# Patient Record
Sex: MALE | URBAN - METROPOLITAN AREA
[De-identification: names, ages, dates, MRNs, and addresses within clinical notes are randomized per-mention and may not be internally consistent; named-entity substitution may affect disease eponyms.]

---

## 2022-09-12 ENCOUNTER — TELEPHONE (OUTPATIENT)
Dept: HEMATOLOGY/ONCOLOGY | Facility: CLINIC | Age: 57
End: 2022-09-12

## 2022-09-12 NOTE — TELEPHONE ENCOUNTER
----- Message from Viry Chew sent at 9/12/2022  8:46 AM CDT -----  Terri (Wife) called to consult with nurse or staff regarding an appointment. She states the patient is currently being seen at [facility] and wanted to find a provider closer because they live in [place]. Terri states that the provider the patient is currently seeing does not do referrals and wanted to see what they can do to get the patient see. She left a number for Dr. Peralta, the oncologist at [phone] just in case the office needs to reach out. Terri would like a call back and can be reached at [phone]. Thanks/MR

## 2022-09-12 NOTE — TELEPHONE ENCOUNTER
Spoke with the patients spouse she states the patient has an appointment with Dr Tomlinson this Wednesday. My direct number was provided and I let her know that we would be more than happy to schedule the patient. They will see Dr Tomlinson Wednesday. They will call us if they would like to make an appointment. TTRN

## 2024-07-31 ENCOUNTER — HOSPITAL ENCOUNTER (EMERGENCY)
Facility: HOSPITAL | Age: 59
Discharge: HOME | End: 2024-07-31
Attending: INTERNAL MEDICINE
Payer: COMMERCIAL

## 2024-07-31 VITALS
TEMPERATURE: 99 F | OXYGEN SATURATION: 100 % | DIASTOLIC BLOOD PRESSURE: 67 MMHG | SYSTOLIC BLOOD PRESSURE: 118 MMHG | RESPIRATION RATE: 18 BRPM | HEIGHT: 67 IN | BODY MASS INDEX: 21.66 KG/M2 | WEIGHT: 138 LBS | HEART RATE: 54 BPM

## 2024-07-31 DIAGNOSIS — R10.84 GENERALIZED ABDOMINAL PAIN: Primary | ICD-10-CM

## 2024-07-31 DIAGNOSIS — Z85.07 PERSONAL HISTORY OF PANCREATIC CANCER: ICD-10-CM

## 2024-07-31 DIAGNOSIS — K59.00 CONSTIPATION, UNSPECIFIED CONSTIPATION TYPE: ICD-10-CM

## 2024-07-31 LAB
ALBUMIN SERPL-MCNC: 3.3 G/DL (ref 3.5–5)
ALBUMIN/GLOB SERPL: 0.9 RATIO (ref 1.1–2)
ALP SERPL-CCNC: 839 UNIT/L (ref 40–150)
ALT SERPL-CCNC: 91 UNIT/L (ref 0–55)
AMMONIA PLAS-MSCNC: 18.2 UMOL/L (ref 18–72)
AMYLASE SERPL-CCNC: 28 UNIT/L (ref 25–125)
ANION GAP SERPL CALC-SCNC: 7 MEQ/L
AST SERPL-CCNC: 102 UNIT/L (ref 5–34)
BASOPHILS # BLD AUTO: 0.03 X10(3)/MCL
BASOPHILS NFR BLD AUTO: 0.5 %
BILIRUB SERPL-MCNC: 0.4 MG/DL
BILIRUB UR QL STRIP.AUTO: NEGATIVE
BUN SERPL-MCNC: 13 MG/DL (ref 8.4–25.7)
CALCIUM SERPL-MCNC: 9.8 MG/DL (ref 8.4–10.2)
CHLORIDE SERPL-SCNC: 108 MMOL/L (ref 98–107)
CLARITY UR: CLEAR
CO2 SERPL-SCNC: 27 MMOL/L (ref 22–29)
COLOR UR AUTO: NORMAL
CREAT SERPL-MCNC: 0.94 MG/DL (ref 0.73–1.18)
CREAT/UREA NIT SERPL: 14
EOSINOPHIL # BLD AUTO: 0.14 X10(3)/MCL (ref 0–0.9)
EOSINOPHIL NFR BLD AUTO: 2.3 %
ERYTHROCYTE [DISTWIDTH] IN BLOOD BY AUTOMATED COUNT: 15.2 % (ref 11.5–17)
GFR SERPLBLD CREATININE-BSD FMLA CKD-EPI: >60 ML/MIN/1.73/M2
GLOBULIN SER-MCNC: 3.8 GM/DL (ref 2.4–3.5)
GLUCOSE SERPL-MCNC: 123 MG/DL (ref 74–100)
GLUCOSE UR QL STRIP: NEGATIVE
HCT VFR BLD AUTO: 34.5 % (ref 42–52)
HGB BLD-MCNC: 10.7 G/DL (ref 14–18)
HGB UR QL STRIP: NEGATIVE
IMM GRANULOCYTES # BLD AUTO: 0.01 X10(3)/MCL (ref 0–0.04)
IMM GRANULOCYTES NFR BLD AUTO: 0.2 %
KETONES UR QL STRIP: NEGATIVE
LACTATE SERPL-SCNC: 0.8 MMOL/L (ref 0.5–2.2)
LEUKOCYTE ESTERASE UR QL STRIP: NEGATIVE
LIPASE SERPL-CCNC: <4 U/L
LYMPHOCYTES # BLD AUTO: 0.97 X10(3)/MCL (ref 0.6–4.6)
LYMPHOCYTES NFR BLD AUTO: 15.8 %
MCH RBC QN AUTO: 27 PG (ref 27–31)
MCHC RBC AUTO-ENTMCNC: 31 G/DL (ref 33–36)
MCV RBC AUTO: 86.9 FL (ref 80–94)
MONOCYTES # BLD AUTO: 0.63 X10(3)/MCL (ref 0.1–1.3)
MONOCYTES NFR BLD AUTO: 10.3 %
NEUTROPHILS # BLD AUTO: 4.34 X10(3)/MCL (ref 2.1–9.2)
NEUTROPHILS NFR BLD AUTO: 70.9 %
NITRITE UR QL STRIP: NEGATIVE
PH UR STRIP: 5.5 [PH]
PLATELET # BLD AUTO: 183 X10(3)/MCL (ref 130–400)
PMV BLD AUTO: 9.9 FL (ref 7.4–10.4)
POTASSIUM SERPL-SCNC: 4.5 MMOL/L (ref 3.5–5.1)
PROT SERPL-MCNC: 7.1 GM/DL (ref 6.4–8.3)
PROT UR QL STRIP: NEGATIVE
RBC # BLD AUTO: 3.97 X10(6)/MCL (ref 4.7–6.1)
SODIUM SERPL-SCNC: 142 MMOL/L (ref 136–145)
SP GR UR STRIP.AUTO: >=1.03 (ref 1–1.03)
UROBILINOGEN UR STRIP-ACNC: 0.2
WBC # BLD AUTO: 6.12 X10(3)/MCL (ref 4.5–11.5)

## 2024-07-31 PROCEDURE — 25000003 PHARM REV CODE 250: Performed by: INTERNAL MEDICINE

## 2024-07-31 PROCEDURE — 82140 ASSAY OF AMMONIA: CPT | Performed by: INTERNAL MEDICINE

## 2024-07-31 PROCEDURE — 80053 COMPREHEN METABOLIC PANEL: CPT | Performed by: INTERNAL MEDICINE

## 2024-07-31 PROCEDURE — 83605 ASSAY OF LACTIC ACID: CPT | Performed by: INTERNAL MEDICINE

## 2024-07-31 PROCEDURE — 82150 ASSAY OF AMYLASE: CPT | Performed by: INTERNAL MEDICINE

## 2024-07-31 PROCEDURE — 83690 ASSAY OF LIPASE: CPT | Performed by: INTERNAL MEDICINE

## 2024-07-31 PROCEDURE — 99284 EMERGENCY DEPT VISIT MOD MDM: CPT | Mod: 25

## 2024-07-31 PROCEDURE — 85025 COMPLETE CBC W/AUTO DIFF WBC: CPT | Performed by: INTERNAL MEDICINE

## 2024-07-31 PROCEDURE — 81003 URINALYSIS AUTO W/O SCOPE: CPT | Performed by: INTERNAL MEDICINE

## 2024-07-31 RX ORDER — LACTULOSE 10 G/15ML
20 SOLUTION ORAL
Status: COMPLETED | OUTPATIENT
Start: 2024-07-31 | End: 2024-07-31

## 2024-07-31 RX ADMIN — LACTULOSE 20 G: 20 SOLUTION ORAL at 10:07

## 2024-08-22 DIAGNOSIS — C25.9 ADENOCARCINOMA OF PANCREAS: Primary | ICD-10-CM

## 2024-08-30 NOTE — PROGRESS NOTES
HEMATOLOGY/ONCOLOGY OFFICE CLINIC VISIT    Visit Information:    Initial Evaluation: 9/3/2024  Referring Provider: Alba Chisholm MD PhD ([facility]) -Cell [phone]  Other providers:  Code status: Not addressed    Diagnosis:  Advanced pancreatic ductal adenocarcinoma (Dx 8/2022)     Present treatment:  Gemzar monotherapy 8/28/2024  -Gemcitabine and CISplatin Every 2 Weeks (8/28/2024-present)--planned  Chemotherapy summary: CISplatin (PLATINOL) 53 mg in sodium chloride 0.9% (NS) 250 mL IVPB, intravenous, 0 of 12 cycles  gemcitabine (GEMZAR) 836 mg in sodium chloride 0.9% (NS) 250 mL IVPB, intravenous, 0 of 12 cycles     Treatment/Oncology history:  -8/2/2022 ERCP with metal biliary stent placement   -Folfirinox (10/2022- 2/2023)  -capecitabine RT (4/3/23 - 5/16/23)   -Phase I study 2021-1176 SD-101 at [facility]--SD-101 2 mg in sodium chloride 0.9% (NS) 30 mL IVPB, intravenous x 2 cycles (8/22/2023-11/22/2023)  -Gemcitabine and paclitaxel protein bound (12/13/2023-- 7/2024)-->clinical progression and GOO  -s/p biliary stent placement 4/4/2024  -chemotherapy with gemcitabine/paclitaxel (last dose 6/26/2024)  -duodenal stent (placed by GI)- 8/7/2024  -Gemcitabine and CISplatin Every 2 Weeks (8/28/2024-present)      Goal of care: life prolongation    Imaging:  CT CAP 8/21/2022: Since 7/25/2022, the primary pancreatic head mass encasing the common hepatic artery remains stable. The intrahepatic biliary obstruction improved with interval placement of a CBD stent. No definite distant metastasis in the abdomen or pelvis. Small indeterminate left lower lobe pulmonary nodule can be followed.  CT CAP 12/12/2022:  Primary pancreatic head tumor encasing the common hepatic artery is overall unchanged in size since 8/21/2022. Stable upstream pancreatic duct dilation and pancreatic atrophy. Interval placement of a cholecystostomy tube, with decompression of the gallbladder. No definite evidence of distant metastatic disease in the chest,  abdomen, and pelvis.   CT CAP 3/7/2023: Locally advanced pancreatic head tumor is slightly less conspicuous. Stable portacaval lymphadenopathy. Geographic hypodense regions have significantly increased throughout the liver compatible with fatty liver, to be correlated clinically regarding any potential concomitant hepatotoxicity; this appearance could obscure small low contrast liver lesions, to be better evaluated with MRI, if indicated. Unchanged mild jennifer appearance of the omentum is favored to be postinflammatory. No definite distant metastatic disease within the chest abdomen pelvis.   Ct CAP 6/29/2023:1.  Locally advanced pancreatic head mass not significantly changed. 2.  Increased periportal haziness and central liver heterogeneity which may relate to radiation.   3.  Indeterminate inferior right hepatic hypodensity as described above. Punctate left hepatic hypodensity also not previously evident, too small to characterize. Consider further evaluation with MRI as indicated.    MRI AP 7/15/2023:Primary neoplasm is noted within the pancreatic head and causes pancreatic ductal dilation and biliary ductal obstruction, which is decompressed via a stent. Nonspecific focal dilation of segment IV bile duct is noted.    CT CAP 10/2/2023: 1.  Compared to 07/28/2023, interval embolization of SMV tributaries in the epigastric region with new embolization coils in the right hepatic lobe following transhepatic catheterization. 2.  Previously noted small indeterminate low-attenuation lesion in the inferior right hepatic lobe segment 6 is no longer visualized. No new hepatic lesions.    3.  Stable 2.2 cm ill-defined residual pancreatic head tumor with no change in the vascular contact with the main portal vein, common hepatic and proper hepatic artery. 4.  No new metastatic disease in the chest or abdomen.   CT CAP 1/9/2024: 1.  Mild interval increase in size of the primary tumor in the head of the pancreas. 2.  No  evidence of metastatic disease in the chest, abdomen or pelvis.   CT AP 4/3/2024: 1.  There is worsening intrahepatic biliary dilation despite the presence of a biliary stent, suggesting stent occlusion. 2.  No significant change in the locally advanced tumor of the head of the pancreas since 03/12/2024.   CT CAP 6/21/2024: 1. No convincing change in the large locally advanced infiltrating ill-defined mass in the head and neck of the pancreas compared with 5/14/2024. 2. No specific evidence of distant metastatic disease in the chest, abdomen or pelvis.   XR ABDOMEN 1 VW PORTABLE 8/6/2024:  There is a gastric drainage tube with the tip below the diaphragm projecting over the gastric body with the sidehole below the GE junction in appropriate position. Gastric drainage tube with tip and sidehole projecting below the diaphragm over the gastric body.  X-ray Abdomen AP  8/5/2024: Air is seen scattered throughout normal caliber colon in a nonobstructive pattern. No dilated loops of bowel. Moderate colonic stool burden. No intraperitoneal free air within the limitations of this study. A biliary stent and embolization coils overlie the right upper quadrant. No suspicious osseous lesions.   Nonobstructive bowel gas pattern. I personally reviewed these image(s) along with the resident's/fellow's interpretations, certify that if a procedure was performed I was physically present, and agree with the final report.  CT Abdomen Pelvis with Contrast 8/5/2024: No suspicious pulmonary nodules in the lung bases. Hepatobiliary: Evaluation of the liver is somewhat limited secondary to streak artifact from the embolization coils. Within these limits there is no suspicious hepatic lesion. The gallbladder is decompressed and poorly evaluated. Common bile duct stent with expected pneumobilia. Mild intrahepatic biliary ductal dilatation is not significantly changed. Spleen: No splenomegaly. Pancreas: Ill-defined infiltrating mass of the  pancreatic head and neck, in keeping with pancreatic adenocarcinoma. There is atrophy and ductal dilatation of the pancreatic body and tail. Overall this tumor appears larger Adrenal Glands: No mass. Kidneys, Ureters, Bladder: No hydronephrosis. No suspicious renal lesion. No bladder mass. Gastrointestinal Tract: The stomach is distended with fluid and debris, suggesting there may be a degree of gastric outlet obstruction secondary to the pancreatic tumor. This is not significantly changed. There is no downstream obstruction. Pelvic Organs: No pelvic mass. Prostatomegaly. Peritoneum/Retroperitoneum: No ascites. Lymph Nodes: No lymphadenopathy. Musculoskeletal: No suspicious skeletal lesion.     No acute abdominopelvic abnormality. Attending addendum: The pancreatic tumor appears larger compared to 06/21/2024. There is gastric distention with debris suggesting chronic outlet obstruction ACTIONABLE ITEMS/RECOMMENDATIONS*: None. *An Actionable Finding is a finding that may be unrelated to the original reason for imaging but potentially actionable, meaning further investigation may be necessary. The Actionable Findings Vigilance Unit (AFVU) assists medical providers with responding to additional radiologic findings that are unexpected and potentially actionable. I personally reviewed these image(s) along with the resident's/fellow's interpretations, certify that if a procedure was performed I was physically present, and agree with the final report.     Pathology:  8/2/2022:  FNA head pancreas: SCANT MALIGNANT CELLS, FAVOR ADENOCARCINOMA  NGS:  No detectable genomic aberrations      CLINICAL HISTORY:       Patient: Warren Lejeune is a 59 y.o. male.with a past medical history of past medical history of pancreatic ductal adenocarcinoma (Dx 8/2022)   Patient initially presented with  unintentional weight loss starting in 1/2022. He also had worsening back/shoulder and abdominal pain during this time. In July he finally  presented to his PCP with darkening of the urine and lightening of the stool - with Jaundice that his wife noticed. Labs demonstrating cholestasis (bilirubin of 19).    Patient noted to have an abnormal CT scan after developing obstructive jaundice. 2 cm hypoenhancing mass noted in the head of the pancreas with intra and extrahepatic biliary ductal dilatation. Mass measured 2.5 x 2.1 cm. There is also atrophy of the distal gland and upstream dilatation of the pancreas duct. There is no obvious vascular invasion. There were no focal liver lesions.    8/2/22- EGD/EUS/FNA. Final pathology showed ductal adenocarcinoma, moderately differentiated. ERCP on the same date performed, with placement of a fully covered metal biliary stent, 60 mm x 10 mm.    7/27/22- CA 19-9 was 681    8/21/22- CT CAP: Since 7/25/2022, the primary pancreatic head mass encasing the common hepatic artery remains stable. The intrahepatic biliary obstruction improved with interval placement of a CBD stent. No definite distant metastasis in the abdomen or pelvis. Small indeterminate left lower lobe pulmonary nodule can be followed.    09/03/2022 Germline genetic testing: Negative for germline pathogenic variants in any of the analyzed genes, Genes Analyzed: APC, DALE, BMPR1A, BRCA1, BRCA2, CDKN2A, EPCAM, MEN1, MLH1, MSH2, MSH6, NF1, PALB2, PMS2, SMAD4, STK11, TP53, TSC1, TSC2, and VHL. Genetic testing performed by [employer]; full report available in scanned documents.     s/p biliary stent placement, currently undergoing chemotherapy with gemcitabine/paclitaxel (last dose 6/26/2024), T2DM on insulin presenting for abdominal pain and constipation. CT A/P with distension in stomach suggesting gastric outlet obstruction secondary to pancreatic tumor. GI and Surg Onc consulted, pending evaluation. Poor PO tolerance, deferring NG tube now as vomiting resolves and having bowel movements.     Patient was recently admitted to the hospital at  "[facility]--Hospital Course:  "Findings on imaging were concerning for malignant gastric outlet obstruction from pancreatic cancer. After a discussion with surgical oncology, GI and GIMO, decision was made to proceed with duodenal stent (placed by GI). No indications for gastrojejunostomy bypass. Patient tolerated procedure well. We were able to advance to low fiber diet. Nutrition was consulted for low fiber diet education. Managed neoplasm related pain with PO morphine."     Patient is here today with his wife to continue chemotherapy close to home.  He is doing relatively well and voices no concerns.  MediPort was removed from his left chest wall to exposure of the MediPort.  Patient reports abdominal mid back pain, occasional muscle spasm and dizziness.  No fever, chills, sweats.  No chest pain or short of breath.    Chief Complaint: Pancreatic Cancer (Pt c/o abd and middle back pain that radiates downward. He is also having muscle spasms and dizziness. )      Interval History:        Past Medical History:   Diagnosis Date    Bradycardia     Enlarged prostate     Pancreatic cancer     Type 2 diabetes mellitus with unspecified diabetic retinopathy without macular edema       Past Surgical History:   Procedure Laterality Date    EGD, WITH STENT INSERTION      MEDIPORT REMOVAL       Family History   Problem Relation Name Age of Onset    Hypertension Mother      Stroke Father      Stroke Sister      Prostate cancer Brother            Review of patient's allergies indicates:  No Known Allergies   Current Outpatient Medications on File Prior to Visit   Medication Sig Dispense Refill    cholecalciferol, vitamin D3, (VITAMIN D3) 50 mcg (2,000 unit) Tab Take 2,000 Units by mouth.      cyclobenzaprine (FLEXERIL) 5 MG tablet Take 5 mg by mouth 3 (three) times daily as needed for Muscle spasms.      diphenoxylate-atropine 2.5-0.025 mg (LOMOTIL) 2.5-0.025 mg per tablet Take 1 tablet by mouth.      GEMTESA 75 mg Tab Take 1 tablet by " mouth.      insulin aspart U-100 (NOVOLOG) 100 unit/mL injection Inject into the skin 3 (three) times daily before meals.      magnesium 250 mg Tab Take 1 tablet by mouth once daily.      multivitamin with folic acid 400 mcg Tab Take 1 tablet by mouth once daily.      ondansetron (ZOFRAN) 8 MG tablet Take 8 mg by mouth.      oxyCODONE-acetaminophen (PERCOCET)  mg per tablet Take 1 tablet by mouth.      pantoprazole (PROTONIX) 40 MG tablet Take 40 mg by mouth.      polyethylene glycol (GLYCOLAX) 17 gram PwPk Take 17 g by mouth.      pregabalin (LYRICA) 100 MG capsule Take 100 mg by mouth.      prochlorperazine (COMPAZINE) 10 MG tablet Take 10 mg by mouth.      tamsulosin (FLOMAX) 0.4 mg Cap Take 1 capsule by mouth.      TRESIBA FLEXTOUCH U-200 200 unit/mL (3 mL) insulin pen Inject 14 Units into the skin.       No current facility-administered medications on file prior to visit.      Review of Systems   Constitutional:  Positive for fatigue. Negative for activity change, appetite change, chills, diaphoresis, fever and unexpected weight change.   HENT:  Negative for nasal congestion, mouth sores, nosebleeds, sinus pressure/congestion, sore throat and trouble swallowing.    Eyes: Negative.    Respiratory:  Negative for cough and shortness of breath.    Cardiovascular:  Negative for chest pain and palpitations.   Gastrointestinal:  Negative for abdominal distention, abdominal pain, blood in stool, change in bowel habit, constipation, diarrhea, nausea and vomiting.   Endocrine: Negative.    Genitourinary:  Negative for bladder incontinence, decreased urine volume, difficulty urinating, dysuria, frequency, hematuria and urgency.   Musculoskeletal:  Negative for arthralgias, back pain, gait problem, joint swelling, leg pain and myalgias.   Integumentary:  Negative for rash.   Allergic/Immunologic: Negative.    Neurological:  Negative for dizziness, tremors, syncope, weakness, light-headedness, numbness, headaches and  "memory loss.   Hematological:  Negative for adenopathy. Does not bruise/bleed easily.   Psychiatric/Behavioral:  Negative for agitation, confusion, hallucinations, sleep disturbance and suicidal ideas. The patient is not nervous/anxious.               Vitals:    09/03/24 1310   BP: 109/68   Pulse: (!) 51   Resp: 20   Temp: 98 °F (36.7 °C)   SpO2: 100%   Weight: 62 kg (136 lb 11.2 oz)   Height: 5' 6.54" (1.69 m)      Wt Readings from Last 6 Encounters:   09/03/24 62 kg (136 lb 11.2 oz)   07/31/24 62.6 kg (138 lb)     Body mass index is 21.71 kg/m².  Body surface area is 1.71 meters squared.  Physical Exam  Vitals and nursing note reviewed.   Constitutional:       General: He is not in acute distress.     Comments: thin   HENT:      Head: Normocephalic and atraumatic.      Mouth/Throat:      Mouth: Mucous membranes are moist.   Eyes:      General: No scleral icterus.     Extraocular Movements: Extraocular movements intact.      Conjunctiva/sclera: Conjunctivae normal.   Neck:      Vascular: No JVD.   Cardiovascular:      Rate and Rhythm: Normal rate and regular rhythm.      Heart sounds: No murmur heard.  Pulmonary:      Effort: Pulmonary effort is normal.      Breath sounds: Normal breath sounds. No wheezing or rhonchi.   Chest:      Comments: Left chest wall scar from previous Mediport  Abdominal:      General: Bowel sounds are normal. There is no distension.      Palpations: Abdomen is soft.      Tenderness: There is no abdominal tenderness.   Musculoskeletal:         General: No swelling or deformity.      Cervical back: Neck supple.   Lymphadenopathy:      Head:      Right side of head: No submandibular adenopathy.      Left side of head: No submandibular adenopathy.      Cervical: No cervical adenopathy.      Upper Body:      Right upper body: No supraclavicular or axillary adenopathy.      Left upper body: No supraclavicular or axillary adenopathy.      Lower Body: No right inguinal adenopathy. No left inguinal " adenopathy.   Skin:     General: Skin is warm.      Coloration: Skin is not jaundiced.      Findings: No rash.   Neurological:      General: No focal deficit present.      Mental Status: He is alert and oriented to person, place, and time.      Cranial Nerves: Cranial nerves 2-12 are intact.   Psychiatric:         Attention and Perception: Attention normal.         Behavior: Behavior is cooperative.       ECOG SCORE    0 - Fully active-able to carry on all pre-disease performance without restriction         Laboratory:  CBC with Differential:  Lab Results   Component Value Date    WBC 6.12 07/31/2024    RBC 3.97 (L) 07/31/2024    HGB 10.7 (L) 07/31/2024    HCT 34.5 (L) 07/31/2024    MCV 86.9 07/31/2024    MCH 27.0 07/31/2024    MCHC 31.0 (L) 07/31/2024    RDW 15.2 07/31/2024     07/31/2024    MPV 9.9 07/31/2024        CMP:  Sodium   Date Value Ref Range Status   07/31/2024 142 136 - 145 mmol/L Final     Potassium   Date Value Ref Range Status   07/31/2024 4.5 3.5 - 5.1 mmol/L Final     Chloride   Date Value Ref Range Status   07/31/2024 108 (H) 98 - 107 mmol/L Final     CO2   Date Value Ref Range Status   07/31/2024 27 22 - 29 mmol/L Final     Blood Urea Nitrogen   Date Value Ref Range Status   07/31/2024 13.0 8.4 - 25.7 mg/dL Final   10/20/2023 12 6 - 23 mg/dL Final     Creatinine   Date Value Ref Range Status   07/31/2024 0.94 0.73 - 1.18 mg/dL Final   10/20/2023 0.95 0.67 - 1.17 mg/dL Final     Calcium   Date Value Ref Range Status   07/31/2024 9.8 8.4 - 10.2 mg/dL Final   10/20/2023 8.7 8.4 - 10.2 mg/dL Final     Albumin   Date Value Ref Range Status   07/31/2024 3.3 (L) 3.5 - 5.0 g/dL Final     Bilirubin Total   Date Value Ref Range Status   07/31/2024 0.4 <=1.5 mg/dL Final     ALP   Date Value Ref Range Status   07/31/2024 839 (H) 40 - 150 unit/L Final     AST   Date Value Ref Range Status   07/31/2024 102 (H) 5 - 34 unit/L Final     ALT   Date Value Ref Range Status   07/31/2024 91 (H) 0 - 55 unit/L  Final      Component 08/28/24 07/17/24 06/26/24 06/21/24 06/12/24 05/29/24   CA 19-9 2,385.0 High  2,150.0 High  1,567.0 High  1,183.0 High  1,000.0 High  959.9 High             Assessment:       1. Adenocarcinoma of pancreas      1) Locally advanced pancreatic cancer   ---8/2/2022 ERCP with metal biliary stent placement   ---Folfirinox (10/2022- 2/2023)  ---capecitabine RT (4/3/23 - 5/16/23)   ---Phase I study 2021-1176 SD-101 at [facility]--SD-101 2 mg in sodium chloride 0.9% (NS) 30 mL IVPB, intravenous x 2 cycles (8/22/2023-11/22/2023)  ---Gemcitabine and paclitaxel protein bound (12/13/2023-- 7/2024)-->clinical progression and GOO  ---s/p biliary stent placement 4/4/2024  ---chemotherapy with gemcitabine/paclitaxel (last dose 6/26/2024)  ---duodenal stent (placed by GI)- 8/7/2024  ---Gemcitabine and CISplatin Every 2 Weeks (8/28/2024-present)    2) H/o Gastric outlet obstruction  ---s/p duodenal stent, severe protein calori malnutrition     3) Pain, neoplasm related pain  ---continue Percocet and Lyrica          Plan:       Patient with unresectable advanced pancreatic cancer to start 4th line treatment with cisplatin and Gemzar.   We will schedule initiation of treatment on/11/2024 with a doctor visit day prior    RTC 2 weeks with NP/MD for TD next day  Labs today and q week  CBC, CMP CA 19-9 today   CBC, CMP q week and add CA 19-9 q Day 1 of chemotherapy  Refer to Dr Moe Champagne for Mediport placemen right chest wall if possible    The patient was seen, interviewed and examined. Pertinent lab and radiology studies were reviewed.   The patient was given ample opportunity to ask questions, and to the best of my abilities, all questions answered to satisfaction; patient demonstrated understanding of what we discussed and agreeable to the plan. Pt instructed to call should develop concerning signs/symptoms or have further questions.     I'd like to thank for referring and allowing me the opportunity to participate in  the care of this patient and if any questions, please do not hesitate to call the office at [phone].         Jena Messina MD  Hematology/Oncology

## 2024-09-03 ENCOUNTER — LAB VISIT (OUTPATIENT)
Dept: LAB | Facility: HOSPITAL | Age: 59
End: 2024-09-03
Attending: INTERNAL MEDICINE
Payer: COMMERCIAL

## 2024-09-03 ENCOUNTER — OFFICE VISIT (OUTPATIENT)
Dept: HEMATOLOGY/ONCOLOGY | Facility: CLINIC | Age: 59
End: 2024-09-03
Payer: COMMERCIAL

## 2024-09-03 VITALS
SYSTOLIC BLOOD PRESSURE: 109 MMHG | DIASTOLIC BLOOD PRESSURE: 68 MMHG | OXYGEN SATURATION: 100 % | RESPIRATION RATE: 20 BRPM | BODY MASS INDEX: 21.45 KG/M2 | HEIGHT: 67 IN | HEART RATE: 51 BPM | TEMPERATURE: 98 F | WEIGHT: 136.69 LBS

## 2024-09-03 DIAGNOSIS — C25.9 ADENOCARCINOMA OF PANCREAS: ICD-10-CM

## 2024-09-03 DIAGNOSIS — C25.9 ADENOCARCINOMA OF PANCREAS: Primary | ICD-10-CM

## 2024-09-03 LAB
ALBUMIN SERPL-MCNC: 3.1 G/DL (ref 3.5–5)
ALBUMIN/GLOB SERPL: 0.9 RATIO (ref 1.1–2)
ALP SERPL-CCNC: 395 UNIT/L (ref 40–150)
ALT SERPL-CCNC: 39 UNIT/L (ref 0–55)
ANION GAP SERPL CALC-SCNC: 5 MEQ/L
AST SERPL-CCNC: 30 UNIT/L (ref 5–34)
BASOPHILS # BLD AUTO: 0.01 X10(3)/MCL
BASOPHILS NFR BLD AUTO: 0.3 %
BILIRUB SERPL-MCNC: 0.3 MG/DL
BUN SERPL-MCNC: 13 MG/DL (ref 8.4–25.7)
CALCIUM SERPL-MCNC: 8.9 MG/DL (ref 8.4–10.2)
CHLORIDE SERPL-SCNC: 102 MMOL/L (ref 98–107)
CO2 SERPL-SCNC: 29 MMOL/L (ref 22–29)
CREAT SERPL-MCNC: 0.76 MG/DL (ref 0.73–1.18)
CREAT/UREA NIT SERPL: 17
EOSINOPHIL # BLD AUTO: 0.02 X10(3)/MCL (ref 0–0.9)
EOSINOPHIL NFR BLD AUTO: 0.7 %
ERYTHROCYTE [DISTWIDTH] IN BLOOD BY AUTOMATED COUNT: 15.2 % (ref 11.5–17)
GFR SERPLBLD CREATININE-BSD FMLA CKD-EPI: >60 ML/MIN/1.73/M2
GLOBULIN SER-MCNC: 3.4 GM/DL (ref 2.4–3.5)
GLUCOSE SERPL-MCNC: 185 MG/DL (ref 74–100)
HCT VFR BLD AUTO: 33.1 % (ref 42–52)
HGB BLD-MCNC: 10.4 G/DL (ref 14–18)
IMM GRANULOCYTES # BLD AUTO: 0 X10(3)/MCL (ref 0–0.04)
IMM GRANULOCYTES NFR BLD AUTO: 0 %
LYMPHOCYTES # BLD AUTO: 0.83 X10(3)/MCL (ref 0.6–4.6)
LYMPHOCYTES NFR BLD AUTO: 28.2 %
MCH RBC QN AUTO: 27.2 PG (ref 27–31)
MCHC RBC AUTO-ENTMCNC: 31.4 G/DL (ref 33–36)
MCV RBC AUTO: 86.4 FL (ref 80–94)
MONOCYTES # BLD AUTO: 0.28 X10(3)/MCL (ref 0.1–1.3)
MONOCYTES NFR BLD AUTO: 9.5 %
NEUTROPHILS # BLD AUTO: 1.8 X10(3)/MCL (ref 2.1–9.2)
NEUTROPHILS NFR BLD AUTO: 61.3 %
PLATELET # BLD AUTO: 128 X10(3)/MCL (ref 130–400)
PMV BLD AUTO: 10.8 FL (ref 7.4–10.4)
POTASSIUM SERPL-SCNC: 4.1 MMOL/L (ref 3.5–5.1)
PROT SERPL-MCNC: 6.5 GM/DL (ref 6.4–8.3)
RBC # BLD AUTO: 3.83 X10(6)/MCL (ref 4.7–6.1)
SODIUM SERPL-SCNC: 136 MMOL/L (ref 136–145)
WBC # BLD AUTO: 2.94 X10(3)/MCL (ref 4.5–11.5)

## 2024-09-03 PROCEDURE — 3074F SYST BP LT 130 MM HG: CPT | Mod: CPTII,S$GLB,, | Performed by: INTERNAL MEDICINE

## 2024-09-03 PROCEDURE — 3008F BODY MASS INDEX DOCD: CPT | Mod: CPTII,S$GLB,, | Performed by: INTERNAL MEDICINE

## 2024-09-03 PROCEDURE — 80053 COMPREHEN METABOLIC PANEL: CPT

## 2024-09-03 PROCEDURE — 99999 PR PBB SHADOW E&M-EST. PATIENT-LVL V: CPT | Mod: PBBFAC,,, | Performed by: INTERNAL MEDICINE

## 2024-09-03 PROCEDURE — 86301 IMMUNOASSAY TUMOR CA 19-9: CPT

## 2024-09-03 PROCEDURE — 1159F MED LIST DOCD IN RCRD: CPT | Mod: CPTII,S$GLB,, | Performed by: INTERNAL MEDICINE

## 2024-09-03 PROCEDURE — 85025 COMPLETE CBC W/AUTO DIFF WBC: CPT

## 2024-09-03 PROCEDURE — 3078F DIAST BP <80 MM HG: CPT | Mod: CPTII,S$GLB,, | Performed by: INTERNAL MEDICINE

## 2024-09-03 PROCEDURE — 36415 COLL VENOUS BLD VENIPUNCTURE: CPT

## 2024-09-03 PROCEDURE — 1160F RVW MEDS BY RX/DR IN RCRD: CPT | Mod: CPTII,S$GLB,, | Performed by: INTERNAL MEDICINE

## 2024-09-03 PROCEDURE — 99205 OFFICE O/P NEW HI 60 MIN: CPT | Mod: S$GLB,,, | Performed by: INTERNAL MEDICINE

## 2024-09-03 RX ORDER — PREGABALIN 100 MG/1
100 CAPSULE ORAL
COMMUNITY
Start: 2024-06-12

## 2024-09-03 RX ORDER — ONDANSETRON HYDROCHLORIDE 8 MG/1
8 TABLET, FILM COATED ORAL
COMMUNITY
Start: 2024-06-12

## 2024-09-03 RX ORDER — INSULIN DEGLUDEC 200 U/ML
14 INJECTION, SOLUTION SUBCUTANEOUS
COMMUNITY
Start: 2024-03-12

## 2024-09-03 RX ORDER — DIPHENOXYLATE HYDROCHLORIDE AND ATROPINE SULFATE 2.5; .025 MG/1; MG/1
1 TABLET ORAL
COMMUNITY
Start: 2024-06-12

## 2024-09-03 RX ORDER — POLYETHYLENE GLYCOL 3350 17 G/17G
17 POWDER, FOR SOLUTION ORAL
COMMUNITY
Start: 2024-08-09

## 2024-09-03 RX ORDER — PANTOPRAZOLE SODIUM 40 MG/1
40 TABLET, DELAYED RELEASE ORAL
COMMUNITY
Start: 2024-07-31

## 2024-09-03 RX ORDER — PROCHLORPERAZINE MALEATE 10 MG
10 TABLET ORAL
COMMUNITY
Start: 2024-06-12

## 2024-09-03 RX ORDER — OXYCODONE AND ACETAMINOPHEN 10; 325 MG/1; MG/1
1 TABLET ORAL
COMMUNITY
Start: 2024-08-09

## 2024-09-03 RX ORDER — MAGNESIUM 250 MG
1 TABLET ORAL DAILY
COMMUNITY

## 2024-09-03 RX ORDER — CYCLOBENZAPRINE HCL 5 MG
5 TABLET ORAL 3 TIMES DAILY PRN
COMMUNITY

## 2024-09-03 RX ORDER — MULTIVITAMIN
1 TABLET ORAL DAILY
COMMUNITY

## 2024-09-03 RX ORDER — TAMSULOSIN HYDROCHLORIDE 0.4 MG/1
1 CAPSULE ORAL
COMMUNITY
Start: 2024-07-26

## 2024-09-03 RX ORDER — CHOLECALCIFEROL (VITAMIN D3) 50 MCG
2000 TABLET ORAL
COMMUNITY

## 2024-09-03 RX ORDER — INSULIN ASPART 100 [IU]/ML
INJECTION, SOLUTION INTRAVENOUS; SUBCUTANEOUS
COMMUNITY

## 2024-09-03 RX ORDER — VIBEGRON 75 MG/1
1 TABLET, FILM COATED ORAL
COMMUNITY
Start: 2024-07-24

## 2024-09-04 ENCOUNTER — DOCUMENTATION ONLY (OUTPATIENT)
Dept: HEMATOLOGY/ONCOLOGY | Facility: CLINIC | Age: 59
End: 2024-09-04
Payer: COMMERCIAL

## 2024-09-04 ENCOUNTER — TELEPHONE (OUTPATIENT)
Dept: HEMATOLOGY/ONCOLOGY | Facility: CLINIC | Age: 59
End: 2024-09-04
Payer: COMMERCIAL

## 2024-09-04 DIAGNOSIS — C25.9 ADENOCARCINOMA OF PANCREAS: Primary | ICD-10-CM

## 2024-09-04 LAB — CANCER AG19-9 SERPL-ACNC: 8876.42 UNIT/ML (ref 0–37)

## 2024-09-04 NOTE — TELEPHONE ENCOUNTER
I spoke with his doctor from [facility] and after long discussion he will have cis and gem q 2 weeks. I can not put the order and need to wait until is built by pharmacist.    Patient will not have education so please let him know that I spoke with his doctor from [facility] and he will have the chemo q 2 weeks.     Thanks

## 2024-09-05 ENCOUNTER — DOCUMENTATION ONLY (OUTPATIENT)
Dept: HEMATOLOGY/ONCOLOGY | Facility: CLINIC | Age: 59
End: 2024-09-05
Payer: COMMERCIAL

## 2024-09-05 NOTE — PROGRESS NOTES
HEMATOLOGY/ONCOLOGY OFFICE CLINIC VISIT    Visit Information:    Initial Evaluation: 9/3/2024  Referring Provider: Alba Chisholm MD PhD ([facility]) -Cell [phone]  Other providers:  Code status: Not addressed    Diagnosis:  Advanced pancreatic ductal adenocarcinoma (Dx 8/2022)     Present treatment:  Gemzar monotherapy 8/28/2024  -Gemcitabine and CISplatin Every 2 Weeks (8/28/2024-present)--planned  Chemotherapy summary: CISplatin (PLATINOL) 53 mg in sodium chloride 0.9% (NS) 250 mL IVPB, intravenous, 0 of 12 cycles  gemcitabine (GEMZAR) 836 mg in sodium chloride 0.9% (NS) 250 mL IVPB, intravenous, 0 of 12 cycles     Treatment/Oncology history:  -8/2/2022 ERCP with metal biliary stent placement   -Folfirinox (10/2022- 2/2023)  -capecitabine RT (4/3/23 - 5/16/23)   -Phase I study 2021-1176 SD-101 at [facility]--SD-101 2 mg in sodium chloride 0.9% (NS) 30 mL IVPB, intravenous x 2 cycles (8/22/2023-11/22/2023)  -Gemcitabine and paclitaxel protein bound (12/13/2023-- 7/2024)-->clinical progression and GOO  -s/p biliary stent placement 4/4/2024  -chemotherapy with gemcitabine/paclitaxel (last dose 6/26/2024)  -duodenal stent (placed by GI)- 8/7/2024  -Gemcitabine and CISplatin Every 2 Weeks (8/28/2024-present)      Goal of care: life prolongation    Imaging:  CT CAP 8/21/2022: Since 7/25/2022, the primary pancreatic head mass encasing the common hepatic artery remains stable. The intrahepatic biliary obstruction improved with interval placement of a CBD stent. No definite distant metastasis in the abdomen or pelvis. Small indeterminate left lower lobe pulmonary nodule can be followed.  CT CAP 12/12/2022:  Primary pancreatic head tumor encasing the common hepatic artery is overall unchanged in size since 8/21/2022. Stable upstream pancreatic duct dilation and pancreatic atrophy. Interval placement of a cholecystostomy tube, with decompression of the gallbladder. No definite evidence of distant metastatic disease in the chest,  abdomen, and pelvis.   CT CAP 3/7/2023: Locally advanced pancreatic head tumor is slightly less conspicuous. Stable portacaval lymphadenopathy. Geographic hypodense regions have significantly increased throughout the liver compatible with fatty liver, to be correlated clinically regarding any potential concomitant hepatotoxicity; this appearance could obscure small low contrast liver lesions, to be better evaluated with MRI, if indicated. Unchanged mild jennifer appearance of the omentum is favored to be postinflammatory. No definite distant metastatic disease within the chest abdomen pelvis.   Ct CAP 6/29/2023:1.  Locally advanced pancreatic head mass not significantly changed. 2.  Increased periportal haziness and central liver heterogeneity which may relate to radiation.   3.  Indeterminate inferior right hepatic hypodensity as described above. Punctate left hepatic hypodensity also not previously evident, too small to characterize. Consider further evaluation with MRI as indicated.    MRI AP 7/15/2023:Primary neoplasm is noted within the pancreatic head and causes pancreatic ductal dilation and biliary ductal obstruction, which is decompressed via a stent. Nonspecific focal dilation of segment IV bile duct is noted.    CT CAP 10/2/2023: 1.  Compared to 07/28/2023, interval embolization of SMV tributaries in the epigastric region with new embolization coils in the right hepatic lobe following transhepatic catheterization. 2.  Previously noted small indeterminate low-attenuation lesion in the inferior right hepatic lobe segment 6 is no longer visualized. No new hepatic lesions.    3.  Stable 2.2 cm ill-defined residual pancreatic head tumor with no change in the vascular contact with the main portal vein, common hepatic and proper hepatic artery. 4.  No new metastatic disease in the chest or abdomen.   CT CAP 1/9/2024: 1.  Mild interval increase in size of the primary tumor in the head of the pancreas. 2.  No  evidence of metastatic disease in the chest, abdomen or pelvis.   CT AP 4/3/2024: 1.  There is worsening intrahepatic biliary dilation despite the presence of a biliary stent, suggesting stent occlusion. 2.  No significant change in the locally advanced tumor of the head of the pancreas since 03/12/2024.   CT CAP 6/21/2024: 1. No convincing change in the large locally advanced infiltrating ill-defined mass in the head and neck of the pancreas compared with 5/14/2024. 2. No specific evidence of distant metastatic disease in the chest, abdomen or pelvis.   XR ABDOMEN 1 VW PORTABLE 8/6/2024:  There is a gastric drainage tube with the tip below the diaphragm projecting over the gastric body with the sidehole below the GE junction in appropriate position. Gastric drainage tube with tip and sidehole projecting below the diaphragm over the gastric body.  X-ray Abdomen AP  8/5/2024: Air is seen scattered throughout normal caliber colon in a nonobstructive pattern. No dilated loops of bowel. Moderate colonic stool burden. No intraperitoneal free air within the limitations of this study. A biliary stent and embolization coils overlie the right upper quadrant. No suspicious osseous lesions.   Nonobstructive bowel gas pattern. I personally reviewed these image(s) along with the resident's/fellow's interpretations, certify that if a procedure was performed I was physically present, and agree with the final report.  CT Abdomen Pelvis with Contrast 8/5/2024: No suspicious pulmonary nodules in the lung bases. Hepatobiliary: Evaluation of the liver is somewhat limited secondary to streak artifact from the embolization coils. Within these limits there is no suspicious hepatic lesion. The gallbladder is decompressed and poorly evaluated. Common bile duct stent with expected pneumobilia. Mild intrahepatic biliary ductal dilatation is not significantly changed. Spleen: No splenomegaly. Pancreas: Ill-defined infiltrating mass of the  pancreatic head and neck, in keeping with pancreatic adenocarcinoma. There is atrophy and ductal dilatation of the pancreatic body and tail. Overall this tumor appears larger Adrenal Glands: No mass. Kidneys, Ureters, Bladder: No hydronephrosis. No suspicious renal lesion. No bladder mass. Gastrointestinal Tract: The stomach is distended with fluid and debris, suggesting there may be a degree of gastric outlet obstruction secondary to the pancreatic tumor. This is not significantly changed. There is no downstream obstruction. Pelvic Organs: No pelvic mass. Prostatomegaly. Peritoneum/Retroperitoneum: No ascites. Lymph Nodes: No lymphadenopathy. Musculoskeletal: No suspicious skeletal lesion.     No acute abdominopelvic abnormality. Attending addendum: The pancreatic tumor appears larger compared to 06/21/2024. There is gastric distention with debris suggesting chronic outlet obstruction ACTIONABLE ITEMS/RECOMMENDATIONS*: None. *An Actionable Finding is a finding that may be unrelated to the original reason for imaging but potentially actionable, meaning further investigation may be necessary. The Actionable Findings Vigilance Unit (AFVU) assists medical providers with responding to additional radiologic findings that are unexpected and potentially actionable. I personally reviewed these image(s) along with the resident's/fellow's interpretations, certify that if a procedure was performed I was physically present, and agree with the final report.     Pathology:  8/2/2022:  FNA head pancreas: SCANT MALIGNANT CELLS, FAVOR ADENOCARCINOMA  NGS:  No detectable genomic aberrations      CLINICAL HISTORY:       Patient: Warren Lejeune is a 59 y.o. male.with a past medical history of past medical history of pancreatic ductal adenocarcinoma (Dx 8/2022)   Patient initially presented with  unintentional weight loss starting in 1/2022. He also had worsening back/shoulder and abdominal pain during this time. In July he finally  presented to his PCP with darkening of the urine and lightening of the stool - with Jaundice that his wife noticed. Labs demonstrating cholestasis (bilirubin of 19).    Patient noted to have an abnormal CT scan after developing obstructive jaundice. 2 cm hypoenhancing mass noted in the head of the pancreas with intra and extrahepatic biliary ductal dilatation. Mass measured 2.5 x 2.1 cm. There is also atrophy of the distal gland and upstream dilatation of the pancreas duct. There is no obvious vascular invasion. There were no focal liver lesions.    8/2/22- EGD/EUS/FNA. Final pathology showed ductal adenocarcinoma, moderately differentiated. ERCP on the same date performed, with placement of a fully covered metal biliary stent, 60 mm x 10 mm.    7/27/22- CA 19-9 was 681    8/21/22- CT CAP: Since 7/25/2022, the primary pancreatic head mass encasing the common hepatic artery remains stable. The intrahepatic biliary obstruction improved with interval placement of a CBD stent. No definite distant metastasis in the abdomen or pelvis. Small indeterminate left lower lobe pulmonary nodule can be followed.    09/03/2022 Germline genetic testing: Negative for germline pathogenic variants in any of the analyzed genes, Genes Analyzed: APC, DALE, BMPR1A, BRCA1, BRCA2, CDKN2A, EPCAM, MEN1, MLH1, MSH2, MSH6, NF1, PALB2, PMS2, SMAD4, STK11, TP53, TSC1, TSC2, and VHL. Genetic testing performed by [employer]; full report available in scanned documents.     s/p biliary stent placement, currently undergoing chemotherapy with gemcitabine/paclitaxel (last dose 6/26/2024), T2DM on insulin presenting for abdominal pain and constipation. CT A/P with distension in stomach suggesting gastric outlet obstruction secondary to pancreatic tumor. GI and Surg Onc consulted, pending evaluation. Poor PO tolerance, deferring NG tube now as vomiting resolves and having bowel movements.     Patient was recently admitted to the hospital at  "[facility]--Hospital Course:  "Findings on imaging were concerning for malignant gastric outlet obstruction from pancreatic cancer. After a discussion with surgical oncology, GI and GIMO, decision was made to proceed with duodenal stent (placed by GI). No indications for gastrojejunostomy bypass. Patient tolerated procedure well. We were able to advance to low fiber diet. Nutrition was consulted for low fiber diet education. Managed neoplasm related pain with PO morphine."     Patient is here today with his wife to continue chemotherapy close to home.  He is doing relatively well and voices no concerns.  MediPort was removed from his left chest wall to exposure of the MediPort.  Patient reports abdominal mid back pain, occasional muscle spasm and dizziness.  No fever, chills, sweats.  No chest pain or short of breath.    Chief Complaint: Abdominal pain with radiating to the back.      Interval History:    9/10/2024:  Mr. Lejeune is here today for labs and follow-up regarding pancreatic cancer.  He will start cycle 2, 4th line treatment of Gemcitabine and Cisplatin every 2 weeks on 9/11/2024.  He received his 1st cycle at [facility] on 8/28/2024.  His wife Terri is present by telephone today during the clinic visit.  Today, he reports, he had nausea/vomiting/diarrhea/abdominal pain after receiving cycle 1 of Gemcitabine, no Cisplatin at [facility].  He states, "just abdominal pain that radiates to the back today.  Pain rating is "6-7" on 0-10 pain scale.  He is taking Percocet 10 mg PO once/twice a day only, with relief.  He denies any fever, chills, cough, SOB, chest pain, abnormal bleeding, nausea, vomiting, jaundice, change in bladder habits, joint/bone/muscle cramping or pain, and no lower extremity pain or swelling.  He is taking Miralax and laxative for opioid induced constipation, and Pregabalin 100 mg QD for bilateral hands and feet neuropathy.  Patient reports difficulty swallowing thick and solid foods, which started " "after he had a duodenal stent placed 1 month ago.  Labs reviewed with patient.  Abnormal labs:  Hgb 11.0, Hct 35.3, , and AST 61.  He has gained 4 pounds since his last visit.  Eating and drinking, "some days better than other."  He denies any recent infections, hospitalizations, illnesses.    He will see Dr. Moe Champagne on 9/10/2024 for Port consult.  Per Dr. Messina, Mr. Lejeune will start his 2nd cycle without Port and have via peripheral IV.    Past Medical History:   Diagnosis Date    Bradycardia     Enlarged prostate     Pancreatic cancer     Type 2 diabetes mellitus with unspecified diabetic retinopathy without macular edema       Past Surgical History:   Procedure Laterality Date    EGD, WITH STENT INSERTION      MEDIPORT REMOVAL       Family History   Problem Relation Name Age of Onset    Hypertension Mother      Stroke Father      Stroke Sister      Prostate cancer Brother            Review of patient's allergies indicates:  No Known Allergies   Current Outpatient Medications on File Prior to Visit   Medication Sig Dispense Refill    cholecalciferol, vitamin D3, (VITAMIN D3) 50 mcg (2,000 unit) Tab Take 2,000 Units by mouth.      cyclobenzaprine (FLEXERIL) 5 MG tablet Take 5 mg by mouth 3 (three) times daily as needed for Muscle spasms.      diphenoxylate-atropine 2.5-0.025 mg (LOMOTIL) 2.5-0.025 mg per tablet Take 1 tablet by mouth.      GEMTESA 75 mg Tab Take 1 tablet by mouth.      insulin aspart U-100 (NOVOLOG) 100 unit/mL injection Inject into the skin 3 (three) times daily before meals.      magnesium 250 mg Tab Take 1 tablet by mouth once daily.      multivitamin with folic acid 400 mcg Tab Take 1 tablet by mouth once daily.      ondansetron (ZOFRAN) 8 MG tablet Take 8 mg by mouth.      oxyCODONE-acetaminophen (PERCOCET)  mg per tablet Take 1 tablet by mouth.      pantoprazole (PROTONIX) 40 MG tablet Take 40 mg by mouth.      polyethylene glycol (GLYCOLAX) 17 gram PwPk Take 17 g by " "mouth.      pregabalin (LYRICA) 100 MG capsule Take 100 mg by mouth.      prochlorperazine (COMPAZINE) 10 MG tablet Take 10 mg by mouth.      tamsulosin (FLOMAX) 0.4 mg Cap Take 1 capsule by mouth.      TRESIBA FLEXTOUCH U-200 200 unit/mL (3 mL) insulin pen Inject 14 Units into the skin.       No current facility-administered medications on file prior to visit.      Review of Systems   Constitutional:  Positive for fatigue. Negative for activity change, appetite change, chills, diaphoresis, fever and unexpected weight change.   HENT:  Negative for nasal congestion, mouth sores, nosebleeds, sinus pressure/congestion, sore throat and trouble swallowing.    Eyes: Negative.    Respiratory:  Negative for cough and shortness of breath.    Cardiovascular:  Negative for chest pain and palpitations.   Gastrointestinal:  Negative for abdominal distention, abdominal pain, blood in stool, change in bowel habit, constipation, diarrhea, nausea and vomiting.   Endocrine: Negative.    Genitourinary:  Negative for bladder incontinence, decreased urine volume, difficulty urinating, dysuria, frequency, hematuria and urgency.   Musculoskeletal:  Negative for arthralgias, back pain, gait problem, joint swelling, leg pain and myalgias.   Integumentary:  Negative for rash.   Allergic/Immunologic: Negative.    Neurological:  Negative for dizziness, tremors, syncope, weakness, light-headedness, numbness, headaches and memory loss.   Hematological:  Negative for adenopathy. Does not bruise/bleed easily.   Psychiatric/Behavioral:  Negative for agitation, confusion, hallucinations, sleep disturbance and suicidal ideas. The patient is not nervous/anxious.               Vitals:    09/09/24 0912   BP: 114/72   Pulse: (!) 54   Resp: 20   Temp: 98.1 °F (36.7 °C)   SpO2: 100%   Weight: 63.5 kg (140 lb)   Height: 5' 6.54" (1.69 m)        Wt Readings from Last 6 Encounters:   09/09/24 63.5 kg (140 lb)   09/03/24 62 kg (136 lb 11.2 oz)   07/31/24 62.6 " kg (138 lb)     Body mass index is 22.23 kg/m².  Body surface area is 1.73 meters squared.  Physical Exam  Vitals and nursing note reviewed.   Constitutional:       General: He is not in acute distress.     Comments: thin   HENT:      Head: Normocephalic and atraumatic.      Mouth/Throat:      Mouth: Mucous membranes are moist.   Eyes:      General: No scleral icterus.     Extraocular Movements: Extraocular movements intact.      Conjunctiva/sclera: Conjunctivae normal.   Neck:      Vascular: No JVD.   Cardiovascular:      Rate and Rhythm: Normal rate and regular rhythm.      Heart sounds: No murmur heard.  Pulmonary:      Effort: Pulmonary effort is normal.      Breath sounds: Normal breath sounds. No wheezing or rhonchi.   Chest:      Comments: Left chest wall scar from previous Mediport  Abdominal:      General: Bowel sounds are normal. There is no distension.      Palpations: Abdomen is soft.      Tenderness: There is no abdominal tenderness.   Musculoskeletal:         General: No swelling or deformity.      Cervical back: Neck supple.   Lymphadenopathy:      Head:      Right side of head: No submandibular adenopathy.      Left side of head: No submandibular adenopathy.      Cervical: No cervical adenopathy.      Upper Body:      Right upper body: No supraclavicular or axillary adenopathy.      Left upper body: No supraclavicular or axillary adenopathy.      Lower Body: No right inguinal adenopathy. No left inguinal adenopathy.   Skin:     General: Skin is warm.      Coloration: Skin is not jaundiced.      Findings: No rash.   Neurological:      General: No focal deficit present.      Mental Status: He is alert and oriented to person, place, and time.      Cranial Nerves: Cranial nerves 2-12 are intact.   Psychiatric:         Attention and Perception: Attention normal.         Behavior: Behavior is cooperative.       ECOG SCORE    1 - Restricted in strenuous activity-ambulatory and able to carry out work of a  light nature         Laboratory:  CBC with Differential:  Lab Results   Component Value Date    WBC 6.00 09/09/2024    RBC 4.07 (L) 09/09/2024    HGB 11.0 (L) 09/09/2024    HCT 35.3 (L) 09/09/2024    MCV 86.7 09/09/2024    MCH 27.0 09/09/2024    MCHC 31.2 (L) 09/09/2024    RDW 15.1 09/09/2024     09/09/2024    MPV 10.7 (H) 09/09/2024        CMP:  Sodium   Date Value Ref Range Status   09/09/2024 142 136 - 145 mmol/L Final     Potassium   Date Value Ref Range Status   09/09/2024 3.9 3.5 - 5.1 mmol/L Final     Chloride   Date Value Ref Range Status   09/09/2024 107 98 - 107 mmol/L Final     CO2   Date Value Ref Range Status   09/09/2024 28 22 - 29 mmol/L Final     Blood Urea Nitrogen   Date Value Ref Range Status   09/09/2024 13.0 8.4 - 25.7 mg/dL Final   10/20/2023 12 6 - 23 mg/dL Final     Creatinine   Date Value Ref Range Status   09/09/2024 0.85 0.73 - 1.18 mg/dL Final   10/20/2023 0.95 0.67 - 1.17 mg/dL Final     Calcium   Date Value Ref Range Status   09/09/2024 8.6 8.4 - 10.2 mg/dL Final   10/20/2023 8.7 8.4 - 10.2 mg/dL Final     Albumin   Date Value Ref Range Status   09/09/2024 2.9 (L) 3.5 - 5.0 g/dL Final     Bilirubin Total   Date Value Ref Range Status   09/09/2024 0.4 <=1.5 mg/dL Final     ALP   Date Value Ref Range Status   09/09/2024 438 (H) 40 - 150 unit/L Final     AST   Date Value Ref Range Status   09/09/2024 61 (H) 5 - 34 unit/L Final     ALT   Date Value Ref Range Status   09/09/2024 44 0 - 55 unit/L Final      Component 08/28/24 07/17/24 06/26/24 06/21/24 06/12/24 05/29/24   CA 19-9 2,385.0 High  2,150.0 High  1,567.0 High  1,183.0 High  1,000.0 High  959.9 High             Assessment:       1. Adenocarcinoma of pancreas    2. Cancer associated pain    3. Chemotherapy-induced peripheral neuropathy    4. Constipation due to opioid therapy        1) Locally advanced pancreatic cancer   ---8/2/2022 ERCP with metal biliary stent placement   ---Folfirinox (10/2022- 2/2023)  ---capecitabine RT  (4/3/23 - 5/16/23)   ---Phase I study 2021-1176 SD-101 at [facility]--SD-101 2 mg in sodium chloride 0.9% (NS) 30 mL IVPB, intravenous x 2 cycles (8/22/2023-11/22/2023)  ---Gemcitabine and paclitaxel protein bound (12/13/2023-- 7/2024)-->clinical progression and GOO  ---s/p biliary stent placement 4/4/2024  ---chemotherapy with gemcitabine/paclitaxel (last dose 6/26/2024)  ---duodenal stent (placed by GI)- 8/7/2024  ---Gemcitabine and CISplatin Every 2 Weeks (8/28/2024-present)    2) H/o Gastric outlet obstruction  ---s/p duodenal stent, severe protein calori malnutrition     3) Pain, neoplasm related pain  ---continue Percocet and Lyrica          Plan:       Patient with unresectable advanced pancreatic cancer to start 4th line treatment with cisplatin and Gemzar.   We will schedule initiation of treatment on 9/11/2024 with a doctor visit day prior    Olanzapine and Dexamethasone sent to pharmacy today for take home premedications.  Refer to GI regarding dysphagia, if OK with Dr. Messina.  Duodenal stent placed at [facility] 1 month ago with difficulty swallowing after stent placement.  RTC 2 weeks with NP/MD for TD next day  Labs today and q week  CBC, CMP q week and add CA 19-9 q Day 1 of chemotherapy  Refer to Dr Moe Champagne for Mediport placemen right chest wall if possible, 9/10/2024.    The patient is in agreement with today's plan of care.  All questions answered.  Patient is aware to contact our office for any problems or concerns prior to next visit.      Danielle Bryant, MSN-ED, APRN, AGACNP-BC, OCN

## 2024-09-09 ENCOUNTER — OFFICE VISIT (OUTPATIENT)
Dept: HEMATOLOGY/ONCOLOGY | Facility: CLINIC | Age: 59
End: 2024-09-09
Payer: COMMERCIAL

## 2024-09-09 ENCOUNTER — LAB VISIT (OUTPATIENT)
Dept: LAB | Facility: HOSPITAL | Age: 59
End: 2024-09-09
Attending: INTERNAL MEDICINE
Payer: COMMERCIAL

## 2024-09-09 VITALS
BODY MASS INDEX: 21.97 KG/M2 | TEMPERATURE: 98 F | OXYGEN SATURATION: 100 % | SYSTOLIC BLOOD PRESSURE: 114 MMHG | HEIGHT: 67 IN | RESPIRATION RATE: 20 BRPM | WEIGHT: 140 LBS | DIASTOLIC BLOOD PRESSURE: 72 MMHG | HEART RATE: 54 BPM

## 2024-09-09 DIAGNOSIS — C25.9 ADENOCARCINOMA OF PANCREAS: Primary | ICD-10-CM

## 2024-09-09 DIAGNOSIS — C25.9 ADENOCARCINOMA OF PANCREAS: ICD-10-CM

## 2024-09-09 DIAGNOSIS — T45.1X5A CHEMOTHERAPY-INDUCED PERIPHERAL NEUROPATHY: ICD-10-CM

## 2024-09-09 DIAGNOSIS — T40.2X5A CONSTIPATION DUE TO OPIOID THERAPY: ICD-10-CM

## 2024-09-09 DIAGNOSIS — G62.0 CHEMOTHERAPY-INDUCED PERIPHERAL NEUROPATHY: ICD-10-CM

## 2024-09-09 DIAGNOSIS — G89.3 CANCER ASSOCIATED PAIN: ICD-10-CM

## 2024-09-09 DIAGNOSIS — K59.03 CONSTIPATION DUE TO OPIOID THERAPY: ICD-10-CM

## 2024-09-09 LAB
ALBUMIN SERPL-MCNC: 2.9 G/DL (ref 3.5–5)
ALBUMIN/GLOB SERPL: 0.8 RATIO (ref 1.1–2)
ALP SERPL-CCNC: 438 UNIT/L (ref 40–150)
ALT SERPL-CCNC: 44 UNIT/L (ref 0–55)
ANION GAP SERPL CALC-SCNC: 7 MEQ/L
AST SERPL-CCNC: 61 UNIT/L (ref 5–34)
BASOPHILS # BLD AUTO: 0.02 X10(3)/MCL
BASOPHILS NFR BLD AUTO: 0.3 %
BILIRUB SERPL-MCNC: 0.4 MG/DL
BUN SERPL-MCNC: 13 MG/DL (ref 8.4–25.7)
CALCIUM SERPL-MCNC: 8.6 MG/DL (ref 8.4–10.2)
CANCER AG19-9 SERPL-ACNC: 6727.08 UNIT/ML (ref 0–37)
CHLORIDE SERPL-SCNC: 107 MMOL/L (ref 98–107)
CO2 SERPL-SCNC: 28 MMOL/L (ref 22–29)
CREAT SERPL-MCNC: 0.85 MG/DL (ref 0.73–1.18)
CREAT/UREA NIT SERPL: 15
EOSINOPHIL # BLD AUTO: 0.07 X10(3)/MCL (ref 0–0.9)
EOSINOPHIL NFR BLD AUTO: 1.2 %
ERYTHROCYTE [DISTWIDTH] IN BLOOD BY AUTOMATED COUNT: 15.1 % (ref 11.5–17)
GFR SERPLBLD CREATININE-BSD FMLA CKD-EPI: >60 ML/MIN/1.73/M2
GLOBULIN SER-MCNC: 3.5 GM/DL (ref 2.4–3.5)
GLUCOSE SERPL-MCNC: 96 MG/DL (ref 74–100)
HCT VFR BLD AUTO: 35.3 % (ref 42–52)
HGB BLD-MCNC: 11 G/DL (ref 14–18)
IMM GRANULOCYTES # BLD AUTO: 0.01 X10(3)/MCL (ref 0–0.04)
IMM GRANULOCYTES NFR BLD AUTO: 0.2 %
LYMPHOCYTES # BLD AUTO: 0.91 X10(3)/MCL (ref 0.6–4.6)
LYMPHOCYTES NFR BLD AUTO: 15.2 %
MCH RBC QN AUTO: 27 PG (ref 27–31)
MCHC RBC AUTO-ENTMCNC: 31.2 G/DL (ref 33–36)
MCV RBC AUTO: 86.7 FL (ref 80–94)
MONOCYTES # BLD AUTO: 0.61 X10(3)/MCL (ref 0.1–1.3)
MONOCYTES NFR BLD AUTO: 10.2 %
NEUTROPHILS # BLD AUTO: 4.38 X10(3)/MCL (ref 2.1–9.2)
NEUTROPHILS NFR BLD AUTO: 72.9 %
PLATELET # BLD AUTO: 141 X10(3)/MCL (ref 130–400)
PMV BLD AUTO: 10.7 FL (ref 7.4–10.4)
POTASSIUM SERPL-SCNC: 3.9 MMOL/L (ref 3.5–5.1)
PROT SERPL-MCNC: 6.4 GM/DL (ref 6.4–8.3)
RBC # BLD AUTO: 4.07 X10(6)/MCL (ref 4.7–6.1)
SODIUM SERPL-SCNC: 142 MMOL/L (ref 136–145)
WBC # BLD AUTO: 6 X10(3)/MCL (ref 4.5–11.5)

## 2024-09-09 PROCEDURE — 99214 OFFICE O/P EST MOD 30 MIN: CPT | Mod: S$GLB,,, | Performed by: NURSE PRACTITIONER

## 2024-09-09 PROCEDURE — 3008F BODY MASS INDEX DOCD: CPT | Mod: CPTII,S$GLB,, | Performed by: NURSE PRACTITIONER

## 2024-09-09 PROCEDURE — 36415 COLL VENOUS BLD VENIPUNCTURE: CPT

## 2024-09-09 PROCEDURE — 99999 PR PBB SHADOW E&M-EST. PATIENT-LVL IV: CPT | Mod: PBBFAC,,, | Performed by: NURSE PRACTITIONER

## 2024-09-09 PROCEDURE — 80053 COMPREHEN METABOLIC PANEL: CPT

## 2024-09-09 PROCEDURE — 3078F DIAST BP <80 MM HG: CPT | Mod: CPTII,S$GLB,, | Performed by: NURSE PRACTITIONER

## 2024-09-09 PROCEDURE — 1159F MED LIST DOCD IN RCRD: CPT | Mod: CPTII,S$GLB,, | Performed by: NURSE PRACTITIONER

## 2024-09-09 PROCEDURE — 3074F SYST BP LT 130 MM HG: CPT | Mod: CPTII,S$GLB,, | Performed by: NURSE PRACTITIONER

## 2024-09-09 PROCEDURE — 1160F RVW MEDS BY RX/DR IN RCRD: CPT | Mod: CPTII,S$GLB,, | Performed by: NURSE PRACTITIONER

## 2024-09-09 PROCEDURE — 85025 COMPLETE CBC W/AUTO DIFF WBC: CPT

## 2024-09-09 PROCEDURE — 86301 IMMUNOASSAY TUMOR CA 19-9: CPT

## 2024-09-09 RX ORDER — DEXAMETHASONE 4 MG/1
8 TABLET ORAL DAILY
Qty: 24 TABLET | Refills: 2 | Status: SHIPPED | OUTPATIENT
Start: 2024-09-09 | End: 2024-10-15

## 2024-09-09 RX ORDER — OLANZAPINE 5 MG/1
5 TABLET ORAL NIGHTLY
Qty: 4 TABLET | Refills: 6 | Status: SHIPPED | OUTPATIENT
Start: 2024-09-09 | End: 2024-10-07

## 2024-09-09 NOTE — PROGRESS NOTES
History & Physical    CHIEF COMPLAINT:  PANCREATIC CANCER - NEEDS A NEW MEDIPORT    History of Present Illness:  59 year-old-male referred by Dr. Messina.  Patient was diagnosed in August 2022 with pancreatic cancer.  He has been treated at [facility] and also followed by surgical oncology there.  He was recently admitted there with a gastric outlet obstruction related to locally advanced and now progressive pancreatic head adenocarcinoma.  Patient has been on multiple lines of chemotherapy  with increasing tumor size, rising , new gastric outlet obstruction and weight loss.  Patient has established care with Dr. Messina for treatment closer to home.  He had a mediport removed due to it being exposed.  Dr. Messina is requesting a new mediport placement.      Review of patient's allergies indicates:  No Known Allergies    Current Outpatient Medications   Medication Sig Dispense Refill    cholecalciferol, vitamin D3, (VITAMIN D3) 50 mcg (2,000 unit) Tab Take 2,000 Units by mouth.      cyclobenzaprine (FLEXERIL) 5 MG tablet Take 5 mg by mouth 3 (three) times daily as needed for Muscle spasms.      diphenoxylate-atropine 2.5-0.025 mg (LOMOTIL) 2.5-0.025 mg per tablet Take 1 tablet by mouth.      GEMTESA 75 mg Tab Take 1 tablet by mouth.      insulin aspart U-100 (NOVOLOG) 100 unit/mL injection Inject into the skin 3 (three) times daily before meals.      magnesium 250 mg Tab Take 1 tablet by mouth once daily.      multivitamin with folic acid 400 mcg Tab Take 1 tablet by mouth once daily.      ondansetron (ZOFRAN) 8 MG tablet Take 8 mg by mouth.      oxyCODONE-acetaminophen (PERCOCET)  mg per tablet Take 1 tablet by mouth.      pantoprazole (PROTONIX) 40 MG tablet Take 40 mg by mouth.      polyethylene glycol (GLYCOLAX) 17 gram PwPk Take 17 g by mouth.      pregabalin (LYRICA) 100 MG capsule Take 100 mg by mouth.      prochlorperazine (COMPAZINE) 10 MG tablet Take 10 mg by mouth.      tamsulosin (FLOMAX)  0.4 mg Cap Take 1 capsule by mouth.      TRESIBA FLEXTOUCH U-200 200 unit/mL (3 mL) insulin pen Inject 14 Units into the skin.       No current facility-administered medications for this visit.       Past Medical History:   Diagnosis Date    Bradycardia     Enlarged prostate     Pancreatic cancer     Type 2 diabetes mellitus with unspecified diabetic retinopathy without macular edema      Past Surgical History:   Procedure Laterality Date    EGD, WITH STENT INSERTION      MEDIPORT REMOVAL       Family History   Problem Relation Name Age of Onset    Hypertension Mother      Stroke Father      Stroke Sister      Prostate cancer Brother       Social History     Tobacco Use    Smoking status: Never    Smokeless tobacco: Former     Types: Chew   Substance Use Topics    Alcohol use: Not Currently    Drug use: Never        Review of Systems:  Review of Systems   Constitutional:  Negative for activity change, appetite change, chills, diaphoresis, fatigue and fever.   HENT:  Negative for congestion, ear pain, tinnitus and trouble swallowing.    Eyes:  Negative for photophobia and pain.   Respiratory:  Negative for apnea, cough, choking, chest tightness, shortness of breath and stridor.    Cardiovascular:  Negative for chest pain, palpitations and leg swelling.   Endocrine: Negative for cold intolerance and heat intolerance.   Genitourinary:  Negative for difficulty urinating, dysuria, enuresis, flank pain, frequency and hematuria.   Musculoskeletal:  Negative for arthralgias, back pain and gait problem.   Neurological:  Negative for dizziness, seizures, syncope, facial asymmetry, speech difficulty, weakness, light-headedness, numbness and headaches.   Psychiatric/Behavioral:  Negative for agitation, behavioral problems, confusion and decreased concentration.    All other systems reviewed and are negative.         Objective     Vital Signs (Most Recent)              Physical Exam:  Physical Exam  Constitutional:        General: He is not in acute distress.     Appearance: Normal appearance. He is well-developed and normal weight. He is not ill-appearing, toxic-appearing or diaphoretic.   HENT:      Head: Normocephalic and atraumatic.      Right Ear: Hearing and external ear normal.      Left Ear: Hearing and external ear normal.      Nose: No congestion or rhinorrhea.      Mouth/Throat:      Mouth: Mucous membranes are moist.      Pharynx: Oropharynx is clear. No oropharyngeal exudate.   Eyes:      General: Lids are normal. Gaze aligned appropriately. No scleral icterus.     Extraocular Movements: Extraocular movements intact.      Right eye: Normal extraocular motion and no nystagmus.      Left eye: Normal extraocular motion and no nystagmus.      Conjunctiva/sclera: Conjunctivae normal.      Right eye: Right conjunctiva is not injected.      Left eye: Left conjunctiva is not injected.      Pupils: Pupils are equal, round, and reactive to light.   Neck:      Vascular: No carotid bruit or JVD.      Trachea: Trachea and phonation normal.   Cardiovascular:      Rate and Rhythm: Normal rate and regular rhythm.      Pulses: Normal pulses.      Heart sounds: Normal heart sounds. No murmur heard.     No friction rub. No gallop.   Pulmonary:      Effort: Pulmonary effort is normal. No tachypnea, accessory muscle usage, respiratory distress or retractions.      Breath sounds: Normal breath sounds and air entry. No stridor or decreased air movement. No wheezing or rhonchi.   Chest:      Chest wall: No mass, deformity, swelling, tenderness or crepitus.   Abdominal:      General: Abdomen is flat. Bowel sounds are normal. There is no distension. There are no signs of injury.      Palpations: Abdomen is soft. There is no shifting dullness, fluid wave, hepatomegaly, splenomegaly or mass.      Tenderness: There is no abdominal tenderness. There is no guarding or rebound.      Hernia: No hernia is present.   Musculoskeletal:         General: No  swelling or tenderness.      Cervical back: Normal range of motion and neck supple. No rigidity, tenderness or crepitus.   Lymphadenopathy:      Head:      Right side of head: No submental, submandibular or occipital adenopathy.      Left side of head: No submental, submandibular or occipital adenopathy.      Cervical: No cervical adenopathy.      Right cervical: No superficial or posterior cervical adenopathy.     Left cervical: No superficial or posterior cervical adenopathy.      Upper Body:      Right upper body: No supraclavicular or axillary adenopathy.      Left upper body: No supraclavicular or axillary adenopathy.      Lower Body: No right inguinal adenopathy. No left inguinal adenopathy.   Skin:     General: Skin is warm.      Capillary Refill: Capillary refill takes less than 2 seconds.      Coloration: Skin is not cyanotic, jaundiced, mottled or pale.      Findings: No bruising, ecchymosis, erythema, lesion, petechiae or rash.      Nails: There is no clubbing.   Neurological:      General: No focal deficit present.      Mental Status: He is alert and oriented to person, place, and time.      Cranial Nerves: No cranial nerve deficit or facial asymmetry.      Sensory: No sensory deficit.      Motor: No weakness, tremor, atrophy or abnormal muscle tone.      Coordination: Coordination normal.      Gait: Gait normal.      Deep Tendon Reflexes: Reflexes normal.   Psychiatric:         Attention and Perception: Attention and perception normal.         Mood and Affect: Mood normal. Mood is not anxious or depressed. Affect is not blunt or flat.         Speech: Speech normal. Speech is not slurred.         Behavior: Behavior normal. Behavior is cooperative.              Assessment and Plan   Pancreatic cancer, MediPort request    Scheduled placement of MediPort    The risks and benefits of the procedure were explained in detail using layman terms, including the pros and cons of any implant that may be used, all  questions were addressed, the patient gives consent to proceed

## 2024-09-09 NOTE — H&P (VIEW-ONLY)
History & Physical    CHIEF COMPLAINT:  PANCREATIC CANCER - NEEDS A NEW MEDIPORT    History of Present Illness:  59 year-old-male referred by Dr. Messina.  Patient was diagnosed in August 2022 with pancreatic cancer.  He has been treated at [facility] and also followed by surgical oncology there.  He was recently admitted there with a gastric outlet obstruction related to locally advanced and now progressive pancreatic head adenocarcinoma.  Patient has been on multiple lines of chemotherapy  with increasing tumor size, rising , new gastric outlet obstruction and weight loss.  Patient has established care with Dr. Messina for treatment closer to home.  He had a mediport removed due to it being exposed.  Dr. Messina is requesting a new mediport placement.      Review of patient's allergies indicates:  No Known Allergies    Current Outpatient Medications   Medication Sig Dispense Refill    cholecalciferol, vitamin D3, (VITAMIN D3) 50 mcg (2,000 unit) Tab Take 2,000 Units by mouth.      cyclobenzaprine (FLEXERIL) 5 MG tablet Take 5 mg by mouth 3 (three) times daily as needed for Muscle spasms.      diphenoxylate-atropine 2.5-0.025 mg (LOMOTIL) 2.5-0.025 mg per tablet Take 1 tablet by mouth.      GEMTESA 75 mg Tab Take 1 tablet by mouth.      insulin aspart U-100 (NOVOLOG) 100 unit/mL injection Inject into the skin 3 (three) times daily before meals.      magnesium 250 mg Tab Take 1 tablet by mouth once daily.      multivitamin with folic acid 400 mcg Tab Take 1 tablet by mouth once daily.      ondansetron (ZOFRAN) 8 MG tablet Take 8 mg by mouth.      oxyCODONE-acetaminophen (PERCOCET)  mg per tablet Take 1 tablet by mouth.      pantoprazole (PROTONIX) 40 MG tablet Take 40 mg by mouth.      polyethylene glycol (GLYCOLAX) 17 gram PwPk Take 17 g by mouth.      pregabalin (LYRICA) 100 MG capsule Take 100 mg by mouth.      prochlorperazine (COMPAZINE) 10 MG tablet Take 10 mg by mouth.      tamsulosin (FLOMAX)  0.4 mg Cap Take 1 capsule by mouth.      TRESIBA FLEXTOUCH U-200 200 unit/mL (3 mL) insulin pen Inject 14 Units into the skin.       No current facility-administered medications for this visit.       Past Medical History:   Diagnosis Date    Bradycardia     Enlarged prostate     Pancreatic cancer     Type 2 diabetes mellitus with unspecified diabetic retinopathy without macular edema      Past Surgical History:   Procedure Laterality Date    EGD, WITH STENT INSERTION      MEDIPORT REMOVAL       Family History   Problem Relation Name Age of Onset    Hypertension Mother      Stroke Father      Stroke Sister      Prostate cancer Brother       Social History     Tobacco Use    Smoking status: Never    Smokeless tobacco: Former     Types: Chew   Substance Use Topics    Alcohol use: Not Currently    Drug use: Never        Review of Systems:  Review of Systems   Constitutional:  Negative for activity change, appetite change, chills, diaphoresis, fatigue and fever.   HENT:  Negative for congestion, ear pain, tinnitus and trouble swallowing.    Eyes:  Negative for photophobia and pain.   Respiratory:  Negative for apnea, cough, choking, chest tightness, shortness of breath and stridor.    Cardiovascular:  Negative for chest pain, palpitations and leg swelling.   Endocrine: Negative for cold intolerance and heat intolerance.   Genitourinary:  Negative for difficulty urinating, dysuria, enuresis, flank pain, frequency and hematuria.   Musculoskeletal:  Negative for arthralgias, back pain and gait problem.   Neurological:  Negative for dizziness, seizures, syncope, facial asymmetry, speech difficulty, weakness, light-headedness, numbness and headaches.   Psychiatric/Behavioral:  Negative for agitation, behavioral problems, confusion and decreased concentration.    All other systems reviewed and are negative.         Objective     Vital Signs (Most Recent)              Physical Exam:  Physical Exam  Constitutional:        General: He is not in acute distress.     Appearance: Normal appearance. He is well-developed and normal weight. He is not ill-appearing, toxic-appearing or diaphoretic.   HENT:      Head: Normocephalic and atraumatic.      Right Ear: Hearing and external ear normal.      Left Ear: Hearing and external ear normal.      Nose: No congestion or rhinorrhea.      Mouth/Throat:      Mouth: Mucous membranes are moist.      Pharynx: Oropharynx is clear. No oropharyngeal exudate.   Eyes:      General: Lids are normal. Gaze aligned appropriately. No scleral icterus.     Extraocular Movements: Extraocular movements intact.      Right eye: Normal extraocular motion and no nystagmus.      Left eye: Normal extraocular motion and no nystagmus.      Conjunctiva/sclera: Conjunctivae normal.      Right eye: Right conjunctiva is not injected.      Left eye: Left conjunctiva is not injected.      Pupils: Pupils are equal, round, and reactive to light.   Neck:      Vascular: No carotid bruit or JVD.      Trachea: Trachea and phonation normal.   Cardiovascular:      Rate and Rhythm: Normal rate and regular rhythm.      Pulses: Normal pulses.      Heart sounds: Normal heart sounds. No murmur heard.     No friction rub. No gallop.   Pulmonary:      Effort: Pulmonary effort is normal. No tachypnea, accessory muscle usage, respiratory distress or retractions.      Breath sounds: Normal breath sounds and air entry. No stridor or decreased air movement. No wheezing or rhonchi.   Chest:      Chest wall: No mass, deformity, swelling, tenderness or crepitus.   Abdominal:      General: Abdomen is flat. Bowel sounds are normal. There is no distension. There are no signs of injury.      Palpations: Abdomen is soft. There is no shifting dullness, fluid wave, hepatomegaly, splenomegaly or mass.      Tenderness: There is no abdominal tenderness. There is no guarding or rebound.      Hernia: No hernia is present.   Musculoskeletal:         General: No  swelling or tenderness.      Cervical back: Normal range of motion and neck supple. No rigidity, tenderness or crepitus.   Lymphadenopathy:      Head:      Right side of head: No submental, submandibular or occipital adenopathy.      Left side of head: No submental, submandibular or occipital adenopathy.      Cervical: No cervical adenopathy.      Right cervical: No superficial or posterior cervical adenopathy.     Left cervical: No superficial or posterior cervical adenopathy.      Upper Body:      Right upper body: No supraclavicular or axillary adenopathy.      Left upper body: No supraclavicular or axillary adenopathy.      Lower Body: No right inguinal adenopathy. No left inguinal adenopathy.   Skin:     General: Skin is warm.      Capillary Refill: Capillary refill takes less than 2 seconds.      Coloration: Skin is not cyanotic, jaundiced, mottled or pale.      Findings: No bruising, ecchymosis, erythema, lesion, petechiae or rash.      Nails: There is no clubbing.   Neurological:      General: No focal deficit present.      Mental Status: He is alert and oriented to person, place, and time.      Cranial Nerves: No cranial nerve deficit or facial asymmetry.      Sensory: No sensory deficit.      Motor: No weakness, tremor, atrophy or abnormal muscle tone.      Coordination: Coordination normal.      Gait: Gait normal.      Deep Tendon Reflexes: Reflexes normal.   Psychiatric:         Attention and Perception: Attention and perception normal.         Mood and Affect: Mood normal. Mood is not anxious or depressed. Affect is not blunt or flat.         Speech: Speech normal. Speech is not slurred.         Behavior: Behavior normal. Behavior is cooperative.              Assessment and Plan   Pancreatic cancer, MediPort request    Scheduled placement of MediPort    The risks and benefits of the procedure were explained in detail using layman terms, including the pros and cons of any implant that may be used, all  questions were addressed, the patient gives consent to proceed

## 2024-09-10 ENCOUNTER — OFFICE VISIT (OUTPATIENT)
Dept: SURGICAL ONCOLOGY | Facility: CLINIC | Age: 59
End: 2024-09-10
Payer: COMMERCIAL

## 2024-09-10 ENCOUNTER — HOSPITAL ENCOUNTER (OUTPATIENT)
Dept: RADIOLOGY | Facility: HOSPITAL | Age: 59
Discharge: HOME | End: 2024-09-10
Attending: SURGERY
Payer: COMMERCIAL

## 2024-09-10 VITALS
DIASTOLIC BLOOD PRESSURE: 62 MMHG | SYSTOLIC BLOOD PRESSURE: 114 MMHG | HEART RATE: 60 BPM | WEIGHT: 138.81 LBS | BODY MASS INDEX: 20.56 KG/M2 | HEIGHT: 69 IN

## 2024-09-10 DIAGNOSIS — Z01.818 PREOP TESTING: ICD-10-CM

## 2024-09-10 DIAGNOSIS — C25.9 ADENOCARCINOMA OF PANCREAS: ICD-10-CM

## 2024-09-10 DIAGNOSIS — G62.0 CHEMOTHERAPY-INDUCED PERIPHERAL NEUROPATHY: Primary | ICD-10-CM

## 2024-09-10 DIAGNOSIS — C25.9 ADENOCARCINOMA OF PANCREAS: Primary | ICD-10-CM

## 2024-09-10 DIAGNOSIS — T45.1X5A CHEMOTHERAPY-INDUCED PERIPHERAL NEUROPATHY: Primary | ICD-10-CM

## 2024-09-10 DIAGNOSIS — C25.9 PANCREATIC CANCER: ICD-10-CM

## 2024-09-10 PROCEDURE — 3078F DIAST BP <80 MM HG: CPT | Mod: CPTII,S$GLB,, | Performed by: SURGERY

## 2024-09-10 PROCEDURE — 99999 PR PBB SHADOW E&M-EST. PATIENT-LVL IV: CPT | Mod: PBBFAC,,, | Performed by: SURGERY

## 2024-09-10 PROCEDURE — 3008F BODY MASS INDEX DOCD: CPT | Mod: CPTII,S$GLB,, | Performed by: SURGERY

## 2024-09-10 PROCEDURE — 1159F MED LIST DOCD IN RCRD: CPT | Mod: CPTII,S$GLB,, | Performed by: SURGERY

## 2024-09-10 PROCEDURE — 71045 X-RAY EXAM CHEST 1 VIEW: CPT | Mod: TC

## 2024-09-10 PROCEDURE — 99202 OFFICE O/P NEW SF 15 MIN: CPT | Mod: S$GLB,,, | Performed by: SURGERY

## 2024-09-10 PROCEDURE — 3074F SYST BP LT 130 MM HG: CPT | Mod: CPTII,S$GLB,, | Performed by: SURGERY

## 2024-09-10 RX ORDER — ENOXAPARIN SODIUM 300 MG/3ML
30 INJECTION INTRAVENOUS; SUBCUTANEOUS EVERY 24 HOURS
OUTPATIENT
Start: 2024-09-10

## 2024-09-12 ENCOUNTER — LAB VISIT (OUTPATIENT)
Dept: LAB | Facility: HOSPITAL | Age: 59
End: 2024-09-12
Payer: COMMERCIAL

## 2024-09-12 ENCOUNTER — INFUSION (OUTPATIENT)
Dept: INFUSION THERAPY | Facility: HOSPITAL | Age: 59
End: 2024-09-12
Attending: INTERNAL MEDICINE
Payer: COMMERCIAL

## 2024-09-12 VITALS
OXYGEN SATURATION: 100 % | SYSTOLIC BLOOD PRESSURE: 110 MMHG | HEART RATE: 54 BPM | WEIGHT: 138 LBS | HEIGHT: 69 IN | DIASTOLIC BLOOD PRESSURE: 67 MMHG | BODY MASS INDEX: 20.44 KG/M2 | TEMPERATURE: 98 F

## 2024-09-12 DIAGNOSIS — C25.9 ADENOCARCINOMA OF PANCREAS: Primary | ICD-10-CM

## 2024-09-12 DIAGNOSIS — C25.9 ADENOCARCINOMA OF PANCREAS: ICD-10-CM

## 2024-09-12 LAB
ALBUMIN SERPL-MCNC: 3 G/DL (ref 3.5–5)
ALBUMIN/GLOB SERPL: 0.8 RATIO (ref 1.1–2)
ALP SERPL-CCNC: 444 UNIT/L (ref 40–150)
ALT SERPL-CCNC: 51 UNIT/L (ref 0–55)
ANION GAP SERPL CALC-SCNC: 7 MEQ/L
AST SERPL-CCNC: 50 UNIT/L (ref 5–34)
BASOPHILS # BLD AUTO: 0.01 X10(3)/MCL
BASOPHILS NFR BLD AUTO: 0.2 %
BILIRUB SERPL-MCNC: 0.3 MG/DL
BUN SERPL-MCNC: 10 MG/DL (ref 8.4–25.7)
CALCIUM SERPL-MCNC: 9.3 MG/DL (ref 8.4–10.2)
CANCER AG19-9 SERPL-ACNC: 7049.29 UNIT/ML (ref 0–37)
CHLORIDE SERPL-SCNC: 104 MMOL/L (ref 98–107)
CO2 SERPL-SCNC: 30 MMOL/L (ref 22–29)
CREAT SERPL-MCNC: 0.85 MG/DL (ref 0.73–1.18)
CREAT/UREA NIT SERPL: 12
EOSINOPHIL # BLD AUTO: 0.01 X10(3)/MCL (ref 0–0.9)
EOSINOPHIL NFR BLD AUTO: 0.2 %
ERYTHROCYTE [DISTWIDTH] IN BLOOD BY AUTOMATED COUNT: 15.3 % (ref 11.5–17)
GFR SERPLBLD CREATININE-BSD FMLA CKD-EPI: >60 ML/MIN/1.73/M2
GLOBULIN SER-MCNC: 3.7 GM/DL (ref 2.4–3.5)
GLUCOSE SERPL-MCNC: 92 MG/DL (ref 74–100)
HCT VFR BLD AUTO: 35.2 % (ref 42–52)
HGB BLD-MCNC: 11.3 G/DL (ref 14–18)
IMM GRANULOCYTES # BLD AUTO: 0.01 X10(3)/MCL (ref 0–0.04)
IMM GRANULOCYTES NFR BLD AUTO: 0.2 %
LYMPHOCYTES # BLD AUTO: 0.56 X10(3)/MCL (ref 0.6–4.6)
LYMPHOCYTES NFR BLD AUTO: 10.1 %
MCH RBC QN AUTO: 27.6 PG (ref 27–31)
MCHC RBC AUTO-ENTMCNC: 32.1 G/DL (ref 33–36)
MCV RBC AUTO: 86.1 FL (ref 80–94)
MONOCYTES # BLD AUTO: 0.59 X10(3)/MCL (ref 0.1–1.3)
MONOCYTES NFR BLD AUTO: 10.7 %
NEUTROPHILS # BLD AUTO: 4.34 X10(3)/MCL (ref 2.1–9.2)
NEUTROPHILS NFR BLD AUTO: 78.6 %
PLATELET # BLD AUTO: 206 X10(3)/MCL (ref 130–400)
PMV BLD AUTO: 10.2 FL (ref 7.4–10.4)
POTASSIUM SERPL-SCNC: 4.7 MMOL/L (ref 3.5–5.1)
PROT SERPL-MCNC: 6.7 GM/DL (ref 6.4–8.3)
RBC # BLD AUTO: 4.09 X10(6)/MCL (ref 4.7–6.1)
SODIUM SERPL-SCNC: 141 MMOL/L (ref 136–145)
WBC # BLD AUTO: 5.52 X10(3)/MCL (ref 4.5–11.5)

## 2024-09-12 PROCEDURE — 63600175 PHARM REV CODE 636 W HCPCS: Performed by: INTERNAL MEDICINE

## 2024-09-12 PROCEDURE — 96417 CHEMO IV INFUS EACH ADDL SEQ: CPT

## 2024-09-12 PROCEDURE — 96367 TX/PROPH/DG ADDL SEQ IV INF: CPT

## 2024-09-12 PROCEDURE — 25000003 PHARM REV CODE 250: Performed by: INTERNAL MEDICINE

## 2024-09-12 PROCEDURE — 96375 TX/PRO/DX INJ NEW DRUG ADDON: CPT

## 2024-09-12 PROCEDURE — 36415 COLL VENOUS BLD VENIPUNCTURE: CPT

## 2024-09-12 PROCEDURE — 96413 CHEMO IV INFUSION 1 HR: CPT

## 2024-09-12 PROCEDURE — 86301 IMMUNOASSAY TUMOR CA 19-9: CPT

## 2024-09-12 PROCEDURE — 85025 COMPLETE CBC W/AUTO DIFF WBC: CPT

## 2024-09-12 PROCEDURE — 80053 COMPREHEN METABOLIC PANEL: CPT

## 2024-09-12 PROCEDURE — 96368 THER/DIAG CONCURRENT INF: CPT

## 2024-09-12 RX ADMIN — APREPITANT 130 MG: 130 INJECTION, EMULSION INTRAVENOUS at 09:09

## 2024-09-12 RX ADMIN — CISPLATIN 60 MG: 1 INJECTION, SOLUTION INTRAVENOUS at 10:09

## 2024-09-12 RX ADMIN — POTASSIUM CHLORIDE 500 ML/HR: 2 INJECTION, SOLUTION, CONCENTRATE INTRAVENOUS at 09:09

## 2024-09-12 RX ADMIN — DEXAMETHASONE SODIUM PHOSPHATE 0.25 MG: 10 INJECTION, SOLUTION INTRAMUSCULAR; INTRAVENOUS at 09:09

## 2024-09-12 RX ADMIN — SODIUM CHLORIDE 500 ML: 9 INJECTION, SOLUTION INTRAVENOUS at 12:09

## 2024-09-12 RX ADMIN — GEMCITABINE 1800 MG: 38 INJECTION, SOLUTION INTRAVENOUS at 11:09

## 2024-09-12 NOTE — PLAN OF CARE
Patient will receive infusion in a timely manor. Patient completed infusion with out difficulty  
4 = No assist / stand by assistance

## 2024-09-13 ENCOUNTER — DOCUMENTATION ONLY (OUTPATIENT)
Dept: HEMATOLOGY/ONCOLOGY | Facility: CLINIC | Age: 59
End: 2024-09-13
Payer: COMMERCIAL

## 2024-09-13 RX ORDER — VIBEGRON 75 MG/1
75 TABLET, FILM COATED ORAL
COMMUNITY

## 2024-09-13 NOTE — PROGRESS NOTES
Oncology Navigation (Initial Visit)   Intake  Date of Diagnosis: 22  Cancer Type: Pancreatic  Type of Referral: External  Initial Nurse Navigator Contact: 24  Appointment Date: 24  Start of Treatment:  (Gemcitabine/Paclitaxel last dose 2024 &Gemcitabine and  Cisplatin every 2 weeks s (2024-present))     Treatment  Current Status: Active       Medical Oncologist: Dr Jena Messina  Chemotherapy: Initiated  Chemotherapy Regimen: Gemcitabine and Cisplatin Every 2 weeks.       Procedures: Port / PICC    [facility] Ref[facility] [employer]; [facility] ([facility] ([facility] with house note and utilities.)          Support Systems: Spouse/significant other  Barriers of Care: Financial concerns  Financial Concerns: Financial hardship  Concerns: Patient was very concern on how he going to be able to pay his house note and utilities bills.     Acuity  Stage: 2  Systemic Treatment - predicted or initiated: Chemotherapy Regimen with Multiple drugs (+1)  ECO  Comorbidities in Medical History: 1  Hospitalization Within the Past Month: 0   Needed: 0  Support: 0  Verbalizes Financial Concerns: 1 (patient very concern on how he going to be able to maintain paying his house note.)  Transportation: 0  Mental Health: PHQ Score: 0  History of noncompliance/frequent no shows and cancellations: 0  Verbalizes the need for more education: 0  Other Factors (+1 for Each): 2  Navigation Acuity: 9     Follow Up  No follow-ups on file.   Patient came to his initial visit with his wife Terri. Patient has great family support but was very concerned about being able to pay his bills and utilities bills. Patient is currently have his insurance under [employer]. Patient has reliable transportation back and forward to his appointment. Patient caitlyn with stress by playing with his chicken and geese. Patient request 75 dollars gas card from [facility]. Patient and patient's wife  was informed about all the things that [facility] and [facility] offered. On the distress scale patient's score is 7/10, he is very concern about his financial responsibilities and chemotherapy. Patient has a poor appetite which he was order supplement through [employer] [facility]. Will continue to monitor patient.

## 2024-09-18 ENCOUNTER — LAB VISIT (OUTPATIENT)
Dept: LAB | Facility: HOSPITAL | Age: 59
End: 2024-09-18
Attending: NURSE PRACTITIONER
Payer: COMMERCIAL

## 2024-09-18 ENCOUNTER — OFFICE VISIT (OUTPATIENT)
Dept: HEMATOLOGY/ONCOLOGY | Facility: CLINIC | Age: 59
End: 2024-09-18
Payer: COMMERCIAL

## 2024-09-18 VITALS
HEART RATE: 57 BPM | SYSTOLIC BLOOD PRESSURE: 108 MMHG | DIASTOLIC BLOOD PRESSURE: 69 MMHG | RESPIRATION RATE: 16 BRPM | HEIGHT: 68 IN | TEMPERATURE: 99 F | BODY MASS INDEX: 20.64 KG/M2 | WEIGHT: 136.19 LBS | OXYGEN SATURATION: 100 %

## 2024-09-18 DIAGNOSIS — G89.3 CANCER ASSOCIATED PAIN: ICD-10-CM

## 2024-09-18 DIAGNOSIS — K20.90 ESOPHAGITIS: ICD-10-CM

## 2024-09-18 DIAGNOSIS — K59.03 CONSTIPATION DUE TO OPIOID THERAPY: ICD-10-CM

## 2024-09-18 DIAGNOSIS — K13.79 MOUTH SORES: ICD-10-CM

## 2024-09-18 DIAGNOSIS — G62.0 CHEMOTHERAPY-INDUCED PERIPHERAL NEUROPATHY: ICD-10-CM

## 2024-09-18 DIAGNOSIS — C25.9 ADENOCARCINOMA OF PANCREAS: Primary | ICD-10-CM

## 2024-09-18 DIAGNOSIS — R13.10 DYSPHAGIA, UNSPECIFIED TYPE: ICD-10-CM

## 2024-09-18 DIAGNOSIS — T45.1X5A CHEMOTHERAPY-INDUCED PERIPHERAL NEUROPATHY: ICD-10-CM

## 2024-09-18 DIAGNOSIS — K21.9 GASTROESOPHAGEAL REFLUX DISEASE, UNSPECIFIED WHETHER ESOPHAGITIS PRESENT: ICD-10-CM

## 2024-09-18 DIAGNOSIS — C25.9 ADENOCARCINOMA OF PANCREAS: ICD-10-CM

## 2024-09-18 DIAGNOSIS — Z79.899 ON ANTINEOPLASTIC CHEMOTHERAPY: ICD-10-CM

## 2024-09-18 DIAGNOSIS — T40.2X5A CONSTIPATION DUE TO OPIOID THERAPY: ICD-10-CM

## 2024-09-18 LAB
ALBUMIN SERPL-MCNC: 3 G/DL (ref 3.5–5)
ALBUMIN/GLOB SERPL: 0.9 RATIO (ref 1.1–2)
ALP SERPL-CCNC: 659 UNIT/L (ref 40–150)
ALT SERPL-CCNC: 139 UNIT/L (ref 0–55)
ANION GAP SERPL CALC-SCNC: 2 MEQ/L
AST SERPL-CCNC: 153 UNIT/L (ref 5–34)
BASOPHILS # BLD AUTO: 0.02 X10(3)/MCL
BASOPHILS NFR BLD AUTO: 0.6 %
BILIRUB SERPL-MCNC: 0.4 MG/DL
BUN SERPL-MCNC: 12 MG/DL (ref 8.4–25.7)
CALCIUM SERPL-MCNC: 9.2 MG/DL (ref 8.4–10.2)
CHLORIDE SERPL-SCNC: 99 MMOL/L (ref 98–107)
CO2 SERPL-SCNC: 34 MMOL/L (ref 22–29)
CREAT SERPL-MCNC: 0.81 MG/DL (ref 0.73–1.18)
CREAT/UREA NIT SERPL: 15
EOSINOPHIL # BLD AUTO: 0.01 X10(3)/MCL (ref 0–0.9)
EOSINOPHIL NFR BLD AUTO: 0.3 %
ERYTHROCYTE [DISTWIDTH] IN BLOOD BY AUTOMATED COUNT: 15.5 % (ref 11.5–17)
GFR SERPLBLD CREATININE-BSD FMLA CKD-EPI: >60 ML/MIN/1.73/M2
GLOBULIN SER-MCNC: 3.4 GM/DL (ref 2.4–3.5)
GLUCOSE SERPL-MCNC: 265 MG/DL (ref 74–100)
HCT VFR BLD AUTO: 32.2 % (ref 42–52)
HGB BLD-MCNC: 10.3 G/DL (ref 14–18)
IMM GRANULOCYTES # BLD AUTO: 0.01 X10(3)/MCL (ref 0–0.04)
IMM GRANULOCYTES NFR BLD AUTO: 0.3 %
LYMPHOCYTES # BLD AUTO: 0.6 X10(3)/MCL (ref 0.6–4.6)
LYMPHOCYTES NFR BLD AUTO: 18.9 %
MCH RBC QN AUTO: 27.4 PG (ref 27–31)
MCHC RBC AUTO-ENTMCNC: 32 G/DL (ref 33–36)
MCV RBC AUTO: 85.6 FL (ref 80–94)
MONOCYTES # BLD AUTO: 0.1 X10(3)/MCL (ref 0.1–1.3)
MONOCYTES NFR BLD AUTO: 3.1 %
NEUTROPHILS # BLD AUTO: 2.44 X10(3)/MCL (ref 2.1–9.2)
NEUTROPHILS NFR BLD AUTO: 76.8 %
PLATELET # BLD AUTO: 197 X10(3)/MCL (ref 130–400)
PMV BLD AUTO: 9.4 FL (ref 7.4–10.4)
POTASSIUM SERPL-SCNC: 4.9 MMOL/L (ref 3.5–5.1)
PROT SERPL-MCNC: 6.4 GM/DL (ref 6.4–8.3)
RBC # BLD AUTO: 3.76 X10(6)/MCL (ref 4.7–6.1)
SODIUM SERPL-SCNC: 135 MMOL/L (ref 136–145)
WBC # BLD AUTO: 3.18 X10(3)/MCL (ref 4.5–11.5)

## 2024-09-18 PROCEDURE — 1160F RVW MEDS BY RX/DR IN RCRD: CPT | Mod: CPTII,S$GLB,,

## 2024-09-18 PROCEDURE — 85025 COMPLETE CBC W/AUTO DIFF WBC: CPT

## 2024-09-18 PROCEDURE — 80053 COMPREHEN METABOLIC PANEL: CPT

## 2024-09-18 PROCEDURE — 1159F MED LIST DOCD IN RCRD: CPT | Mod: CPTII,S$GLB,,

## 2024-09-18 PROCEDURE — 3078F DIAST BP <80 MM HG: CPT | Mod: CPTII,S$GLB,,

## 2024-09-18 PROCEDURE — 99215 OFFICE O/P EST HI 40 MIN: CPT | Mod: S$GLB,,,

## 2024-09-18 PROCEDURE — 99999 PR PBB SHADOW E&M-EST. PATIENT-LVL V: CPT | Mod: PBBFAC,,,

## 2024-09-18 PROCEDURE — 3074F SYST BP LT 130 MM HG: CPT | Mod: CPTII,S$GLB,,

## 2024-09-18 PROCEDURE — 36415 COLL VENOUS BLD VENIPUNCTURE: CPT

## 2024-09-18 PROCEDURE — 3008F BODY MASS INDEX DOCD: CPT | Mod: CPTII,S$GLB,,

## 2024-09-18 RX ORDER — LIDOCAINE HYDROCHLORIDE 20 MG/ML
SOLUTION OROPHARYNGEAL 4 TIMES DAILY PRN
Qty: 100 ML | Refills: 3 | Status: SHIPPED | OUTPATIENT
Start: 2024-09-18

## 2024-09-18 RX ORDER — DIPHENHYDRAMINE HCL 12.5MG/5ML
12.5 ELIXIR ORAL 4 TIMES DAILY PRN
Qty: 236 ML | Refills: 3 | Status: SHIPPED | OUTPATIENT
Start: 2024-09-18

## 2024-09-18 RX ORDER — PANTOPRAZOLE SODIUM 40 MG/1
40 TABLET, DELAYED RELEASE ORAL DAILY
Qty: 30 TABLET | Refills: 2 | Status: SHIPPED | OUTPATIENT
Start: 2024-09-18 | End: 2024-12-17

## 2024-09-18 RX ORDER — MORPHINE SULFATE 15 MG/1
15 TABLET, FILM COATED, EXTENDED RELEASE ORAL 2 TIMES DAILY
Qty: 30 TABLET | Refills: 0 | Status: SHIPPED | OUTPATIENT
Start: 2024-09-18

## 2024-09-18 RX ORDER — ALUMINUM HYDROXIDE, MAGNESIUM HYDROXIDE, AND SIMETHICONE 2400; 240; 2400 MG/30ML; MG/30ML; MG/30ML
5 SUSPENSION ORAL 4 TIMES DAILY PRN
Qty: 335 ML | Refills: 3 | Status: SHIPPED | OUTPATIENT
Start: 2024-09-18 | End: 2025-09-18

## 2024-09-18 RX ORDER — CYCLOBENZAPRINE HCL 5 MG
5 TABLET ORAL 3 TIMES DAILY PRN
Qty: 90 TABLET | Refills: 0 | Status: SHIPPED | OUTPATIENT
Start: 2024-09-18 | End: 2024-10-18

## 2024-09-18 NOTE — PROGRESS NOTES
HEMATOLOGY/ONCOLOGY OFFICE CLINIC VISIT    Visit Information:    Initial Evaluation: 9/3/2024  Referring Provider: Alba Cihsholm MD PhD ([facility]) -Cell [phone]  Other providers:  Code status: Not addressed    Diagnosis:  Advanced pancreatic ductal adenocarcinoma (Dx 8/2022)     Present treatment:  Gemzar monotherapy 8/28/2024  -Gemcitabine and CISplatin Every 2 Weeks (8/28/2024-present)--planned  Chemotherapy summary: CISplatin (PLATINOL) 53 mg in sodium chloride 0.9% (NS) 250 mL IVPB, intravenous, 0 of 12 cycles  gemcitabine (GEMZAR) 836 mg in sodium chloride 0.9% (NS) 250 mL IVPB, intravenous, 0 of 12 cycles     Treatment/Oncology history:  -8/2/2022 ERCP with metal biliary stent placement   -Folfirinox (10/2022- 2/2023)  -capecitabine RT (4/3/23 - 5/16/23)   -Phase I study 2021-1176 SD-101 at [facility]--SD-101 2 mg in sodium chloride 0.9% (NS) 30 mL IVPB, intravenous x 2 cycles (8/22/2023-11/22/2023)  -Gemcitabine and paclitaxel protein bound (12/13/2023-- 7/2024)-->clinical progression and GOO  -s/p biliary stent placement 4/4/2024  -chemotherapy with gemcitabine/paclitaxel (last dose 6/26/2024)  -duodenal stent (placed by GI)- 8/7/2024  -Gemcitabine and CISplatin Every 2 Weeks (8/28/2024-present)      Goal of care: life prolongation    Imaging:  CT CAP 8/21/2022: Since 7/25/2022, the primary pancreatic head mass encasing the common hepatic artery remains stable. The intrahepatic biliary obstruction improved with interval placement of a CBD stent. No definite distant metastasis in the abdomen or pelvis. Small indeterminate left lower lobe pulmonary nodule can be followed.  CT CAP 12/12/2022:  Primary pancreatic head tumor encasing the common hepatic artery is overall unchanged in size since 8/21/2022. Stable upstream pancreatic duct dilation and pancreatic atrophy. Interval placement of a cholecystostomy tube, with decompression of the gallbladder. No definite evidence of distant metastatic disease in the chest,  abdomen, and pelvis.   CT CAP 3/7/2023: Locally advanced pancreatic head tumor is slightly less conspicuous. Stable portacaval lymphadenopathy. Geographic hypodense regions have significantly increased throughout the liver compatible with fatty liver, to be correlated clinically regarding any potential concomitant hepatotoxicity; this appearance could obscure small low contrast liver lesions, to be better evaluated with MRI, if indicated. Unchanged mild jennifer appearance of the omentum is favored to be postinflammatory. No definite distant metastatic disease within the chest abdomen pelvis.   Ct CAP 6/29/2023:1.  Locally advanced pancreatic head mass not significantly changed. 2.  Increased periportal haziness and central liver heterogeneity which may relate to radiation.   3.  Indeterminate inferior right hepatic hypodensity as described above. Punctate left hepatic hypodensity also not previously evident, too small to characterize. Consider further evaluation with MRI as indicated.    MRI AP 7/15/2023:Primary neoplasm is noted within the pancreatic head and causes pancreatic ductal dilation and biliary ductal obstruction, which is decompressed via a stent. Nonspecific focal dilation of segment IV bile duct is noted.    CT CAP 10/2/2023: 1.  Compared to 07/28/2023, interval embolization of SMV tributaries in the epigastric region with new embolization coils in the right hepatic lobe following transhepatic catheterization. 2.  Previously noted small indeterminate low-attenuation lesion in the inferior right hepatic lobe segment 6 is no longer visualized. No new hepatic lesions.    3.  Stable 2.2 cm ill-defined residual pancreatic head tumor with no change in the vascular contact with the main portal vein, common hepatic and proper hepatic artery. 4.  No new metastatic disease in the chest or abdomen.   CT CAP 1/9/2024: 1.  Mild interval increase in size of the primary tumor in the head of the pancreas. 2.  No  evidence of metastatic disease in the chest, abdomen or pelvis.   CT AP 4/3/2024: 1.  There is worsening intrahepatic biliary dilation despite the presence of a biliary stent, suggesting stent occlusion. 2.  No significant change in the locally advanced tumor of the head of the pancreas since 03/12/2024.   CT CAP 6/21/2024: 1. No convincing change in the large locally advanced infiltrating ill-defined mass in the head and neck of the pancreas compared with 5/14/2024. 2. No specific evidence of distant metastatic disease in the chest, abdomen or pelvis.   XR ABDOMEN 1 VW PORTABLE 8/6/2024:  There is a gastric drainage tube with the tip below the diaphragm projecting over the gastric body with the sidehole below the GE junction in appropriate position. Gastric drainage tube with tip and sidehole projecting below the diaphragm over the gastric body.  X-ray Abdomen AP  8/5/2024: Air is seen scattered throughout normal caliber colon in a nonobstructive pattern. No dilated loops of bowel. Moderate colonic stool burden. No intraperitoneal free air within the limitations of this study. A biliary stent and embolization coils overlie the right upper quadrant. No suspicious osseous lesions.   Nonobstructive bowel gas pattern. I personally reviewed these image(s) along with the resident's/fellow's interpretations, certify that if a procedure was performed I was physically present, and agree with the final report.  CT Abdomen Pelvis with Contrast 8/5/2024: No suspicious pulmonary nodules in the lung bases. Hepatobiliary: Evaluation of the liver is somewhat limited secondary to streak artifact from the embolization coils. Within these limits there is no suspicious hepatic lesion. The gallbladder is decompressed and poorly evaluated. Common bile duct stent with expected pneumobilia. Mild intrahepatic biliary ductal dilatation is not significantly changed. Spleen: No splenomegaly. Pancreas: Ill-defined infiltrating mass of the  pancreatic head and neck, in keeping with pancreatic adenocarcinoma. There is atrophy and ductal dilatation of the pancreatic body and tail. Overall this tumor appears larger Adrenal Glands: No mass. Kidneys, Ureters, Bladder: No hydronephrosis. No suspicious renal lesion. No bladder mass. Gastrointestinal Tract: The stomach is distended with fluid and debris, suggesting there may be a degree of gastric outlet obstruction secondary to the pancreatic tumor. This is not significantly changed. There is no downstream obstruction. Pelvic Organs: No pelvic mass. Prostatomegaly. Peritoneum/Retroperitoneum: No ascites. Lymph Nodes: No lymphadenopathy. Musculoskeletal: No suspicious skeletal lesion.     No acute abdominopelvic abnormality. Attending addendum: The pancreatic tumor appears larger compared to 06/21/2024. There is gastric distention with debris suggesting chronic outlet obstruction ACTIONABLE ITEMS/RECOMMENDATIONS*: None. *An Actionable Finding is a finding that may be unrelated to the original reason for imaging but potentially actionable, meaning further investigation may be necessary. The Actionable Findings Vigilance Unit (AFVU) assists medical providers with responding to additional radiologic findings that are unexpected and potentially actionable. I personally reviewed these image(s) along with the resident's/fellow's interpretations, certify that if a procedure was performed I was physically present, and agree with the final report.     Pathology:  8/2/2022:  FNA head pancreas: SCANT MALIGNANT CELLS, FAVOR ADENOCARCINOMA  NGS:  No detectable genomic aberrations      CLINICAL HISTORY:       Patient: Warren P Lejeune is a 59 y.o. male.with a past medical history of past medical history of pancreatic ductal adenocarcinoma (Dx 8/2022)   Patient initially presented with  unintentional weight loss starting in 1/2022. He also had worsening back/shoulder and abdominal pain during this time. In July he finally  presented to his PCP with darkening of the urine and lightening of the stool - with Jaundice that his wife noticed. Labs demonstrating cholestasis (bilirubin of 19).    Patient noted to have an abnormal CT scan after developing obstructive jaundice. 2 cm hypoenhancing mass noted in the head of the pancreas with intra and extrahepatic biliary ductal dilatation. Mass measured 2.5 x 2.1 cm. There is also atrophy of the distal gland and upstream dilatation of the pancreas duct. There is no obvious vascular invasion. There were no focal liver lesions.    8/2/22- EGD/EUS/FNA. Final pathology showed ductal adenocarcinoma, moderately differentiated. ERCP on the same date performed, with placement of a fully covered metal biliary stent, 60 mm x 10 mm.    7/27/22- CA 19-9 was 681    8/21/22- CT CAP: Since 7/25/2022, the primary pancreatic head mass encasing the common hepatic artery remains stable. The intrahepatic biliary obstruction improved with interval placement of a CBD stent. No definite distant metastasis in the abdomen or pelvis. Small indeterminate left lower lobe pulmonary nodule can be followed.    09/03/2022 Germline genetic testing: Negative for germline pathogenic variants in any of the analyzed genes, Genes Analyzed: APC, DALE, BMPR1A, BRCA1, BRCA2, CDKN2A, EPCAM, MEN1, MLH1, MSH2, MSH6, NF1, PALB2, PMS2, SMAD4, STK11, TP53, TSC1, TSC2, and VHL. Genetic testing performed by [facility]; full report available in scanned documents.     s/p biliary stent placement, currently undergoing chemotherapy with gemcitabine/paclitaxel (last dose 6/26/2024), T2DM on insulin presenting for abdominal pain and constipation. CT A/P with distension in stomach suggesting gastric outlet obstruction secondary to pancreatic tumor. GI and Surg Onc consulted, pending evaluation. Poor PO tolerance, deferring NG tube now as vomiting resolves and having bowel movements.     Patient was recently admitted to the hospital at  "[facility]--Hospital Course:  "Findings on imaging were concerning for malignant gastric outlet obstruction from pancreatic cancer. After a discussion with surgical oncology, GI and GIMO, decision was made to proceed with duodenal stent (placed by GI). No indications for gastrojejunostomy bypass. Patient tolerated procedure well. We were able to advance to low fiber diet. Nutrition was consulted for low fiber diet education. Managed neoplasm related pain with PO morphine."     Patient is here today with his wife to continue chemotherapy close to home.  He is doing relatively well and voices no concerns.  MediPort was removed from his left chest wall to exposure of the MediPort.  Patient reports abdominal mid back pain, occasional muscle spasm and dizziness.  No fever, chills, sweats.  No chest pain or short of breath.    Chief Complaint: Abdominal pain with radiating to the back.      Interval History:    9/18/2024:  Mr. Lejeune is here today for labs and  toxicity check following Gemzar and Cisplatin. He started cycle 2, 4th line treatment of Gemcitabine and Cisplatin every 2 weeks on 9/12/2024.  He received his 1st cycle at [facility] on 8/28/2024, gemzar only.  His wife Terri is present by telephone today during the clinic visit.  Today, he reports, he had nausea/vomiting/diarrhea/abdominal pain after receiving cycle 2 of Gemcitabine and Cisplatin.  He states, "just abdominal pain that radiates to the back today.  Pain rating is "6-7" on 0-10 pain scale.  He is taking Percocet 10 mg PO once/twice a day only, with relief.  He denies any fever, chills, cough, SOB, chest pain, abnormal bleeding, nausea, vomiting, jaundice, change in bladder habits, joint/bone/muscle cramping or pain.  He is taking Miralax and laxative for opioid induced constipation, and Pregabalin 100 mg QD for bilateral hands and feet neuropathy. He is also reporting shortness of breath and swelling to bilateral knee and ankles that has since resolved.  " Patient reports difficulty swallowing thick and solid foods, which started after he had a duodenal stent placed 1 month ago.  Labs reviewed with patient.  Abnormal labs:  Hgb 10.3, Hct 32.2, ,  GAG939, .  He has lost 4 pounds since his last visit.He does report decreased appetite.He denies any recent infections, hospitalizations, illnesses. He is scheduled for mediport insertion on 9/20/2024 with Dr. Moe Champagne.      Past Medical History:   Diagnosis Date    Abdominal pain     Admission for chemotherapy     last chemo 9/12/24    Anxiety disorder, unspecified     Back pain     Bradycardia     Enlarged prostate     GERD (gastroesophageal reflux disease)     Hypertension     no cardiologist; no longer on meds since weight loss due to pancreatic cancer    Memory loss     Pancreatic cancer     Peripheral neuropathy     Type 2 diabetes mellitus with unspecified diabetic retinopathy without macular edema       Past Surgical History:   Procedure Laterality Date    bile duct stents times 2      EGD, WITH STENT INSERTION      MEDIPORT REMOVAL      times 2     Family History   Problem Relation Name Age of Onset    Hypertension Mother      Stroke Father      Stroke Sister      Prostate cancer Brother            Review of patient's allergies indicates:  No Known Allergies   Current Outpatient Medications on File Prior to Visit   Medication Sig Dispense Refill    cholecalciferol, vitamin D3, (VITAMIN D3) 50 mcg (2,000 unit) Tab Take 2,000 Units by mouth once daily.      dexAMETHasone (DECADRON) 4 MG Tab Take 2 tablets (8 mg total) by mouth once daily. 24 tablet 2    diphenoxylate-atropine 2.5-0.025 mg (LOMOTIL) 2.5-0.025 mg per tablet Take 1 tablet by mouth.      insulin aspart U-100 (NOVOLOG) 100 unit/mL injection Inject into the skin 3 (three) times daily before meals. prn      magnesium 250 mg Tab Take 1 tablet by mouth once daily.      multivitamin with folic acid 400 mcg Tab Take 1 tablet by mouth once daily.       OLANZapine (ZYPREXA) 5 MG tablet Take 1 tablet (5 mg total) by mouth nightly. 4 tablet 6    ondansetron (ZOFRAN) 8 MG tablet Take 8 mg by mouth.      oxyCODONE-acetaminophen (PERCOCET)  mg per tablet Take 1 tablet by mouth.      polyethylene glycol (GLYCOLAX) 17 gram PwPk Take 17 g by mouth.      pregabalin (LYRICA) 100 MG capsule Take 100 mg by mouth.      prochlorperazine (COMPAZINE) 10 MG tablet Take 10 mg by mouth.      tamsulosin (FLOMAX) 0.4 mg Cap Take 1 capsule by mouth.      TRESIBA FLEXTOUCH U-200 200 unit/mL (3 mL) insulin pen Inject 14 Units into the skin.      vibegron (GEMTESA) 75 mg Tab Take 75 mg by mouth.      [DISCONTINUED] cyclobenzaprine (FLEXERIL) 5 MG tablet Take 5 mg by mouth 3 (three) times daily as needed for Muscle spasms.      [DISCONTINUED] pantoprazole (PROTONIX) 40 MG tablet Take 40 mg by mouth.       No current facility-administered medications on file prior to visit.      Review of Systems   Constitutional:  Positive for appetite change and fatigue. Negative for activity change, chills, diaphoresis, fever and unexpected weight change.   HENT:  Positive for trouble swallowing. Negative for nasal congestion, mouth sores, nosebleeds, sinus pressure/congestion and sore throat.    Eyes: Negative.    Respiratory:  Positive for shortness of breath. Negative for cough.    Cardiovascular:  Negative for chest pain and palpitations.   Gastrointestinal:  Positive for constipation. Negative for abdominal distention, abdominal pain, blood in stool, change in bowel habit, diarrhea, nausea and vomiting.   Endocrine: Negative.    Genitourinary:  Negative for bladder incontinence, decreased urine volume, difficulty urinating, dysuria, frequency, hematuria and urgency.   Musculoskeletal:  Positive for back pain. Negative for arthralgias, gait problem, joint swelling, leg pain and myalgias.   Integumentary:  Negative for rash.   Allergic/Immunologic: Negative.  Negative for frequent  "infections.   Neurological:  Positive for weakness. Negative for dizziness, tremors, syncope, light-headedness, numbness, headaches and memory loss.   Hematological:  Negative for adenopathy. Does not bruise/bleed easily.   Psychiatric/Behavioral:  Negative for agitation, confusion, hallucinations, sleep disturbance and suicidal ideas. The patient is not nervous/anxious.               Vitals:    09/18/24 1244   BP: 108/69   Pulse: (!) 57   Resp: 16   Temp: 99.1 °F (37.3 °C)   SpO2: 100%   Weight: 61.8 kg (136 lb 3.2 oz)   Height: 5' 7.99" (1.727 m)          Wt Readings from Last 6 Encounters:   09/18/24 61.8 kg (136 lb 3.2 oz)   09/12/24 62.6 kg (138 lb)   09/10/24 63 kg (138 lb 12.8 oz)   09/09/24 63.5 kg (140 lb)   09/03/24 62 kg (136 lb 11.2 oz)   07/31/24 62.6 kg (138 lb)     Body mass index is 20.71 kg/m².  Body surface area is 1.72 meters squared.  Physical Exam  Vitals and nursing note reviewed.   Constitutional:       General: He is not in acute distress.     Comments: thin   HENT:      Head: Normocephalic and atraumatic.      Mouth/Throat:      Mouth: Mucous membranes are moist.   Eyes:      General: No scleral icterus.     Extraocular Movements: Extraocular movements intact.      Conjunctiva/sclera: Conjunctivae normal.   Neck:      Vascular: No JVD.   Cardiovascular:      Rate and Rhythm: Normal rate and regular rhythm.      Heart sounds: No murmur heard.  Pulmonary:      Effort: Pulmonary effort is normal.      Breath sounds: Normal breath sounds. No wheezing or rhonchi.   Chest:      Comments: Left chest wall scar from previous Mediport  Abdominal:      General: Bowel sounds are normal. There is no distension.      Palpations: Abdomen is soft.      Tenderness: There is no abdominal tenderness.   Musculoskeletal:         General: No swelling or deformity.      Cervical back: Neck supple.   Lymphadenopathy:      Head:      Right side of head: No submandibular adenopathy.      Left side of head: No " submandibular adenopathy.      Cervical: No cervical adenopathy.      Upper Body:      Right upper body: No supraclavicular or axillary adenopathy.      Left upper body: No supraclavicular or axillary adenopathy.      Lower Body: No right inguinal adenopathy. No left inguinal adenopathy.   Skin:     General: Skin is warm.      Coloration: Skin is not jaundiced.      Findings: No rash.   Neurological:      General: No focal deficit present.      Mental Status: He is alert and oriented to person, place, and time.      Cranial Nerves: Cranial nerves 2-12 are intact.   Psychiatric:         Attention and Perception: Attention normal.         Behavior: Behavior is cooperative.       ECOG SCORE             Laboratory:  CBC with Differential:  Lab Results   Component Value Date    WBC 3.18 (L) 09/18/2024    RBC 3.76 (L) 09/18/2024    HGB 10.3 (L) 09/18/2024    HCT 32.2 (L) 09/18/2024    MCV 85.6 09/18/2024    MCH 27.4 09/18/2024    MCHC 32.0 (L) 09/18/2024    RDW 15.5 09/18/2024     09/18/2024    MPV 9.4 09/18/2024        CMP:  Sodium   Date Value Ref Range Status   09/18/2024 135 (L) 136 - 145 mmol/L Final     Potassium   Date Value Ref Range Status   09/18/2024 4.9 3.5 - 5.1 mmol/L Final     Chloride   Date Value Ref Range Status   09/18/2024 99 98 - 107 mmol/L Final     CO2   Date Value Ref Range Status   09/18/2024 34 (H) 22 - 29 mmol/L Final     Blood Urea Nitrogen   Date Value Ref Range Status   09/18/2024 12.0 8.4 - 25.7 mg/dL Final   10/20/2023 12 6 - 23 mg/dL Final     Creatinine   Date Value Ref Range Status   09/18/2024 0.81 0.73 - 1.18 mg/dL Final   10/20/2023 0.95 0.67 - 1.17 mg/dL Final     Calcium   Date Value Ref Range Status   09/18/2024 9.2 8.4 - 10.2 mg/dL Final   10/20/2023 8.7 8.4 - 10.2 mg/dL Final     Albumin   Date Value Ref Range Status   09/18/2024 3.0 (L) 3.5 - 5.0 g/dL Final     Bilirubin Total   Date Value Ref Range Status   09/18/2024 0.4 <=1.5 mg/dL Final     ALP   Date Value Ref Range  Status   09/18/2024 659 (H) 40 - 150 unit/L Final     AST   Date Value Ref Range Status   09/18/2024 153 (H) 5 - 34 unit/L Final     ALT   Date Value Ref Range Status   09/18/2024 139 (H) 0 - 55 unit/L Final      Component 08/28/24 07/17/24 06/26/24 06/21/24 06/12/24 05/29/24   CA 19-9 2,385.0 High  2,150.0 High  1,567.0 High  1,183.0 High  1,000.0 High  959.9 High             Assessment:       1. Adenocarcinoma of pancreas    2. Cancer associated pain    3. Chemotherapy-induced peripheral neuropathy    4. Constipation due to opioid therapy    5. On antineoplastic chemotherapy    6. Gastroesophageal reflux disease, unspecified whether esophagitis present    7. Esophagitis          1) Locally advanced pancreatic cancer   ---8/2/2022 ERCP with metal biliary stent placement   ---Folfirinox (10/2022- 2/2023)  ---capecitabine RT (4/3/23 - 5/16/23)   ---Phase I study 2021-1176 SD-101 at [facility]--SD-101 2 mg in sodium chloride 0.9% (NS) 30 mL IVPB, intravenous x 2 cycles (8/22/2023-11/22/2023)  ---Gemcitabine and paclitaxel protein bound (12/13/2023-- 7/2024)-->clinical progression and GOO  ---s/p biliary stent placement 4/4/2024  ---chemotherapy with gemcitabine/paclitaxel (last dose 6/26/2024)  ---duodenal stent (placed by GI)- 8/7/2024  ---Gemcitabine and CISplatin Every 2 Weeks (8/28/2024-present)    2) H/o Gastric outlet obstruction  ---s/p duodenal stent, severe protein calori malnutrition     3) Pain, neoplasm related pain  ---continue Percocet and Lyrica          Plan:       Patient with unresectable advanced pancreatic cancer to start 4th line treatment with cisplatin and Gemzar.       Refilled protonix, flexeril, and morphine 15 mg BID  Start Magic Mouthwash for esophagitis   Refer to GI regarding dysphagia.  Duodenal stent placed at [facility] 1 month ago with difficulty swallowing after stent placement.  RTC 1 week with NP/treatment   CBC CMP CA 19 9     The patient is in agreement with today's plan of care.  All  questions answered.  Patient is aware to contact our office for any problems or concerns prior to next visit.    THEODORE Poon  Oncology/Hematology  [facility]

## 2024-09-19 ENCOUNTER — ANESTHESIA EVENT (OUTPATIENT)
Dept: SURGERY | Facility: HOSPITAL | Age: 59
End: 2024-09-19
Payer: COMMERCIAL

## 2024-09-19 NOTE — ANESTHESIA PREPROCEDURE EVALUATION
09/19/2024  Warren P Lejeune is a 59 y.o., male , who presents for the following:    Procedure: [id number]-CATH (Chest)   Anesthesia type: General/MAC   Diagnosis: Adenocarcinoma of pancreas [C25.9]   Pre-op diagnosis: Adenocarcinoma of pancreas [C25.9]   Location: [facility] OR 02 / [facility] OR   Surgeons: Moe Champagne MD     LAB:        CXR:  NAF    EKG:  Sinus Bradycardia  (42 bpm)      Pre-op Assessment    I have reviewed the Patient Summary Reports.     I have reviewed the Nursing Notes. I have reviewed the NPO Status.   I have reviewed the Medications.     Review of Systems  Anesthesia Hx:  No problems with previous Anesthesia             Denies Family Hx of Anesthesia complications.    Denies Personal Hx of Anesthesia complications.                    Hematology/Oncology:       -- Anemia:                              Oncology Comments: Pancreatic CA     Cardiovascular:     Hypertension               H/O Bradycardia                         Pulmonary:  Pulmonary Normal                       Hepatic/GI:     GERD   H/O  Abdominal Pain, but denies abdominal pain or nausea this am          Neurological:        Peripheral Neuropathy                          Endocrine:  Diabetes, type 2           Psych:   anxiety                 Physical Exam  General: Alert and Oriented    Airway:  Mallampati: III   Mouth Opening: Normal  TM Distance: Normal  Tongue: Normal  Neck ROM: Normal ROM    Dental:  Intact    Chest/Lungs:  Normal Respiratory Rate    Heart:  Rate: Normal  Rhythm: Regular Rhythm        Anesthesia Plan  Type of Anesthesia, risks & benefits discussed:    Anesthesia Type: Gen Supraglottic Airway  Intra-op Monitoring Plan: Standard ASA Monitors  Post Op Pain Control Plan: IV/PO Opioids PRN and multimodal analgesia  Induction:  IV  Airway Plan: Direct  Informed Consent: Informed consent signed with the  Patient and all parties understand the risks and agree with anesthesia plan.  All questions answered. Patient consented to blood products? No  ASA Score: 3  Day of Surgery Review of History & Physical: H&P Update referred to the surgeon/provider.  Anesthesia Plan Notes: Premedication with Midazolam  Technique: LMA   PONV Prophylaxis   PACU Postop       Ready For Surgery From Anesthesia Perspective.     .

## 2024-09-20 ENCOUNTER — ANESTHESIA (OUTPATIENT)
Dept: SURGERY | Facility: HOSPITAL | Age: 59
End: 2024-09-20
Payer: COMMERCIAL

## 2024-09-20 ENCOUNTER — HOSPITAL ENCOUNTER (OUTPATIENT)
Facility: HOSPITAL | Age: 59
Discharge: HOME | End: 2024-09-20
Attending: SURGERY | Admitting: SURGERY
Payer: COMMERCIAL

## 2024-09-20 DIAGNOSIS — C25.9 PANCREATIC CANCER: ICD-10-CM

## 2024-09-20 DIAGNOSIS — C25.9 ADENOCARCINOMA OF PANCREAS: ICD-10-CM

## 2024-09-20 LAB
POCT GLUCOSE: 172 MG/DL (ref 70–110)
POCT GLUCOSE: 75 MG/DL (ref 70–110)

## 2024-09-20 PROCEDURE — 71000015 HC POSTOP RECOV 1ST HR: Performed by: SURGERY

## 2024-09-20 PROCEDURE — 82962 GLUCOSE BLOOD TEST: CPT | Performed by: SURGERY

## 2024-09-20 PROCEDURE — 25000003 PHARM REV CODE 250: Performed by: NURSE ANESTHETIST, CERTIFIED REGISTERED

## 2024-09-20 PROCEDURE — 63600175 PHARM REV CODE 636 W HCPCS: Performed by: ANESTHESIOLOGY

## 2024-09-20 PROCEDURE — 37000008 HC ANESTHESIA 1ST 15 MINUTES: Performed by: SURGERY

## 2024-09-20 PROCEDURE — 71000033 HC RECOVERY, INTIAL HOUR: Performed by: SURGERY

## 2024-09-20 PROCEDURE — 36000706: Performed by: SURGERY

## 2024-09-20 PROCEDURE — C1788 PORT, INDWELLING, IMP: HCPCS | Performed by: SURGERY

## 2024-09-20 PROCEDURE — 37000009 HC ANESTHESIA EA ADD 15 MINS: Performed by: SURGERY

## 2024-09-20 PROCEDURE — 36000707: Performed by: SURGERY

## 2024-09-20 PROCEDURE — 63600175 PHARM REV CODE 636 W HCPCS: Performed by: SURGERY

## 2024-09-20 PROCEDURE — 63600175 PHARM REV CODE 636 W HCPCS: Performed by: NURSE ANESTHETIST, CERTIFIED REGISTERED

## 2024-09-20 DEVICE — POWERPORT CLEARVUE IMPLANTABLE PORT WITH ATTACHABLE 8F POLYURETHANE OPEN-ENDED SINGLE-LUMEN VENOUS CATHETER INTERMEDIATE KIT
Type: IMPLANTABLE DEVICE | Site: CHEST | Status: FUNCTIONAL
Brand: POWERPORT CLEARVUE

## 2024-09-20 RX ORDER — ONDANSETRON HYDROCHLORIDE 2 MG/ML
4 INJECTION, SOLUTION INTRAVENOUS ONCE AS NEEDED
Status: DISCONTINUED | OUTPATIENT
Start: 2024-09-20 | End: 2024-09-20 | Stop reason: HOSPADM

## 2024-09-20 RX ORDER — SODIUM CHLORIDE, SODIUM LACTATE, POTASSIUM CHLORIDE, CALCIUM CHLORIDE 600; 310; 30; 20 MG/100ML; MG/100ML; MG/100ML; MG/100ML
INJECTION, SOLUTION INTRAVENOUS CONTINUOUS
Status: DISCONTINUED | OUTPATIENT
Start: 2024-09-20 | End: 2024-09-20

## 2024-09-20 RX ORDER — MIDAZOLAM HYDROCHLORIDE 2 MG/2ML
2 INJECTION, SOLUTION INTRAMUSCULAR; INTRAVENOUS ONCE AS NEEDED
Status: COMPLETED | OUTPATIENT
Start: 2024-09-20 | End: 2024-09-20

## 2024-09-20 RX ORDER — MORPHINE SULFATE 15 MG/1
15 TABLET, FILM COATED, EXTENDED RELEASE ORAL ONCE
Status: DISCONTINUED | OUTPATIENT
Start: 2024-09-20 | End: 2024-09-20 | Stop reason: HOSPADM

## 2024-09-20 RX ORDER — CEFAZOLIN SODIUM 1 G/3ML
INJECTION, POWDER, FOR SOLUTION INTRAMUSCULAR; INTRAVENOUS
Status: DISCONTINUED | OUTPATIENT
Start: 2024-09-20 | End: 2024-09-20 | Stop reason: HOSPADM

## 2024-09-20 RX ORDER — EPHEDRINE SULFATE 50 MG/ML
INJECTION, SOLUTION INTRAVENOUS
Status: DISCONTINUED | OUTPATIENT
Start: 2024-09-20 | End: 2024-09-20

## 2024-09-20 RX ORDER — ENOXAPARIN SODIUM 100 MG/ML
30 INJECTION SUBCUTANEOUS
Status: DISCONTINUED | OUTPATIENT
Start: 2024-09-20 | End: 2024-09-20 | Stop reason: HOSPADM

## 2024-09-20 RX ORDER — LIDOCAINE HYDROCHLORIDE 10 MG/ML
1 INJECTION, SOLUTION EPIDURAL; INFILTRATION; INTRACAUDAL; PERINEURAL ONCE
Status: DISCONTINUED | OUTPATIENT
Start: 2024-09-20 | End: 2024-09-20 | Stop reason: HOSPADM

## 2024-09-20 RX ORDER — BUPIVACAINE HYDROCHLORIDE 5 MG/ML
INJECTION, SOLUTION EPIDURAL; INTRACAUDAL
Status: DISCONTINUED
Start: 2024-09-20 | End: 2024-09-20 | Stop reason: HOSPADM

## 2024-09-20 RX ORDER — LIDOCAINE HYDROCHLORIDE 10 MG/ML
INJECTION, SOLUTION EPIDURAL; INFILTRATION; INTRACAUDAL; PERINEURAL
Status: DISCONTINUED | OUTPATIENT
Start: 2024-09-20 | End: 2024-09-20

## 2024-09-20 RX ORDER — CEFAZOLIN SODIUM 1 G/3ML
INJECTION, POWDER, FOR SOLUTION INTRAMUSCULAR; INTRAVENOUS
Status: DISCONTINUED
Start: 2024-09-20 | End: 2024-09-20 | Stop reason: HOSPADM

## 2024-09-20 RX ORDER — HEPARIN SODIUM 5000 [USP'U]/ML
INJECTION, SOLUTION INTRAVENOUS; SUBCUTANEOUS
Status: DISCONTINUED | OUTPATIENT
Start: 2024-09-20 | End: 2024-09-20 | Stop reason: HOSPADM

## 2024-09-20 RX ORDER — HYDROMORPHONE HYDROCHLORIDE 2 MG/ML
0.4 INJECTION, SOLUTION INTRAMUSCULAR; INTRAVENOUS; SUBCUTANEOUS EVERY 5 MIN PRN
Status: DISCONTINUED | OUTPATIENT
Start: 2024-09-20 | End: 2024-09-20 | Stop reason: HOSPADM

## 2024-09-20 RX ORDER — BUPIVACAINE HYDROCHLORIDE 5 MG/ML
INJECTION, SOLUTION EPIDURAL; INTRACAUDAL
Status: DISCONTINUED | OUTPATIENT
Start: 2024-09-20 | End: 2024-09-20 | Stop reason: HOSPADM

## 2024-09-20 RX ORDER — GLUCAGON 1 MG
1 KIT INJECTION
Status: DISCONTINUED | OUTPATIENT
Start: 2024-09-20 | End: 2024-09-20 | Stop reason: HOSPADM

## 2024-09-20 RX ORDER — ONDANSETRON HYDROCHLORIDE 2 MG/ML
INJECTION, SOLUTION INTRAMUSCULAR; INTRAVENOUS
Status: DISCONTINUED | OUTPATIENT
Start: 2024-09-20 | End: 2024-09-20

## 2024-09-20 RX ORDER — ENOXAPARIN SODIUM 100 MG/ML
30 INJECTION SUBCUTANEOUS EVERY 24 HOURS
Status: DISCONTINUED | OUTPATIENT
Start: 2024-09-20 | End: 2024-09-20

## 2024-09-20 RX ORDER — PROPOFOL 10 MG/ML
VIAL (ML) INTRAVENOUS
Status: DISCONTINUED | OUTPATIENT
Start: 2024-09-20 | End: 2024-09-20

## 2024-09-20 RX ORDER — DEXTROSE, SODIUM CHLORIDE, SODIUM LACTATE, POTASSIUM CHLORIDE, AND CALCIUM CHLORIDE 5; .6; .31; .03; .02 G/100ML; G/100ML; G/100ML; G/100ML; G/100ML
INJECTION, SOLUTION INTRAVENOUS CONTINUOUS
Status: DISCONTINUED | OUTPATIENT
Start: 2024-09-20 | End: 2024-09-20 | Stop reason: HOSPADM

## 2024-09-20 RX ORDER — HEPARIN SODIUM 5000 [USP'U]/ML
INJECTION, SOLUTION INTRAVENOUS; SUBCUTANEOUS
Status: DISCONTINUED
Start: 2024-09-20 | End: 2024-09-20 | Stop reason: HOSPADM

## 2024-09-20 RX ORDER — CEFAZOLIN SODIUM 1 G/3ML
2 INJECTION, POWDER, FOR SOLUTION INTRAMUSCULAR; INTRAVENOUS ONCE
Status: COMPLETED | OUTPATIENT
Start: 2024-09-20 | End: 2024-09-20

## 2024-09-20 RX ADMIN — LIDOCAINE HYDROCHLORIDE 20 MG: 10 INJECTION, SOLUTION EPIDURAL; INFILTRATION; INTRACAUDAL; PERINEURAL at 07:09

## 2024-09-20 RX ADMIN — ONDANSETRON 4 MG: 2 INJECTION INTRAMUSCULAR; INTRAVENOUS at 07:09

## 2024-09-20 RX ADMIN — EPHEDRINE SULFATE 20 MG: 50 INJECTION INTRAVENOUS at 07:09

## 2024-09-20 RX ADMIN — MIDAZOLAM HYDROCHLORIDE 2 MG: 1 INJECTION, SOLUTION INTRAMUSCULAR; INTRAVENOUS at 07:09

## 2024-09-20 RX ADMIN — SODIUM CHLORIDE, SODIUM LACTATE, POTASSIUM CHLORIDE, CALCIUM CHLORIDE AND DEXTROSE MONOHYDRATE: 5; 600; 310; 30; 20 INJECTION, SOLUTION INTRAVENOUS at 06:09

## 2024-09-20 RX ADMIN — CEFAZOLIN 2 G: 330 INJECTION, POWDER, FOR SOLUTION INTRAMUSCULAR; INTRAVENOUS at 07:09

## 2024-09-20 RX ADMIN — ENOXAPARIN SODIUM 30 MG: 30 INJECTION SUBCUTANEOUS at 06:09

## 2024-09-20 RX ADMIN — SODIUM CHLORIDE, POTASSIUM CHLORIDE, SODIUM LACTATE AND CALCIUM CHLORIDE: 600; 310; 30; 20 INJECTION, SOLUTION INTRAVENOUS at 06:09

## 2024-09-20 RX ADMIN — PROPOFOL 120 MG: 10 INJECTION, EMULSION INTRAVENOUS at 07:09

## 2024-09-20 NOTE — OP NOTE
Date of Surgery:  09/20/2024    Surgeon:  Moe Champagne MD                                      Pre-op Diagnosis:  Pancreatic cancer    Post-op Diagnosis:  Same    Procedure:  Right internal jugular Mediport insertion with ultrasound and fluoroscopic guidance    Anesthesia:  Local/MAC    EBL:  Less than 15 cc    Specimens:  None    Findings:  8 Japanese PowerPort placed without difficulty, final fluoroscopy revealed the tip of the catheter within the superior vena cava, no evidence of pneumothorax or other complication.  The port flushed and aspirated freely.    Procedure in detail: After informed consent was obtained the patient was brought to the operating room and placed in the supine position.  Sedation was administered by anesthesia.  The upper chest and neck were prepped and draped in sterile fashion.  After infiltration with local anesthesia, under ultrasound guidance the right internal jugular vein was cannulated with a large-bore needle and a guidewire was placed under fluoroscopic guidance into the superior vena cava.  Incision was made and a pocket was created for the port over the pectoralis fascia.  A PowerPort was assembled and flushed it was soaked in antimicrobial solution, placed in the pocket and the catheter was tunneled to the wire insertion site without difficulty.  The catheter was cut to size using fluoroscopic guidance.  An introducer dilator was placed over the guidewire and the catheter was threaded through the peel-away introducer without difficulty.  Final fluoroscopy revealed the tip of the catheter within the superior vena cava, the port flushed and aspirated freely, final heparin solution was instilled.  There was no evidence of pneumothorax or other complication on final fluoroscopy.  The port pocket was irrigated with antimicrobial solution, meticulous hemostasis was achieved, it was closed in multiple layers with absorbable suture and sterile dressings were applied.  The patient  tolerated the procedure well.    Brief Discharge Note:    Outcome: Satisfactory  Disposition: Discharged home postoperatively in good condition  Followup:  With oncology  Final Diagnosis same as postoperative diagnosis    Moe Champagne MD  Surgical Oncology  Complex General, Gastrointestinal and Hepatobiliary Surgery

## 2024-09-20 NOTE — ANESTHESIA POSTPROCEDURE EVALUATION
Anesthesia Post Evaluation    Patient: Warren P Lejeune    Procedure(s) Performed: Procedure(s) (LRB):  [id number] (Right)    Final Anesthesia Type: general      Patient location during evaluation: OPS  Patient participation: Yes- Able to Participate  Level of consciousness: awake and oriented  Post-procedure vital signs: reviewed and stable  Pain management: adequate  Airway patency: patent    PONV status at discharge: No PONV  Anesthetic complications: no      Cardiovascular status: stable and hemodynamically stable  Respiratory status: unassisted and spontaneous ventilation  Hydration status: euvolemic  Follow-up not needed.              Vitals Value Taken Time   /58 09/20/24 0920   Temp 36.6 °C (97.8 °F) 09/20/24 0849   Pulse 45 09/20/24 0920   Resp 16 09/20/24 0848   SpO2 100 % 09/20/24 0920         Event Time   Out of Recovery 08:44:00         Pain/Pina Score: Pina Score: 10 (9/20/2024  8:50 AM)  Modified Pina Score: 20 (9/20/2024  9:46 AM)

## 2024-09-20 NOTE — PLAN OF CARE
Patient resting comfortably, VSS, no signs or symptoms of distress.  Pina at acceptable level for transfer to phase II.  Criteria met for anesthesia, released per Dr. Thibodeaux.

## 2024-09-20 NOTE — TRANSFER OF CARE
"Anesthesia Transfer of Care Note    Patient: Warren P Lejeune    Procedure(s) Performed: Procedure(s) (LRB):  [id number] (Right)    Patient location: PACU    Anesthesia Type: general    Transport from OR: Transported from OR on room air with adequate spontaneous ventilation    Post pain: adequate analgesia    Post assessment: no apparent anesthetic complications    Post vital signs: stable    Level of consciousness: responds to stimulation    Nausea/Vomiting: no nausea/vomiting    Complications: none    Transfer of care protocol was followed      Last vitals: Visit Vitals  BP (!) 94/51   Pulse (!) 51   Temp 37.2 °C (98.9 °F) (Oral)   Resp 18   Ht 5' 8.5" (1.74 m)   Wt 60 kg (132 lb 4.4 oz)   SpO2 99%   BMI 19.82 kg/m²     "

## 2024-09-20 NOTE — ANESTHESIA PROCEDURE NOTES
Intubation    Date/Time: 9/20/2024 7:27 AM    Performed by: Michael Abreu CRNA  Authorized by: Jimenez Villafuerte MD    Intubation:     Induction:  Intravenous    Intubated:  Postinduction    Mask Ventilation:  Easy mask    Attempts:  1    Attempted By:  CRNA    Difficult Airway Encountered?: No      Complications:  None    Airway Device:  Supraglottic airway/LMA    Airway Device Size:  4.0    Style/Cuff Inflation:  Cuffed (inflated to minimal occlusive pressure)    Secured at:  The lips    Placement Verified By:  Capnometry    Findings Post-Intubation:  Atraumatic/condition of teeth unchanged

## 2024-09-21 VITALS
BODY MASS INDEX: 19.59 KG/M2 | DIASTOLIC BLOOD PRESSURE: 58 MMHG | TEMPERATURE: 98 F | SYSTOLIC BLOOD PRESSURE: 107 MMHG | OXYGEN SATURATION: 100 % | HEART RATE: 45 BPM | RESPIRATION RATE: 16 BRPM | WEIGHT: 132.25 LBS | HEIGHT: 69 IN

## 2024-09-24 ENCOUNTER — OFFICE VISIT (OUTPATIENT)
Dept: HEMATOLOGY/ONCOLOGY | Facility: CLINIC | Age: 59
End: 2024-09-24
Payer: COMMERCIAL

## 2024-09-24 ENCOUNTER — LAB VISIT (OUTPATIENT)
Dept: LAB | Facility: HOSPITAL | Age: 59
End: 2024-09-24
Payer: COMMERCIAL

## 2024-09-24 VITALS
WEIGHT: 139.69 LBS | HEART RATE: 51 BPM | OXYGEN SATURATION: 100 % | DIASTOLIC BLOOD PRESSURE: 68 MMHG | HEIGHT: 68 IN | TEMPERATURE: 98 F | RESPIRATION RATE: 16 BRPM | SYSTOLIC BLOOD PRESSURE: 114 MMHG | BODY MASS INDEX: 21.17 KG/M2

## 2024-09-24 DIAGNOSIS — C25.9 ADENOCARCINOMA OF PANCREAS: Primary | ICD-10-CM

## 2024-09-24 DIAGNOSIS — Z79.899 ON ANTINEOPLASTIC CHEMOTHERAPY: ICD-10-CM

## 2024-09-24 DIAGNOSIS — G89.3 CANCER ASSOCIATED PAIN: ICD-10-CM

## 2024-09-24 DIAGNOSIS — K59.03 CONSTIPATION DUE TO OPIOID THERAPY: ICD-10-CM

## 2024-09-24 DIAGNOSIS — T40.2X5A CONSTIPATION DUE TO OPIOID THERAPY: ICD-10-CM

## 2024-09-24 DIAGNOSIS — C25.9 ADENOCARCINOMA OF PANCREAS: ICD-10-CM

## 2024-09-24 LAB
ALBUMIN SERPL-MCNC: 2.9 G/DL (ref 3.5–5)
ALBUMIN/GLOB SERPL: 0.9 RATIO (ref 1.1–2)
ALP SERPL-CCNC: 493 UNIT/L (ref 40–150)
ALT SERPL-CCNC: 62 UNIT/L (ref 0–55)
ANION GAP SERPL CALC-SCNC: 4 MEQ/L
AST SERPL-CCNC: 52 UNIT/L (ref 5–34)
BASOPHILS # BLD AUTO: 0.03 X10(3)/MCL
BASOPHILS NFR BLD AUTO: 0.7 %
BILIRUB SERPL-MCNC: 0.3 MG/DL
BUN SERPL-MCNC: 7 MG/DL (ref 8.4–25.7)
CALCIUM SERPL-MCNC: 8.8 MG/DL (ref 8.4–10.2)
CANCER AG19-9 SERPL-ACNC: 6202.93 UNIT/ML (ref 0–37)
CHLORIDE SERPL-SCNC: 108 MMOL/L (ref 98–107)
CO2 SERPL-SCNC: 30 MMOL/L (ref 22–29)
CREAT SERPL-MCNC: 0.75 MG/DL (ref 0.73–1.18)
CREAT/UREA NIT SERPL: 9
EOSINOPHIL # BLD AUTO: 0.02 X10(3)/MCL (ref 0–0.9)
EOSINOPHIL NFR BLD AUTO: 0.5 %
ERYTHROCYTE [DISTWIDTH] IN BLOOD BY AUTOMATED COUNT: 15.8 % (ref 11.5–17)
GFR SERPLBLD CREATININE-BSD FMLA CKD-EPI: >60 ML/MIN/1.73/M2
GLOBULIN SER-MCNC: 3.4 GM/DL (ref 2.4–3.5)
GLUCOSE SERPL-MCNC: 82 MG/DL (ref 74–100)
HCT VFR BLD AUTO: 33.6 % (ref 42–52)
HGB BLD-MCNC: 10.6 G/DL (ref 14–18)
IMM GRANULOCYTES # BLD AUTO: 0.01 X10(3)/MCL (ref 0–0.04)
IMM GRANULOCYTES NFR BLD AUTO: 0.2 %
LYMPHOCYTES # BLD AUTO: 0.8 X10(3)/MCL (ref 0.6–4.6)
LYMPHOCYTES NFR BLD AUTO: 18.6 %
MCH RBC QN AUTO: 27.3 PG (ref 27–31)
MCHC RBC AUTO-ENTMCNC: 31.5 G/DL (ref 33–36)
MCV RBC AUTO: 86.6 FL (ref 80–94)
MONOCYTES # BLD AUTO: 0.53 X10(3)/MCL (ref 0.1–1.3)
MONOCYTES NFR BLD AUTO: 12.4 %
NEUTROPHILS # BLD AUTO: 2.9 X10(3)/MCL (ref 2.1–9.2)
NEUTROPHILS NFR BLD AUTO: 67.6 %
PLATELET # BLD AUTO: 131 X10(3)/MCL (ref 130–400)
PMV BLD AUTO: 10.3 FL (ref 7.4–10.4)
POTASSIUM SERPL-SCNC: 4.6 MMOL/L (ref 3.5–5.1)
PROT SERPL-MCNC: 6.3 GM/DL (ref 6.4–8.3)
RBC # BLD AUTO: 3.88 X10(6)/MCL (ref 4.7–6.1)
SODIUM SERPL-SCNC: 142 MMOL/L (ref 136–145)
WBC # BLD AUTO: 4.29 X10(3)/MCL (ref 4.5–11.5)

## 2024-09-24 PROCEDURE — 1159F MED LIST DOCD IN RCRD: CPT | Mod: CPTII,S$GLB,,

## 2024-09-24 PROCEDURE — 3078F DIAST BP <80 MM HG: CPT | Mod: CPTII,S$GLB,,

## 2024-09-24 PROCEDURE — 36415 COLL VENOUS BLD VENIPUNCTURE: CPT

## 2024-09-24 PROCEDURE — 85025 COMPLETE CBC W/AUTO DIFF WBC: CPT

## 2024-09-24 PROCEDURE — 99999 PR PBB SHADOW E&M-EST. PATIENT-LVL V: CPT | Mod: PBBFAC,,,

## 2024-09-24 PROCEDURE — 3008F BODY MASS INDEX DOCD: CPT | Mod: CPTII,S$GLB,,

## 2024-09-24 PROCEDURE — 99215 OFFICE O/P EST HI 40 MIN: CPT | Mod: S$GLB,,,

## 2024-09-24 PROCEDURE — 80053 COMPREHEN METABOLIC PANEL: CPT

## 2024-09-24 PROCEDURE — 86301 IMMUNOASSAY TUMOR CA 19-9: CPT

## 2024-09-24 PROCEDURE — 3074F SYST BP LT 130 MM HG: CPT | Mod: CPTII,S$GLB,,

## 2024-09-24 PROCEDURE — 1160F RVW MEDS BY RX/DR IN RCRD: CPT | Mod: CPTII,S$GLB,,

## 2024-09-24 RX ORDER — PROCHLORPERAZINE EDISYLATE 5 MG/ML
10 INJECTION INTRAMUSCULAR; INTRAVENOUS ONCE AS NEEDED
Status: CANCELLED
Start: 2024-09-25

## 2024-09-24 RX ORDER — SODIUM CHLORIDE 0.9 % (FLUSH) 0.9 %
10 SYRINGE (ML) INJECTION
Status: CANCELLED | OUTPATIENT
Start: 2024-09-25

## 2024-09-24 RX ORDER — DIPHENHYDRAMINE HYDROCHLORIDE 50 MG/ML
50 INJECTION INTRAMUSCULAR; INTRAVENOUS ONCE AS NEEDED
Status: CANCELLED | OUTPATIENT
Start: 2024-09-25

## 2024-09-24 RX ORDER — HEPARIN 100 UNIT/ML
500 SYRINGE INTRAVENOUS
Status: CANCELLED | OUTPATIENT
Start: 2024-09-25

## 2024-09-24 RX ORDER — EPINEPHRINE 0.3 MG/.3ML
0.3 INJECTION SUBCUTANEOUS ONCE AS NEEDED
Status: CANCELLED | OUTPATIENT
Start: 2024-09-25

## 2024-09-24 NOTE — PROGRESS NOTES
HEMATOLOGY/ONCOLOGY OFFICE CLINIC VISIT    Visit Information:    Initial Evaluation: 9/3/2024  Referring Provider: Alba Chisholm MD PhD ([facility]) -Cell [phone]  Other providers:  Code status: Not addressed    Diagnosis:  Advanced pancreatic ductal adenocarcinoma (Dx 8/2022)     Present treatment:  Gemzar monotherapy 8/28/2024  -Gemcitabine and CISplatin Every 2 Weeks (8/28/2024-present)--planned  Chemotherapy summary: CISplatin (PLATINOL) 53 mg in sodium chloride 0.9% (NS) 250 mL IVPB, intravenous, 0 of 12 cycles  gemcitabine (GEMZAR) 836 mg in sodium chloride 0.9% (NS) 250 mL IVPB, intravenous, 0 of 12 cycles     Treatment/Oncology history:  -8/2/2022 ERCP with metal biliary stent placement   -Folfirinox (10/2022- 2/2023)  -capecitabine RT (4/3/23 - 5/16/23)   -Phase I study 2021-1176 SD-101 at [facility]--SD-101 2 mg in sodium chloride 0.9% (NS) 30 mL IVPB, intravenous x 2 cycles (8/22/2023-11/22/2023)  -Gemcitabine and paclitaxel protein bound (12/13/2023-- 7/2024)-->clinical progression and GOO  -s/p biliary stent placement 4/4/2024  -chemotherapy with gemcitabine/paclitaxel (last dose 6/26/2024)  -duodenal stent (placed by GI)- 8/7/2024  -Gemcitabine and CISplatin Every 2 Weeks (8/28/2024-present)      Goal of care: life prolongation    Imaging:  CT CAP 8/21/2022: Since 7/25/2022, the primary pancreatic head mass encasing the common hepatic artery remains stable. The intrahepatic biliary obstruction improved with interval placement of a CBD stent. No definite distant metastasis in the abdomen or pelvis. Small indeterminate left lower lobe pulmonary nodule can be followed.  CT CAP 12/12/2022:  Primary pancreatic head tumor encasing the common hepatic artery is overall unchanged in size since 8/21/2022. Stable upstream pancreatic duct dilation and pancreatic atrophy. Interval placement of a cholecystostomy tube, with decompression of the gallbladder. No definite evidence of distant metastatic disease in the chest,  abdomen, and pelvis.   CT CAP 3/7/2023: Locally advanced pancreatic head tumor is slightly less conspicuous. Stable portacaval lymphadenopathy. Geographic hypodense regions have significantly increased throughout the liver compatible with fatty liver, to be correlated clinically regarding any potential concomitant hepatotoxicity; this appearance could obscure small low contrast liver lesions, to be better evaluated with MRI, if indicated. Unchanged mild jennifer appearance of the omentum is favored to be postinflammatory. No definite distant metastatic disease within the chest abdomen pelvis.   Ct CAP 6/29/2023:1.  Locally advanced pancreatic head mass not significantly changed. 2.  Increased periportal haziness and central liver heterogeneity which may relate to radiation.   3.  Indeterminate inferior right hepatic hypodensity as described above. Punctate left hepatic hypodensity also not previously evident, too small to characterize. Consider further evaluation with MRI as indicated.    MRI AP 7/15/2023:Primary neoplasm is noted within the pancreatic head and causes pancreatic ductal dilation and biliary ductal obstruction, which is decompressed via a stent. Nonspecific focal dilation of segment IV bile duct is noted.    CT CAP 10/2/2023: 1.  Compared to 07/28/2023, interval embolization of SMV tributaries in the epigastric region with new embolization coils in the right hepatic lobe following transhepatic catheterization. 2.  Previously noted small indeterminate low-attenuation lesion in the inferior right hepatic lobe segment 6 is no longer visualized. No new hepatic lesions.    3.  Stable 2.2 cm ill-defined residual pancreatic head tumor with no change in the vascular contact with the main portal vein, common hepatic and proper hepatic artery. 4.  No new metastatic disease in the chest or abdomen.   CT CAP 1/9/2024: 1.  Mild interval increase in size of the primary tumor in the head of the pancreas. 2.  No  evidence of metastatic disease in the chest, abdomen or pelvis.   CT AP 4/3/2024: 1.  There is worsening intrahepatic biliary dilation despite the presence of a biliary stent, suggesting stent occlusion. 2.  No significant change in the locally advanced tumor of the head of the pancreas since 03/12/2024.   CT CAP 6/21/2024: 1. No convincing change in the large locally advanced infiltrating ill-defined mass in the head and neck of the pancreas compared with 5/14/2024. 2. No specific evidence of distant metastatic disease in the chest, abdomen or pelvis.   XR ABDOMEN 1 VW PORTABLE 8/6/2024:  There is a gastric drainage tube with the tip below the diaphragm projecting over the gastric body with the sidehole below the GE junction in appropriate position. Gastric drainage tube with tip and sidehole projecting below the diaphragm over the gastric body.  X-ray Abdomen AP  8/5/2024: Air is seen scattered throughout normal caliber colon in a nonobstructive pattern. No dilated loops of bowel. Moderate colonic stool burden. No intraperitoneal free air within the limitations of this study. A biliary stent and embolization coils overlie the right upper quadrant. No suspicious osseous lesions.   Nonobstructive bowel gas pattern. I personally reviewed these image(s) along with the resident's/fellow's interpretations, certify that if a procedure was performed I was physically present, and agree with the final report.  CT Abdomen Pelvis with Contrast 8/5/2024: No suspicious pulmonary nodules in the lung bases. Hepatobiliary: Evaluation of the liver is somewhat limited secondary to streak artifact from the embolization coils. Within these limits there is no suspicious hepatic lesion. The gallbladder is decompressed and poorly evaluated. Common bile duct stent with expected pneumobilia. Mild intrahepatic biliary ductal dilatation is not significantly changed. Spleen: No splenomegaly. Pancreas: Ill-defined infiltrating mass of the  pancreatic head and neck, in keeping with pancreatic adenocarcinoma. There is atrophy and ductal dilatation of the pancreatic body and tail. Overall this tumor appears larger Adrenal Glands: No mass. Kidneys, Ureters, Bladder: No hydronephrosis. No suspicious renal lesion. No bladder mass. Gastrointestinal Tract: The stomach is distended with fluid and debris, suggesting there may be a degree of gastric outlet obstruction secondary to the pancreatic tumor. This is not significantly changed. There is no downstream obstruction. Pelvic Organs: No pelvic mass. Prostatomegaly. Peritoneum/Retroperitoneum: No ascites. Lymph Nodes: No lymphadenopathy. Musculoskeletal: No suspicious skeletal lesion.     No acute abdominopelvic abnormality. Attending addendum: The pancreatic tumor appears larger compared to 06/21/2024. There is gastric distention with debris suggesting chronic outlet obstruction ACTIONABLE ITEMS/RECOMMENDATIONS*: None. *An Actionable Finding is a finding that may be unrelated to the original reason for imaging but potentially actionable, meaning further investigation may be necessary. The Actionable Findings Vigilance Unit (AFVU) assists medical providers with responding to additional radiologic findings that are unexpected and potentially actionable. I personally reviewed these image(s) along with the resident's/fellow's interpretations, certify that if a procedure was performed I was physically present, and agree with the final report.     Pathology:  8/2/2022:  FNA head pancreas: SCANT MALIGNANT CELLS, FAVOR ADENOCARCINOMA  NGS:  No detectable genomic aberrations      CLINICAL HISTORY:       Patient: Warren P Lejeune is a 59 y.o. male.with a past medical history of past medical history of pancreatic ductal adenocarcinoma (Dx 8/2022)   Patient initially presented with  unintentional weight loss starting in 1/2022. He also had worsening back/shoulder and abdominal pain during this time. In July he finally  presented to his PCP with darkening of the urine and lightening of the stool - with Jaundice that his wife noticed. Labs demonstrating cholestasis (bilirubin of 19).    Patient noted to have an abnormal CT scan after developing obstructive jaundice. 2 cm hypoenhancing mass noted in the head of the pancreas with intra and extrahepatic biliary ductal dilatation. Mass measured 2.5 x 2.1 cm. There is also atrophy of the distal gland and upstream dilatation of the pancreas duct. There is no obvious vascular invasion. There were no focal liver lesions.    8/2/22- EGD/EUS/FNA. Final pathology showed ductal adenocarcinoma, moderately differentiated. ERCP on the same date performed, with placement of a fully covered metal biliary stent, 60 mm x 10 mm.    7/27/22- CA 19-9 was 681    8/21/22- CT CAP: Since 7/25/2022, the primary pancreatic head mass encasing the common hepatic artery remains stable. The intrahepatic biliary obstruction improved with interval placement of a CBD stent. No definite distant metastasis in the abdomen or pelvis. Small indeterminate left lower lobe pulmonary nodule can be followed.    09/03/2022 Germline genetic testing: Negative for germline pathogenic variants in any of the analyzed genes, Genes Analyzed: APC, DALE, BMPR1A, BRCA1, BRCA2, CDKN2A, EPCAM, MEN1, MLH1, MSH2, MSH6, NF1, PALB2, PMS2, SMAD4, STK11, TP53, TSC1, TSC2, and VHL. Genetic testing performed by [facility] [facility]; full report available in scanned documents.     s/p biliary stent placement, currently undergoing chemotherapy with gemcitabine/paclitaxel (last dose 6/26/2024), T2DM on insulin presenting for abdominal pain and constipation. CT A/P with distension in stomach suggesting gastric outlet obstruction secondary to pancreatic tumor. GI and Surg Onc consulted, pending evaluation. Poor PO tolerance, deferring NG tube now as vomiting resolves and having bowel movements.     Patient was recently admitted to the hospital at  "[facility]--Hospital Course:  "Findings on imaging were concerning for malignant gastric outlet obstruction from pancreatic cancer. After a discussion with surgical oncology, GI and GIMO, decision was made to proceed with duodenal stent (placed by GI). No indications for gastrojejunostomy bypass. Patient tolerated procedure well. We were able to advance to low fiber diet. Nutrition was consulted for low fiber diet education. Managed neoplasm related pain with PO morphine."     Patient is here today with his wife to continue chemotherapy close to home.  He is doing relatively well and voices no concerns.  MediPort was removed from his left chest wall to exposure of the MediPort.  Patient reports abdominal mid back pain, occasional muscle spasm and dizziness.  No fever, chills, sweats.  No chest pain or short of breath.    Chief Complaint: Abdominal pain with radiating to the back.      Interval History:    9/24/2024:  Mr. Lejeune is here today follow-up and treatment clearance for Cycle 2 Gemcitabine and Cisplatin. He received his 1st cycle at [facility] on 8/28/2024, gemzar only.  His wife Terri is present by telephone today during the clinic visit.  Today, he reports abdominal pain that radiates that has improved.  He is taking Percocet 10 mg PO once a day and morphine 15 mg once a day.  He denies any fever, chills, cough, SOB, chest pain, abnormal bleeding, nausea, vomiting, jaundice, change in bladder habits, joint/bone/muscle cramping or pain. He is taking Miralax and laxative for opioid induced constipation, and Pregabalin 100 mg QD for bilateral hand and feet neuropathy. He is also reporting shortness of breath and swelling to bilateral knee and ankles that has since resolved. He does report decreased appetite, go stable . He is also reporting fatigue and weakness, continues to work. He denies any recent infections, hospitalizations, illnesses.  Mediport insertion on 9/20/2024 with Dr. Moe Champagne, well healed " without signs of infection. He has an appointment with GI @ [facility] on 1/8/2025 to replace duodenal stent. Labs reviewed with patient and wife in detail. Ca 19-9  6,202.93 on 9/24/24       Past Medical History:   Diagnosis Date    Abdominal pain     Admission for chemotherapy     last chemo 9/12/24    Anxiety disorder, unspecified     Back pain     Bradycardia     Enlarged prostate     GERD (gastroesophageal reflux disease)     Hypertension     no cardiologist; no longer on meds since weight loss due to pancreatic cancer    Memory loss     Pancreatic cancer     Peripheral neuropathy     Type 2 diabetes mellitus with unspecified diabetic retinopathy without macular edema       Past Surgical History:   Procedure Laterality Date    bile duct stents times 2      EGD, WITH STENT INSERTION      INSERTION OF TUNNELED CENTRAL VENOUS CATHETER (CVC) WITH SUBCUTANEOUS PORT Right 9/20/2024    Procedure: [id number];  Surgeon: Moe Champagne MD;  Location: [facility];  Service: General;  Laterality: Right;    MEDIPORT REMOVAL      times 2     Family History   Problem Relation Name Age of Onset    Hypertension Mother      Stroke Father      Stroke Sister      Prostate cancer Brother            Review of patient's allergies indicates:  No Known Allergies   Current Outpatient Medications on File Prior to Visit   Medication Sig Dispense Refill    aluminum & magnesium hydroxide-simethicone (MYLANTA MAX STRENGTH) 400-400-40 mg/5 mL suspension Take 5 mLs by mouth 4 (four) times daily as needed (mouth sores). 335 mL 3    cholecalciferol, vitamin D3, (VITAMIN D3) 50 mcg (2,000 unit) Tab Take 2,000 Units by mouth once daily.      cyclobenzaprine (FLEXERIL) 5 MG tablet Take 1 tablet (5 mg total) by mouth 3 (three) times daily as needed for Muscle spasms. 90 tablet 0    dexAMETHasone (DECADRON) 4 MG Tab Take 2 tablets (8 mg total) by mouth once daily. 24 tablet 2    diphenhydrAMINE (BENADRYL) 12.5 mg/5 mL elixir Take 5 mLs (12.5 mg  total) by mouth 4 (four) times daily as needed (mouth sores). 236 mL 3    diphenoxylate-atropine 2.5-0.025 mg (LOMOTIL) 2.5-0.025 mg per tablet Take 1 tablet by mouth.      insulin aspart U-100 (NOVOLOG) 100 unit/mL injection Inject into the skin 3 (three) times daily before meals. prn      LIDOcaine viscous HCl 2% (LIDOCAINE VISCOUS) 2 % Soln by Mucous Membrane route 4 (four) times daily as needed (mouth sores). Swish and spit 15 ml (5 ml benadryl, 5 ml mylanta, 5 ml lidocaine) by mouth 4 times daily as needed for mouth sores 100 mL 3    magnesium 250 mg Tab Take 1 tablet by mouth once daily.      morphine (MS CONTIN) 15 MG 12 hr tablet Take 1 tablet (15 mg total) by mouth 2 (two) times daily. 30 tablet 0    multivitamin with folic acid 400 mcg Tab Take 1 tablet by mouth once daily.      OLANZapine (ZYPREXA) 5 MG tablet Take 1 tablet (5 mg total) by mouth nightly. 4 tablet 6    ondansetron (ZOFRAN) 8 MG tablet Take 8 mg by mouth.      oxyCODONE-acetaminophen (PERCOCET)  mg per tablet Take 1 tablet by mouth.      pantoprazole (PROTONIX) 40 MG tablet Take 1 tablet (40 mg total) by mouth once daily. 30 tablet 2    polyethylene glycol (GLYCOLAX) 17 gram PwPk Take 17 g by mouth.      pregabalin (LYRICA) 100 MG capsule Take 100 mg by mouth.      prochlorperazine (COMPAZINE) 10 MG tablet Take 10 mg by mouth.      tamsulosin (FLOMAX) 0.4 mg Cap Take 1 capsule by mouth.      TRESIBA FLEXTOUCH U-200 200 unit/mL (3 mL) insulin pen Inject 14 Units into the skin.      vibegron (GEMTESA) 75 mg Tab Take 75 mg by mouth.       No current facility-administered medications on file prior to visit.      Review of Systems   Constitutional:  Positive for appetite change and fatigue. Negative for activity change, chills, diaphoresis, fever and unexpected weight change.   HENT:  Positive for trouble swallowing. Negative for nasal congestion, mouth sores, nosebleeds, sinus pressure/congestion and sore throat.    Eyes: Negative.   "  Respiratory:  Positive for shortness of breath. Negative for cough.    Cardiovascular:  Negative for chest pain and palpitations.   Gastrointestinal:  Positive for constipation. Negative for abdominal distention, abdominal pain, blood in stool, change in bowel habit, diarrhea, nausea and vomiting.   Endocrine: Negative.    Genitourinary:  Negative for bladder incontinence, decreased urine volume, difficulty urinating, dysuria, frequency, hematuria and urgency.   Musculoskeletal:  Positive for back pain. Negative for arthralgias, gait problem, joint swelling, leg pain and myalgias.   Integumentary:  Negative for rash.   Allergic/Immunologic: Negative.  Negative for frequent infections.   Neurological:  Positive for weakness. Negative for dizziness, tremors, syncope, light-headedness, numbness, headaches and memory loss.   Hematological:  Negative for adenopathy. Does not bruise/bleed easily.   Psychiatric/Behavioral:  Negative for agitation, confusion, hallucinations, sleep disturbance and suicidal ideas. The patient is not nervous/anxious.               Vitals:    09/24/24 1000   BP: 114/68   BP Location: Left arm   Pulse: (!) 51   Resp: 16   Temp: 98.1 °F (36.7 °C)   SpO2: 100%   Weight: 63.4 kg (139 lb 11.2 oz)   Height: 5' 7.99" (1.727 m)          Wt Readings from Last 6 Encounters:   09/24/24 63.4 kg (139 lb 11.2 oz)   09/20/24 60 kg (132 lb 4.4 oz)   09/18/24 61.8 kg (136 lb 3.2 oz)   09/12/24 62.6 kg (138 lb)   09/10/24 63 kg (138 lb 12.8 oz)   09/09/24 63.5 kg (140 lb)     Body mass index is 21.25 kg/m².  Body surface area is 1.74 meters squared.  Physical Exam  Vitals and nursing note reviewed.   Constitutional:       General: He is not in acute distress.     Comments: thin   HENT:      Head: Normocephalic and atraumatic.      Mouth/Throat:      Mouth: Mucous membranes are moist.   Eyes:      General: No scleral icterus.     Extraocular Movements: Extraocular movements intact.      Conjunctiva/sclera: " Conjunctivae normal.   Neck:      Vascular: No JVD.   Cardiovascular:      Rate and Rhythm: Normal rate and regular rhythm.      Heart sounds: No murmur heard.  Pulmonary:      Effort: Pulmonary effort is normal.      Breath sounds: Normal breath sounds. No wheezing or rhonchi.   Chest:      Comments: Left chest wall scar from previous Mediport  Abdominal:      General: Bowel sounds are normal. There is no distension.      Palpations: Abdomen is soft.      Tenderness: There is no abdominal tenderness.   Musculoskeletal:         General: No swelling or deformity.      Cervical back: Neck supple.   Lymphadenopathy:      Head:      Right side of head: No submandibular adenopathy.      Left side of head: No submandibular adenopathy.      Cervical: No cervical adenopathy.      Upper Body:      Right upper body: No supraclavicular or axillary adenopathy.      Left upper body: No supraclavicular or axillary adenopathy.      Lower Body: No right inguinal adenopathy. No left inguinal adenopathy.   Skin:     General: Skin is warm.      Coloration: Skin is not jaundiced.      Findings: No rash.   Neurological:      General: No focal deficit present.      Mental Status: He is alert and oriented to person, place, and time.      Cranial Nerves: Cranial nerves 2-12 are intact.   Psychiatric:         Attention and Perception: Attention normal.         Behavior: Behavior is cooperative.       ECOG SCORE             Laboratory:  CBC with Differential:  Lab Results   Component Value Date    WBC 4.29 (L) 09/24/2024    RBC 3.88 (L) 09/24/2024    HGB 10.6 (L) 09/24/2024    HCT 33.6 (L) 09/24/2024    MCV 86.6 09/24/2024    MCH 27.3 09/24/2024    MCHC 31.5 (L) 09/24/2024    RDW 15.8 09/24/2024     09/24/2024    MPV 10.3 09/24/2024        CMP:  Sodium   Date Value Ref Range Status   09/24/2024 142 136 - 145 mmol/L Final     Potassium   Date Value Ref Range Status   09/24/2024 4.6 3.5 - 5.1 mmol/L Final     Chloride   Date Value Ref  Range Status   09/24/2024 108 (H) 98 - 107 mmol/L Final     CO2   Date Value Ref Range Status   09/24/2024 30 (H) 22 - 29 mmol/L Final     Blood Urea Nitrogen   Date Value Ref Range Status   09/24/2024 7.0 (L) 8.4 - 25.7 mg/dL Final   10/20/2023 12 6 - 23 mg/dL Final     Creatinine   Date Value Ref Range Status   09/24/2024 0.75 0.73 - 1.18 mg/dL Final   10/20/2023 0.95 0.67 - 1.17 mg/dL Final     Calcium   Date Value Ref Range Status   09/24/2024 8.8 8.4 - 10.2 mg/dL Final   10/20/2023 8.7 8.4 - 10.2 mg/dL Final     Albumin   Date Value Ref Range Status   09/24/2024 2.9 (L) 3.5 - 5.0 g/dL Final     Bilirubin Total   Date Value Ref Range Status   09/24/2024 0.3 <=1.5 mg/dL Final     ALP   Date Value Ref Range Status   09/24/2024 493 (H) 40 - 150 unit/L Final     AST   Date Value Ref Range Status   09/24/2024 52 (H) 5 - 34 unit/L Final     ALT   Date Value Ref Range Status   09/24/2024 62 (H) 0 - 55 unit/L Final      Component 08/28/24 07/17/24 06/26/24 06/21/24 06/12/24 05/29/24   CA 19-9 2,385.0 High  2,150.0 High  1,567.0 High  1,183.0 High  1,000.0 High  959.9 High             Assessment:       1. Adenocarcinoma of pancreas    2. Cancer associated pain    3. On antineoplastic chemotherapy    4. Constipation due to opioid therapy            1) Locally advanced pancreatic cancer   ---8/2/2022 ERCP with metal biliary stent placement   ---Folfirinox (10/2022- 2/2023)  ---capecitabine RT (4/3/23 - 5/16/23)   ---Phase I study 2021-1176 SD-101 at [facility]--SD-101 2 mg in sodium chloride 0.9% (NS) 30 mL IVPB, intravenous x 2 cycles (8/22/2023-11/22/2023)  ---Gemcitabine and paclitaxel protein bound (12/13/2023-- 7/2024)-->clinical progression and GOO  ---s/p biliary stent placement 4/4/2024  ---chemotherapy with gemcitabine/paclitaxel (last dose 6/26/2024)  ---duodenal stent (placed by GI)- 8/7/2024  ---Gemcitabine and CISplatin Every 2 Weeks (8/28/2024-present)    2) H/o Gastric outlet obstruction  ---s/p duodenal stent,  severe protein calori malnutrition     3) Pain, neoplasm related pain  ---continue Percocet, Lyrica, Morphine         Plan:       Patient with unresectable advanced pancreatic cancer to start 4th line treatment with cisplatin and Gemzar.     Labs and exam stable  Proceed with C2 Gemzar/Cisplatin   Stimufend on Day 2   Continue Magic Mouthwash for esophagitis   Continue morphine and percocet for pain  CT C/A/P every 3 months, next due 11/2024   RTC 2  week with NP/treatment   CBC CMP CA 19 9     The patient is in agreement with today's plan of care.  All questions answered.  Patient is aware to contact our office for any problems or concerns prior to next visit.    Terri Marcelino, ERNA-C  Oncology/Hematology  [facility]

## 2024-09-25 ENCOUNTER — INFUSION (OUTPATIENT)
Dept: INFUSION THERAPY | Facility: HOSPITAL | Age: 59
End: 2024-09-25
Attending: INTERNAL MEDICINE
Payer: COMMERCIAL

## 2024-09-25 VITALS
BODY MASS INDEX: 21.37 KG/M2 | RESPIRATION RATE: 16 BRPM | HEIGHT: 68 IN | SYSTOLIC BLOOD PRESSURE: 109 MMHG | WEIGHT: 141 LBS | TEMPERATURE: 99 F | HEART RATE: 58 BPM | DIASTOLIC BLOOD PRESSURE: 58 MMHG

## 2024-09-25 DIAGNOSIS — C25.9 ADENOCARCINOMA OF PANCREAS: Primary | ICD-10-CM

## 2024-09-25 PROCEDURE — 63600175 PHARM REV CODE 636 W HCPCS: Mod: JZ,JG

## 2024-09-25 PROCEDURE — 96417 CHEMO IV INFUS EACH ADDL SEQ: CPT

## 2024-09-25 PROCEDURE — 96375 TX/PRO/DX INJ NEW DRUG ADDON: CPT

## 2024-09-25 PROCEDURE — 96413 CHEMO IV INFUSION 1 HR: CPT

## 2024-09-25 PROCEDURE — A4216 STERILE WATER/SALINE, 10 ML: HCPCS

## 2024-09-25 PROCEDURE — 25000003 PHARM REV CODE 250

## 2024-09-25 PROCEDURE — 96367 TX/PROPH/DG ADDL SEQ IV INF: CPT

## 2024-09-25 RX ORDER — SODIUM CHLORIDE 0.9 % (FLUSH) 0.9 %
10 SYRINGE (ML) INJECTION
Status: DISCONTINUED | OUTPATIENT
Start: 2024-09-25 | End: 2024-09-25 | Stop reason: HOSPADM

## 2024-09-25 RX ORDER — PROCHLORPERAZINE EDISYLATE 5 MG/ML
10 INJECTION INTRAMUSCULAR; INTRAVENOUS ONCE AS NEEDED
Status: DISCONTINUED | OUTPATIENT
Start: 2024-09-12 | End: 2024-09-25 | Stop reason: HOSPADM

## 2024-09-25 RX ORDER — DIPHENHYDRAMINE HYDROCHLORIDE 50 MG/ML
50 INJECTION INTRAMUSCULAR; INTRAVENOUS ONCE AS NEEDED
Status: DISCONTINUED | OUTPATIENT
Start: 2024-09-25 | End: 2024-09-25 | Stop reason: HOSPADM

## 2024-09-25 RX ORDER — EPINEPHRINE 0.3 MG/.3ML
0.3 INJECTION SUBCUTANEOUS ONCE AS NEEDED
Status: DISCONTINUED | OUTPATIENT
Start: 2024-09-25 | End: 2024-09-25 | Stop reason: HOSPADM

## 2024-09-25 RX ORDER — HEPARIN 100 UNIT/ML
500 SYRINGE INTRAVENOUS
Status: DISCONTINUED | OUTPATIENT
Start: 2024-09-25 | End: 2024-09-25 | Stop reason: HOSPADM

## 2024-09-25 RX ADMIN — CISPLATIN 60 MG: 1 INJECTION, SOLUTION INTRAVENOUS at 09:09

## 2024-09-25 RX ADMIN — SODIUM CHLORIDE 500 ML: 9 INJECTION, SOLUTION INTRAVENOUS at 10:09

## 2024-09-25 RX ADMIN — GEMCITABINE 1800 MG: 38 INJECTION, SOLUTION INTRAVENOUS at 10:09

## 2024-09-25 RX ADMIN — DEXAMETHASONE SODIUM PHOSPHATE 0.25 MG: 10 INJECTION, SOLUTION INTRAMUSCULAR; INTRAVENOUS at 08:09

## 2024-09-25 RX ADMIN — Medication 10 ML: at 11:09

## 2024-09-25 RX ADMIN — APREPITANT 130 MG: 130 INJECTION, EMULSION INTRAVENOUS at 09:09

## 2024-09-25 RX ADMIN — HEPARIN SODIUM (PORCINE) LOCK FLUSH IV SOLN 100 UNIT/ML 500 UNITS: 100 SOLUTION at 11:09

## 2024-09-25 RX ADMIN — POTASSIUM CHLORIDE 500 ML/HR: 2 INJECTION, SOLUTION, CONCENTRATE INTRAVENOUS at 09:09

## 2024-09-26 ENCOUNTER — INFUSION (OUTPATIENT)
Dept: INFUSION THERAPY | Facility: HOSPITAL | Age: 59
End: 2024-09-26
Attending: INTERNAL MEDICINE
Payer: COMMERCIAL

## 2024-09-26 VITALS
SYSTOLIC BLOOD PRESSURE: 93 MMHG | WEIGHT: 141 LBS | DIASTOLIC BLOOD PRESSURE: 56 MMHG | RESPIRATION RATE: 16 BRPM | HEART RATE: 47 BPM | TEMPERATURE: 98 F | BODY MASS INDEX: 21.37 KG/M2 | HEIGHT: 68 IN

## 2024-09-26 DIAGNOSIS — C25.9 ADENOCARCINOMA OF PANCREAS: Primary | ICD-10-CM

## 2024-09-26 PROCEDURE — 63600175 PHARM REV CODE 636 W HCPCS: Mod: JZ,JG

## 2024-09-26 PROCEDURE — 96372 THER/PROPH/DIAG INJ SC/IM: CPT

## 2024-09-26 RX ADMIN — PEGFLILGRASTIM-FPGK 6 MG: 6 INJECTION, SOLUTION SUBCUTANEOUS at 02:09

## 2024-10-09 ENCOUNTER — INFUSION (OUTPATIENT)
Dept: INFUSION THERAPY | Facility: HOSPITAL | Age: 59
End: 2024-10-09
Attending: INTERNAL MEDICINE
Payer: COMMERCIAL

## 2024-10-09 ENCOUNTER — LAB VISIT (OUTPATIENT)
Dept: LAB | Facility: HOSPITAL | Age: 59
End: 2024-10-09
Attending: NURSE PRACTITIONER
Payer: COMMERCIAL

## 2024-10-09 ENCOUNTER — OFFICE VISIT (OUTPATIENT)
Dept: HEMATOLOGY/ONCOLOGY | Facility: CLINIC | Age: 59
End: 2024-10-09
Payer: COMMERCIAL

## 2024-10-09 VITALS
DIASTOLIC BLOOD PRESSURE: 73 MMHG | WEIGHT: 141.13 LBS | SYSTOLIC BLOOD PRESSURE: 112 MMHG | RESPIRATION RATE: 16 BRPM | TEMPERATURE: 98 F | BODY MASS INDEX: 21.39 KG/M2 | RESPIRATION RATE: 16 BRPM | OXYGEN SATURATION: 100 % | BODY MASS INDEX: 21.39 KG/M2 | WEIGHT: 141.13 LBS | OXYGEN SATURATION: 100 % | DIASTOLIC BLOOD PRESSURE: 73 MMHG | HEART RATE: 58 BPM | HEART RATE: 58 BPM | HEIGHT: 68 IN | TEMPERATURE: 98 F | HEIGHT: 68 IN | SYSTOLIC BLOOD PRESSURE: 112 MMHG

## 2024-10-09 DIAGNOSIS — C25.9 ADENOCARCINOMA OF PANCREAS: ICD-10-CM

## 2024-10-09 DIAGNOSIS — Z79.899 ON ANTINEOPLASTIC CHEMOTHERAPY: ICD-10-CM

## 2024-10-09 DIAGNOSIS — Z79.899 IMMUNODEFICIENCY DUE TO CHEMOTHERAPY: ICD-10-CM

## 2024-10-09 DIAGNOSIS — D84.821 IMMUNODEFICIENCY DUE TO CHEMOTHERAPY: ICD-10-CM

## 2024-10-09 DIAGNOSIS — C25.9 ADENOCARCINOMA OF PANCREAS: Primary | ICD-10-CM

## 2024-10-09 DIAGNOSIS — T45.1X5A IMMUNODEFICIENCY DUE TO CHEMOTHERAPY: ICD-10-CM

## 2024-10-09 DIAGNOSIS — T45.1X5A CISPLATIN INDUCED OTOTOXICITY, UNSPECIFIED LATERALITY: ICD-10-CM

## 2024-10-09 DIAGNOSIS — G89.3 CANCER ASSOCIATED PAIN: ICD-10-CM

## 2024-10-09 DIAGNOSIS — H93.8X9 CISPLATIN INDUCED OTOTOXICITY, UNSPECIFIED LATERALITY: ICD-10-CM

## 2024-10-09 LAB
ALBUMIN SERPL-MCNC: 2.9 G/DL (ref 3.5–5)
ALBUMIN/GLOB SERPL: 0.9 RATIO (ref 1.1–2)
ALP SERPL-CCNC: 637 UNIT/L (ref 40–150)
ALT SERPL-CCNC: 60 UNIT/L (ref 0–55)
ANION GAP SERPL CALC-SCNC: 5 MEQ/L
AST SERPL-CCNC: 49 UNIT/L (ref 5–34)
BASOPHILS # BLD AUTO: 0.06 X10(3)/MCL
BASOPHILS NFR BLD AUTO: 0.6 %
BILIRUB SERPL-MCNC: 0.3 MG/DL
BUN SERPL-MCNC: 5 MG/DL (ref 8.4–25.7)
CALCIUM SERPL-MCNC: 8.8 MG/DL (ref 8.4–10.2)
CHLORIDE SERPL-SCNC: 105 MMOL/L (ref 98–107)
CO2 SERPL-SCNC: 31 MMOL/L (ref 22–29)
CREAT SERPL-MCNC: 0.78 MG/DL (ref 0.73–1.18)
CREAT/UREA NIT SERPL: 6
EOSINOPHIL # BLD AUTO: 0.02 X10(3)/MCL (ref 0–0.9)
EOSINOPHIL NFR BLD AUTO: 0.2 %
ERYTHROCYTE [DISTWIDTH] IN BLOOD BY AUTOMATED COUNT: 17.6 % (ref 11.5–17)
GFR SERPLBLD CREATININE-BSD FMLA CKD-EPI: >60 ML/MIN/1.73/M2
GLOBULIN SER-MCNC: 3.3 GM/DL (ref 2.4–3.5)
GLUCOSE SERPL-MCNC: 161 MG/DL (ref 74–100)
HCT VFR BLD AUTO: 34.8 % (ref 42–52)
HGB BLD-MCNC: 11 G/DL (ref 14–18)
IMM GRANULOCYTES # BLD AUTO: 0.07 X10(3)/MCL (ref 0–0.04)
IMM GRANULOCYTES NFR BLD AUTO: 0.7 %
LYMPHOCYTES # BLD AUTO: 1.18 X10(3)/MCL (ref 0.6–4.6)
LYMPHOCYTES NFR BLD AUTO: 12.3 %
MCH RBC QN AUTO: 27.6 PG (ref 27–31)
MCHC RBC AUTO-ENTMCNC: 31.6 G/DL (ref 33–36)
MCV RBC AUTO: 87.4 FL (ref 80–94)
MONOCYTES # BLD AUTO: 0.81 X10(3)/MCL (ref 0.1–1.3)
MONOCYTES NFR BLD AUTO: 8.4 %
NEUTROPHILS # BLD AUTO: 7.46 X10(3)/MCL (ref 2.1–9.2)
NEUTROPHILS NFR BLD AUTO: 77.8 %
PLATELET # BLD AUTO: 200 X10(3)/MCL (ref 130–400)
PMV BLD AUTO: 10.3 FL (ref 7.4–10.4)
POTASSIUM SERPL-SCNC: 4.2 MMOL/L (ref 3.5–5.1)
PROT SERPL-MCNC: 6.2 GM/DL (ref 6.4–8.3)
RBC # BLD AUTO: 3.98 X10(6)/MCL (ref 4.7–6.1)
SODIUM SERPL-SCNC: 141 MMOL/L (ref 136–145)
WBC # BLD AUTO: 9.6 X10(3)/MCL (ref 4.5–11.5)

## 2024-10-09 PROCEDURE — 3008F BODY MASS INDEX DOCD: CPT | Mod: CPTII,S$GLB,,

## 2024-10-09 PROCEDURE — 99999 PR PBB SHADOW E&M-EST. PATIENT-LVL V: CPT | Mod: PBBFAC,,,

## 2024-10-09 PROCEDURE — 96368 THER/DIAG CONCURRENT INF: CPT

## 2024-10-09 PROCEDURE — 3074F SYST BP LT 130 MM HG: CPT | Mod: CPTII,S$GLB,,

## 2024-10-09 PROCEDURE — 96417 CHEMO IV INFUS EACH ADDL SEQ: CPT

## 2024-10-09 PROCEDURE — 85025 COMPLETE CBC W/AUTO DIFF WBC: CPT

## 2024-10-09 PROCEDURE — 1160F RVW MEDS BY RX/DR IN RCRD: CPT | Mod: CPTII,S$GLB,,

## 2024-10-09 PROCEDURE — 63600175 PHARM REV CODE 636 W HCPCS

## 2024-10-09 PROCEDURE — 36415 COLL VENOUS BLD VENIPUNCTURE: CPT

## 2024-10-09 PROCEDURE — 96413 CHEMO IV INFUSION 1 HR: CPT

## 2024-10-09 PROCEDURE — 80053 COMPREHEN METABOLIC PANEL: CPT

## 2024-10-09 PROCEDURE — 96375 TX/PRO/DX INJ NEW DRUG ADDON: CPT

## 2024-10-09 PROCEDURE — 25000003 PHARM REV CODE 250: Performed by: INTERNAL MEDICINE

## 2024-10-09 PROCEDURE — 96367 TX/PROPH/DG ADDL SEQ IV INF: CPT

## 2024-10-09 PROCEDURE — 1159F MED LIST DOCD IN RCRD: CPT | Mod: CPTII,S$GLB,,

## 2024-10-09 PROCEDURE — A4216 STERILE WATER/SALINE, 10 ML: HCPCS

## 2024-10-09 PROCEDURE — 99215 OFFICE O/P EST HI 40 MIN: CPT | Mod: S$GLB,,,

## 2024-10-09 PROCEDURE — 25000003 PHARM REV CODE 250

## 2024-10-09 PROCEDURE — 3078F DIAST BP <80 MM HG: CPT | Mod: CPTII,S$GLB,,

## 2024-10-09 RX ORDER — HEPARIN 100 UNIT/ML
500 SYRINGE INTRAVENOUS
Status: CANCELLED | OUTPATIENT
Start: 2024-10-09

## 2024-10-09 RX ORDER — DIPHENHYDRAMINE HYDROCHLORIDE 50 MG/ML
50 INJECTION INTRAMUSCULAR; INTRAVENOUS ONCE AS NEEDED
Status: CANCELLED | OUTPATIENT
Start: 2024-10-09

## 2024-10-09 RX ORDER — EPINEPHRINE 0.3 MG/.3ML
0.3 INJECTION SUBCUTANEOUS ONCE AS NEEDED
Status: CANCELLED | OUTPATIENT
Start: 2024-10-09

## 2024-10-09 RX ORDER — HEPARIN 100 UNIT/ML
500 SYRINGE INTRAVENOUS
Status: DISCONTINUED | OUTPATIENT
Start: 2024-10-09 | End: 2024-10-09 | Stop reason: HOSPADM

## 2024-10-09 RX ORDER — PROCHLORPERAZINE EDISYLATE 5 MG/ML
10 INJECTION INTRAMUSCULAR; INTRAVENOUS ONCE AS NEEDED
Status: CANCELLED
Start: 2024-10-09

## 2024-10-09 RX ORDER — SODIUM CHLORIDE 0.9 % (FLUSH) 0.9 %
10 SYRINGE (ML) INJECTION
Status: CANCELLED | OUTPATIENT
Start: 2024-10-09

## 2024-10-09 RX ORDER — SODIUM CHLORIDE 0.9 % (FLUSH) 0.9 %
10 SYRINGE (ML) INJECTION
Status: DISCONTINUED | OUTPATIENT
Start: 2024-10-09 | End: 2024-10-09 | Stop reason: HOSPADM

## 2024-10-09 RX ADMIN — DEXAMETHASONE SODIUM PHOSPHATE 0.25 MG: 10 INJECTION, SOLUTION INTRAMUSCULAR; INTRAVENOUS at 09:10

## 2024-10-09 RX ADMIN — HEPARIN SODIUM (PORCINE) LOCK FLUSH IV SOLN 100 UNIT/ML 500 UNITS: 100 SOLUTION at 11:10

## 2024-10-09 RX ADMIN — SODIUM CHLORIDE: 9 INJECTION, SOLUTION INTRAVENOUS at 09:10

## 2024-10-09 RX ADMIN — POTASSIUM CHLORIDE 500 ML/HR: 2 INJECTION, SOLUTION, CONCENTRATE INTRAVENOUS at 09:10

## 2024-10-09 RX ADMIN — Medication 10 ML: at 11:10

## 2024-10-09 RX ADMIN — APREPITANT 130 MG: 130 INJECTION, EMULSION INTRAVENOUS at 09:10

## 2024-10-09 RX ADMIN — GEMCITABINE 1800 MG: 38 INJECTION, SOLUTION INTRAVENOUS at 09:10

## 2024-10-09 RX ADMIN — CISPLATIN 60 MG: 1 INJECTION, SOLUTION INTRAVENOUS at 10:10

## 2024-10-09 RX ADMIN — SODIUM CHLORIDE 500 ML: 9 INJECTION, SOLUTION INTRAVENOUS at 10:10

## 2024-10-09 NOTE — PROGRESS NOTES
HEMATOLOGY/ONCOLOGY OFFICE CLINIC VISIT    Visit Information:    Initial Evaluation: 9/3/2024  Referring Provider: Alba Chisholm MD PhD ([facility]) -Cell [phone]  Other providers:  Code status: Not addressed    Diagnosis:  Advanced pancreatic ductal adenocarcinoma (Dx 8/2022)     Present treatment:  Gemzar monotherapy 8/28/2024  -Gemcitabine and CISplatin Every 2 Weeks (8/28/2024-present)--planned  Chemotherapy summary: CISplatin (PLATINOL) 53 mg in sodium chloride 0.9% (NS) 250 mL IVPB, intravenous, 0 of 12 cycles  gemcitabine (GEMZAR) 836 mg in sodium chloride 0.9% (NS) 250 mL IVPB, intravenous, 0 of 12 cycles     Treatment/Oncology history:  -8/2/2022 ERCP with metal biliary stent placement   -Folfirinox (10/2022- 2/2023)  -capecitabine RT (4/3/23 - 5/16/23)   -Phase I study 2021-1176 SD-101 at [facility]--SD-101 2 mg in sodium chloride 0.9% (NS) 30 mL IVPB, intravenous x 2 cycles (8/22/2023-11/22/2023)  -Gemcitabine and paclitaxel protein bound (12/13/2023-- 7/2024)-->clinical progression and GOO  -s/p biliary stent placement 4/4/2024  -chemotherapy with gemcitabine/paclitaxel (last dose 6/26/2024)  -duodenal stent (placed by GI)- 8/7/2024  -Gemcitabine and CISplatin Every 2 Weeks (8/28/2024-present)      Goal of care: life prolongation    Imaging:  CT CAP 8/21/2022: Since 7/25/2022, the primary pancreatic head mass encasing the common hepatic artery remains stable. The intrahepatic biliary obstruction improved with interval placement of a CBD stent. No definite distant metastasis in the abdomen or pelvis. Small indeterminate left lower lobe pulmonary nodule can be followed.  CT CAP 12/12/2022:  Primary pancreatic head tumor encasing the common hepatic artery is overall unchanged in size since 8/21/2022. Stable upstream pancreatic duct dilation and pancreatic atrophy. Interval placement of a cholecystostomy tube, with decompression of the gallbladder. No definite evidence of distant metastatic disease in the chest,  abdomen, and pelvis.   CT CAP 3/7/2023: Locally advanced pancreatic head tumor is slightly less conspicuous. Stable portacaval lymphadenopathy. Geographic hypodense regions have significantly increased throughout the liver compatible with fatty liver, to be correlated clinically regarding any potential concomitant hepatotoxicity; this appearance could obscure small low contrast liver lesions, to be better evaluated with MRI, if indicated. Unchanged mild jennifer appearance of the omentum is favored to be postinflammatory. No definite distant metastatic disease within the chest abdomen pelvis.   Ct CAP 6/29/2023:1.  Locally advanced pancreatic head mass not significantly changed. 2.  Increased periportal haziness and central liver heterogeneity which may relate to radiation.   3.  Indeterminate inferior right hepatic hypodensity as described above. Punctate left hepatic hypodensity also not previously evident, too small to characterize. Consider further evaluation with MRI as indicated.    MRI AP 7/15/2023:Primary neoplasm is noted within the pancreatic head and causes pancreatic ductal dilation and biliary ductal obstruction, which is decompressed via a stent. Nonspecific focal dilation of segment IV bile duct is noted.    CT CAP 10/2/2023: 1.  Compared to 07/28/2023, interval embolization of SMV tributaries in the epigastric region with new embolization coils in the right hepatic lobe following transhepatic catheterization. 2.  Previously noted small indeterminate low-attenuation lesion in the inferior right hepatic lobe segment 6 is no longer visualized. No new hepatic lesions.    3.  Stable 2.2 cm ill-defined residual pancreatic head tumor with no change in the vascular contact with the main portal vein, common hepatic and proper hepatic artery. 4.  No new metastatic disease in the chest or abdomen.   CT CAP 1/9/2024: 1.  Mild interval increase in size of the primary tumor in the head of the pancreas. 2.  No  evidence of metastatic disease in the chest, abdomen or pelvis.   CT AP 4/3/2024: 1.  There is worsening intrahepatic biliary dilation despite the presence of a biliary stent, suggesting stent occlusion. 2.  No significant change in the locally advanced tumor of the head of the pancreas since 03/12/2024.   CT CAP 6/21/2024: 1. No convincing change in the large locally advanced infiltrating ill-defined mass in the head and neck of the pancreas compared with 5/14/2024. 2. No specific evidence of distant metastatic disease in the chest, abdomen or pelvis.   XR ABDOMEN 1 VW PORTABLE 8/6/2024:  There is a gastric drainage tube with the tip below the diaphragm projecting over the gastric body with the sidehole below the GE junction in appropriate position. Gastric drainage tube with tip and sidehole projecting below the diaphragm over the gastric body.  X-ray Abdomen AP  8/5/2024: Air is seen scattered throughout normal caliber colon in a nonobstructive pattern. No dilated loops of bowel. Moderate colonic stool burden. No intraperitoneal free air within the limitations of this study. A biliary stent and embolization coils overlie the right upper quadrant. No suspicious osseous lesions.   Nonobstructive bowel gas pattern. I personally reviewed these image(s) along with the resident's/fellow's interpretations, certify that if a procedure was performed I was physically present, and agree with the final report.  CT Abdomen Pelvis with Contrast 8/5/2024: No suspicious pulmonary nodules in the lung bases. Hepatobiliary: Evaluation of the liver is somewhat limited secondary to streak artifact from the embolization coils. Within these limits there is no suspicious hepatic lesion. The gallbladder is decompressed and poorly evaluated. Common bile duct stent with expected pneumobilia. Mild intrahepatic biliary ductal dilatation is not significantly changed. Spleen: No splenomegaly. Pancreas: Ill-defined infiltrating mass of the  pancreatic head and neck, in keeping with pancreatic adenocarcinoma. There is atrophy and ductal dilatation of the pancreatic body and tail. Overall this tumor appears larger Adrenal Glands: No mass. Kidneys, Ureters, Bladder: No hydronephrosis. No suspicious renal lesion. No bladder mass. Gastrointestinal Tract: The stomach is distended with fluid and debris, suggesting there may be a degree of gastric outlet obstruction secondary to the pancreatic tumor. This is not significantly changed. There is no downstream obstruction. Pelvic Organs: No pelvic mass. Prostatomegaly. Peritoneum/Retroperitoneum: No ascites. Lymph Nodes: No lymphadenopathy. Musculoskeletal: No suspicious skeletal lesion.     No acute abdominopelvic abnormality. Attending addendum: The pancreatic tumor appears larger compared to 06/21/2024. There is gastric distention with debris suggesting chronic outlet obstruction ACTIONABLE ITEMS/RECOMMENDATIONS*: None. *An Actionable Finding is a finding that may be unrelated to the original reason for imaging but potentially actionable, meaning further investigation may be necessary. The Actionable Findings Vigilance Unit (AFVU) assists medical providers with responding to additional radiologic findings that are unexpected and potentially actionable. I personally reviewed these image(s) along with the resident's/fellow's interpretations, certify that if a procedure was performed I was physically present, and agree with the final report.     Pathology:  8/2/2022:  FNA head pancreas: SCANT MALIGNANT CELLS, FAVOR ADENOCARCINOMA  NGS:  No detectable genomic aberrations      CLINICAL HISTORY:       Patient: Warren P Lejeune is a 59 y.o. male.with a past medical history of past medical history of pancreatic ductal adenocarcinoma (Dx 8/2022)   Patient initially presented with  unintentional weight loss starting in 1/2022. He also had worsening back/shoulder and abdominal pain during this time. In July he finally  presented to his PCP with darkening of the urine and lightening of the stool - with Jaundice that his wife noticed. Labs demonstrating cholestasis (bilirubin of 19).    Patient noted to have an abnormal CT scan after developing obstructive jaundice. 2 cm hypoenhancing mass noted in the head of the pancreas with intra and extrahepatic biliary ductal dilatation. Mass measured 2.5 x 2.1 cm. There is also atrophy of the distal gland and upstream dilatation of the pancreas duct. There is no obvious vascular invasion. There were no focal liver lesions.    8/2/22- EGD/EUS/FNA. Final pathology showed ductal adenocarcinoma, moderately differentiated. ERCP on the same date performed, with placement of a fully covered metal biliary stent, 60 mm x 10 mm.    7/27/22- CA 19-9 was 681    8/21/22- CT CAP: Since 7/25/2022, the primary pancreatic head mass encasing the common hepatic artery remains stable. The intrahepatic biliary obstruction improved with interval placement of a CBD stent. No definite distant metastasis in the abdomen or pelvis. Small indeterminate left lower lobe pulmonary nodule can be followed.    09/03/2022 Germline genetic testing: Negative for germline pathogenic variants in any of the analyzed genes, Genes Analyzed: APC, DALE, BMPR1A, BRCA1, BRCA2, CDKN2A, EPCAM, MEN1, MLH1, MSH2, MSH6, NF1, PALB2, PMS2, SMAD4, STK11, TP53, TSC1, TSC2, and VHL. Genetic testing performed by [employer]; full report available in scanned documents.     s/p biliary stent placement, currently undergoing chemotherapy with gemcitabine/paclitaxel (last dose 6/26/2024), T2DM on insulin presenting for abdominal pain and constipation. CT A/P with distension in stomach suggesting gastric outlet obstruction secondary to pancreatic tumor. GI and Surg Onc consulted, pending evaluation. Poor PO tolerance, deferring NG tube now as vomiting resolves and having bowel movements.     Patient was recently admitted to the hospital at  "[facility]--Hospital Course:  "Findings on imaging were concerning for malignant gastric outlet obstruction from pancreatic cancer. After a discussion with surgical oncology, GI and GIMO, decision was made to proceed with duodenal stent (placed by GI). No indications for gastrojejunostomy bypass. Patient tolerated procedure well. We were able to advance to low fiber diet. Nutrition was consulted for low fiber diet education. Managed neoplasm related pain with PO morphine."     Patient is here today with his wife to continue chemotherapy close to home.  He is doing relatively well and voices no concerns.  MediPort was removed from his left chest wall to exposure of the MediPort.  Patient reports abdominal mid back pain, occasional muscle spasm and dizziness.  No fever, chills, sweats.  No chest pain or short of breath.    Chief Complaint: Abdominal pain with radiating to the back.      Interval History:    10/9/2024:  Mr. Lejeune is here today follow-up and treatment clearance for Cycle 3 Gemcitabine and Cisplatin. He received his 1st cycle at [facility] on 8/28/2024, gemzar only.  His wife Terri is present by telephone today during the clinic visit.  Today, he reports abdominal pain that radiates that has improved, rating it a 4 put of 10.  He is taking Percocet 10 mg PO once a day and morphine 15 mg once a day.  He denies any fever, chills, cough, SOB, chest pain, abnormal bleeding, nausea, vomiting, jaundice, change in bladder habits, joint/bone/muscle cramping or pain. He is taking Miralax and laxative for opioid induced constipation, and Pregabalin 100 mg QD for bilateral hand and feet neuropathy. He is also reporting occasional shortness of breath and swelling to bilateral knee and ankles that has since resolved. He is also reporting fatigue and weakness. He denies any recent infections, hospitalizations, illnesses.  Mediport insertion on 9/20/2024 with Dr. Moe Champagne, well healed without signs of infection. He has an " appointment with GI @ [facility] on 1/8/2025 to replace duodenal stent. Labs reviewed with patient and wife in detail. Ca 19-9  6,202.93 on 9/24/24       Past Medical History:   Diagnosis Date    Abdominal pain     Admission for chemotherapy     last chemo 9/12/24    Anxiety disorder, unspecified     Back pain     Bradycardia     Enlarged prostate     GERD (gastroesophageal reflux disease)     Hypertension     no cardiologist; no longer on meds since weight loss due to pancreatic cancer    Memory loss     Pancreatic cancer     Peripheral neuropathy     Type 2 diabetes mellitus with unspecified diabetic retinopathy without macular edema       Past Surgical History:   Procedure Laterality Date    bile duct stents times 2      EGD, WITH STENT INSERTION      INSERTION OF TUNNELED CENTRAL VENOUS CATHETER (CVC) WITH SUBCUTANEOUS PORT Right 9/20/2024    Procedure: [id number];  Surgeon: Moe Champagne MD;  Location: [facility];  Service: General;  Laterality: Right;    MEDIPORT REMOVAL      times 2     Family History   Problem Relation Name Age of Onset    Hypertension Mother      Stroke Father      Stroke Sister      Prostate cancer Brother            Review of patient's allergies indicates:  No Known Allergies   Current Outpatient Medications on File Prior to Visit   Medication Sig Dispense Refill    aluminum & magnesium hydroxide-simethicone (MYLANTA MAX STRENGTH) 400-400-40 mg/5 mL suspension Take 5 mLs by mouth 4 (four) times daily as needed (mouth sores). 335 mL 3    cholecalciferol, vitamin D3, (VITAMIN D3) 50 mcg (2,000 unit) Tab Take 2,000 Units by mouth once daily.      cyclobenzaprine (FLEXERIL) 5 MG tablet Take 1 tablet (5 mg total) by mouth 3 (three) times daily as needed for Muscle spasms. 90 tablet 0    dexAMETHasone (DECADRON) 4 MG Tab Take 2 tablets (8 mg total) by mouth once daily. 24 tablet 2    diphenhydrAMINE (BENADRYL) 12.5 mg/5 mL elixir Take 5 mLs (12.5 mg total) by mouth 4 (four) times daily as  needed (mouth sores). 236 mL 3    diphenoxylate-atropine 2.5-0.025 mg (LOMOTIL) 2.5-0.025 mg per tablet Take 1 tablet by mouth.      insulin aspart U-100 (NOVOLOG) 100 unit/mL injection Inject into the skin 3 (three) times daily before meals. prn      LIDOcaine viscous HCl 2% (LIDOCAINE VISCOUS) 2 % Soln by Mucous Membrane route 4 (four) times daily as needed (mouth sores). Swish and spit 15 ml (5 ml benadryl, 5 ml mylanta, 5 ml lidocaine) by mouth 4 times daily as needed for mouth sores 100 mL 3    magnesium 250 mg Tab Take 1 tablet by mouth once daily.      morphine (MS CONTIN) 15 MG 12 hr tablet Take 1 tablet (15 mg total) by mouth 2 (two) times daily. 30 tablet 0    multivitamin with folic acid 400 mcg Tab Take 1 tablet by mouth once daily.      OLANZapine (ZYPREXA) 5 MG tablet Take 1 tablet (5 mg total) by mouth nightly. 4 tablet 6    ondansetron (ZOFRAN) 8 MG tablet Take 8 mg by mouth.      oxyCODONE-acetaminophen (PERCOCET)  mg per tablet Take 1 tablet by mouth.      pantoprazole (PROTONIX) 40 MG tablet Take 1 tablet (40 mg total) by mouth once daily. 30 tablet 2    polyethylene glycol (GLYCOLAX) 17 gram PwPk Take 17 g by mouth.      pregabalin (LYRICA) 100 MG capsule Take 100 mg by mouth.      prochlorperazine (COMPAZINE) 10 MG tablet Take 10 mg by mouth.      tamsulosin (FLOMAX) 0.4 mg Cap Take 1 capsule by mouth.      TRESIBA FLEXTOUCH U-200 200 unit/mL (3 mL) insulin pen Inject 14 Units into the skin.      vibegron (GEMTESA) 75 mg Tab Take 75 mg by mouth.       No current facility-administered medications on file prior to visit.      Review of Systems   Constitutional:  Positive for appetite change and fatigue. Negative for activity change, chills, diaphoresis, fever and unexpected weight change.   HENT:  Positive for trouble swallowing. Negative for nasal congestion, mouth sores, nosebleeds, sinus pressure/congestion and sore throat.    Eyes: Negative.    Respiratory:  Positive for shortness of  breath. Negative for cough.    Cardiovascular:  Negative for chest pain and palpitations.   Gastrointestinal:  Positive for constipation. Negative for abdominal distention, abdominal pain, blood in stool, change in bowel habit, diarrhea, nausea and vomiting.   Endocrine: Negative.    Genitourinary:  Negative for bladder incontinence, decreased urine volume, difficulty urinating, dysuria, frequency, hematuria and urgency.   Musculoskeletal:  Positive for back pain. Negative for arthralgias, gait problem, joint swelling, leg pain and myalgias.   Integumentary:  Negative for rash.   Allergic/Immunologic: Negative.  Negative for frequent infections.   Neurological:  Positive for weakness. Negative for dizziness, tremors, syncope, light-headedness, numbness, headaches and memory loss.   Hematological:  Negative for adenopathy. Does not bruise/bleed easily.   Psychiatric/Behavioral:  Negative for agitation, confusion, hallucinations, sleep disturbance and suicidal ideas. The patient is not nervous/anxious.               There were no vitals filed for this visit.         Wt Readings from Last 6 Encounters:   09/26/24 64 kg (141 lb)   09/25/24 64 kg (141 lb)   09/24/24 63.4 kg (139 lb 11.2 oz)   09/20/24 60 kg (132 lb 4.4 oz)   09/18/24 61.8 kg (136 lb 3.2 oz)   09/12/24 62.6 kg (138 lb)     There is no height or weight on file to calculate BMI.  There is no height or weight on file to calculate BSA.  Physical Exam  Vitals and nursing note reviewed.   Constitutional:       General: He is not in acute distress.     Comments: thin   HENT:      Head: Normocephalic and atraumatic.      Mouth/Throat:      Mouth: Mucous membranes are moist.   Eyes:      General: No scleral icterus.     Extraocular Movements: Extraocular movements intact.      Conjunctiva/sclera: Conjunctivae normal.   Neck:      Vascular: No JVD.   Cardiovascular:      Rate and Rhythm: Normal rate and regular rhythm.      Heart sounds: No murmur  heard.  Pulmonary:      Effort: Pulmonary effort is normal.      Breath sounds: Normal breath sounds. No wheezing or rhonchi.   Chest:      Comments: Left chest wall scar from previous Mediport  Abdominal:      General: Bowel sounds are normal. There is no distension.      Palpations: Abdomen is soft.      Tenderness: There is no abdominal tenderness.   Musculoskeletal:         General: No swelling or deformity.      Cervical back: Neck supple.   Lymphadenopathy:      Head:      Right side of head: No submandibular adenopathy.      Left side of head: No submandibular adenopathy.      Cervical: No cervical adenopathy.      Upper Body:      Right upper body: No supraclavicular or axillary adenopathy.      Left upper body: No supraclavicular or axillary adenopathy.      Lower Body: No right inguinal adenopathy. No left inguinal adenopathy.   Skin:     General: Skin is warm.      Coloration: Skin is not jaundiced.      Findings: No rash.   Neurological:      General: No focal deficit present.      Mental Status: He is alert and oriented to person, place, and time.      Cranial Nerves: Cranial nerves 2-12 are intact.   Psychiatric:         Attention and Perception: Attention normal.         Behavior: Behavior is cooperative.       ECOG SCORE             Laboratory:  CBC with Differential:  Lab Results   Component Value Date    WBC 9.60 10/09/2024    RBC 3.98 (L) 10/09/2024    HGB 11.0 (L) 10/09/2024    HCT 34.8 (L) 10/09/2024    MCV 87.4 10/09/2024    MCH 27.6 10/09/2024    MCHC 31.6 (L) 10/09/2024    RDW 17.6 (H) 10/09/2024     10/09/2024    MPV 10.3 10/09/2024        CMP:  Sodium   Date Value Ref Range Status   10/09/2024 141 136 - 145 mmol/L Final     Potassium   Date Value Ref Range Status   10/09/2024 4.2 3.5 - 5.1 mmol/L Final     Chloride   Date Value Ref Range Status   10/09/2024 105 98 - 107 mmol/L Final     CO2   Date Value Ref Range Status   10/09/2024 31 (H) 22 - 29 mmol/L Final     Blood Urea Nitrogen    Date Value Ref Range Status   10/09/2024 5.0 (L) 8.4 - 25.7 mg/dL Final   10/20/2023 12 6 - 23 mg/dL Final     Creatinine   Date Value Ref Range Status   10/09/2024 0.78 0.73 - 1.18 mg/dL Final   10/20/2023 0.95 0.67 - 1.17 mg/dL Final     Calcium   Date Value Ref Range Status   10/09/2024 8.8 8.4 - 10.2 mg/dL Final   10/20/2023 8.7 8.4 - 10.2 mg/dL Final     Albumin   Date Value Ref Range Status   10/09/2024 2.9 (L) 3.5 - 5.0 g/dL Final     Bilirubin Total   Date Value Ref Range Status   10/09/2024 0.3 <=1.5 mg/dL Final     ALP   Date Value Ref Range Status   10/09/2024 637 (H) 40 - 150 unit/L Final     AST   Date Value Ref Range Status   10/09/2024 49 (H) 5 - 34 unit/L Final     ALT   Date Value Ref Range Status   10/09/2024 60 (H) 0 - 55 unit/L Final      Component 08/28/24 07/17/24 06/26/24 06/21/24 06/12/24 05/29/24   CA 19-9 2,385.0 High  2,150.0 High  1,567.0 High  1,183.0 High  1,000.0 High  959.9 High             Assessment:       1. Adenocarcinoma of pancreas    2. Cancer associated pain    3. On antineoplastic chemotherapy    4. Immunodeficiency due to chemotherapy              1) Locally advanced pancreatic cancer   ---8/2/2022 ERCP with metal biliary stent placement   ---Folfirinox (10/2022- 2/2023)  ---capecitabine RT (4/3/23 - 5/16/23)   ---Phase I study 2021-1176 SD-101 at [facility]--SD-101 2 mg in sodium chloride 0.9% (NS) 30 mL IVPB, intravenous x 2 cycles (8/22/2023-11/22/2023)  ---Gemcitabine and paclitaxel protein bound (12/13/2023-- 7/2024)-->clinical progression and GOO  ---s/p biliary stent placement 4/4/2024  ---chemotherapy with gemcitabine/paclitaxel (last dose 6/26/2024)  ---duodenal stent (placed by GI)- 8/7/2024  ---Gemcitabine and CISplatin Every 2 Weeks (8/28/2024-present)    2) H/o Gastric outlet obstruction  ---s/p duodenal stent, severe protein calori malnutrition     3) Pain, neoplasm related pain  ---continue Percocet, Lyrica, Morphine         Plan:       Patient with unresectable  advanced pancreatic cancer to start 4th line treatment with cisplatin and Gemzar.     Labs and exam stable  Proceed with C3 Gemzar/Cisplatin   Stimufend on Day 2   Continue Magic Mouthwash for esophagitis   Continue morphine and percocet for pain  CT C/A/P every 3 months, next due 11/2024, ordered today  RTC 2  week with NP/treatment   CBC CMP  Mg CA 19 9     The patient is in agreement with today's plan of care.  All questions answered.  Patient is aware to contact our office for any problems or concerns prior to next visit.    Terri Marcelino, FNP-C  Oncology/Hematology  [facility]

## 2024-10-10 ENCOUNTER — INFUSION (OUTPATIENT)
Dept: INFUSION THERAPY | Facility: HOSPITAL | Age: 59
End: 2024-10-10
Attending: INTERNAL MEDICINE
Payer: COMMERCIAL

## 2024-10-10 VITALS
OXYGEN SATURATION: 95 % | TEMPERATURE: 99 F | BODY MASS INDEX: 23.07 KG/M2 | WEIGHT: 147 LBS | DIASTOLIC BLOOD PRESSURE: 43 MMHG | HEART RATE: 51 BPM | RESPIRATION RATE: 16 BRPM | HEIGHT: 67 IN | SYSTOLIC BLOOD PRESSURE: 95 MMHG

## 2024-10-10 DIAGNOSIS — C25.9 ADENOCARCINOMA OF PANCREAS: Primary | ICD-10-CM

## 2024-10-10 PROCEDURE — 96372 THER/PROPH/DIAG INJ SC/IM: CPT

## 2024-10-14 ENCOUNTER — CLINICAL SUPPORT (OUTPATIENT)
Dept: AUDIOLOGY | Facility: HOSPITAL | Age: 59
End: 2024-10-14
Payer: COMMERCIAL

## 2024-10-14 DIAGNOSIS — T45.1X5A CISPLATIN INDUCED OTOTOXICITY, UNSPECIFIED LATERALITY: ICD-10-CM

## 2024-10-14 DIAGNOSIS — H93.8X9 CISPLATIN INDUCED OTOTOXICITY, UNSPECIFIED LATERALITY: ICD-10-CM

## 2024-10-14 DIAGNOSIS — H90.3 SENSORINEURAL HEARING LOSS, BILATERAL: Primary | ICD-10-CM

## 2024-10-14 DIAGNOSIS — C25.9 ADENOCARCINOMA OF PANCREAS: ICD-10-CM

## 2024-10-14 PROCEDURE — 92567 TYMPANOMETRY: CPT | Performed by: AUDIOLOGIST

## 2024-10-14 PROCEDURE — 92557 COMPREHENSIVE HEARING TEST: CPT | Performed by: AUDIOLOGIST

## 2024-10-14 NOTE — PROGRESS NOTES
Hearing Evaluation        Patient History: Mr. Lejeune is in for a hearing evaluation reporting a gradual decrease in hearing, onset unknown. He also endorses constant bilateral tinnitus and orthostatic hypertension.  Middle ear issues are denied. Currently undergoing chemotherapy for treatment of pancreatic cancer.  All additional history is unremarkable.        Test Results:                    Pure Tone Testing:      Right ear:       Mild to moderately severe sensorineural hearing loss from 250-8kHz. Speech reception threshold is in agreement with puretone findings.  Discrimination score of 100% is considered excellent.        Left ear:          Mild to moderately severe sensorineural hearing loss from 250-8kHz. Speech reception threshold is in agreement with puretone findings.  Discrimination score of 100% is considered excellent.                                                                            Tympanometry:                                           Right ear:       Type 'A' tymp, normal middle ear pressure/function     Left ear:          Type 'A' tymp, normal middle ear pressure/function                                     Interpretations:      Behavioral test findings indicate a mild to moderately severe sensorineural hearing loss, bilaterally. Speech reception thresholds obtained at 30dB, AU, and are in agreement with puretone findings. Speech discrimination scores of 100%, AU, are considered excellent.  Immittance measures indicate normal middle ear pressure/function, bilaterally.            Recommendations:   1. Annual hearing evaluations  2. Binaural amplification (Information on [place] given)

## 2024-10-23 ENCOUNTER — INFUSION (OUTPATIENT)
Dept: INFUSION THERAPY | Facility: HOSPITAL | Age: 59
End: 2024-10-23
Attending: INTERNAL MEDICINE
Payer: COMMERCIAL

## 2024-10-23 ENCOUNTER — OFFICE VISIT (OUTPATIENT)
Dept: HEMATOLOGY/ONCOLOGY | Facility: CLINIC | Age: 59
End: 2024-10-23
Payer: COMMERCIAL

## 2024-10-23 ENCOUNTER — LAB VISIT (OUTPATIENT)
Dept: LAB | Facility: HOSPITAL | Age: 59
End: 2024-10-23
Payer: COMMERCIAL

## 2024-10-23 VITALS
OXYGEN SATURATION: 100 % | BODY MASS INDEX: 22.08 KG/M2 | WEIGHT: 140.69 LBS | HEART RATE: 63 BPM | SYSTOLIC BLOOD PRESSURE: 109 MMHG | TEMPERATURE: 99 F | DIASTOLIC BLOOD PRESSURE: 71 MMHG | HEIGHT: 67 IN | RESPIRATION RATE: 18 BRPM

## 2024-10-23 DIAGNOSIS — G89.3 CANCER ASSOCIATED PAIN: ICD-10-CM

## 2024-10-23 DIAGNOSIS — D84.821 IMMUNODEFICIENCY DUE TO CHEMOTHERAPY: ICD-10-CM

## 2024-10-23 DIAGNOSIS — C25.9 ADENOCARCINOMA OF PANCREAS: Primary | ICD-10-CM

## 2024-10-23 DIAGNOSIS — C25.9 ADENOCARCINOMA OF PANCREAS: ICD-10-CM

## 2024-10-23 DIAGNOSIS — Z79.899 IMMUNODEFICIENCY DUE TO CHEMOTHERAPY: ICD-10-CM

## 2024-10-23 DIAGNOSIS — Z79.899 ON ANTINEOPLASTIC CHEMOTHERAPY: ICD-10-CM

## 2024-10-23 DIAGNOSIS — T45.1X5A IMMUNODEFICIENCY DUE TO CHEMOTHERAPY: ICD-10-CM

## 2024-10-23 LAB
ALBUMIN SERPL-MCNC: 3 G/DL (ref 3.5–5)
ALBUMIN/GLOB SERPL: 0.9 RATIO (ref 1.1–2)
ALP SERPL-CCNC: 785 UNIT/L (ref 40–150)
ALT SERPL-CCNC: 125 UNIT/L (ref 0–55)
ANION GAP SERPL CALC-SCNC: 6 MEQ/L
AST SERPL-CCNC: 159 UNIT/L (ref 5–34)
BASOPHILS # BLD AUTO: 0.03 X10(3)/MCL
BASOPHILS NFR BLD AUTO: 0.2 %
BILIRUB SERPL-MCNC: 0.8 MG/DL
BUN SERPL-MCNC: 5 MG/DL (ref 8.4–25.7)
CALCIUM SERPL-MCNC: 8.8 MG/DL (ref 8.4–10.2)
CANCER AG19-9 SERPL-ACNC: ABNORMAL UNIT/ML (ref 0–37)
CHLORIDE SERPL-SCNC: 104 MMOL/L (ref 98–107)
CO2 SERPL-SCNC: 27 MMOL/L (ref 22–29)
CREAT SERPL-MCNC: 0.82 MG/DL (ref 0.72–1.25)
CREAT/UREA NIT SERPL: 6
EOSINOPHIL # BLD AUTO: 0.04 X10(3)/MCL (ref 0–0.9)
EOSINOPHIL NFR BLD AUTO: 0.3 %
ERYTHROCYTE [DISTWIDTH] IN BLOOD BY AUTOMATED COUNT: 19 % (ref 11.5–17)
GFR SERPLBLD CREATININE-BSD FMLA CKD-EPI: >60 ML/MIN/1.73/M2
GLOBULIN SER-MCNC: 3.2 GM/DL (ref 2.4–3.5)
GLUCOSE SERPL-MCNC: 166 MG/DL (ref 74–100)
HCT VFR BLD AUTO: 31.2 % (ref 42–52)
HGB BLD-MCNC: 9.8 G/DL (ref 14–18)
IMM GRANULOCYTES # BLD AUTO: 0.06 X10(3)/MCL (ref 0–0.04)
IMM GRANULOCYTES NFR BLD AUTO: 0.5 %
LYMPHOCYTES # BLD AUTO: 0.49 X10(3)/MCL (ref 0.6–4.6)
LYMPHOCYTES NFR BLD AUTO: 4 %
MAGNESIUM SERPL-MCNC: 1.7 MG/DL (ref 1.6–2.6)
MCH RBC QN AUTO: 28 PG (ref 27–31)
MCHC RBC AUTO-ENTMCNC: 31.4 G/DL (ref 33–36)
MCV RBC AUTO: 89.1 FL (ref 80–94)
MONOCYTES # BLD AUTO: 1.18 X10(3)/MCL (ref 0.1–1.3)
MONOCYTES NFR BLD AUTO: 9.6 %
NEUTROPHILS # BLD AUTO: 10.47 X10(3)/MCL (ref 2.1–9.2)
NEUTROPHILS NFR BLD AUTO: 85.4 %
PLATELET # BLD AUTO: 159 X10(3)/MCL (ref 130–400)
PMV BLD AUTO: 11.6 FL (ref 7.4–10.4)
POTASSIUM SERPL-SCNC: 4.7 MMOL/L (ref 3.5–5.1)
PROT SERPL-MCNC: 6.2 GM/DL (ref 6.4–8.3)
RBC # BLD AUTO: 3.5 X10(6)/MCL (ref 4.7–6.1)
SODIUM SERPL-SCNC: 137 MMOL/L (ref 136–145)
WBC # BLD AUTO: 12.27 X10(3)/MCL (ref 4.5–11.5)

## 2024-10-23 PROCEDURE — 83735 ASSAY OF MAGNESIUM: CPT

## 2024-10-23 PROCEDURE — 1159F MED LIST DOCD IN RCRD: CPT | Mod: CPTII,S$GLB,, | Performed by: NURSE PRACTITIONER

## 2024-10-23 PROCEDURE — 1160F RVW MEDS BY RX/DR IN RCRD: CPT | Mod: CPTII,S$GLB,, | Performed by: NURSE PRACTITIONER

## 2024-10-23 PROCEDURE — 3078F DIAST BP <80 MM HG: CPT | Mod: CPTII,S$GLB,, | Performed by: NURSE PRACTITIONER

## 2024-10-23 PROCEDURE — 25000003 PHARM REV CODE 250: Performed by: NURSE PRACTITIONER

## 2024-10-23 PROCEDURE — 99214 OFFICE O/P EST MOD 30 MIN: CPT | Mod: S$GLB,,, | Performed by: NURSE PRACTITIONER

## 2024-10-23 PROCEDURE — 99999 PR PBB SHADOW E&M-EST. PATIENT-LVL V: CPT | Mod: PBBFAC,,, | Performed by: NURSE PRACTITIONER

## 2024-10-23 PROCEDURE — 80053 COMPREHEN METABOLIC PANEL: CPT

## 2024-10-23 PROCEDURE — 85025 COMPLETE CBC W/AUTO DIFF WBC: CPT

## 2024-10-23 PROCEDURE — 3008F BODY MASS INDEX DOCD: CPT | Mod: CPTII,S$GLB,, | Performed by: NURSE PRACTITIONER

## 2024-10-23 PROCEDURE — 3074F SYST BP LT 130 MM HG: CPT | Mod: CPTII,S$GLB,, | Performed by: NURSE PRACTITIONER

## 2024-10-23 PROCEDURE — 86301 IMMUNOASSAY TUMOR CA 19-9: CPT

## 2024-10-23 PROCEDURE — 36415 COLL VENOUS BLD VENIPUNCTURE: CPT

## 2024-10-23 PROCEDURE — 63600175 PHARM REV CODE 636 W HCPCS: Performed by: NURSE PRACTITIONER

## 2024-10-23 RX ORDER — HEPARIN 100 UNIT/ML
500 SYRINGE INTRAVENOUS
Status: CANCELLED | OUTPATIENT
Start: 2024-10-23

## 2024-10-23 RX ORDER — EPINEPHRINE 0.3 MG/.3ML
0.3 INJECTION SUBCUTANEOUS ONCE AS NEEDED
Status: CANCELLED | OUTPATIENT
Start: 2024-10-23

## 2024-10-23 RX ORDER — HEPARIN 100 UNIT/ML
500 SYRINGE INTRAVENOUS
Status: DISCONTINUED | OUTPATIENT
Start: 2024-10-23 | End: 2024-10-23 | Stop reason: HOSPADM

## 2024-10-23 RX ORDER — SODIUM CHLORIDE 0.9 % (FLUSH) 0.9 %
10 SYRINGE (ML) INJECTION
Status: DISCONTINUED | OUTPATIENT
Start: 2024-10-23 | End: 2024-10-23 | Stop reason: HOSPADM

## 2024-10-23 RX ORDER — PROCHLORPERAZINE EDISYLATE 5 MG/ML
10 INJECTION INTRAMUSCULAR; INTRAVENOUS ONCE AS NEEDED
Status: CANCELLED
Start: 2024-10-23

## 2024-10-23 RX ORDER — SODIUM CHLORIDE 0.9 % (FLUSH) 0.9 %
10 SYRINGE (ML) INJECTION
Status: CANCELLED | OUTPATIENT
Start: 2024-10-23

## 2024-10-23 RX ORDER — DIPHENHYDRAMINE HYDROCHLORIDE 50 MG/ML
50 INJECTION INTRAMUSCULAR; INTRAVENOUS ONCE AS NEEDED
Status: DISCONTINUED | OUTPATIENT
Start: 2024-10-23 | End: 2024-10-23 | Stop reason: HOSPADM

## 2024-10-23 RX ORDER — DIPHENHYDRAMINE HYDROCHLORIDE 50 MG/ML
50 INJECTION INTRAMUSCULAR; INTRAVENOUS ONCE AS NEEDED
Status: CANCELLED | OUTPATIENT
Start: 2024-10-23

## 2024-10-23 RX ORDER — EPINEPHRINE 0.3 MG/.3ML
0.3 INJECTION SUBCUTANEOUS ONCE AS NEEDED
Status: DISCONTINUED | OUTPATIENT
Start: 2024-10-23 | End: 2024-10-23 | Stop reason: HOSPADM

## 2024-10-23 RX ORDER — PROCHLORPERAZINE EDISYLATE 5 MG/ML
10 INJECTION INTRAMUSCULAR; INTRAVENOUS ONCE AS NEEDED
Status: DISCONTINUED | OUTPATIENT
Start: 2024-10-23 | End: 2024-10-23 | Stop reason: HOSPADM

## 2024-10-23 RX ADMIN — Medication 500 UNITS: at 10:10

## 2024-10-23 RX ADMIN — SODIUM CHLORIDE: 9 INJECTION, SOLUTION INTRAVENOUS at 09:10

## 2024-10-24 ENCOUNTER — HOSPITAL ENCOUNTER (OUTPATIENT)
Dept: RADIOLOGY | Facility: HOSPITAL | Age: 59
Discharge: HOME | End: 2024-10-24
Payer: COMMERCIAL

## 2024-10-24 DIAGNOSIS — C25.9 ADENOCARCINOMA OF PANCREAS: ICD-10-CM

## 2024-10-24 PROCEDURE — 25500020 PHARM REV CODE 255

## 2024-10-24 PROCEDURE — 71260 CT THORAX DX C+: CPT | Mod: TC

## 2024-10-24 PROCEDURE — 74177 CT ABD & PELVIS W/CONTRAST: CPT | Mod: TC

## 2024-10-24 RX ORDER — IOPAMIDOL 755 MG/ML
100 INJECTION, SOLUTION INTRAVASCULAR
Status: COMPLETED | OUTPATIENT
Start: 2024-10-24 | End: 2024-10-24

## 2024-10-24 RX ORDER — MORPHINE SULFATE 15 MG/1
15 TABLET, FILM COATED, EXTENDED RELEASE ORAL 2 TIMES DAILY
Qty: 30 TABLET | Refills: 0 | Status: SHIPPED | OUTPATIENT
Start: 2024-10-24

## 2024-10-24 RX ORDER — DIATRIZOATE MEGLUMINE AND DIATRIZOATE SODIUM 660; 100 MG/ML; MG/ML
30 SOLUTION ORAL; RECTAL
Status: COMPLETED | OUTPATIENT
Start: 2024-10-24 | End: 2024-10-24

## 2024-10-24 RX ORDER — OXYCODONE AND ACETAMINOPHEN 10; 325 MG/1; MG/1
1 TABLET ORAL EVERY 6 HOURS PRN
Qty: 30 TABLET | Refills: 0 | Status: SHIPPED | OUTPATIENT
Start: 2024-10-24

## 2024-10-24 RX ADMIN — DIATRIZOATE MEGLUMINE AND DIATRIZOATE SODIUM 30 ML: 660; 100 SOLUTION ORAL; RECTAL at 08:10

## 2024-10-24 RX ADMIN — IOPAMIDOL 100 ML: 755 INJECTION, SOLUTION INTRAVENOUS at 08:10

## 2024-10-29 ENCOUNTER — OFFICE VISIT (OUTPATIENT)
Dept: HEMATOLOGY/ONCOLOGY | Facility: CLINIC | Age: 59
End: 2024-10-29
Payer: COMMERCIAL

## 2024-10-29 ENCOUNTER — LAB VISIT (OUTPATIENT)
Dept: LAB | Facility: HOSPITAL | Age: 59
End: 2024-10-29
Attending: NURSE PRACTITIONER
Payer: COMMERCIAL

## 2024-10-29 VITALS
BODY MASS INDEX: 21.68 KG/M2 | HEART RATE: 58 BPM | HEIGHT: 67 IN | DIASTOLIC BLOOD PRESSURE: 71 MMHG | SYSTOLIC BLOOD PRESSURE: 121 MMHG | OXYGEN SATURATION: 100 % | RESPIRATION RATE: 16 BRPM | TEMPERATURE: 98 F | WEIGHT: 138.13 LBS

## 2024-10-29 DIAGNOSIS — C25.9 ADENOCARCINOMA OF PANCREAS: Primary | ICD-10-CM

## 2024-10-29 DIAGNOSIS — I82.90 THROMBUS: Primary | ICD-10-CM

## 2024-10-29 DIAGNOSIS — R41.3 OTHER AMNESIA: Primary | ICD-10-CM

## 2024-10-29 DIAGNOSIS — C25.9 ADENOCARCINOMA OF PANCREAS: ICD-10-CM

## 2024-10-29 DIAGNOSIS — I81 PORTAL VEIN THROMBOSIS: ICD-10-CM

## 2024-10-29 DIAGNOSIS — R74.8 ELEVATED ALKALINE PHOSPHATASE LEVEL: ICD-10-CM

## 2024-10-29 DIAGNOSIS — R97.8 ELEVATED CA 19-9 LEVEL: ICD-10-CM

## 2024-10-29 LAB
ALBUMIN SERPL-MCNC: 2.8 G/DL (ref 3.5–5)
ALBUMIN/GLOB SERPL: 0.8 RATIO (ref 1.1–2)
ALP SERPL-CCNC: 799 UNIT/L (ref 40–150)
ALT SERPL-CCNC: 64 UNIT/L (ref 0–55)
ANION GAP SERPL CALC-SCNC: 6 MEQ/L
AST SERPL-CCNC: 48 UNIT/L (ref 5–34)
BASOPHILS # BLD AUTO: 0.04 X10(3)/MCL
BASOPHILS NFR BLD AUTO: 0.4 %
BILIRUB SERPL-MCNC: 0.6 MG/DL
BUN SERPL-MCNC: 4 MG/DL (ref 8.4–25.7)
CALCIUM SERPL-MCNC: 8.9 MG/DL (ref 8.4–10.2)
CHLORIDE SERPL-SCNC: 103 MMOL/L (ref 98–107)
CO2 SERPL-SCNC: 27 MMOL/L (ref 22–29)
CREAT SERPL-MCNC: 0.71 MG/DL (ref 0.72–1.25)
CREAT/UREA NIT SERPL: 6
EOSINOPHIL # BLD AUTO: 0.06 X10(3)/MCL (ref 0–0.9)
EOSINOPHIL NFR BLD AUTO: 0.6 %
ERYTHROCYTE [DISTWIDTH] IN BLOOD BY AUTOMATED COUNT: 19.7 % (ref 11.5–17)
GFR SERPLBLD CREATININE-BSD FMLA CKD-EPI: >60 ML/MIN/1.73/M2
GLOBULIN SER-MCNC: 3.4 GM/DL (ref 2.4–3.5)
GLUCOSE SERPL-MCNC: 180 MG/DL (ref 74–100)
HCT VFR BLD AUTO: 31.3 % (ref 42–52)
HGB BLD-MCNC: 10.1 G/DL (ref 14–18)
IMM GRANULOCYTES # BLD AUTO: 0.05 X10(3)/MCL (ref 0–0.04)
IMM GRANULOCYTES NFR BLD AUTO: 0.5 %
LYMPHOCYTES # BLD AUTO: 1.01 X10(3)/MCL (ref 0.6–4.6)
LYMPHOCYTES NFR BLD AUTO: 9.8 %
MAGNESIUM SERPL-MCNC: 1.8 MG/DL (ref 1.6–2.6)
MCH RBC QN AUTO: 28.7 PG (ref 27–31)
MCHC RBC AUTO-ENTMCNC: 32.3 G/DL (ref 33–36)
MCV RBC AUTO: 88.9 FL (ref 80–94)
MONOCYTES # BLD AUTO: 0.84 X10(3)/MCL (ref 0.1–1.3)
MONOCYTES NFR BLD AUTO: 8.1 %
NEUTROPHILS # BLD AUTO: 8.34 X10(3)/MCL (ref 2.1–9.2)
NEUTROPHILS NFR BLD AUTO: 80.6 %
PLATELET # BLD AUTO: 219 X10(3)/MCL (ref 130–400)
PMV BLD AUTO: 10 FL (ref 7.4–10.4)
POTASSIUM SERPL-SCNC: 4.4 MMOL/L (ref 3.5–5.1)
PROT SERPL-MCNC: 6.2 GM/DL (ref 6.4–8.3)
RBC # BLD AUTO: 3.52 X10(6)/MCL (ref 4.7–6.1)
SODIUM SERPL-SCNC: 136 MMOL/L (ref 136–145)
WBC # BLD AUTO: 10.34 X10(3)/MCL (ref 4.5–11.5)

## 2024-10-29 PROCEDURE — 3078F DIAST BP <80 MM HG: CPT | Mod: CPTII,S$GLB,, | Performed by: INTERNAL MEDICINE

## 2024-10-29 PROCEDURE — 80053 COMPREHEN METABOLIC PANEL: CPT

## 2024-10-29 PROCEDURE — 1159F MED LIST DOCD IN RCRD: CPT | Mod: CPTII,S$GLB,, | Performed by: INTERNAL MEDICINE

## 2024-10-29 PROCEDURE — 85025 COMPLETE CBC W/AUTO DIFF WBC: CPT

## 2024-10-29 PROCEDURE — 36415 COLL VENOUS BLD VENIPUNCTURE: CPT

## 2024-10-29 PROCEDURE — 3008F BODY MASS INDEX DOCD: CPT | Mod: CPTII,S$GLB,, | Performed by: INTERNAL MEDICINE

## 2024-10-29 PROCEDURE — 83735 ASSAY OF MAGNESIUM: CPT

## 2024-10-29 PROCEDURE — 99999 PR PBB SHADOW E&M-EST. PATIENT-LVL V: CPT | Mod: PBBFAC,,, | Performed by: INTERNAL MEDICINE

## 2024-10-29 PROCEDURE — 1160F RVW MEDS BY RX/DR IN RCRD: CPT | Mod: CPTII,S$GLB,, | Performed by: INTERNAL MEDICINE

## 2024-10-29 PROCEDURE — 99215 OFFICE O/P EST HI 40 MIN: CPT | Mod: S$GLB,,, | Performed by: INTERNAL MEDICINE

## 2024-10-29 PROCEDURE — 3074F SYST BP LT 130 MM HG: CPT | Mod: CPTII,S$GLB,, | Performed by: INTERNAL MEDICINE

## 2024-11-04 ENCOUNTER — HOSPITAL ENCOUNTER (OUTPATIENT)
Dept: RADIOLOGY | Facility: HOSPITAL | Age: 59
Discharge: HOME | End: 2024-11-04
Attending: INTERNAL MEDICINE
Payer: MEDICARE

## 2024-11-04 DIAGNOSIS — R41.3 OTHER AMNESIA: ICD-10-CM

## 2024-11-04 PROCEDURE — 25500020 PHARM REV CODE 255: Performed by: INTERNAL MEDICINE

## 2024-11-04 PROCEDURE — 70553 MRI BRAIN STEM W/O & W/DYE: CPT | Mod: TC

## 2024-11-04 PROCEDURE — A9577 INJ MULTIHANCE: HCPCS | Performed by: INTERNAL MEDICINE

## 2024-11-04 RX ADMIN — GADOBENATE DIMEGLUMINE 15 ML: 529 INJECTION, SOLUTION INTRAVENOUS at 12:11

## 2024-11-04 NOTE — PROGRESS NOTES
HEMATOLOGY/ONCOLOGY OFFICE CLINIC VISIT    Visit Information:    Initial Evaluation: 9/3/2024  Referring Provider: Alba Chisholm MD PhD ([facility]) -Cell [phone]  Other providers:  Code status: Not addressed    Diagnosis:  Advanced pancreatic ductal adenocarcinoma (Dx 8/2022)     Present treatment:  Gemzar monotherapy 8/28/2024  -Gemcitabine and CISplatin Every 2 Weeks (8/28/2024-present)--planned  Chemotherapy summary: CISplatin (PLATINOL) 53 mg in sodium chloride 0.9% (NS) 250 mL IVPB, intravenous, 0 of 12 cycles  gemcitabine (GEMZAR) 836 mg in sodium chloride 0.9% (NS) 250 mL IVPB, intravenous, 0 of 12 cycles   - Eliquis 10 mg BID x 7 days then 5 mg BID- started 10/29/24--present    Treatment/Oncology history:  -8/2/2022 ERCP with metal biliary stent placement   -Folfirinox (10/2022- 2/2023)  -capecitabine RT (4/3/23 - 5/16/23)   -Phase I study 2021-1176 SD-101 at [facility]--SD-101 2 mg in sodium chloride 0.9% (NS) 30 mL IVPB, intravenous x 2 cycles (8/22/2023-11/22/2023)  -Gemcitabine and paclitaxel protein bound (12/13/2023-- 7/2024)-->clinical progression and GOO  -s/p biliary stent placement 4/4/2024  -chemotherapy with gemcitabine/paclitaxel (last dose 6/26/2024)  -duodenal stent (placed by GI)- 8/7/2024  -Gemcitabine and CISplatin Every 2 Weeks (8/28/2024-present)      Goal of care: life prolongation    Imaging:  CT CAP 8/21/2022: Since 7/25/2022, the primary pancreatic head mass encasing the common hepatic artery remains stable. The intrahepatic biliary obstruction improved with interval placement of a CBD stent. No definite distant metastasis in the abdomen or pelvis. Small indeterminate left lower lobe pulmonary nodule can be followed.  CT CAP 12/12/2022:  Primary pancreatic head tumor encasing the common hepatic artery is overall unchanged in size since 8/21/2022. Stable upstream pancreatic duct dilation and pancreatic atrophy. Interval placement of a cholecystostomy tube, with decompression of the  gallbladder. No definite evidence of distant metastatic disease in the chest, abdomen, and pelvis.   CT CAP 3/7/2023: Locally advanced pancreatic head tumor is slightly less conspicuous. Stable portacaval lymphadenopathy. Geographic hypodense regions have significantly increased throughout the liver compatible with fatty liver, to be correlated clinically regarding any potential concomitant hepatotoxicity; this appearance could obscure small low contrast liver lesions, to be better evaluated with MRI, if indicated. Unchanged mild jennifer appearance of the omentum is favored to be postinflammatory. No definite distant metastatic disease within the chest abdomen pelvis.   Ct CAP 6/29/2023:1.  Locally advanced pancreatic head mass not significantly changed. 2.  Increased periportal haziness and central liver heterogeneity which may relate to radiation.   3.  Indeterminate inferior right hepatic hypodensity as described above. Punctate left hepatic hypodensity also not previously evident, too small to characterize. Consider further evaluation with MRI as indicated.    MRI AP 7/15/2023:Primary neoplasm is noted within the pancreatic head and causes pancreatic ductal dilation and biliary ductal obstruction, which is decompressed via a stent. Nonspecific focal dilation of segment IV bile duct is noted.    CT CAP 10/2/2023: 1.  Compared to 07/28/2023, interval embolization of SMV tributaries in the epigastric region with new embolization coils in the right hepatic lobe following transhepatic catheterization. 2.  Previously noted small indeterminate low-attenuation lesion in the inferior right hepatic lobe segment 6 is no longer visualized. No new hepatic lesions.    3.  Stable 2.2 cm ill-defined residual pancreatic head tumor with no change in the vascular contact with the main portal vein, common hepatic and proper hepatic artery. 4.  No new metastatic disease in the chest or abdomen.   CT CAP 1/9/2024: 1.  Mild interval  increase in size of the primary tumor in the head of the pancreas. 2.  No evidence of metastatic disease in the chest, abdomen or pelvis.   CT AP 4/3/2024: 1.  There is worsening intrahepatic biliary dilation despite the presence of a biliary stent, suggesting stent occlusion. 2.  No significant change in the locally advanced tumor of the head of the pancreas since 03/12/2024.   CT CAP 6/21/2024: 1. No convincing change in the large locally advanced infiltrating ill-defined mass in the head and neck of the pancreas compared with 5/14/2024. 2. No specific evidence of distant metastatic disease in the chest, abdomen or pelvis.   XR ABDOMEN 1 VW PORTABLE 8/6/2024:  There is a gastric drainage tube with the tip below the diaphragm projecting over the gastric body with the sidehole below the GE junction in appropriate position. Gastric drainage tube with tip and sidehole projecting below the diaphragm over the gastric body.  X-ray Abdomen AP  8/5/2024: Air is seen scattered throughout normal caliber colon in a nonobstructive pattern. No dilated loops of bowel. Moderate colonic stool burden. No intraperitoneal free air within the limitations of this study. A biliary stent and embolization coils overlie the right upper quadrant. No suspicious osseous lesions.   Nonobstructive bowel gas pattern. I personally reviewed these image(s) along with the resident's/fellow's interpretations, certify that if a procedure was performed I was physically present, and agree with the final report.  CT Abdomen Pelvis with Contrast 8/5/2024: No suspicious pulmonary nodules in the lung bases. Hepatobiliary: Evaluation of the liver is somewhat limited secondary to streak artifact from the embolization coils. Within these limits there is no suspicious hepatic lesion. The gallbladder is decompressed and poorly evaluated. Common bile duct stent with expected pneumobilia. Mild intrahepatic biliary ductal dilatation is not significantly changed.  Spleen: No splenomegaly. Pancreas: Ill-defined infiltrating mass of the pancreatic head and neck, in keeping with pancreatic adenocarcinoma. There is atrophy and ductal dilatation of the pancreatic body and tail. Overall this tumor appears larger Adrenal Glands: No mass. Kidneys, Ureters, Bladder: No hydronephrosis. No suspicious renal lesion. No bladder mass. Gastrointestinal Tract: The stomach is distended with fluid and debris, suggesting there may be a degree of gastric outlet obstruction secondary to the pancreatic tumor. This is not significantly changed. There is no downstream obstruction. Pelvic Organs: No pelvic mass. Prostatomegaly. Peritoneum/Retroperitoneum: No ascites. Lymph Nodes: No lymphadenopathy. Musculoskeletal: No suspicious skeletal lesion.     No acute abdominopelvic abnormality. Attending addendum: The pancreatic tumor appears larger compared to 06/21/2024. There is gastric distention with debris suggesting chronic outlet obstruction ACTIONABLE ITEMS/RECOMMENDATIONS*: None. *An Actionable Finding is a finding that may be unrelated to the original reason for imaging but potentially actionable, meaning further investigation may be necessary. The Actionable Findings Vigilance Unit (AFVU) assists medical providers with responding to additional radiologic findings that are unexpected and potentially actionable. I personally reviewed these image(s) along with the resident's/fellow's interpretations, certify that if a procedure was performed I was physically present, and agree with the final report.   CT CAP 10/24/24:  1. Ill-defined soft tissue fullness at the a hay hepatis and pancreatic head and uncinate process fabella bases history of pancreatic adenocarcinoma.  A few blebs of gas are identified which may represent gas within the common bile duct.  2. Intrahepatic biliary ductal dilatation identified with nonvisualization of the gallbladder  3. Beam hardening artifact due to embolization coils  at the liver  4. Small 10 x 6 filling defect at the portal vein suggesting a small thrombus  5. Diffuse hazy mucosal prominence at the descending and ascending colon.  This may be merely due to underdistention.  A underlying colitis cannot be excluded  6. Findings of constipation with contrast and fluid distended loops of small bowel diffusely throughout suggesting a mild ileus  7. Prostatomegaly with calcifications  8. Findings and other details as above  9. Comparison to previous exam would allow further evaluation.  MRI Brain 11/4/24:  1. Tiny (subcentimeter), occasional focal areas of increased signal intensity (on the FLAIR sequences) are noted within the deep white matter and gray-white matter junctions of both cerebral hemispheres (these are only visualized on the FLAIR sequence with no contrast enhancement noted on postcontrast images). I suspect the changes represent chronic ischemic changes, however, follow-up imaging in 3-6 months to ensure stability may be helpful in excluding the very remote possibility of tiny metastases if felt be clinically indicated.     Pathology:  8/2/2022:  FNA head pancreas: SCANT MALIGNANT CELLS, FAVOR ADENOCARCINOMA  NGS:  No detectable genomic aberrations      CLINICAL HISTORY:       Patient: Warren P Lejeune is a 59 y.o. male.with a past medical history of past medical history of pancreatic ductal adenocarcinoma (Dx 8/2022)   Patient initially presented with  unintentional weight loss starting in 1/2022. He also had worsening back/shoulder and abdominal pain during this time. In July he finally presented to his PCP with darkening of the urine and lightening of the stool - with Jaundice that his wife noticed. Labs demonstrating cholestasis (bilirubin of 19).    Patient noted to have an abnormal CT scan after developing obstructive jaundice. 2 cm hypoenhancing mass noted in the head of the pancreas with intra and extrahepatic biliary ductal dilatation. Mass measured 2.5 x 2.1  "cm. There is also atrophy of the distal gland and upstream dilatation of the pancreas duct. There is no obvious vascular invasion. There were no focal liver lesions.    8/2/22- EGD/EUS/FNA. Final pathology showed ductal adenocarcinoma, moderately differentiated. ERCP on the same date performed, with placement of a fully covered metal biliary stent, 60 mm x 10 mm.    7/27/22- CA 19-9 was 681    8/21/22- CT CAP: Since 7/25/2022, the primary pancreatic head mass encasing the common hepatic artery remains stable. The intrahepatic biliary obstruction improved with interval placement of a CBD stent. No definite distant metastasis in the abdomen or pelvis. Small indeterminate left lower lobe pulmonary nodule can be followed.    09/03/2022 Germline genetic testing: Negative for germline pathogenic variants in any of the analyzed genes, Genes Analyzed: APC, DALE, BMPR1A, BRCA1, BRCA2, CDKN2A, EPCAM, MEN1, MLH1, MSH2, MSH6, NF1, PALB2, PMS2, SMAD4, STK11, TP53, TSC1, TSC2, and VHL. Genetic testing performed by [employer]; full report available in scanned documents.     s/p biliary stent placement, currently undergoing chemotherapy with gemcitabine/paclitaxel (last dose 6/26/2024), T2DM on insulin presenting for abdominal pain and constipation. CT A/P with distension in stomach suggesting gastric outlet obstruction secondary to pancreatic tumor. GI and Surg Onc consulted, pending evaluation. Poor PO tolerance, deferring NG tube now as vomiting resolves and having bowel movements.     Patient was recently admitted to the hospital at [facility]--Hospital Course:  "Findings on imaging were concerning for malignant gastric outlet obstruction from pancreatic cancer. After a discussion with surgical oncology, GI and GIMO, decision was made to proceed with duodenal stent (placed by GI). No indications for gastrojejunostomy bypass. Patient tolerated procedure well. We were able to advance to low fiber diet. Nutrition was consulted for " "low fiber diet education. Managed neoplasm related pain with PO morphine."     Patient is here today with his wife to continue chemotherapy close to home.  He is doing relatively well and voices no concerns.  MediPort was removed from his left chest wall to exposure of the MediPort.  Patient reports abdominal mid back pain, occasional muscle spasm and dizziness.  No fever, chills, sweats.  No chest pain or short of breath.    Chief Complaint: Adenocarcinoma of pancreas (Pt confirms fatigue, bilateral knee/ankle swelling which he wears compression stockings, abd and back pain, C, occasional L chest pain, abd bloating, and trouble urinating. )      Interval History:  Patient returns to clinic for a scan review and treatment clearance for C4 of [place]/Cisplatin q2w. He is here today with his wife. He continues with abdominal and lower back pain, he takes Percocet and MS cont for this. Wife reports that he just filled the Percocet and lost it. He is  not sure what he did with it. He does have the MS Contin though. Wife also reports that he is having problems with memory  His CA 19-9 continued to increase. He is taking Miralax and laxative for opioid induced constipation, and Pregabalin 100 mg QD for bilateral hand and feet neuropathy. He has an appointment with GI @ [facility] on 1/8/2025 to replace duodenal stent. Labs and scans reviewed with patient and wife in detail.       Past Medical History:   Diagnosis Date    Abdominal pain     Admission for chemotherapy     last chemo 9/12/24    Anxiety disorder, unspecified     Back pain     Bradycardia     Enlarged prostate     GERD (gastroesophageal reflux disease)     Hypertension     no cardiologist; no longer on meds since weight loss due to pancreatic cancer    Memory loss     Pancreatic cancer     Peripheral neuropathy     Type 2 diabetes mellitus with unspecified diabetic retinopathy without macular edema       Past Surgical History:   Procedure Laterality Date    bile duct " stents times 2      EGD, WITH STENT INSERTION      INSERTION OF TUNNELED CENTRAL VENOUS CATHETER (CVC) WITH SUBCUTANEOUS PORT Right 9/20/2024    Procedure: [id number];  Surgeon: Moe Champagne MD;  Location: [facility];  Service: General;  Laterality: Right;    MEDIPORT REMOVAL      times 2     Family History   Problem Relation Name Age of Onset    Hypertension Mother      Stroke Father      Stroke Sister      Prostate cancer Brother            Review of patient's allergies indicates:  No Known Allergies   Current Outpatient Medications on File Prior to Visit   Medication Sig Dispense Refill    aluminum & magnesium hydroxide-simethicone (MYLANTA MAX STRENGTH) 400-400-40 mg/5 mL suspension Take 5 mLs by mouth 4 (four) times daily as needed (mouth sores). 335 mL 3    apixaban (ELIQUIS) 5 mg Tab Take 1 tablet (5 mg total) by mouth 2 (two) times daily. Take 2 tablets by mouth (10 mg) twice a day for 7 days, then 1 tablet (5 mg total) by mouth twice a day thereafter 74 tablet 3    cholecalciferol, vitamin D3, (VITAMIN D3) 50 mcg (2,000 unit) Tab Take 2,000 Units by mouth once daily.      diphenhydrAMINE (BENADRYL) 12.5 mg/5 mL elixir Take 5 mLs (12.5 mg total) by mouth 4 (four) times daily as needed (mouth sores). 236 mL 3    diphenoxylate-atropine 2.5-0.025 mg (LOMOTIL) 2.5-0.025 mg per tablet Take 1 tablet by mouth.      insulin aspart U-100 (NOVOLOG) 100 unit/mL injection Inject into the skin 3 (three) times daily before meals. prn      LIDOcaine viscous HCl 2% (LIDOCAINE VISCOUS) 2 % Soln by Mucous Membrane route 4 (four) times daily as needed (mouth sores). Swish and spit 15 ml (5 ml benadryl, 5 ml mylanta, 5 ml lidocaine) by mouth 4 times daily as needed for mouth sores 100 mL 3    magnesium 250 mg Tab Take 1 tablet by mouth once daily.      morphine (MS CONTIN) 15 MG 12 hr tablet Take 1 tablet (15 mg total) by mouth 2 (two) times daily. 30 tablet 0    multivitamin with folic acid 400 mcg Tab Take 1  tablet by mouth once daily.      ondansetron (ZOFRAN) 8 MG tablet Take 8 mg by mouth.      oxyCODONE-acetaminophen (PERCOCET)  mg per tablet Take 1 tablet by mouth every 6 (six) hours as needed for Pain. 30 tablet 0    pantoprazole (PROTONIX) 40 MG tablet Take 1 tablet (40 mg total) by mouth once daily. 30 tablet 2    polyethylene glycol (GLYCOLAX) 17 gram PwPk Take 17 g by mouth.      pregabalin (LYRICA) 100 MG capsule Take 100 mg by mouth.      prochlorperazine (COMPAZINE) 10 MG tablet Take 10 mg by mouth.      tamsulosin (FLOMAX) 0.4 mg Cap Take 1 capsule by mouth.      TRESIBA FLEXTOUCH U-200 200 unit/mL (3 mL) insulin pen Inject 14 Units into the skin.      vibegron (GEMTESA) 75 mg Tab Take 75 mg by mouth.      OLANZapine (ZYPREXA) 5 MG tablet Take 1 tablet (5 mg total) by mouth nightly. 4 tablet 6     No current facility-administered medications on file prior to visit.      Review of Systems   Constitutional:  Positive for appetite change and fatigue. Negative for activity change, chills, diaphoresis, fever and unexpected weight change.   HENT:  Positive for trouble swallowing. Negative for nasal congestion, mouth sores, nosebleeds, sinus pressure/congestion and sore throat.    Eyes: Negative.    Respiratory:  Positive for shortness of breath. Negative for cough.    Cardiovascular:  Negative for chest pain and palpitations.   Gastrointestinal:  Positive for constipation. Negative for abdominal distention, abdominal pain, blood in stool, change in bowel habit, diarrhea, nausea and vomiting.   Endocrine: Negative.    Genitourinary:  Negative for bladder incontinence, decreased urine volume, difficulty urinating, dysuria, frequency, hematuria and urgency.   Musculoskeletal:  Positive for back pain. Negative for arthralgias, gait problem, joint swelling, leg pain and myalgias.   Integumentary:  Negative for rash.   Allergic/Immunologic: Negative.  Negative for frequent infections.   Neurological:  Positive  "for weakness. Negative for dizziness, tremors, syncope, light-headedness, numbness, headaches and memory loss.   Hematological:  Negative for adenopathy. Does not bruise/bleed easily.   Psychiatric/Behavioral:  Negative for agitation, confusion, hallucinations, sleep disturbance and suicidal ideas. The patient is not nervous/anxious.               Vitals:    11/05/24 1439   BP: 105/65   BP Location: Left arm   Patient Position: Sitting   Pulse: 68   Resp: 16   Temp: 98.5 °F (36.9 °C)   TempSrc: Oral   SpO2: 99%   Weight: 66.4 kg (146 lb 4.8 oz)   Height: 5' 7.01" (1.702 m)        Wt Readings from Last 6 Encounters:   11/05/24 66.4 kg (146 lb 4.8 oz)   10/29/24 62.6 kg (138 lb 1.6 oz)   10/23/24 63.8 kg (140 lb 11.2 oz)   10/10/24 66.7 kg (147 lb)   10/09/24 64 kg (141 lb 1.6 oz)   10/09/24 64 kg (141 lb 1.6 oz)     Body mass index is 22.91 kg/m².  Body surface area is 1.77 meters squared.  Physical Exam  Vitals and nursing note reviewed.   Constitutional:       General: He is not in acute distress.     Comments: thin   HENT:      Head: Normocephalic and atraumatic.      Mouth/Throat:      Mouth: Mucous membranes are moist.   Eyes:      General: No scleral icterus.     Extraocular Movements: Extraocular movements intact.      Conjunctiva/sclera: Conjunctivae normal.   Neck:      Vascular: No JVD.   Cardiovascular:      Rate and Rhythm: Normal rate and regular rhythm.      Heart sounds: No murmur heard.  Pulmonary:      Effort: Pulmonary effort is normal.      Breath sounds: Normal breath sounds. No wheezing or rhonchi.   Chest:      Comments: Left chest wall scar from previous Mediport  Abdominal:      General: Bowel sounds are normal. There is no distension.      Palpations: Abdomen is soft.      Tenderness: There is no abdominal tenderness.   Musculoskeletal:         General: No swelling or deformity.      Cervical back: Neck supple.   Lymphadenopathy:      Head:      Right side of head: No submandibular " adenopathy.      Left side of head: No submandibular adenopathy.      Cervical: No cervical adenopathy.      Upper Body:      Right upper body: No supraclavicular or axillary adenopathy.      Left upper body: No supraclavicular or axillary adenopathy.      Lower Body: No right inguinal adenopathy. No left inguinal adenopathy.   Skin:     General: Skin is warm.      Coloration: Skin is not jaundiced.      Findings: No rash.   Neurological:      General: No focal deficit present.      Mental Status: He is alert and oriented to person, place, and time.      Cranial Nerves: Cranial nerves 2-12 are intact.   Psychiatric:         Attention and Perception: Attention normal.         Behavior: Behavior is cooperative.       ECOG SCORE    2 - Capable of all selfcare but unable to carry out any work activities, active > 50% of hours         Laboratory:  CBC with Differential:  Lab Results   Component Value Date    WBC 6.92 11/05/2024    RBC 3.35 (L) 11/05/2024    HGB 9.7 (L) 11/05/2024    HCT 30.1 (L) 11/05/2024    MCV 89.9 11/05/2024    MCH 29.0 11/05/2024    MCHC 32.2 (L) 11/05/2024    RDW 18.6 (H) 11/05/2024     11/05/2024    MPV 10.5 (H) 11/05/2024        CMP:  Sodium   Date Value Ref Range Status   11/05/2024 138 136 - 145 mmol/L Final     Potassium   Date Value Ref Range Status   11/05/2024 4.0 3.5 - 5.1 mmol/L Final     Chloride   Date Value Ref Range Status   11/05/2024 105 98 - 107 mmol/L Final     CO2   Date Value Ref Range Status   11/05/2024 29 22 - 29 mmol/L Final     Blood Urea Nitrogen   Date Value Ref Range Status   11/05/2024 6.0 (L) 8.4 - 25.7 mg/dL Final   10/20/2023 12 6 - 23 mg/dL Final     Creatinine   Date Value Ref Range Status   11/05/2024 0.78 0.72 - 1.25 mg/dL Final   10/20/2023 0.95 0.67 - 1.17 mg/dL Final     Calcium   Date Value Ref Range Status   11/05/2024 8.2 (L) 8.4 - 10.2 mg/dL Final   10/20/2023 8.7 8.4 - 10.2 mg/dL Final     Albumin   Date Value Ref Range Status   11/05/2024 2.7 (L)  3.5 - 5.0 g/dL Final     Bilirubin Total   Date Value Ref Range Status   11/05/2024 0.3 <=1.5 mg/dL Final     ALP   Date Value Ref Range Status   11/05/2024 474 (H) 40 - 150 unit/L Final     AST   Date Value Ref Range Status   11/05/2024 36 (H) 5 - 34 unit/L Final     ALT   Date Value Ref Range Status   11/05/2024 37 0 - 55 unit/L Final      Component 08/28/24 07/17/24 06/26/24 06/21/24 06/12/24 05/29/24   CA 19-9 2,385.0 High  2,150.0 High  1,567.0 High  1,183.0 High  1,000.0 High  959.9 High       Latest Reference Range & Units 09/24/24 08:52 10/23/24 07:57   CA 19-9 0.00 - 37.00 unit/mL 6,202.93 (H) 21,137.60 (H)   (H): Data is abnormally high       Assessment:       1. Adenocarcinoma of pancreas    2. On antineoplastic chemotherapy    3. Chemotherapy-induced peripheral neuropathy    4. Portal vein thrombosis    5. Cancer associated pain    6. Elevated alkaline phosphatase level            1) Locally advanced pancreatic cancer   ---8/2/2022 ERCP with metal biliary stent placement   ---Folfirinox (10/2022- 2/2023)  ---capecitabine RT (4/3/23 - 5/16/23)   ---Phase I study 2021-1176 SD-101 at [facility]--SD-101 2 mg in sodium chloride 0.9% (NS) 30 mL IVPB, intravenous x 2 cycles (8/22/2023-11/22/2023)  ---Gemcitabine and paclitaxel protein bound (12/13/2023-- 7/2024)-->clinical progression and GOO  ---s/p biliary stent placement 4/4/2024  ---chemotherapy with gemcitabine/paclitaxel (last dose 6/26/2024)  ---duodenal stent (placed by GI)- 8/7/2024  ---Gemcitabine and CISplatin Every 2 Weeks (8/28/2024-present)    2) H/o Gastric outlet obstruction  ---s/p duodenal stent, severe protein calori malnutrition     3) Pain, neoplasm related pain  ---continue Percocet, Lyrica, Morphine   ---patient lost Percocet.  Instructed to continue to take morphine as prescribed.      4) elevated ALP  --have improved significantly          Plan:       Patient with unresectable advanced pancreatic cancer to start 4th line treatment with  cisplatin and Gemzar.     Labs and exam stable  Toxicity reviewed, okay to proceed with C4 Gemzar/Cisplatin 11/6/24  Stimufend on Day 2   Continue Eliquis 5 mg BID for thrombus  Repeat MRI Brain in 3 months, due 2/2025  Continue Magic Mouthwash for esophagitis   Continue morphine and percocet for pain  CT C/A/P every 3 months, next due 1/2025  RTC 2 week with NP with labs/C5  CBC, CMP,  Mg, CA 19 9 - 1 hr prior @ [facility]    The patient was seen, interviewed and examined. Pertinent lab and radiology studies were reviewed.   The patient was given ample opportunity to ask questions, and to the best of my abilities, all questions answered to satisfaction; patient demonstrated understanding of what we discussed and agreeable to the plan. Pt instructed to call should develop concerning signs/symptoms or have further questions.       Jena Messina MD  Hematology/Oncology  [facility]      Professional Services   I, Yessica Toro LPN, acted solely as a scribe for and in the presence of Dr. Jena Messina, who performed these services.

## 2024-11-05 ENCOUNTER — LAB VISIT (OUTPATIENT)
Dept: LAB | Facility: HOSPITAL | Age: 59
End: 2024-11-05
Attending: NURSE PRACTITIONER
Payer: MEDICARE

## 2024-11-05 ENCOUNTER — OFFICE VISIT (OUTPATIENT)
Dept: HEMATOLOGY/ONCOLOGY | Facility: CLINIC | Age: 59
End: 2024-11-05
Payer: MEDICARE

## 2024-11-05 VITALS
DIASTOLIC BLOOD PRESSURE: 65 MMHG | HEART RATE: 68 BPM | RESPIRATION RATE: 16 BRPM | HEIGHT: 67 IN | SYSTOLIC BLOOD PRESSURE: 105 MMHG | BODY MASS INDEX: 22.97 KG/M2 | TEMPERATURE: 99 F | OXYGEN SATURATION: 99 % | WEIGHT: 146.31 LBS

## 2024-11-05 DIAGNOSIS — R74.8 ELEVATED ALKALINE PHOSPHATASE LEVEL: ICD-10-CM

## 2024-11-05 DIAGNOSIS — C25.9 ADENOCARCINOMA OF PANCREAS: Primary | ICD-10-CM

## 2024-11-05 DIAGNOSIS — G89.3 CANCER ASSOCIATED PAIN: ICD-10-CM

## 2024-11-05 DIAGNOSIS — G62.0 CHEMOTHERAPY-INDUCED PERIPHERAL NEUROPATHY: ICD-10-CM

## 2024-11-05 DIAGNOSIS — C25.9 ADENOCARCINOMA OF PANCREAS: ICD-10-CM

## 2024-11-05 DIAGNOSIS — Z79.899 ON ANTINEOPLASTIC CHEMOTHERAPY: ICD-10-CM

## 2024-11-05 DIAGNOSIS — T45.1X5A CHEMOTHERAPY-INDUCED PERIPHERAL NEUROPATHY: ICD-10-CM

## 2024-11-05 DIAGNOSIS — I81 PORTAL VEIN THROMBOSIS: ICD-10-CM

## 2024-11-05 LAB
ALBUMIN SERPL-MCNC: 2.7 G/DL (ref 3.5–5)
ALBUMIN/GLOB SERPL: 0.8 RATIO (ref 1.1–2)
ALP SERPL-CCNC: 474 UNIT/L (ref 40–150)
ALT SERPL-CCNC: 37 UNIT/L (ref 0–55)
ANION GAP SERPL CALC-SCNC: 4 MEQ/L
AST SERPL-CCNC: 36 UNIT/L (ref 5–34)
BASOPHILS # BLD AUTO: 0.04 X10(3)/MCL
BASOPHILS NFR BLD AUTO: 0.6 %
BILIRUB SERPL-MCNC: 0.3 MG/DL
BUN SERPL-MCNC: 6 MG/DL (ref 8.4–25.7)
CALCIUM SERPL-MCNC: 8.2 MG/DL (ref 8.4–10.2)
CANCER AG19-9 SERPL-ACNC: ABNORMAL UNIT/ML (ref 0–37)
CHLORIDE SERPL-SCNC: 105 MMOL/L (ref 98–107)
CO2 SERPL-SCNC: 29 MMOL/L (ref 22–29)
CREAT SERPL-MCNC: 0.78 MG/DL (ref 0.72–1.25)
CREAT/UREA NIT SERPL: 8
EOSINOPHIL # BLD AUTO: 0.07 X10(3)/MCL (ref 0–0.9)
EOSINOPHIL NFR BLD AUTO: 1 %
ERYTHROCYTE [DISTWIDTH] IN BLOOD BY AUTOMATED COUNT: 18.6 % (ref 11.5–17)
GFR SERPLBLD CREATININE-BSD FMLA CKD-EPI: >60 ML/MIN/1.73/M2
GLOBULIN SER-MCNC: 3.2 GM/DL (ref 2.4–3.5)
GLUCOSE SERPL-MCNC: 135 MG/DL (ref 74–100)
HCT VFR BLD AUTO: 30.1 % (ref 42–52)
HGB BLD-MCNC: 9.7 G/DL (ref 14–18)
IMM GRANULOCYTES # BLD AUTO: 0.03 X10(3)/MCL (ref 0–0.04)
IMM GRANULOCYTES NFR BLD AUTO: 0.4 %
LYMPHOCYTES # BLD AUTO: 0.89 X10(3)/MCL (ref 0.6–4.6)
LYMPHOCYTES NFR BLD AUTO: 12.9 %
MAGNESIUM SERPL-MCNC: 2 MG/DL (ref 1.6–2.6)
MCH RBC QN AUTO: 29 PG (ref 27–31)
MCHC RBC AUTO-ENTMCNC: 32.2 G/DL (ref 33–36)
MCV RBC AUTO: 89.9 FL (ref 80–94)
MONOCYTES # BLD AUTO: 0.54 X10(3)/MCL (ref 0.1–1.3)
MONOCYTES NFR BLD AUTO: 7.8 %
NEUTROPHILS # BLD AUTO: 5.35 X10(3)/MCL (ref 2.1–9.2)
NEUTROPHILS NFR BLD AUTO: 77.3 %
PLATELET # BLD AUTO: 180 X10(3)/MCL (ref 130–400)
PMV BLD AUTO: 10.5 FL (ref 7.4–10.4)
POTASSIUM SERPL-SCNC: 4 MMOL/L (ref 3.5–5.1)
PROT SERPL-MCNC: 5.9 GM/DL (ref 6.4–8.3)
RBC # BLD AUTO: 3.35 X10(6)/MCL (ref 4.7–6.1)
SODIUM SERPL-SCNC: 138 MMOL/L (ref 136–145)
WBC # BLD AUTO: 6.92 X10(3)/MCL (ref 4.5–11.5)

## 2024-11-05 PROCEDURE — 1159F MED LIST DOCD IN RCRD: CPT | Mod: CPTII,S$GLB,, | Performed by: INTERNAL MEDICINE

## 2024-11-05 PROCEDURE — 1160F RVW MEDS BY RX/DR IN RCRD: CPT | Mod: CPTII,S$GLB,, | Performed by: INTERNAL MEDICINE

## 2024-11-05 PROCEDURE — 3008F BODY MASS INDEX DOCD: CPT | Mod: CPTII,S$GLB,, | Performed by: INTERNAL MEDICINE

## 2024-11-05 PROCEDURE — 99215 OFFICE O/P EST HI 40 MIN: CPT | Mod: S$GLB,,, | Performed by: INTERNAL MEDICINE

## 2024-11-05 PROCEDURE — 99999 PR PBB SHADOW E&M-EST. PATIENT-LVL V: CPT | Mod: PBBFAC,,, | Performed by: INTERNAL MEDICINE

## 2024-11-05 PROCEDURE — 85025 COMPLETE CBC W/AUTO DIFF WBC: CPT

## 2024-11-05 PROCEDURE — 80053 COMPREHEN METABOLIC PANEL: CPT

## 2024-11-05 PROCEDURE — 36415 COLL VENOUS BLD VENIPUNCTURE: CPT

## 2024-11-05 PROCEDURE — 3074F SYST BP LT 130 MM HG: CPT | Mod: CPTII,S$GLB,, | Performed by: INTERNAL MEDICINE

## 2024-11-05 PROCEDURE — 86301 IMMUNOASSAY TUMOR CA 19-9: CPT

## 2024-11-05 PROCEDURE — 3078F DIAST BP <80 MM HG: CPT | Mod: CPTII,S$GLB,, | Performed by: INTERNAL MEDICINE

## 2024-11-05 PROCEDURE — 83735 ASSAY OF MAGNESIUM: CPT

## 2024-11-05 RX ORDER — EPINEPHRINE 0.3 MG/.3ML
0.3 INJECTION SUBCUTANEOUS ONCE AS NEEDED
Status: CANCELLED | OUTPATIENT
Start: 2024-11-05

## 2024-11-05 RX ORDER — DIPHENHYDRAMINE HYDROCHLORIDE 50 MG/ML
50 INJECTION INTRAMUSCULAR; INTRAVENOUS ONCE AS NEEDED
Status: CANCELLED | OUTPATIENT
Start: 2024-11-05

## 2024-11-05 RX ORDER — HEPARIN 100 UNIT/ML
500 SYRINGE INTRAVENOUS
Status: CANCELLED | OUTPATIENT
Start: 2024-11-05

## 2024-11-05 RX ORDER — SODIUM CHLORIDE 0.9 % (FLUSH) 0.9 %
10 SYRINGE (ML) INJECTION
Status: CANCELLED | OUTPATIENT
Start: 2024-11-05

## 2024-11-05 RX ORDER — PROCHLORPERAZINE EDISYLATE 5 MG/ML
10 INJECTION INTRAMUSCULAR; INTRAVENOUS ONCE AS NEEDED
Status: CANCELLED | OUTPATIENT
Start: 2024-11-05

## 2024-11-06 ENCOUNTER — INFUSION (OUTPATIENT)
Dept: INFUSION THERAPY | Facility: HOSPITAL | Age: 59
End: 2024-11-06
Attending: INTERNAL MEDICINE
Payer: MEDICARE

## 2024-11-06 VITALS
WEIGHT: 146.38 LBS | DIASTOLIC BLOOD PRESSURE: 77 MMHG | RESPIRATION RATE: 16 BRPM | HEIGHT: 67 IN | BODY MASS INDEX: 22.98 KG/M2 | TEMPERATURE: 99 F | HEART RATE: 68 BPM | SYSTOLIC BLOOD PRESSURE: 116 MMHG

## 2024-11-06 DIAGNOSIS — K59.00 CONSTIPATION, UNSPECIFIED CONSTIPATION TYPE: Primary | ICD-10-CM

## 2024-11-06 DIAGNOSIS — C25.9 ADENOCARCINOMA OF PANCREAS: Primary | ICD-10-CM

## 2024-11-06 PROCEDURE — 96367 TX/PROPH/DG ADDL SEQ IV INF: CPT

## 2024-11-06 PROCEDURE — 63600175 PHARM REV CODE 636 W HCPCS: Performed by: INTERNAL MEDICINE

## 2024-11-06 PROCEDURE — 96417 CHEMO IV INFUS EACH ADDL SEQ: CPT

## 2024-11-06 PROCEDURE — 96375 TX/PRO/DX INJ NEW DRUG ADDON: CPT

## 2024-11-06 PROCEDURE — 25000003 PHARM REV CODE 250: Performed by: INTERNAL MEDICINE

## 2024-11-06 PROCEDURE — 96368 THER/DIAG CONCURRENT INF: CPT

## 2024-11-06 PROCEDURE — 96413 CHEMO IV INFUSION 1 HR: CPT

## 2024-11-06 RX ORDER — EPINEPHRINE 0.3 MG/.3ML
0.3 INJECTION SUBCUTANEOUS ONCE AS NEEDED
Status: DISCONTINUED | OUTPATIENT
Start: 2024-11-06 | End: 2024-11-06 | Stop reason: HOSPADM

## 2024-11-06 RX ORDER — LACTULOSE 10 G/15ML
10 SOLUTION ORAL 2 TIMES DAILY
Qty: 900 ML | Refills: 0 | Status: SHIPPED | OUTPATIENT
Start: 2024-11-06 | End: 2024-12-06

## 2024-11-06 RX ORDER — SODIUM CHLORIDE 0.9 % (FLUSH) 0.9 %
10 SYRINGE (ML) INJECTION
Status: DISCONTINUED | OUTPATIENT
Start: 2024-11-06 | End: 2024-11-06 | Stop reason: HOSPADM

## 2024-11-06 RX ORDER — HEPARIN 100 UNIT/ML
500 SYRINGE INTRAVENOUS
Status: DISCONTINUED | OUTPATIENT
Start: 2024-11-06 | End: 2024-11-06 | Stop reason: HOSPADM

## 2024-11-06 RX ORDER — DIPHENHYDRAMINE HYDROCHLORIDE 50 MG/ML
50 INJECTION INTRAMUSCULAR; INTRAVENOUS ONCE AS NEEDED
Status: DISCONTINUED | OUTPATIENT
Start: 2024-11-06 | End: 2024-11-06 | Stop reason: HOSPADM

## 2024-11-06 RX ORDER — PROCHLORPERAZINE EDISYLATE 5 MG/ML
10 INJECTION INTRAMUSCULAR; INTRAVENOUS ONCE AS NEEDED
Status: DISCONTINUED | OUTPATIENT
Start: 2024-11-06 | End: 2024-11-06 | Stop reason: HOSPADM

## 2024-11-06 RX ADMIN — DEXAMETHASONE SODIUM PHOSPHATE 0.25 MG: 4 INJECTION, SOLUTION INTRA-ARTICULAR; INTRALESIONAL; INTRAMUSCULAR; INTRAVENOUS; SOFT TISSUE at 08:11

## 2024-11-06 RX ADMIN — APREPITANT 130 MG: 130 INJECTION, EMULSION INTRAVENOUS at 08:11

## 2024-11-06 RX ADMIN — SODIUM CHLORIDE 500 ML: 9 INJECTION, SOLUTION INTRAVENOUS at 09:11

## 2024-11-06 RX ADMIN — HEPARIN SODIUM (PORCINE) LOCK FLUSH IV SOLN 100 UNIT/ML 500 UNITS: 100 SOLUTION at 10:11

## 2024-11-06 RX ADMIN — CISPLATIN 60 MG: 1 INJECTION, SOLUTION INTRAVENOUS at 09:11

## 2024-11-06 RX ADMIN — GEMCITABINE 1800 MG: 38 INJECTION, SOLUTION INTRAVENOUS at 08:11

## 2024-11-06 RX ADMIN — POTASSIUM CHLORIDE 500 ML/HR: 2 INJECTION, SOLUTION, CONCENTRATE INTRAVENOUS at 08:11

## 2024-11-07 ENCOUNTER — INFUSION (OUTPATIENT)
Dept: INFUSION THERAPY | Facility: HOSPITAL | Age: 59
End: 2024-11-07
Attending: INTERNAL MEDICINE
Payer: MEDICARE

## 2024-11-07 VITALS
OXYGEN SATURATION: 99 % | WEIGHT: 146.38 LBS | HEART RATE: 54 BPM | DIASTOLIC BLOOD PRESSURE: 70 MMHG | RESPIRATION RATE: 18 BRPM | BODY MASS INDEX: 22.98 KG/M2 | SYSTOLIC BLOOD PRESSURE: 109 MMHG | HEIGHT: 67 IN | TEMPERATURE: 99 F

## 2024-11-07 DIAGNOSIS — C25.9 ADENOCARCINOMA OF PANCREAS: Primary | ICD-10-CM

## 2024-11-07 PROCEDURE — 63600175 PHARM REV CODE 636 W HCPCS: Mod: JZ,JG | Performed by: INTERNAL MEDICINE

## 2024-11-07 PROCEDURE — 96372 THER/PROPH/DIAG INJ SC/IM: CPT

## 2024-11-07 RX ADMIN — PEGFILGRASTIM-JMDB 6 MG: 6 INJECTION SUBCUTANEOUS at 01:11

## 2024-11-19 ENCOUNTER — LAB VISIT (OUTPATIENT)
Dept: LAB | Facility: HOSPITAL | Age: 59
End: 2024-11-19
Attending: INTERNAL MEDICINE
Payer: MEDICARE

## 2024-11-19 ENCOUNTER — INFUSION (OUTPATIENT)
Dept: INFUSION THERAPY | Facility: HOSPITAL | Age: 59
End: 2024-11-19
Attending: INTERNAL MEDICINE
Payer: MEDICARE

## 2024-11-19 ENCOUNTER — OFFICE VISIT (OUTPATIENT)
Dept: HEMATOLOGY/ONCOLOGY | Facility: CLINIC | Age: 59
End: 2024-11-19
Payer: MEDICARE

## 2024-11-19 VITALS
HEIGHT: 67 IN | WEIGHT: 138.63 LBS | TEMPERATURE: 98 F | BODY MASS INDEX: 21.76 KG/M2 | OXYGEN SATURATION: 100 % | DIASTOLIC BLOOD PRESSURE: 66 MMHG | SYSTOLIC BLOOD PRESSURE: 101 MMHG | RESPIRATION RATE: 16 BRPM | HEART RATE: 59 BPM

## 2024-11-19 VITALS
HEIGHT: 67 IN | OXYGEN SATURATION: 100 % | WEIGHT: 138.63 LBS | DIASTOLIC BLOOD PRESSURE: 66 MMHG | RESPIRATION RATE: 16 BRPM | SYSTOLIC BLOOD PRESSURE: 101 MMHG | HEART RATE: 59 BPM | BODY MASS INDEX: 21.76 KG/M2 | TEMPERATURE: 98 F

## 2024-11-19 DIAGNOSIS — K52.1 CHEMOTHERAPY INDUCED DIARRHEA: ICD-10-CM

## 2024-11-19 DIAGNOSIS — D84.821 IMMUNODEFICIENCY DUE TO CHEMOTHERAPY: ICD-10-CM

## 2024-11-19 DIAGNOSIS — T45.1X5A CHEMOTHERAPY INDUCED DIARRHEA: ICD-10-CM

## 2024-11-19 DIAGNOSIS — T45.1X5A IMMUNODEFICIENCY DUE TO CHEMOTHERAPY: ICD-10-CM

## 2024-11-19 DIAGNOSIS — C25.9 ADENOCARCINOMA OF PANCREAS: ICD-10-CM

## 2024-11-19 DIAGNOSIS — I81 PORTAL VEIN THROMBOSIS: ICD-10-CM

## 2024-11-19 DIAGNOSIS — Z79.899 IMMUNODEFICIENCY DUE TO CHEMOTHERAPY: ICD-10-CM

## 2024-11-19 DIAGNOSIS — T45.1X5A CHEMOTHERAPY-INDUCED PERIPHERAL NEUROPATHY: ICD-10-CM

## 2024-11-19 DIAGNOSIS — C25.9 ADENOCARCINOMA OF PANCREAS: Primary | ICD-10-CM

## 2024-11-19 DIAGNOSIS — R74.8 ELEVATED ALKALINE PHOSPHATASE LEVEL: ICD-10-CM

## 2024-11-19 DIAGNOSIS — T45.1X5A CHEMOTHERAPY-INDUCED NAUSEA: ICD-10-CM

## 2024-11-19 DIAGNOSIS — R39.9 LOWER URINARY TRACT SYMPTOMS: ICD-10-CM

## 2024-11-19 DIAGNOSIS — Z79.899 ON ANTINEOPLASTIC CHEMOTHERAPY: ICD-10-CM

## 2024-11-19 DIAGNOSIS — G89.3 CANCER ASSOCIATED PAIN: ICD-10-CM

## 2024-11-19 DIAGNOSIS — R11.0 CHEMOTHERAPY-INDUCED NAUSEA: ICD-10-CM

## 2024-11-19 DIAGNOSIS — G62.0 CHEMOTHERAPY-INDUCED PERIPHERAL NEUROPATHY: ICD-10-CM

## 2024-11-19 LAB
ALBUMIN SERPL-MCNC: 2.8 G/DL (ref 3.5–5)
ALBUMIN/GLOB SERPL: 0.9 RATIO (ref 1.1–2)
ALP SERPL-CCNC: 759 UNIT/L (ref 40–150)
ALT SERPL-CCNC: 51 UNIT/L (ref 0–55)
ANION GAP SERPL CALC-SCNC: 3 MEQ/L
AST SERPL-CCNC: 41 UNIT/L (ref 5–34)
BASOPHILS # BLD AUTO: 0.04 X10(3)/MCL
BASOPHILS NFR BLD AUTO: 0.5 %
BILIRUB SERPL-MCNC: 0.5 MG/DL
BUN SERPL-MCNC: 5 MG/DL (ref 8.4–25.7)
CALCIUM SERPL-MCNC: 8.8 MG/DL (ref 8.4–10.2)
CANCER AG19-9 SERPL-ACNC: ABNORMAL UNIT/ML (ref 0–37)
CHLORIDE SERPL-SCNC: 107 MMOL/L (ref 98–107)
CO2 SERPL-SCNC: 29 MMOL/L (ref 22–29)
CREAT SERPL-MCNC: 0.69 MG/DL (ref 0.72–1.25)
CREAT/UREA NIT SERPL: 7
EOSINOPHIL # BLD AUTO: 0.07 X10(3)/MCL (ref 0–0.9)
EOSINOPHIL NFR BLD AUTO: 0.8 %
ERYTHROCYTE [DISTWIDTH] IN BLOOD BY AUTOMATED COUNT: 17.3 % (ref 11.5–17)
GFR SERPLBLD CREATININE-BSD FMLA CKD-EPI: >60 ML/MIN/1.73/M2
GLOBULIN SER-MCNC: 3.2 GM/DL (ref 2.4–3.5)
GLUCOSE SERPL-MCNC: 112 MG/DL (ref 74–100)
HCT VFR BLD AUTO: 32 % (ref 42–52)
HGB BLD-MCNC: 10.4 G/DL (ref 14–18)
IMM GRANULOCYTES # BLD AUTO: 0.03 X10(3)/MCL (ref 0–0.04)
IMM GRANULOCYTES NFR BLD AUTO: 0.4 %
LYMPHOCYTES # BLD AUTO: 1.18 X10(3)/MCL (ref 0.6–4.6)
LYMPHOCYTES NFR BLD AUTO: 14.1 %
MAGNESIUM SERPL-MCNC: 1.8 MG/DL (ref 1.6–2.6)
MCH RBC QN AUTO: 29.1 PG (ref 27–31)
MCHC RBC AUTO-ENTMCNC: 32.5 G/DL (ref 33–36)
MCV RBC AUTO: 89.4 FL (ref 80–94)
MONOCYTES # BLD AUTO: 0.66 X10(3)/MCL (ref 0.1–1.3)
MONOCYTES NFR BLD AUTO: 7.9 %
NEUTROPHILS # BLD AUTO: 6.37 X10(3)/MCL (ref 2.1–9.2)
NEUTROPHILS NFR BLD AUTO: 76.3 %
PLATELET # BLD AUTO: 183 X10(3)/MCL (ref 130–400)
PMV BLD AUTO: 11.3 FL (ref 7.4–10.4)
POTASSIUM SERPL-SCNC: 4 MMOL/L (ref 3.5–5.1)
PROT SERPL-MCNC: 6 GM/DL (ref 6.4–8.3)
RBC # BLD AUTO: 3.58 X10(6)/MCL (ref 4.7–6.1)
SODIUM SERPL-SCNC: 139 MMOL/L (ref 136–145)
WBC # BLD AUTO: 8.35 X10(3)/MCL (ref 4.5–11.5)

## 2024-11-19 PROCEDURE — 3074F SYST BP LT 130 MM HG: CPT | Mod: CPTII,S$GLB,,

## 2024-11-19 PROCEDURE — 83735 ASSAY OF MAGNESIUM: CPT

## 2024-11-19 PROCEDURE — 96367 TX/PROPH/DG ADDL SEQ IV INF: CPT

## 2024-11-19 PROCEDURE — 63600175 PHARM REV CODE 636 W HCPCS: Mod: JZ,JG

## 2024-11-19 PROCEDURE — 80053 COMPREHEN METABOLIC PANEL: CPT

## 2024-11-19 PROCEDURE — 36415 COLL VENOUS BLD VENIPUNCTURE: CPT

## 2024-11-19 PROCEDURE — 1159F MED LIST DOCD IN RCRD: CPT | Mod: CPTII,S$GLB,,

## 2024-11-19 PROCEDURE — 99999 PR PBB SHADOW E&M-EST. PATIENT-LVL IV: CPT | Mod: PBBFAC,,,

## 2024-11-19 PROCEDURE — 86301 IMMUNOASSAY TUMOR CA 19-9: CPT

## 2024-11-19 PROCEDURE — 85025 COMPLETE CBC W/AUTO DIFF WBC: CPT

## 2024-11-19 PROCEDURE — 1160F RVW MEDS BY RX/DR IN RCRD: CPT | Mod: CPTII,S$GLB,,

## 2024-11-19 PROCEDURE — 3078F DIAST BP <80 MM HG: CPT | Mod: CPTII,S$GLB,,

## 2024-11-19 PROCEDURE — 3008F BODY MASS INDEX DOCD: CPT | Mod: CPTII,S$GLB,,

## 2024-11-19 PROCEDURE — 96375 TX/PRO/DX INJ NEW DRUG ADDON: CPT

## 2024-11-19 PROCEDURE — 25000003 PHARM REV CODE 250

## 2024-11-19 PROCEDURE — 96413 CHEMO IV INFUSION 1 HR: CPT

## 2024-11-19 PROCEDURE — 99215 OFFICE O/P EST HI 40 MIN: CPT | Mod: S$GLB,,,

## 2024-11-19 PROCEDURE — A4216 STERILE WATER/SALINE, 10 ML: HCPCS

## 2024-11-19 PROCEDURE — 96417 CHEMO IV INFUS EACH ADDL SEQ: CPT

## 2024-11-19 RX ORDER — PROCHLORPERAZINE EDISYLATE 5 MG/ML
10 INJECTION INTRAMUSCULAR; INTRAVENOUS ONCE AS NEEDED
Status: CANCELLED
Start: 2024-11-20

## 2024-11-19 RX ORDER — SODIUM CHLORIDE 0.9 % (FLUSH) 0.9 %
10 SYRINGE (ML) INJECTION
Status: CANCELLED | OUTPATIENT
Start: 2024-11-20

## 2024-11-19 RX ORDER — HEPARIN 100 UNIT/ML
500 SYRINGE INTRAVENOUS
Status: DISCONTINUED | OUTPATIENT
Start: 2024-11-19 | End: 2024-11-19 | Stop reason: HOSPADM

## 2024-11-19 RX ORDER — SODIUM CHLORIDE 0.9 % (FLUSH) 0.9 %
10 SYRINGE (ML) INJECTION
Status: DISCONTINUED | OUTPATIENT
Start: 2024-11-19 | End: 2024-11-19 | Stop reason: HOSPADM

## 2024-11-19 RX ORDER — HEPARIN 100 UNIT/ML
500 SYRINGE INTRAVENOUS
Status: CANCELLED | OUTPATIENT
Start: 2024-11-20

## 2024-11-19 RX ORDER — EPINEPHRINE 0.3 MG/.3ML
0.3 INJECTION SUBCUTANEOUS ONCE AS NEEDED
Status: CANCELLED | OUTPATIENT
Start: 2024-11-20

## 2024-11-19 RX ORDER — DIPHENHYDRAMINE HYDROCHLORIDE 50 MG/ML
50 INJECTION INTRAMUSCULAR; INTRAVENOUS ONCE AS NEEDED
Status: CANCELLED | OUTPATIENT
Start: 2024-11-20

## 2024-11-19 RX ADMIN — CISPLATIN 60 MG: 1 INJECTION, SOLUTION INTRAVENOUS at 11:11

## 2024-11-19 RX ADMIN — GEMCITABINE HYDROCHLORIDE 1800 MG: 38 INJECTION, SOLUTION INTRAVENOUS at 12:11

## 2024-11-19 RX ADMIN — SODIUM CHLORIDE: 9 INJECTION, SOLUTION INTRAVENOUS at 10:11

## 2024-11-19 RX ADMIN — HEPARIN SODIUM (PORCINE) LOCK FLUSH IV SOLN 100 UNIT/ML 500 UNITS: 100 SOLUTION at 01:11

## 2024-11-19 RX ADMIN — POTASSIUM CHLORIDE 500 ML/HR: 2 INJECTION, SOLUTION, CONCENTRATE INTRAVENOUS at 10:11

## 2024-11-19 RX ADMIN — SODIUM CHLORIDE 500 ML: 9 INJECTION, SOLUTION INTRAVENOUS at 12:11

## 2024-11-19 RX ADMIN — DEXAMETHASONE SODIUM PHOSPHATE 0.25 MG: 4 INJECTION, SOLUTION INTRA-ARTICULAR; INTRALESIONAL; INTRAMUSCULAR; INTRAVENOUS; SOFT TISSUE at 10:11

## 2024-11-19 RX ADMIN — Medication 10 ML: at 01:11

## 2024-11-19 RX ADMIN — APREPITANT 130 MG: 130 INJECTION, EMULSION INTRAVENOUS at 10:11

## 2024-11-19 NOTE — PROGRESS NOTES
HEMATOLOGY/ONCOLOGY OFFICE CLINIC VISIT    Visit Information:    Initial Evaluation: 9/3/2024  Referring Provider: Alba Chisholm MD PhD ([facility]) -Cell [phone]  Other providers:  Code status: Not addressed    Diagnosis:  Advanced pancreatic ductal adenocarcinoma (Dx 8/2022)     Present treatment:  Gemzar monotherapy 8/28/2024  -Gemcitabine and CISplatin Every 2 Weeks (8/28/2024-present)--planned  Chemotherapy summary: CISplatin (PLATINOL) 53 mg in sodium chloride 0.9% (NS) 250 mL IVPB, intravenous, 0 of 12 cycles  gemcitabine (GEMZAR) 836 mg in sodium chloride 0.9% (NS) 250 mL IVPB, intravenous, 0 of 12 cycles   - Eliquis 10 mg BID x 7 days then 5 mg BID- started 10/29/24--present    Treatment/Oncology history:  -8/2/2022 ERCP with metal biliary stent placement   -Folfirinox (10/2022- 2/2023)  -capecitabine RT (4/3/23 - 5/16/23)   -Phase I study 2021-1176 SD-101 at [facility]--SD-101 2 mg in sodium chloride 0.9% (NS) 30 mL IVPB, intravenous x 2 cycles (8/22/2023-11/22/2023)  -Gemcitabine and paclitaxel protein bound (12/13/2023-- 7/2024)-->clinical progression and GOO  -s/p biliary stent placement 4/4/2024  -chemotherapy with gemcitabine/paclitaxel (last dose 6/26/2024)  -duodenal stent (placed by GI)- 8/7/2024  -Gemcitabine and CISplatin Every 2 Weeks (8/28/2024-present)      Goal of care: life prolongation    Imaging:  CT CAP 8/21/2022: Since 7/25/2022, the primary pancreatic head mass encasing the common hepatic artery remains stable. The intrahepatic biliary obstruction improved with interval placement of a CBD stent. No definite distant metastasis in the abdomen or pelvis. Small indeterminate left lower lobe pulmonary nodule can be followed.  CT CAP 12/12/2022:  Primary pancreatic head tumor encasing the common hepatic artery is overall unchanged in size since 8/21/2022. Stable upstream pancreatic duct dilation and pancreatic atrophy. Interval placement of a cholecystostomy tube, with decompression of the  gallbladder. No definite evidence of distant metastatic disease in the chest, abdomen, and pelvis.   CT CAP 3/7/2023: Locally advanced pancreatic head tumor is slightly less conspicuous. Stable portacaval lymphadenopathy. Geographic hypodense regions have significantly increased throughout the liver compatible with fatty liver, to be correlated clinically regarding any potential concomitant hepatotoxicity; this appearance could obscure small low contrast liver lesions, to be better evaluated with MRI, if indicated. Unchanged mild jennifer appearance of the omentum is favored to be postinflammatory. No definite distant metastatic disease within the chest abdomen pelvis.   Ct CAP 6/29/2023:1.  Locally advanced pancreatic head mass not significantly changed. 2.  Increased periportal haziness and central liver heterogeneity which may relate to radiation.   3.  Indeterminate inferior right hepatic hypodensity as described above. Punctate left hepatic hypodensity also not previously evident, too small to characterize. Consider further evaluation with MRI as indicated.    MRI AP 7/15/2023:Primary neoplasm is noted within the pancreatic head and causes pancreatic ductal dilation and biliary ductal obstruction, which is decompressed via a stent. Nonspecific focal dilation of segment IV bile duct is noted.    CT CAP 10/2/2023: 1.  Compared to 07/28/2023, interval embolization of SMV tributaries in the epigastric region with new embolization coils in the right hepatic lobe following transhepatic catheterization. 2.  Previously noted small indeterminate low-attenuation lesion in the inferior right hepatic lobe segment 6 is no longer visualized. No new hepatic lesions.    3.  Stable 2.2 cm ill-defined residual pancreatic head tumor with no change in the vascular contact with the main portal vein, common hepatic and proper hepatic artery. 4.  No new metastatic disease in the chest or abdomen.   CT CAP 1/9/2024: 1.  Mild interval  increase in size of the primary tumor in the head of the pancreas. 2.  No evidence of metastatic disease in the chest, abdomen or pelvis.   CT AP 4/3/2024: 1.  There is worsening intrahepatic biliary dilation despite the presence of a biliary stent, suggesting stent occlusion. 2.  No significant change in the locally advanced tumor of the head of the pancreas since 03/12/2024.   CT CAP 6/21/2024: 1. No convincing change in the large locally advanced infiltrating ill-defined mass in the head and neck of the pancreas compared with 5/14/2024. 2. No specific evidence of distant metastatic disease in the chest, abdomen or pelvis.   XR ABDOMEN 1 VW PORTABLE 8/6/2024:  There is a gastric drainage tube with the tip below the diaphragm projecting over the gastric body with the sidehole below the GE junction in appropriate position. Gastric drainage tube with tip and sidehole projecting below the diaphragm over the gastric body.  X-ray Abdomen AP  8/5/2024: Air is seen scattered throughout normal caliber colon in a nonobstructive pattern. No dilated loops of bowel. Moderate colonic stool burden. No intraperitoneal free air within the limitations of this study. A biliary stent and embolization coils overlie the right upper quadrant. No suspicious osseous lesions.   Nonobstructive bowel gas pattern. I personally reviewed these image(s) along with the resident's/fellow's interpretations, certify that if a procedure was performed I was physically present, and agree with the final report.  CT Abdomen Pelvis with Contrast 8/5/2024: No suspicious pulmonary nodules in the lung bases. Hepatobiliary: Evaluation of the liver is somewhat limited secondary to streak artifact from the embolization coils. Within these limits there is no suspicious hepatic lesion. The gallbladder is decompressed and poorly evaluated. Common bile duct stent with expected pneumobilia. Mild intrahepatic biliary ductal dilatation is not significantly changed.  Spleen: No splenomegaly. Pancreas: Ill-defined infiltrating mass of the pancreatic head and neck, in keeping with pancreatic adenocarcinoma. There is atrophy and ductal dilatation of the pancreatic body and tail. Overall this tumor appears larger Adrenal Glands: No mass. Kidneys, Ureters, Bladder: No hydronephrosis. No suspicious renal lesion. No bladder mass. Gastrointestinal Tract: The stomach is distended with fluid and debris, suggesting there may be a degree of gastric outlet obstruction secondary to the pancreatic tumor. This is not significantly changed. There is no downstream obstruction. Pelvic Organs: No pelvic mass. Prostatomegaly. Peritoneum/Retroperitoneum: No ascites. Lymph Nodes: No lymphadenopathy. Musculoskeletal: No suspicious skeletal lesion.     No acute abdominopelvic abnormality. Attending addendum: The pancreatic tumor appears larger compared to 06/21/2024. There is gastric distention with debris suggesting chronic outlet obstruction ACTIONABLE ITEMS/RECOMMENDATIONS*: None. *An Actionable Finding is a finding that may be unrelated to the original reason for imaging but potentially actionable, meaning further investigation may be necessary. The Actionable Findings Vigilance Unit (AFVU) assists medical providers with responding to additional radiologic findings that are unexpected and potentially actionable. I personally reviewed these image(s) along with the resident's/fellow's interpretations, certify that if a procedure was performed I was physically present, and agree with the final report.   CT CAP 10/24/24:  1. Ill-defined soft tissue fullness at the a hay hepatis and pancreatic head and uncinate process fabella bases history of pancreatic adenocarcinoma.  A few blebs of gas are identified which may represent gas within the common bile duct.  2. Intrahepatic biliary ductal dilatation identified with nonvisualization of the gallbladder  3. Beam hardening artifact due to embolization coils  at the liver  4. Small 10 x 6 filling defect at the portal vein suggesting a small thrombus  5. Diffuse hazy mucosal prominence at the descending and ascending colon.  This may be merely due to underdistention.  A underlying colitis cannot be excluded  6. Findings of constipation with contrast and fluid distended loops of small bowel diffusely throughout suggesting a mild ileus  7. Prostatomegaly with calcifications  8. Findings and other details as above  9. Comparison to previous exam would allow further evaluation.  MRI Brain 11/4/24:  1. Tiny (subcentimeter), occasional focal areas of increased signal intensity (on the FLAIR sequences) are noted within the deep white matter and gray-white matter junctions of both cerebral hemispheres (these are only visualized on the FLAIR sequence with no contrast enhancement noted on postcontrast images). I suspect the changes represent chronic ischemic changes, however, follow-up imaging in 3-6 months to ensure stability may be helpful in excluding the very remote possibility of tiny metastases if felt be clinically indicated.     Pathology:  8/2/2022:  FNA head pancreas: SCANT MALIGNANT CELLS, FAVOR ADENOCARCINOMA  NGS:  No detectable genomic aberrations      CLINICAL HISTORY:       Patient: Warren P Lejeune is a 59 y.o. male.with a past medical history of past medical history of pancreatic ductal adenocarcinoma (Dx 8/2022)   Patient initially presented with  unintentional weight loss starting in 1/2022. He also had worsening back/shoulder and abdominal pain during this time. In July he finally presented to his PCP with darkening of the urine and lightening of the stool - with Jaundice that his wife noticed. Labs demonstrating cholestasis (bilirubin of 19).    Patient noted to have an abnormal CT scan after developing obstructive jaundice. 2 cm hypoenhancing mass noted in the head of the pancreas with intra and extrahepatic biliary ductal dilatation. Mass measured 2.5 x 2.1  "cm. There is also atrophy of the distal gland and upstream dilatation of the pancreas duct. There is no obvious vascular invasion. There were no focal liver lesions.    8/2/22- EGD/EUS/FNA. Final pathology showed ductal adenocarcinoma, moderately differentiated. ERCP on the same date performed, with placement of a fully covered metal biliary stent, 60 mm x 10 mm.    7/27/22- CA 19-9 was 681    8/21/22- CT CAP: Since 7/25/2022, the primary pancreatic head mass encasing the common hepatic artery remains stable. The intrahepatic biliary obstruction improved with interval placement of a CBD stent. No definite distant metastasis in the abdomen or pelvis. Small indeterminate left lower lobe pulmonary nodule can be followed.    09/03/2022 Germline genetic testing: Negative for germline pathogenic variants in any of the analyzed genes, Genes Analyzed: APC, DALE, BMPR1A, BRCA1, BRCA2, CDKN2A, EPCAM, MEN1, MLH1, MSH2, MSH6, NF1, PALB2, PMS2, SMAD4, STK11, TP53, TSC1, TSC2, and VHL. Genetic testing performed by [employer]; full report available in scanned documents.     s/p biliary stent placement, currently undergoing chemotherapy with gemcitabine/paclitaxel (last dose 6/26/2024), T2DM on insulin presenting for abdominal pain and constipation. CT A/P with distension in stomach suggesting gastric outlet obstruction secondary to pancreatic tumor. GI and Surg Onc consulted, pending evaluation. Poor PO tolerance, deferring NG tube now as vomiting resolves and having bowel movements.     Patient was recently admitted to the hospital at [facility]--Hospital Course:  "Findings on imaging were concerning for malignant gastric outlet obstruction from pancreatic cancer. After a discussion with surgical oncology, GI and GIMO, decision was made to proceed with duodenal stent (placed by GI). No indications for gastrojejunostomy bypass. Patient tolerated procedure well. We were able to advance to low fiber diet. Nutrition was consulted for " "low fiber diet education. Managed neoplasm related pain with PO morphine."     Patient is here today with his wife to continue chemotherapy close to home.  He is doing relatively well and voices no concerns.  MediPort was removed from his left chest wall to exposure of the MediPort.  Patient reports abdominal mid back pain, occasional muscle spasm and dizziness.  No fever, chills, sweats.  No chest pain or short of breath.    Chief Complaint: Adenocarcinoma of pancreas (Pt reports pain to lower abdomen. He is taking Percocet and MS Cont. Pain remains stable and unchanged. He continues with N controlled with Zofran. He reports he has been eating mainly soups that past few days because he has been having difficulty swallowing. He says that Thursday, 11/21//24, he is having a EGD done for this. )      Interval History:  Patient returns to clinic for treatment clearance for C5 of [place]/Cisplatin q2w.  He continues with abdominal and lower back pain, he takes Percocet and MS cont for this.   His CA 19-9 is slightly decreased since last visit. He is taking Miralax and laxative for opioid induced constipation, and Pregabalin 100 mg QD for bilateral hand and feet neuropathy. He has an appointment with GI @ [facility] on 1/8/2025 to replace duodenal stent. Labs and scans reviewed with patient in detail.He does report trouble swallowing and feeling as though food is getting stuck, scheduled for EGD 11/21/2024.      Past Medical History:   Diagnosis Date    Abdominal pain     Admission for chemotherapy     last chemo 9/12/24    Anxiety disorder, unspecified     Back pain     Bradycardia     Enlarged prostate     GERD (gastroesophageal reflux disease)     Hypertension     no cardiologist; no longer on meds since weight loss due to pancreatic cancer    Memory loss     Pancreatic cancer     Peripheral neuropathy     Type 2 diabetes mellitus with unspecified diabetic retinopathy without macular edema       Past Surgical History: "   Procedure Laterality Date    bile duct stents times 2      EGD, WITH STENT INSERTION      INSERTION OF TUNNELED CENTRAL VENOUS CATHETER (CVC) WITH SUBCUTANEOUS PORT Right 9/20/2024    Procedure: [id number];  Surgeon: Moe Champagne MD;  Location: [facility];  Service: General;  Laterality: Right;    MEDIPORT REMOVAL      times 2     Family History   Problem Relation Name Age of Onset    Hypertension Mother      Stroke Father      Stroke Sister      Prostate cancer Brother            Review of patient's allergies indicates:  No Known Allergies   Current Outpatient Medications on File Prior to Visit   Medication Sig Dispense Refill    aluminum & magnesium hydroxide-simethicone (MYLANTA MAX STRENGTH) 400-400-40 mg/5 mL suspension Take 5 mLs by mouth 4 (four) times daily as needed (mouth sores). 335 mL 3    apixaban (ELIQUIS) 5 mg Tab Take 1 tablet (5 mg total) by mouth 2 (two) times daily. Take 2 tablets by mouth (10 mg) twice a day for 7 days, then 1 tablet (5 mg total) by mouth twice a day thereafter 74 tablet 3    cholecalciferol, vitamin D3, (VITAMIN D3) 50 mcg (2,000 unit) Tab Take 2,000 Units by mouth once daily.      diphenhydrAMINE (BENADRYL) 12.5 mg/5 mL elixir Take 5 mLs (12.5 mg total) by mouth 4 (four) times daily as needed (mouth sores). 236 mL 3    diphenoxylate-atropine 2.5-0.025 mg (LOMOTIL) 2.5-0.025 mg per tablet Take 1 tablet by mouth.      insulin aspart U-100 (NOVOLOG) 100 unit/mL injection Inject into the skin 3 (three) times daily before meals. prn      lactulose (CHRONULAC) 20 gram/30 mL Soln Take 15 mLs (10 g total) by mouth 2 (two) times daily. 900 mL 0    LIDOcaine viscous HCl 2% (LIDOCAINE VISCOUS) 2 % Soln by Mucous Membrane route 4 (four) times daily as needed (mouth sores). Swish and spit 15 ml (5 ml benadryl, 5 ml mylanta, 5 ml lidocaine) by mouth 4 times daily as needed for mouth sores 100 mL 3    magnesium 250 mg Tab Take 1 tablet by mouth once daily.      morphine (MS  CONTIN) 15 MG 12 hr tablet Take 1 tablet (15 mg total) by mouth 2 (two) times daily. 30 tablet 0    multivitamin with folic acid 400 mcg Tab Take 1 tablet by mouth once daily.      ondansetron (ZOFRAN) 8 MG tablet Take 8 mg by mouth.      oxyCODONE-acetaminophen (PERCOCET)  mg per tablet Take 1 tablet by mouth every 6 (six) hours as needed for Pain. 30 tablet 0    pantoprazole (PROTONIX) 40 MG tablet Take 1 tablet (40 mg total) by mouth once daily. 30 tablet 2    polyethylene glycol (GLYCOLAX) 17 gram PwPk Take 17 g by mouth.      pregabalin (LYRICA) 100 MG capsule Take 100 mg by mouth.      prochlorperazine (COMPAZINE) 10 MG tablet Take 10 mg by mouth.      tamsulosin (FLOMAX) 0.4 mg Cap Take 1 capsule by mouth.      TRESIBA FLEXTOUCH U-200 200 unit/mL (3 mL) insulin pen Inject 14 Units into the skin.      vibegron (GEMTESA) 75 mg Tab Take 75 mg by mouth.      OLANZapine (ZYPREXA) 5 MG tablet Take 1 tablet (5 mg total) by mouth nightly. 4 tablet 6     No current facility-administered medications on file prior to visit.      Review of Systems   Constitutional:  Positive for appetite change and fatigue. Negative for activity change, chills, diaphoresis, fever and unexpected weight change.   HENT:  Positive for trouble swallowing. Negative for nasal congestion, mouth sores, nosebleeds, sinus pressure/congestion and sore throat.    Eyes: Negative.    Respiratory:  Positive for shortness of breath. Negative for cough.    Cardiovascular:  Negative for chest pain and palpitations.   Gastrointestinal:  Positive for constipation. Negative for abdominal distention, abdominal pain, blood in stool, change in bowel habit, diarrhea, nausea and vomiting.   Endocrine: Negative.    Genitourinary:  Negative for bladder incontinence, decreased urine volume, difficulty urinating, dysuria, frequency, hematuria and urgency.   Musculoskeletal:  Positive for back pain. Negative for arthralgias, gait problem, joint swelling, leg pain  "and myalgias.   Integumentary:  Negative for rash.   Allergic/Immunologic: Negative.  Negative for frequent infections.   Neurological:  Positive for weakness. Negative for dizziness, tremors, syncope, light-headedness, numbness, headaches and memory loss.   Hematological:  Negative for adenopathy. Does not bruise/bleed easily.   Psychiatric/Behavioral:  Negative for agitation, confusion, hallucinations, sleep disturbance and suicidal ideas. The patient is not nervous/anxious.               Vitals:    11/19/24 0921   BP: 101/66   BP Location: Right arm   Patient Position: Sitting   Pulse: (!) 59   Resp: 16   Temp: 98 °F (36.7 °C)   TempSrc: Oral   SpO2: 100%   Weight: 62.9 kg (138 lb 9.6 oz)   Height: 5' 7.01" (1.702 m)        Wt Readings from Last 6 Encounters:   11/19/24 62.9 kg (138 lb 9.6 oz)   11/07/24 66.4 kg (146 lb 6.2 oz)   11/06/24 66.4 kg (146 lb 6.2 oz)   11/05/24 66.4 kg (146 lb 4.8 oz)   10/29/24 62.6 kg (138 lb 1.6 oz)   10/23/24 63.8 kg (140 lb 11.2 oz)     Body mass index is 21.7 kg/m².  Body surface area is 1.72 meters squared.  Physical Exam  Vitals and nursing note reviewed.   Constitutional:       General: He is not in acute distress.     Comments: thin   HENT:      Head: Normocephalic and atraumatic.      Mouth/Throat:      Mouth: Mucous membranes are moist.   Eyes:      General: No scleral icterus.     Extraocular Movements: Extraocular movements intact.      Conjunctiva/sclera: Conjunctivae normal.   Neck:      Vascular: No JVD.   Cardiovascular:      Rate and Rhythm: Normal rate and regular rhythm.      Heart sounds: No murmur heard.  Pulmonary:      Effort: Pulmonary effort is normal.      Breath sounds: Normal breath sounds. No wheezing or rhonchi.   Chest:      Comments: Left chest wall scar from previous Mediport  Abdominal:      General: Bowel sounds are normal. There is no distension.      Palpations: Abdomen is soft.      Tenderness: There is no abdominal tenderness. "   Musculoskeletal:         General: No swelling or deformity.      Cervical back: Neck supple.   Lymphadenopathy:      Head:      Right side of head: No submandibular adenopathy.      Left side of head: No submandibular adenopathy.      Cervical: No cervical adenopathy.      Upper Body:      Right upper body: No supraclavicular or axillary adenopathy.      Left upper body: No supraclavicular or axillary adenopathy.      Lower Body: No right inguinal adenopathy. No left inguinal adenopathy.   Skin:     General: Skin is warm.      Coloration: Skin is not jaundiced.      Findings: No rash.   Neurological:      General: No focal deficit present.      Mental Status: He is alert and oriented to person, place, and time.      Cranial Nerves: Cranial nerves 2-12 are intact.   Psychiatric:         Attention and Perception: Attention normal.         Behavior: Behavior is cooperative.       ECOG SCORE             Laboratory:  CBC with Differential:  Lab Results   Component Value Date    WBC 8.35 11/19/2024    RBC 3.58 (L) 11/19/2024    HGB 10.4 (L) 11/19/2024    HCT 32.0 (L) 11/19/2024    MCV 89.4 11/19/2024    MCH 29.1 11/19/2024    MCHC 32.5 (L) 11/19/2024    RDW 17.3 (H) 11/19/2024     11/19/2024    MPV 11.3 (H) 11/19/2024        CMP:  Sodium   Date Value Ref Range Status   11/19/2024 139 136 - 145 mmol/L Final     Potassium   Date Value Ref Range Status   11/19/2024 4.0 3.5 - 5.1 mmol/L Final     Chloride   Date Value Ref Range Status   11/19/2024 107 98 - 107 mmol/L Final     CO2   Date Value Ref Range Status   11/19/2024 29 22 - 29 mmol/L Final     Blood Urea Nitrogen   Date Value Ref Range Status   11/19/2024 5.0 (L) 8.4 - 25.7 mg/dL Final   10/20/2023 12 6 - 23 mg/dL Final     Creatinine   Date Value Ref Range Status   11/19/2024 0.69 (L) 0.72 - 1.25 mg/dL Final   10/20/2023 0.95 0.67 - 1.17 mg/dL Final     Calcium   Date Value Ref Range Status   11/19/2024 8.8 8.4 - 10.2 mg/dL Final   10/20/2023 8.7 8.4 - 10.2  mg/dL Final     Albumin   Date Value Ref Range Status   11/19/2024 2.8 (L) 3.5 - 5.0 g/dL Final     Bilirubin Total   Date Value Ref Range Status   11/19/2024 0.5 <=1.5 mg/dL Final     ALP   Date Value Ref Range Status   11/19/2024 759 (H) 40 - 150 unit/L Final     AST   Date Value Ref Range Status   11/19/2024 41 (H) 5 - 34 unit/L Final     ALT   Date Value Ref Range Status   11/19/2024 51 0 - 55 unit/L Final      Component 08/28/24 07/17/24 06/26/24 06/21/24 06/12/24 05/29/24   CA 19-9 2,385.0 High  2,150.0 High  1,567.0 High  1,183.0 High  1,000.0 High  959.9 High       Latest Reference Range & Units 09/24/24 08:52 10/23/24 07:57   CA 19-9 0.00 - 37.00 unit/mL 6,202.93 (H) 21,137.60 (H)   (H): Data is abnormally high       Assessment:       1. Adenocarcinoma of pancreas    2. On antineoplastic chemotherapy    3. Chemotherapy-induced peripheral neuropathy    4. Portal vein thrombosis    5. Elevated alkaline phosphatase level    6. Cancer associated pain              1) Locally advanced pancreatic cancer   ---8/2/2022 ERCP with metal biliary stent placement   ---Folfirinox (10/2022- 2/2023)  ---capecitabine RT (4/3/23 - 5/16/23)   ---Phase I study 2021-1176 SD-101 at [facility]--SD-101 2 mg in sodium chloride 0.9% (NS) 30 mL IVPB, intravenous x 2 cycles (8/22/2023-11/22/2023)  ---Gemcitabine and paclitaxel protein bound (12/13/2023-- 7/2024)-->clinical progression and GOO  ---s/p biliary stent placement 4/4/2024  ---chemotherapy with gemcitabine/paclitaxel (last dose 6/26/2024)  ---duodenal stent (placed by GI)- 8/7/2024  ---Gemcitabine and CISplatin Every 2 Weeks (8/28/2024-present)    2) H/o Gastric outlet obstruction  ---s/p duodenal stent, severe protein calori malnutrition     3) Pain, neoplasm related pain  ---continue Percocet, Lyrica, Morphine   ---patient lost Percocet.  Instructed to continue to take morphine as prescribed.      4) elevated ALP  --have improved significantly          Plan:       Patient with  unresectable advanced pancreatic cancer to start 4th line treatment with cisplatin and Gemzar.     Labs and exam stable  Toxicity reviewed, okay to proceed with C5 Gemzar/Cisplatin 11/6/24  Stimufend on Day 2   Continue Eliquis 5 mg BID for thrombus  Repeat MRI Brain in 3 months, due 2/2025  Continue Magic Mouthwash for esophagitis   Continue morphine and percocet for pain, refill sent for percocet  CT C/A/P every 3 months, next due 1/2025  RTC 2 week with NP with labs/C6  CBC, CMP,  Mg, CA 19 9 - 1 hr prior @ [facility]    The patient was seen, interviewed and examined. Pertinent lab and radiology studies were reviewed.   The patient was given ample opportunity to ask questions, and to the best of my abilities, all questions answered to satisfaction; patient demonstrated understanding of what we discussed and agreeable to the plan. Pt instructed to call should develop concerning signs/symptoms or have further questions.       Terri Marcelino, TRUONGP-C  Oncology/Hematology  [facility]

## 2024-11-20 ENCOUNTER — INFUSION (OUTPATIENT)
Dept: INFUSION THERAPY | Facility: HOSPITAL | Age: 59
End: 2024-11-20
Attending: INTERNAL MEDICINE
Payer: MEDICARE

## 2024-11-20 VITALS
SYSTOLIC BLOOD PRESSURE: 104 MMHG | BODY MASS INDEX: 22.54 KG/M2 | RESPIRATION RATE: 18 BRPM | TEMPERATURE: 98 F | WEIGHT: 143.63 LBS | OXYGEN SATURATION: 100 % | HEART RATE: 52 BPM | DIASTOLIC BLOOD PRESSURE: 56 MMHG | HEIGHT: 67 IN

## 2024-11-20 DIAGNOSIS — C25.9 ADENOCARCINOMA OF PANCREAS: Primary | ICD-10-CM

## 2024-11-20 PROCEDURE — 63600175 PHARM REV CODE 636 W HCPCS: Mod: JZ,JG

## 2024-11-20 RX ORDER — DIPHENOXYLATE HYDROCHLORIDE AND ATROPINE SULFATE 2.5; .025 MG/1; MG/1
1 TABLET ORAL 4 TIMES DAILY PRN
Qty: 60 TABLET | Refills: 1 | Status: SHIPPED | OUTPATIENT
Start: 2024-11-20 | End: 2024-12-20

## 2024-11-20 RX ORDER — TAMSULOSIN HYDROCHLORIDE 0.4 MG/1
0.4 CAPSULE ORAL DAILY
Qty: 30 CAPSULE | Refills: 0 | Status: SHIPPED | OUTPATIENT
Start: 2024-11-20 | End: 2024-12-20

## 2024-11-20 RX ORDER — OXYCODONE AND ACETAMINOPHEN 10; 325 MG/1; MG/1
1 TABLET ORAL EVERY 6 HOURS PRN
Qty: 30 TABLET | Refills: 0 | Status: SHIPPED | OUTPATIENT
Start: 2024-11-20

## 2024-11-20 RX ADMIN — PEGFILGRASTIM-JMDB 6 MG: 6 INJECTION SUBCUTANEOUS at 01:11

## 2024-11-21 ENCOUNTER — HOSPITAL ENCOUNTER (EMERGENCY)
Facility: HOSPITAL | Age: 59
Discharge: LEFT BEFORE DISCHARGE | End: 2024-11-21
Payer: MEDICARE

## 2024-11-21 VITALS
BODY MASS INDEX: 20.73 KG/M2 | HEIGHT: 69 IN | OXYGEN SATURATION: 100 % | RESPIRATION RATE: 20 BRPM | SYSTOLIC BLOOD PRESSURE: 197 MMHG | WEIGHT: 140 LBS | TEMPERATURE: 98 F | HEART RATE: 54 BPM | DIASTOLIC BLOOD PRESSURE: 98 MMHG

## 2024-11-21 LAB
ABS NEUT (OLG): 46.37 X10(3)/MCL (ref 2.1–9.2)
ALBUMIN SERPL-MCNC: 3.1 G/DL (ref 3.5–5)
ALBUMIN/GLOB SERPL: 1 RATIO (ref 1.1–2)
ALP SERPL-CCNC: 742 UNIT/L (ref 40–150)
ALT SERPL-CCNC: 53 UNIT/L (ref 0–55)
ANION GAP SERPL CALC-SCNC: 8 MEQ/L
APTT PPP: 27 SECONDS (ref 23.2–33.7)
AST SERPL-CCNC: 35 UNIT/L (ref 5–34)
B-OH-BUTYR SERPL-MCNC: 0.1 MMOL/L
BACTERIA #/AREA URNS AUTO: ABNORMAL /HPF
BILIRUB SERPL-MCNC: 0.5 MG/DL
BILIRUB UR QL STRIP.AUTO: NEGATIVE
BUN SERPL-MCNC: 8.8 MG/DL (ref 8.4–25.7)
CALCIUM SERPL-MCNC: 9.1 MG/DL (ref 8.4–10.2)
CHLORIDE SERPL-SCNC: 98 MMOL/L (ref 98–107)
CLARITY UR: CLEAR
CO2 SERPL-SCNC: 26 MMOL/L (ref 22–29)
COLOR UR AUTO: COLORLESS
CREAT SERPL-MCNC: 1.15 MG/DL (ref 0.72–1.25)
CREAT/UREA NIT SERPL: 8
ERYTHROCYTE [DISTWIDTH] IN BLOOD BY AUTOMATED COUNT: 17.4 % (ref 11.5–17)
GFR SERPLBLD CREATININE-BSD FMLA CKD-EPI: >60 ML/MIN/1.73/M2
GLOBULIN SER-MCNC: 3.1 GM/DL (ref 2.4–3.5)
GLUCOSE SERPL-MCNC: 501 MG/DL (ref 74–100)
GLUCOSE UR QL STRIP: ABNORMAL
HCT VFR BLD AUTO: 34.4 % (ref 42–52)
HGB BLD-MCNC: 11.1 G/DL (ref 14–18)
HGB UR QL STRIP: NEGATIVE
INR PPP: 1.2
INSTRUMENT WBC (OLG): 47.32 X10(3)/MCL
KETONES UR QL STRIP: NEGATIVE
LEUKOCYTE ESTERASE UR QL STRIP: NEGATIVE
LIPASE SERPL-CCNC: <4 U/L
MAGNESIUM SERPL-MCNC: 1.7 MG/DL (ref 1.6–2.6)
MCH RBC QN AUTO: 29.1 PG (ref 27–31)
MCHC RBC AUTO-ENTMCNC: 32.3 G/DL (ref 33–36)
MCV RBC AUTO: 90.3 FL (ref 80–94)
MONOCYTES NFR BLD MANUAL: 0.95 X10(3)/MCL (ref 0.1–1.3)
MONOCYTES NFR BLD MANUAL: 2 %
NEUTROPHILS NFR BLD MANUAL: 98 %
NITRITE UR QL STRIP: NEGATIVE
NRBC BLD AUTO-RTO: 0 %
PH UR STRIP: 5.5 [PH]
PLATELET # BLD AUTO: 210 X10(3)/MCL (ref 130–400)
PLATELET # BLD EST: NORMAL 10*3/UL
PMV BLD AUTO: 10.9 FL (ref 7.4–10.4)
POCT GLUCOSE: >500 MG/DL (ref 70–110)
POTASSIUM SERPL-SCNC: 5.1 MMOL/L (ref 3.5–5.1)
PROT SERPL-MCNC: 6.2 GM/DL (ref 6.4–8.3)
PROT UR QL STRIP: NEGATIVE
PROTHROMBIN TIME: 15.3 SECONDS (ref 12.5–14.5)
RBC # BLD AUTO: 3.81 X10(6)/MCL (ref 4.7–6.1)
RBC #/AREA URNS AUTO: ABNORMAL /HPF
RBC MORPH BLD: NORMAL
SODIUM SERPL-SCNC: 132 MMOL/L (ref 136–145)
SP GR UR STRIP.AUTO: 1.03 (ref 1–1.03)
SQUAMOUS #/AREA URNS LPF: ABNORMAL /HPF
UROBILINOGEN UR STRIP-ACNC: NORMAL
WBC # BLD AUTO: 47.32 X10(3)/MCL (ref 4.5–11.5)
WBC #/AREA URNS AUTO: ABNORMAL /HPF

## 2024-11-21 PROCEDURE — 85730 THROMBOPLASTIN TIME PARTIAL: CPT

## 2024-11-21 PROCEDURE — 99283 EMERGENCY DEPT VISIT LOW MDM: CPT

## 2024-11-21 PROCEDURE — 82010 KETONE BODYS QUAN: CPT

## 2024-11-21 PROCEDURE — 83735 ASSAY OF MAGNESIUM: CPT

## 2024-11-21 PROCEDURE — 85610 PROTHROMBIN TIME: CPT

## 2024-11-21 PROCEDURE — 85025 COMPLETE CBC W/AUTO DIFF WBC: CPT

## 2024-11-21 PROCEDURE — 83690 ASSAY OF LIPASE: CPT

## 2024-11-21 PROCEDURE — 81015 MICROSCOPIC EXAM OF URINE: CPT

## 2024-11-21 PROCEDURE — 80053 COMPREHEN METABOLIC PANEL: CPT

## 2024-11-21 PROCEDURE — 82962 GLUCOSE BLOOD TEST: CPT

## 2024-11-22 NOTE — FIRST PROVIDER EVALUATION
Medical screening examination initiated.  I have conducted a focused provider triage encounter, findings are as follows:    Brief history of present illness: Pt reporting h/o pancreatic CA and feeling like his sugar is high. States sugar is over 500 after checking in the ED. He is on insulin for DM. Chemo one week ago. Also reporting lower back pain. Has not taken medication in 2 days     There were no vitals filed for this visit.    Pertinent physical exam:  amb, NAD,     Brief workup plan:  labs, UA, IVF,    Preliminary workup initiated; this workup will be continued and followed by the physician or advanced practice provider that is assigned to the patient when roomed.

## 2024-11-26 ENCOUNTER — DOCUMENTATION ONLY (OUTPATIENT)
Dept: INFUSION THERAPY | Facility: HOSPITAL | Age: 59
End: 2024-11-26
Payer: MEDICARE

## 2024-11-26 NOTE — PROGRESS NOTES
Patient is from [place] and driving to [place] for treatment. Offered to be treated in [place] however spouse works in [place] and would prefer to treat at [facility].

## 2024-12-04 ENCOUNTER — LAB VISIT (OUTPATIENT)
Dept: LAB | Facility: HOSPITAL | Age: 59
End: 2024-12-04
Attending: INTERNAL MEDICINE
Payer: MEDICARE

## 2024-12-04 ENCOUNTER — INFUSION (OUTPATIENT)
Dept: INFUSION THERAPY | Facility: HOSPITAL | Age: 59
End: 2024-12-04
Attending: INTERNAL MEDICINE
Payer: MEDICARE

## 2024-12-04 ENCOUNTER — OFFICE VISIT (OUTPATIENT)
Dept: HEMATOLOGY/ONCOLOGY | Facility: CLINIC | Age: 59
End: 2024-12-04
Payer: MEDICARE

## 2024-12-04 VITALS
HEIGHT: 67 IN | OXYGEN SATURATION: 100 % | BODY MASS INDEX: 22.32 KG/M2 | WEIGHT: 142.19 LBS | DIASTOLIC BLOOD PRESSURE: 74 MMHG | TEMPERATURE: 98 F | HEART RATE: 49 BPM | SYSTOLIC BLOOD PRESSURE: 117 MMHG | RESPIRATION RATE: 16 BRPM

## 2024-12-04 DIAGNOSIS — R74.8 ELEVATED ALKALINE PHOSPHATASE LEVEL: ICD-10-CM

## 2024-12-04 DIAGNOSIS — R97.8 ELEVATED CA 19-9 LEVEL: ICD-10-CM

## 2024-12-04 DIAGNOSIS — Z79.899 ON ANTINEOPLASTIC CHEMOTHERAPY: ICD-10-CM

## 2024-12-04 DIAGNOSIS — K52.1 CHEMOTHERAPY INDUCED DIARRHEA: ICD-10-CM

## 2024-12-04 DIAGNOSIS — K59.00 CONSTIPATION, UNSPECIFIED CONSTIPATION TYPE: ICD-10-CM

## 2024-12-04 DIAGNOSIS — T45.1X5A CHEMOTHERAPY-INDUCED PERIPHERAL NEUROPATHY: ICD-10-CM

## 2024-12-04 DIAGNOSIS — G89.3 CANCER ASSOCIATED PAIN: ICD-10-CM

## 2024-12-04 DIAGNOSIS — C25.9 ADENOCARCINOMA OF PANCREAS: Primary | ICD-10-CM

## 2024-12-04 DIAGNOSIS — G62.0 CHEMOTHERAPY-INDUCED PERIPHERAL NEUROPATHY: ICD-10-CM

## 2024-12-04 DIAGNOSIS — R39.9 LOWER URINARY TRACT SYMPTOMS: ICD-10-CM

## 2024-12-04 DIAGNOSIS — T45.1X5A CHEMOTHERAPY-INDUCED NAUSEA: ICD-10-CM

## 2024-12-04 DIAGNOSIS — Z79.899 IMMUNODEFICIENCY DUE TO CHEMOTHERAPY: ICD-10-CM

## 2024-12-04 DIAGNOSIS — C25.9 ADENOCARCINOMA OF PANCREAS: ICD-10-CM

## 2024-12-04 DIAGNOSIS — T45.1X5A CHEMOTHERAPY INDUCED DIARRHEA: ICD-10-CM

## 2024-12-04 DIAGNOSIS — D84.821 IMMUNODEFICIENCY DUE TO CHEMOTHERAPY: ICD-10-CM

## 2024-12-04 DIAGNOSIS — R11.0 CHEMOTHERAPY-INDUCED NAUSEA: ICD-10-CM

## 2024-12-04 DIAGNOSIS — T45.1X5A IMMUNODEFICIENCY DUE TO CHEMOTHERAPY: ICD-10-CM

## 2024-12-04 DIAGNOSIS — I81 PORTAL VEIN THROMBOSIS: ICD-10-CM

## 2024-12-04 LAB
ALBUMIN SERPL-MCNC: 2.9 G/DL (ref 3.5–5)
ALBUMIN/GLOB SERPL: 1 RATIO (ref 1.1–2)
ALP SERPL-CCNC: 571 UNIT/L (ref 40–150)
ALT SERPL-CCNC: 47 UNIT/L (ref 0–55)
ANION GAP SERPL CALC-SCNC: 7 MEQ/L
AST SERPL-CCNC: 46 UNIT/L (ref 5–34)
BASOPHILS # BLD AUTO: 0.07 X10(3)/MCL
BASOPHILS NFR BLD AUTO: 0.8 %
BILIRUB SERPL-MCNC: 0.3 MG/DL
BUN SERPL-MCNC: 4 MG/DL (ref 8.4–25.7)
CALCIUM SERPL-MCNC: 9 MG/DL (ref 8.4–10.2)
CANCER AG19-9 SERPL-ACNC: ABNORMAL UNIT/ML (ref 0–37)
CHLORIDE SERPL-SCNC: 104 MMOL/L (ref 98–107)
CO2 SERPL-SCNC: 27 MMOL/L (ref 22–29)
CREAT SERPL-MCNC: 0.79 MG/DL (ref 0.72–1.25)
CREAT/UREA NIT SERPL: 5
EOSINOPHIL # BLD AUTO: 0.02 X10(3)/MCL (ref 0–0.9)
EOSINOPHIL NFR BLD AUTO: 0.2 %
ERYTHROCYTE [DISTWIDTH] IN BLOOD BY AUTOMATED COUNT: 16.7 % (ref 11.5–17)
GFR SERPLBLD CREATININE-BSD FMLA CKD-EPI: >60 ML/MIN/1.73/M2
GLOBULIN SER-MCNC: 3 GM/DL (ref 2.4–3.5)
GLUCOSE SERPL-MCNC: 146 MG/DL (ref 74–100)
HCT VFR BLD AUTO: 34.2 % (ref 42–52)
HGB BLD-MCNC: 10.8 G/DL (ref 14–18)
IMM GRANULOCYTES # BLD AUTO: 0.06 X10(3)/MCL (ref 0–0.04)
IMM GRANULOCYTES NFR BLD AUTO: 0.7 %
LYMPHOCYTES # BLD AUTO: 1.07 X10(3)/MCL (ref 0.6–4.6)
LYMPHOCYTES NFR BLD AUTO: 12 %
MAGNESIUM SERPL-MCNC: 1.7 MG/DL (ref 1.6–2.6)
MCH RBC QN AUTO: 29 PG (ref 27–31)
MCHC RBC AUTO-ENTMCNC: 31.6 G/DL (ref 33–36)
MCV RBC AUTO: 91.7 FL (ref 80–94)
MONOCYTES # BLD AUTO: 0.92 X10(3)/MCL (ref 0.1–1.3)
MONOCYTES NFR BLD AUTO: 10.3 %
NEUTROPHILS # BLD AUTO: 6.78 X10(3)/MCL (ref 2.1–9.2)
NEUTROPHILS NFR BLD AUTO: 76 %
PLATELET # BLD AUTO: 205 X10(3)/MCL (ref 130–400)
PMV BLD AUTO: 10.8 FL (ref 7.4–10.4)
POTASSIUM SERPL-SCNC: 4.1 MMOL/L (ref 3.5–5.1)
PROT SERPL-MCNC: 5.9 GM/DL (ref 6.4–8.3)
RBC # BLD AUTO: 3.73 X10(6)/MCL (ref 4.7–6.1)
SODIUM SERPL-SCNC: 138 MMOL/L (ref 136–145)
WBC # BLD AUTO: 8.92 X10(3)/MCL (ref 4.5–11.5)

## 2024-12-04 PROCEDURE — 96367 TX/PROPH/DG ADDL SEQ IV INF: CPT

## 2024-12-04 PROCEDURE — 86301 IMMUNOASSAY TUMOR CA 19-9: CPT

## 2024-12-04 PROCEDURE — 99999 PR PBB SHADOW E&M-EST. PATIENT-LVL V: CPT | Mod: PBBFAC,,,

## 2024-12-04 PROCEDURE — 85025 COMPLETE CBC W/AUTO DIFF WBC: CPT

## 2024-12-04 PROCEDURE — 96413 CHEMO IV INFUSION 1 HR: CPT

## 2024-12-04 PROCEDURE — 25000003 PHARM REV CODE 250

## 2024-12-04 PROCEDURE — 63600175 PHARM REV CODE 636 W HCPCS

## 2024-12-04 PROCEDURE — 96375 TX/PRO/DX INJ NEW DRUG ADDON: CPT

## 2024-12-04 PROCEDURE — 83735 ASSAY OF MAGNESIUM: CPT

## 2024-12-04 PROCEDURE — 80053 COMPREHEN METABOLIC PANEL: CPT

## 2024-12-04 PROCEDURE — 96368 THER/DIAG CONCURRENT INF: CPT

## 2024-12-04 PROCEDURE — A4216 STERILE WATER/SALINE, 10 ML: HCPCS

## 2024-12-04 PROCEDURE — 36415 COLL VENOUS BLD VENIPUNCTURE: CPT

## 2024-12-04 PROCEDURE — 96417 CHEMO IV INFUS EACH ADDL SEQ: CPT

## 2024-12-04 RX ORDER — SODIUM CHLORIDE 0.9 % (FLUSH) 0.9 %
10 SYRINGE (ML) INJECTION
Status: CANCELLED | OUTPATIENT
Start: 2024-12-04

## 2024-12-04 RX ORDER — EPINEPHRINE 0.3 MG/.3ML
0.3 INJECTION SUBCUTANEOUS ONCE AS NEEDED
Status: DISCONTINUED | OUTPATIENT
Start: 2024-12-04 | End: 2024-12-04 | Stop reason: HOSPADM

## 2024-12-04 RX ORDER — DEXAMETHASONE 4 MG/1
TABLET ORAL
COMMUNITY
Start: 2024-11-26

## 2024-12-04 RX ORDER — PROCHLORPERAZINE EDISYLATE 5 MG/ML
10 INJECTION INTRAMUSCULAR; INTRAVENOUS ONCE AS NEEDED
Status: DISCONTINUED | OUTPATIENT
Start: 2024-12-04 | End: 2024-12-04 | Stop reason: HOSPADM

## 2024-12-04 RX ORDER — SODIUM CHLORIDE 0.9 % (FLUSH) 0.9 %
10 SYRINGE (ML) INJECTION
Status: DISCONTINUED | OUTPATIENT
Start: 2024-12-04 | End: 2024-12-04 | Stop reason: HOSPADM

## 2024-12-04 RX ORDER — LACTULOSE 10 G/15ML
10 SOLUTION ORAL 2 TIMES DAILY
Qty: 900 ML | Refills: 0 | Status: SHIPPED | OUTPATIENT
Start: 2024-12-04 | End: 2025-01-03

## 2024-12-04 RX ORDER — HEPARIN 100 UNIT/ML
500 SYRINGE INTRAVENOUS
Status: DISCONTINUED | OUTPATIENT
Start: 2024-12-04 | End: 2024-12-04 | Stop reason: HOSPADM

## 2024-12-04 RX ORDER — PROCHLORPERAZINE EDISYLATE 5 MG/ML
10 INJECTION INTRAMUSCULAR; INTRAVENOUS ONCE AS NEEDED
Status: CANCELLED
Start: 2024-12-04

## 2024-12-04 RX ORDER — DIPHENHYDRAMINE HYDROCHLORIDE 50 MG/ML
50 INJECTION INTRAMUSCULAR; INTRAVENOUS ONCE AS NEEDED
Status: DISCONTINUED | OUTPATIENT
Start: 2024-12-04 | End: 2024-12-04 | Stop reason: HOSPADM

## 2024-12-04 RX ORDER — HEPARIN 100 UNIT/ML
500 SYRINGE INTRAVENOUS
Status: CANCELLED | OUTPATIENT
Start: 2024-12-04

## 2024-12-04 RX ORDER — EPINEPHRINE 0.3 MG/.3ML
0.3 INJECTION SUBCUTANEOUS ONCE AS NEEDED
Status: CANCELLED | OUTPATIENT
Start: 2024-12-04

## 2024-12-04 RX ORDER — DIPHENHYDRAMINE HYDROCHLORIDE 50 MG/ML
50 INJECTION INTRAMUSCULAR; INTRAVENOUS ONCE AS NEEDED
Status: CANCELLED | OUTPATIENT
Start: 2024-12-04

## 2024-12-04 RX ADMIN — CISPLATIN 60 MG: 1 INJECTION, SOLUTION INTRAVENOUS at 10:12

## 2024-12-04 RX ADMIN — HEPARIN SODIUM (PORCINE) LOCK FLUSH IV SOLN 100 UNIT/ML 500 UNITS: 100 SOLUTION at 11:12

## 2024-12-04 RX ADMIN — APREPITANT 130 MG: 130 INJECTION, EMULSION INTRAVENOUS at 10:12

## 2024-12-04 RX ADMIN — SODIUM CHLORIDE 500 ML: 9 INJECTION, SOLUTION INTRAVENOUS at 10:12

## 2024-12-04 RX ADMIN — Medication 10 ML: at 11:12

## 2024-12-04 RX ADMIN — POTASSIUM CHLORIDE 500 ML/HR: 2 INJECTION, SOLUTION, CONCENTRATE INTRAVENOUS at 09:12

## 2024-12-04 RX ADMIN — GEMCITABINE HYDROCHLORIDE 1800 MG: 38 INJECTION, SOLUTION INTRAVENOUS at 11:12

## 2024-12-04 RX ADMIN — DEXAMETHASONE SODIUM PHOSPHATE 0.25 MG: 10 INJECTION, SOLUTION INTRAMUSCULAR; INTRAVENOUS at 09:12

## 2024-12-04 NOTE — PROGRESS NOTES
HEMATOLOGY/ONCOLOGY OFFICE CLINIC VISIT    Visit Information:    Initial Evaluation: 9/3/2024  Referring Provider: Alba Chisholm MD PhD ([facility]) -Cell [phone]  Other providers:  Code status: Not addressed    Diagnosis:  Advanced pancreatic ductal adenocarcinoma (Dx 8/2022)     Present treatment:  Gemzar monotherapy 8/28/2024  -Gemcitabine and CISplatin Every 2 Weeks (8/28/2024-present)--planned  Chemotherapy summary: CISplatin (PLATINOL) 53 mg in sodium chloride 0.9% (NS) 250 mL IVPB, intravenous, 0 of 12 cycles  gemcitabine (GEMZAR) 836 mg in sodium chloride 0.9% (NS) 250 mL IVPB, intravenous, 0 of 12 cycles   - Eliquis 10 mg BID x 7 days then 5 mg BID- started 10/29/24--present    Treatment/Oncology history:  -8/2/2022 ERCP with metal biliary stent placement   -Folfirinox (10/2022- 2/2023)  -capecitabine RT (4/3/23 - 5/16/23)   -Phase I study 2021-1176 SD-101 at [facility]--SD-101 2 mg in sodium chloride 0.9% (NS) 30 mL IVPB, intravenous x 2 cycles (8/22/2023-11/22/2023)  -Gemcitabine and paclitaxel protein bound (12/13/2023-- 7/2024)-->clinical progression and GOO  -s/p biliary stent placement 4/4/2024  -chemotherapy with gemcitabine/paclitaxel (last dose 6/26/2024)  -duodenal stent (placed by GI)- 8/7/2024  -Gemcitabine and CISplatin Every 2 Weeks (8/28/2024-present)      Goal of care: life prolongation    Imaging:  CT CAP 8/21/2022: Since 7/25/2022, the primary pancreatic head mass encasing the common hepatic artery remains stable. The intrahepatic biliary obstruction improved with interval placement of a CBD stent. No definite distant metastasis in the abdomen or pelvis. Small indeterminate left lower lobe pulmonary nodule can be followed.  CT CAP 12/12/2022:  Primary pancreatic head tumor encasing the common hepatic artery is overall unchanged in size since 8/21/2022. Stable upstream pancreatic duct dilation and pancreatic atrophy. Interval placement of a cholecystostomy tube, with decompression of the  gallbladder. No definite evidence of distant metastatic disease in the chest, abdomen, and pelvis.   CT CAP 3/7/2023: Locally advanced pancreatic head tumor is slightly less conspicuous. Stable portacaval lymphadenopathy. Geographic hypodense regions have significantly increased throughout the liver compatible with fatty liver, to be correlated clinically regarding any potential concomitant hepatotoxicity; this appearance could obscure small low contrast liver lesions, to be better evaluated with MRI, if indicated. Unchanged mild jennifer appearance of the omentum is favored to be postinflammatory. No definite distant metastatic disease within the chest abdomen pelvis.   Ct CAP 6/29/2023:1.  Locally advanced pancreatic head mass not significantly changed. 2.  Increased periportal haziness and central liver heterogeneity which may relate to radiation.   3.  Indeterminate inferior right hepatic hypodensity as described above. Punctate left hepatic hypodensity also not previously evident, too small to characterize. Consider further evaluation with MRI as indicated.    MRI AP 7/15/2023:Primary neoplasm is noted within the pancreatic head and causes pancreatic ductal dilation and biliary ductal obstruction, which is decompressed via a stent. Nonspecific focal dilation of segment IV bile duct is noted.    CT CAP 10/2/2023: 1.  Compared to 07/28/2023, interval embolization of SMV tributaries in the epigastric region with new embolization coils in the right hepatic lobe following transhepatic catheterization. 2.  Previously noted small indeterminate low-attenuation lesion in the inferior right hepatic lobe segment 6 is no longer visualized. No new hepatic lesions.    3.  Stable 2.2 cm ill-defined residual pancreatic head tumor with no change in the vascular contact with the main portal vein, common hepatic and proper hepatic artery. 4.  No new metastatic disease in the chest or abdomen.   CT CAP 1/9/2024: 1.  Mild interval  increase in size of the primary tumor in the head of the pancreas. 2.  No evidence of metastatic disease in the chest, abdomen or pelvis.   CT AP 4/3/2024: 1.  There is worsening intrahepatic biliary dilation despite the presence of a biliary stent, suggesting stent occlusion. 2.  No significant change in the locally advanced tumor of the head of the pancreas since 03/12/2024.   CT CAP 6/21/2024: 1. No convincing change in the large locally advanced infiltrating ill-defined mass in the head and neck of the pancreas compared with 5/14/2024. 2. No specific evidence of distant metastatic disease in the chest, abdomen or pelvis.   XR ABDOMEN 1 VW PORTABLE 8/6/2024:  There is a gastric drainage tube with the tip below the diaphragm projecting over the gastric body with the sidehole below the GE junction in appropriate position. Gastric drainage tube with tip and sidehole projecting below the diaphragm over the gastric body.  X-ray Abdomen AP  8/5/2024: Air is seen scattered throughout normal caliber colon in a nonobstructive pattern. No dilated loops of bowel. Moderate colonic stool burden. No intraperitoneal free air within the limitations of this study. A biliary stent and embolization coils overlie the right upper quadrant. No suspicious osseous lesions.   Nonobstructive bowel gas pattern. I personally reviewed these image(s) along with the resident's/fellow's interpretations, certify that if a procedure was performed I was physically present, and agree with the final report.  CT Abdomen Pelvis with Contrast 8/5/2024: No suspicious pulmonary nodules in the lung bases. Hepatobiliary: Evaluation of the liver is somewhat limited secondary to streak artifact from the embolization coils. Within these limits there is no suspicious hepatic lesion. The gallbladder is decompressed and poorly evaluated. Common bile duct stent with expected pneumobilia. Mild intrahepatic biliary ductal dilatation is not significantly changed.  Spleen: No splenomegaly. Pancreas: Ill-defined infiltrating mass of the pancreatic head and neck, in keeping with pancreatic adenocarcinoma. There is atrophy and ductal dilatation of the pancreatic body and tail. Overall this tumor appears larger Adrenal Glands: No mass. Kidneys, Ureters, Bladder: No hydronephrosis. No suspicious renal lesion. No bladder mass. Gastrointestinal Tract: The stomach is distended with fluid and debris, suggesting there may be a degree of gastric outlet obstruction secondary to the pancreatic tumor. This is not significantly changed. There is no downstream obstruction. Pelvic Organs: No pelvic mass. Prostatomegaly. Peritoneum/Retroperitoneum: No ascites. Lymph Nodes: No lymphadenopathy. Musculoskeletal: No suspicious skeletal lesion.     No acute abdominopelvic abnormality. Attending addendum: The pancreatic tumor appears larger compared to 06/21/2024. There is gastric distention with debris suggesting chronic outlet obstruction ACTIONABLE ITEMS/RECOMMENDATIONS*: None. *An Actionable Finding is a finding that may be unrelated to the original reason for imaging but potentially actionable, meaning further investigation may be necessary. The Actionable Findings Vigilance Unit (AFVU) assists medical providers with responding to additional radiologic findings that are unexpected and potentially actionable. I personally reviewed these image(s) along with the resident's/fellow's interpretations, certify that if a procedure was performed I was physically present, and agree with the final report.   CT CAP 10/24/24:  1. Ill-defined soft tissue fullness at the a hay hepatis and pancreatic head and uncinate process fabella bases history of pancreatic adenocarcinoma.  A few blebs of gas are identified which may represent gas within the common bile duct.  2. Intrahepatic biliary ductal dilatation identified with nonvisualization of the gallbladder  3. Beam hardening artifact due to embolization coils  at the liver  4. Small 10 x 6 filling defect at the portal vein suggesting a small thrombus  5. Diffuse hazy mucosal prominence at the descending and ascending colon.  This may be merely due to underdistention.  A underlying colitis cannot be excluded  6. Findings of constipation with contrast and fluid distended loops of small bowel diffusely throughout suggesting a mild ileus  7. Prostatomegaly with calcifications  8. Findings and other details as above  9. Comparison to previous exam would allow further evaluation.  MRI Brain 11/4/24:  1. Tiny (subcentimeter), occasional focal areas of increased signal intensity (on the FLAIR sequences) are noted within the deep white matter and gray-white matter junctions of both cerebral hemispheres (these are only visualized on the FLAIR sequence with no contrast enhancement noted on postcontrast images). I suspect the changes represent chronic ischemic changes, however, follow-up imaging in 3-6 months to ensure stability may be helpful in excluding the very remote possibility of tiny metastases if felt be clinically indicated.     Pathology:  8/2/2022:  FNA head pancreas: SCANT MALIGNANT CELLS, FAVOR ADENOCARCINOMA  NGS:  No detectable genomic aberrations      CLINICAL HISTORY:       Patient: Warren P Lejeune is a 59 y.o. male.with a past medical history of past medical history of pancreatic ductal adenocarcinoma (Dx 8/2022)   Patient initially presented with  unintentional weight loss starting in 1/2022. He also had worsening back/shoulder and abdominal pain during this time. In July he finally presented to his PCP with darkening of the urine and lightening of the stool - with Jaundice that his wife noticed. Labs demonstrating cholestasis (bilirubin of 19).    Patient noted to have an abnormal CT scan after developing obstructive jaundice. 2 cm hypoenhancing mass noted in the head of the pancreas with intra and extrahepatic biliary ductal dilatation. Mass measured 2.5 x 2.1  "cm. There is also atrophy of the distal gland and upstream dilatation of the pancreas duct. There is no obvious vascular invasion. There were no focal liver lesions.    8/2/22- EGD/EUS/FNA. Final pathology showed ductal adenocarcinoma, moderately differentiated. ERCP on the same date performed, with placement of a fully covered metal biliary stent, 60 mm x 10 mm.    7/27/22- CA 19-9 was 681    8/21/22- CT CAP: Since 7/25/2022, the primary pancreatic head mass encasing the common hepatic artery remains stable. The intrahepatic biliary obstruction improved with interval placement of a CBD stent. No definite distant metastasis in the abdomen or pelvis. Small indeterminate left lower lobe pulmonary nodule can be followed.    09/03/2022 Germline genetic testing: Negative for germline pathogenic variants in any of the analyzed genes, Genes Analyzed: APC, DALE, BMPR1A, BRCA1, BRCA2, CDKN2A, EPCAM, MEN1, MLH1, MSH2, MSH6, NF1, PALB2, PMS2, SMAD4, STK11, TP53, TSC1, TSC2, and VHL. Genetic testing performed by [facility]; full report available in scanned documents.     s/p biliary stent placement, currently undergoing chemotherapy with gemcitabine/paclitaxel (last dose 6/26/2024), T2DM on insulin presenting for abdominal pain and constipation. CT A/P with distension in stomach suggesting gastric outlet obstruction secondary to pancreatic tumor. GI and Surg Onc consulted, pending evaluation. Poor PO tolerance, deferring NG tube now as vomiting resolves and having bowel movements.     Patient was recently admitted to the [facility]--Hospital Course:  "Findings on imaging were concerning for malignant gastric outlet obstruction from pancreatic cancer. After a discussion with surgical oncology, GI and GIMO, decision was made to proceed with duodenal stent (placed by GI). No indications for gastrojejunostomy bypass. Patient tolerated procedure well. We were able to advance to low fiber diet. Nutrition was consulted for " "low fiber diet education. Managed neoplasm related pain with PO morphine."     Patient is here today with his wife to continue chemotherapy close to home.  He is doing relatively well and voices no concerns.  MediPort was removed from his left chest wall to exposure of the MediPort.  Patient reports abdominal mid back pain, occasional muscle spasm and dizziness.  No fever, chills, sweats.  No chest pain or short of breath.    Chief Complaint: Adenocarcinoma of pancreas (Pt is c/o bilateral finger tips and knuckle dryness and cracking. He has been moisturizing 5-6 x a day and reports no improvement. Pt continues with trouble swallowing. )      Interval History:  Patient returns to clinic for treatment clearance for C6 of Hodgeman/Cisplatin q2w.  He continues with abdominal and lower back pain, he takes Percocet and MS cont for this. He is taking Miralax and laxative for opioid induced constipation, and Pregabalin 100 mg QD for bilateral hand and feet neuropathy. He has an appointment with GI @ [facility] on 1/8/2025 to replace duodenal stent. Labs and scans reviewed with patient in detail.He does report trouble swallowing and feeling as though food is getting stuck, scheduled for EGD 11/21/2024. He missed EGD due to ER visit but will call to reschedule.      Past Medical History:   Diagnosis Date    Abdominal pain     Admission for chemotherapy     last chemo 9/12/24    Anxiety disorder, unspecified     Back pain     Bradycardia     Enlarged prostate     GERD (gastroesophageal reflux disease)     Hypertension     no cardiologist; no longer on meds since weight loss due to pancreatic cancer    Memory loss     Pancreatic cancer     Peripheral neuropathy     Type 2 diabetes mellitus with unspecified diabetic retinopathy without macular edema       Past Surgical History:   Procedure Laterality Date    bile duct stents times 2      EGD, WITH STENT INSERTION      INSERTION OF TUNNELED CENTRAL VENOUS CATHETER (CVC) WITH SUBCUTANEOUS " PORT Right 9/20/2024    Procedure: [id number];  Surgeon: Moe Champagne MD;  Location: [facility];  Service: General;  Laterality: Right;    MEDIPORT REMOVAL      times 2     Family History   Problem Relation Name Age of Onset    Hypertension Mother      Stroke Father      Stroke Sister      Prostate cancer Brother            Review of patient's allergies indicates:  No Known Allergies   Current Outpatient Medications on File Prior to Visit   Medication Sig Dispense Refill    aluminum & magnesium hydroxide-simethicone (MYLANTA MAX STRENGTH) 400-400-40 mg/5 mL suspension Take 5 mLs by mouth 4 (four) times daily as needed (mouth sores). 335 mL 3    apixaban (ELIQUIS) 5 mg Tab Take 1 tablet (5 mg total) by mouth 2 (two) times daily. Take 2 tablets by mouth (10 mg) twice a day for 7 days, then 1 tablet (5 mg total) by mouth twice a day thereafter 74 tablet 3    cholecalciferol, vitamin D3, (VITAMIN D3) 50 mcg (2,000 unit) Tab Take 2,000 Units by mouth once daily.      dexAMETHasone (DECADRON) 4 MG Tab TAKE 2 TABLETS BY MOUTH ONCE DAILY AS DIRECTED ON DAYS 2 AND 3 OF CHEMOTHERAPY CYCLE      diphenhydrAMINE (BENADRYL) 12.5 mg/5 mL elixir Take 5 mLs (12.5 mg total) by mouth 4 (four) times daily as needed (mouth sores). 236 mL 3    diphenoxylate-atropine 2.5-0.025 mg (LOMOTIL) 2.5-0.025 mg per tablet Take 1 tablet by mouth 4 (four) times daily as needed for Diarrhea. 60 tablet 1    insulin aspart U-100 (NOVOLOG) 100 unit/mL injection Inject into the skin 3 (three) times daily before meals. prn      lactulose (CHRONULAC) 20 gram/30 mL Soln Take 15 mLs (10 g total) by mouth 2 (two) times daily. 900 mL 0    LIDOcaine viscous HCl 2% (LIDOCAINE VISCOUS) 2 % Soln by Mucous Membrane route 4 (four) times daily as needed (mouth sores). Swish and spit 15 ml (5 ml benadryl, 5 ml mylanta, 5 ml lidocaine) by mouth 4 times daily as needed for mouth sores 100 mL 3    magnesium 250 mg Tab Take 1 tablet by mouth once daily.       morphine (MS CONTIN) 15 MG 12 hr tablet Take 1 tablet (15 mg total) by mouth 2 (two) times daily. 30 tablet 0    multivitamin with folic acid 400 mcg Tab Take 1 tablet by mouth once daily.      ondansetron (ZOFRAN) 8 MG tablet Take 8 mg by mouth.      oxyCODONE-acetaminophen (PERCOCET)  mg per tablet Take 1 tablet by mouth every 6 (six) hours as needed for Pain. 30 tablet 0    pantoprazole (PROTONIX) 40 MG tablet Take 1 tablet (40 mg total) by mouth once daily. 30 tablet 2    polyethylene glycol (GLYCOLAX) 17 gram PwPk Take 17 g by mouth.      pregabalin (LYRICA) 100 MG capsule Take 100 mg by mouth.      prochlorperazine (COMPAZINE) 10 MG tablet Take 10 mg by mouth.      tamsulosin (FLOMAX) 0.4 mg Cap Take 1 capsule (0.4 mg total) by mouth once daily. 30 capsule 0    TRESIBA FLEXTOUCH U-200 200 unit/mL (3 mL) insulin pen Inject 14 Units into the skin.      vibegron (GEMTESA) 75 mg Tab Take 75 mg by mouth.      OLANZapine (ZYPREXA) 5 MG tablet Take 1 tablet (5 mg total) by mouth nightly. 4 tablet 6     No current facility-administered medications on file prior to visit.      Review of Systems   Constitutional:  Positive for appetite change and fatigue. Negative for activity change, chills, diaphoresis, fever and unexpected weight change.   HENT:  Positive for trouble swallowing. Negative for nasal congestion, mouth sores, nosebleeds, sinus pressure/congestion and sore throat.    Eyes: Negative.    Respiratory:  Positive for shortness of breath. Negative for cough.    Cardiovascular:  Negative for chest pain and palpitations.   Gastrointestinal:  Positive for constipation. Negative for abdominal distention, abdominal pain, blood in stool, change in bowel habit, diarrhea, nausea and vomiting.   Endocrine: Negative.    Genitourinary:  Negative for bladder incontinence, decreased urine volume, difficulty urinating, dysuria, frequency, hematuria and urgency.   Musculoskeletal:  Positive for back pain. Negative for  "arthralgias, gait problem, joint swelling, leg pain and myalgias.   Integumentary:  Negative for rash.   Allergic/Immunologic: Negative.  Negative for frequent infections.   Neurological:  Positive for weakness. Negative for dizziness, tremors, syncope, light-headedness, numbness, headaches and memory loss.   Hematological:  Negative for adenopathy. Does not bruise/bleed easily.   Psychiatric/Behavioral:  Negative for agitation, confusion, hallucinations, sleep disturbance and suicidal ideas. The patient is not nervous/anxious.               Vitals:    12/04/24 0849   BP: 117/74   BP Location: Left arm   Patient Position: Sitting   Pulse: (!) 49   Resp: 16   Temp: 97.8 °F (36.6 °C)   TempSrc: Oral   SpO2: 100%   Weight: 64.5 kg (142 lb 3.2 oz)   Height: 5' 7.01" (1.702 m)        Wt Readings from Last 6 Encounters:   12/04/24 64.5 kg (142 lb 3.2 oz)   11/21/24 63.5 kg (140 lb)   11/20/24 65.1 kg (143 lb 9.6 oz)   11/19/24 62.9 kg (138 lb 9.6 oz)   11/19/24 62.9 kg (138 lb 9.6 oz)   11/07/24 66.4 kg (146 lb 6.2 oz)     Body mass index is 22.27 kg/m².  Body surface area is 1.75 meters squared.  Physical Exam  Vitals and nursing note reviewed.   Constitutional:       General: He is not in acute distress.     Comments: thin   HENT:      Head: Normocephalic and atraumatic.      Mouth/Throat:      Mouth: Mucous membranes are moist.   Eyes:      General: No scleral icterus.     Extraocular Movements: Extraocular movements intact.      Conjunctiva/sclera: Conjunctivae normal.   Neck:      Vascular: No JVD.   Cardiovascular:      Rate and Rhythm: Normal rate and regular rhythm.      Heart sounds: No murmur heard.  Pulmonary:      Effort: Pulmonary effort is normal.      Breath sounds: Normal breath sounds. No wheezing or rhonchi.   Chest:      Comments: Left chest wall scar from previous Mediport  Abdominal:      General: Bowel sounds are normal. There is no distension.      Palpations: Abdomen is soft.      Tenderness: There " is no abdominal tenderness.   Musculoskeletal:         General: No swelling or deformity.      Cervical back: Neck supple.   Lymphadenopathy:      Head:      Right side of head: No submandibular adenopathy.      Left side of head: No submandibular adenopathy.      Cervical: No cervical adenopathy.      Upper Body:      Right upper body: No supraclavicular or axillary adenopathy.      Left upper body: No supraclavicular or axillary adenopathy.      Lower Body: No right inguinal adenopathy. No left inguinal adenopathy.   Skin:     General: Skin is warm.      Coloration: Skin is not jaundiced.      Findings: No rash.   Neurological:      General: No focal deficit present.      Mental Status: He is alert and oriented to person, place, and time.      Cranial Nerves: Cranial nerves 2-12 are intact.   Psychiatric:         Attention and Perception: Attention normal.         Behavior: Behavior is cooperative.       ECOG SCORE             Laboratory:  CBC with Differential:  Lab Results   Component Value Date    WBC 8.92 12/04/2024    RBC 3.73 (L) 12/04/2024    HGB 10.8 (L) 12/04/2024    HCT 34.2 (L) 12/04/2024    MCV 91.7 12/04/2024    MCH 29.0 12/04/2024    MCHC 31.6 (L) 12/04/2024    RDW 16.7 12/04/2024     12/04/2024    MPV 10.8 (H) 12/04/2024        CMP:  Sodium   Date Value Ref Range Status   12/04/2024 138 136 - 145 mmol/L Final     Potassium   Date Value Ref Range Status   12/04/2024 4.1 3.5 - 5.1 mmol/L Final     Chloride   Date Value Ref Range Status   12/04/2024 104 98 - 107 mmol/L Final     CO2   Date Value Ref Range Status   12/04/2024 27 22 - 29 mmol/L Final     Blood Urea Nitrogen   Date Value Ref Range Status   12/04/2024 4.0 (L) 8.4 - 25.7 mg/dL Final   10/20/2023 12 6 - 23 mg/dL Final     Creatinine   Date Value Ref Range Status   12/04/2024 0.79 0.72 - 1.25 mg/dL Final   10/20/2023 0.95 0.67 - 1.17 mg/dL Final     Calcium   Date Value Ref Range Status   12/04/2024 9.0 8.4 - 10.2 mg/dL Final    10/20/2023 8.7 8.4 - 10.2 mg/dL Final     Albumin   Date Value Ref Range Status   12/04/2024 2.9 (L) 3.5 - 5.0 g/dL Final     Bilirubin Total   Date Value Ref Range Status   12/04/2024 0.3 <=1.5 mg/dL Final     ALP   Date Value Ref Range Status   12/04/2024 571 (H) 40 - 150 unit/L Final     AST   Date Value Ref Range Status   12/04/2024 46 (H) 5 - 34 unit/L Final     ALT   Date Value Ref Range Status   12/04/2024 47 0 - 55 unit/L Final      Component 08/28/24 07/17/24 06/26/24 06/21/24 06/12/24 05/29/24   CA 19-9 2,385.0 High  2,150.0 High  1,567.0 High  1,183.0 High  1,000.0 High  959.9 High       Latest Reference Range & Units 09/24/24 08:52 10/23/24 07:57   CA 19-9 0.00 - 37.00 unit/mL 6,202.93 (H) 21,137.60 (H)   (H): Data is abnormally high       Assessment:       No diagnosis found.            1) Locally advanced pancreatic cancer   ---8/2/2022 ERCP with metal biliary stent placement   ---Folfirinox (10/2022- 2/2023)  ---capecitabine RT (4/3/23 - 5/16/23)   ---Phase I study 2021-1176 SD-101 at [facility]--SD-101 2 mg in sodium chloride 0.9% (NS) 30 mL IVPB, intravenous x 2 cycles (8/22/2023-11/22/2023)  ---Gemcitabine and paclitaxel protein bound (12/13/2023-- 7/2024)-->clinical progression and GOO  ---s/p biliary stent placement 4/4/2024  ---chemotherapy with gemcitabine/paclitaxel (last dose 6/26/2024)  ---duodenal stent (placed by GI)- 8/7/2024  ---Gemcitabine and CISplatin Every 2 Weeks (8/28/2024-present)    2) H/o Gastric outlet obstruction  ---s/p duodenal stent, severe protein calori malnutrition     3) Pain, neoplasm related pain  ---continue Percocet, Lyrica, Morphine   ---patient lost Percocet.  Instructed to continue to take morphine as prescribed.      4) elevated ALP  --have improved significantly          Plan:       Patient with unresectable advanced pancreatic cancer to start 4th line treatment with cisplatin and Gemzar.     Labs and exam stable  Toxicity reviewed, okay to proceed with C6  Gemzar/Cisplatin 11/6/24  Stimufend on Day 2   Udderly smooth extra care for dry hands   Continue Eliquis 5 mg BID for thrombus  Repeat MRI Brain in 3 months, due 2/2025  Continue Magic Mouthwash for esophagitis   Continue morphine and percocet for pain, refill sent for percocet  CT C/A/P every 3 months, next due 1/2025  RTC 2 week with NP with labs/C7  CBC, CMP,  Mg, CA 19 9 - 1 hr prior @ [facility]    The patient was seen, interviewed and examined. Pertinent lab and radiology studies were reviewed.   The patient was given ample opportunity to ask questions, and to the best of my abilities, all questions answered to satisfaction; patient demonstrated understanding of what we discussed and agreeable to the plan. Pt instructed to call should develop concerning signs/symptoms or have further questions.       Terri Marcelino, FNP-C  Oncology/Hematology  [facility]

## 2024-12-05 ENCOUNTER — INFUSION (OUTPATIENT)
Dept: INFUSION THERAPY | Facility: HOSPITAL | Age: 59
End: 2024-12-05
Attending: INTERNAL MEDICINE
Payer: MEDICARE

## 2024-12-05 VITALS
HEART RATE: 61 BPM | SYSTOLIC BLOOD PRESSURE: 113 MMHG | DIASTOLIC BLOOD PRESSURE: 67 MMHG | TEMPERATURE: 98 F | RESPIRATION RATE: 16 BRPM

## 2024-12-05 DIAGNOSIS — C25.9 ADENOCARCINOMA OF PANCREAS: Primary | ICD-10-CM

## 2024-12-05 PROCEDURE — 63600175 PHARM REV CODE 636 W HCPCS: Mod: JZ,JG

## 2024-12-05 PROCEDURE — 96372 THER/PROPH/DIAG INJ SC/IM: CPT

## 2024-12-05 RX ADMIN — PEGFILGRASTIM-JMDB 6 MG: 6 INJECTION SUBCUTANEOUS at 12:12

## 2024-12-18 ENCOUNTER — LAB VISIT (OUTPATIENT)
Dept: LAB | Facility: HOSPITAL | Age: 59
End: 2024-12-18
Attending: INTERNAL MEDICINE
Payer: MEDICARE

## 2024-12-18 ENCOUNTER — OFFICE VISIT (OUTPATIENT)
Dept: HEMATOLOGY/ONCOLOGY | Facility: CLINIC | Age: 59
End: 2024-12-18
Payer: MEDICARE

## 2024-12-18 VITALS
OXYGEN SATURATION: 100 % | RESPIRATION RATE: 18 BRPM | TEMPERATURE: 98 F | DIASTOLIC BLOOD PRESSURE: 73 MMHG | BODY MASS INDEX: 23.8 KG/M2 | HEIGHT: 67 IN | WEIGHT: 151.63 LBS | HEART RATE: 65 BPM | SYSTOLIC BLOOD PRESSURE: 109 MMHG

## 2024-12-18 DIAGNOSIS — C25.9 ADENOCARCINOMA OF PANCREAS: ICD-10-CM

## 2024-12-18 DIAGNOSIS — M79.89 SWELLING OF BOTH LOWER EXTREMITIES: ICD-10-CM

## 2024-12-18 DIAGNOSIS — R74.8 ELEVATED ALKALINE PHOSPHATASE LEVEL: ICD-10-CM

## 2024-12-18 DIAGNOSIS — T45.1X5A CHEMOTHERAPY-INDUCED PERIPHERAL NEUROPATHY: ICD-10-CM

## 2024-12-18 DIAGNOSIS — G62.9 NEUROPATHY: ICD-10-CM

## 2024-12-18 DIAGNOSIS — E83.42 HYPOMAGNESEMIA: ICD-10-CM

## 2024-12-18 DIAGNOSIS — R97.8 ELEVATED CA 19-9 LEVEL: ICD-10-CM

## 2024-12-18 DIAGNOSIS — D84.821 IMMUNODEFICIENCY DUE TO CHEMOTHERAPY: ICD-10-CM

## 2024-12-18 DIAGNOSIS — T45.1X5A IMMUNODEFICIENCY DUE TO CHEMOTHERAPY: ICD-10-CM

## 2024-12-18 DIAGNOSIS — K59.00 CONSTIPATION, UNSPECIFIED CONSTIPATION TYPE: ICD-10-CM

## 2024-12-18 DIAGNOSIS — G62.0 CHEMOTHERAPY-INDUCED PERIPHERAL NEUROPATHY: ICD-10-CM

## 2024-12-18 DIAGNOSIS — C25.9 ADENOCARCINOMA OF PANCREAS: Primary | ICD-10-CM

## 2024-12-18 DIAGNOSIS — G89.3 CANCER ASSOCIATED PAIN: ICD-10-CM

## 2024-12-18 DIAGNOSIS — Z79.899 ON ANTINEOPLASTIC CHEMOTHERAPY: ICD-10-CM

## 2024-12-18 DIAGNOSIS — Z79.899 IMMUNODEFICIENCY DUE TO CHEMOTHERAPY: ICD-10-CM

## 2024-12-18 LAB
ALBUMIN SERPL-MCNC: 2.7 G/DL (ref 3.5–5)
ALBUMIN/GLOB SERPL: 0.9 RATIO (ref 1.1–2)
ALP SERPL-CCNC: 350 UNIT/L (ref 40–150)
ALT SERPL-CCNC: 44 UNIT/L (ref 0–55)
ANION GAP SERPL CALC-SCNC: 6 MEQ/L
AST SERPL-CCNC: 39 UNIT/L (ref 5–34)
BASOPHILS # BLD AUTO: 0.05 X10(3)/MCL
BASOPHILS NFR BLD AUTO: 0.6 %
BILIRUB SERPL-MCNC: 0.3 MG/DL
BUN SERPL-MCNC: 5 MG/DL (ref 8.4–25.7)
CALCIUM SERPL-MCNC: 8.1 MG/DL (ref 8.4–10.2)
CANCER AG19-9 SERPL-ACNC: ABNORMAL UNIT/ML (ref 0–37)
CHLORIDE SERPL-SCNC: 105 MMOL/L (ref 98–107)
CO2 SERPL-SCNC: 28 MMOL/L (ref 22–29)
CREAT SERPL-MCNC: 0.82 MG/DL (ref 0.72–1.25)
CREAT/UREA NIT SERPL: 6
EOSINOPHIL # BLD AUTO: 0.02 X10(3)/MCL (ref 0–0.9)
EOSINOPHIL NFR BLD AUTO: 0.2 %
ERYTHROCYTE [DISTWIDTH] IN BLOOD BY AUTOMATED COUNT: 15.2 % (ref 11.5–17)
GFR SERPLBLD CREATININE-BSD FMLA CKD-EPI: >60 ML/MIN/1.73/M2
GLOBULIN SER-MCNC: 3.1 GM/DL (ref 2.4–3.5)
GLUCOSE SERPL-MCNC: 129 MG/DL (ref 74–100)
HCT VFR BLD AUTO: 32.8 % (ref 42–52)
HGB BLD-MCNC: 10.6 G/DL (ref 14–18)
IMM GRANULOCYTES # BLD AUTO: 0.06 X10(3)/MCL (ref 0–0.04)
IMM GRANULOCYTES NFR BLD AUTO: 0.7 %
LYMPHOCYTES # BLD AUTO: 1 X10(3)/MCL (ref 0.6–4.6)
LYMPHOCYTES NFR BLD AUTO: 11.4 %
MAGNESIUM SERPL-MCNC: 1.3 MG/DL (ref 1.6–2.6)
MCH RBC QN AUTO: 29.7 PG (ref 27–31)
MCHC RBC AUTO-ENTMCNC: 32.3 G/DL (ref 33–36)
MCV RBC AUTO: 91.9 FL (ref 80–94)
MONOCYTES # BLD AUTO: 0.8 X10(3)/MCL (ref 0.1–1.3)
MONOCYTES NFR BLD AUTO: 9.1 %
NEUTROPHILS # BLD AUTO: 6.85 X10(3)/MCL (ref 2.1–9.2)
NEUTROPHILS NFR BLD AUTO: 78 %
PLATELET # BLD AUTO: 233 X10(3)/MCL (ref 130–400)
PMV BLD AUTO: 10.2 FL (ref 7.4–10.4)
POTASSIUM SERPL-SCNC: 4.7 MMOL/L (ref 3.5–5.1)
PROT SERPL-MCNC: 5.8 GM/DL (ref 6.4–8.3)
RBC # BLD AUTO: 3.57 X10(6)/MCL (ref 4.7–6.1)
SODIUM SERPL-SCNC: 139 MMOL/L (ref 136–145)
WBC # BLD AUTO: 8.78 X10(3)/MCL (ref 4.5–11.5)

## 2024-12-18 PROCEDURE — 1159F MED LIST DOCD IN RCRD: CPT | Mod: CPTII,S$GLB,,

## 2024-12-18 PROCEDURE — 86301 IMMUNOASSAY TUMOR CA 19-9: CPT

## 2024-12-18 PROCEDURE — 1160F RVW MEDS BY RX/DR IN RCRD: CPT | Mod: CPTII,S$GLB,,

## 2024-12-18 PROCEDURE — 3008F BODY MASS INDEX DOCD: CPT | Mod: CPTII,S$GLB,,

## 2024-12-18 PROCEDURE — 83735 ASSAY OF MAGNESIUM: CPT

## 2024-12-18 PROCEDURE — 99215 OFFICE O/P EST HI 40 MIN: CPT | Mod: S$GLB,,,

## 2024-12-18 PROCEDURE — 99999 PR PBB SHADOW E&M-EST. PATIENT-LVL V: CPT | Mod: PBBFAC,,,

## 2024-12-18 PROCEDURE — 3078F DIAST BP <80 MM HG: CPT | Mod: CPTII,S$GLB,,

## 2024-12-18 PROCEDURE — 3074F SYST BP LT 130 MM HG: CPT | Mod: CPTII,S$GLB,,

## 2024-12-18 PROCEDURE — 85025 COMPLETE CBC W/AUTO DIFF WBC: CPT

## 2024-12-18 PROCEDURE — 80053 COMPREHEN METABOLIC PANEL: CPT

## 2024-12-18 PROCEDURE — 36415 COLL VENOUS BLD VENIPUNCTURE: CPT

## 2024-12-18 RX ORDER — PREGABALIN 100 MG/1
100 CAPSULE ORAL 2 TIMES DAILY
Qty: 60 CAPSULE | Refills: 6 | Status: SHIPPED | OUTPATIENT
Start: 2024-12-18 | End: 2025-07-16

## 2024-12-18 RX ORDER — FUROSEMIDE 20 MG/1
20 TABLET ORAL DAILY
Qty: 30 TABLET | Refills: 0 | Status: SHIPPED | OUTPATIENT
Start: 2024-12-18 | End: 2025-12-18

## 2024-12-18 RX ORDER — OXYCODONE AND ACETAMINOPHEN 10; 325 MG/1; MG/1
1 TABLET ORAL EVERY 6 HOURS PRN
Qty: 60 TABLET | Refills: 0 | Status: SHIPPED | OUTPATIENT
Start: 2024-12-18

## 2024-12-18 RX ORDER — LANOLIN ALCOHOL/MO/W.PET/CERES
400 CREAM (GRAM) TOPICAL DAILY
Qty: 90 TABLET | Refills: 2 | Status: SHIPPED | OUTPATIENT
Start: 2024-12-18 | End: 2025-12-18

## 2024-12-18 RX ORDER — PREGABALIN 100 MG/1
100 CAPSULE ORAL
OUTPATIENT
Start: 2024-12-18

## 2024-12-18 RX ORDER — OXYCODONE AND ACETAMINOPHEN 10; 325 MG/1; MG/1
1 TABLET ORAL EVERY 6 HOURS PRN
Qty: 30 TABLET | Refills: 0 | OUTPATIENT
Start: 2024-12-18

## 2024-12-18 NOTE — PROGRESS NOTES
HEMATOLOGY/ONCOLOGY OFFICE CLINIC VISIT    Visit Information:    Initial Evaluation: 9/3/2024  Referring Provider: Alba Chisholm MD PhD ([facility]) -Cell [phone]  Other providers:  Code status: Not addressed    Diagnosis:  Advanced pancreatic ductal adenocarcinoma (Dx 8/2022)     Present treatment:  Gemzar monotherapy 8/28/2024  -Gemcitabine and CISplatin Every 2 Weeks (8/28/2024-present)--planned  Chemotherapy summary: CISplatin (PLATINOL) 53 mg in sodium chloride 0.9% (NS) 250 mL IVPB, intravenous, 0 of 12 cycles  gemcitabine (GEMZAR) 836 mg in sodium chloride 0.9% (NS) 250 mL IVPB, intravenous, 0 of 12 cycles   - Eliquis 10 mg BID x 7 days then 5 mg BID- started 10/29/24--present    Treatment/Oncology history:  -8/2/2022 ERCP with metal biliary stent placement   -Folfirinox (10/2022- 2/2023)  -capecitabine RT (4/3/23 - 5/16/23)   -Phase I study 2021-1176 SD-101 at [facility]--SD-101 2 mg in sodium chloride 0.9% (NS) 30 mL IVPB, intravenous x 2 cycles (8/22/2023-11/22/2023)  -Gemcitabine and paclitaxel protein bound (12/13/2023-- 7/2024)-->clinical progression and GOO  -s/p biliary stent placement 4/4/2024  -chemotherapy with gemcitabine/paclitaxel (last dose 6/26/2024)  -duodenal stent (placed by GI)- 8/7/2024  -Gemcitabine and CISplatin Every 2 Weeks (8/28/2024-present)      Goal of care: life prolongation    Imaging:  CT CAP 8/21/2022: Since 7/25/2022, the primary pancreatic head mass encasing the common hepatic artery remains stable. The intrahepatic biliary obstruction improved with interval placement of a CBD stent. No definite distant metastasis in the abdomen or pelvis. Small indeterminate left lower lobe pulmonary nodule can be followed.  CT CAP 12/12/2022:  Primary pancreatic head tumor encasing the common hepatic artery is overall unchanged in size since 8/21/2022. Stable upstream pancreatic duct dilation and pancreatic atrophy. Interval placement of a cholecystostomy tube, with decompression of the  gallbladder. No definite evidence of distant metastatic disease in the chest, abdomen, and pelvis.   CT CAP 3/7/2023: Locally advanced pancreatic head tumor is slightly less conspicuous. Stable portacaval lymphadenopathy. Geographic hypodense regions have significantly increased throughout the liver compatible with fatty liver, to be correlated clinically regarding any potential concomitant hepatotoxicity; this appearance could obscure small low contrast liver lesions, to be better evaluated with MRI, if indicated. Unchanged mild jennifer appearance of the omentum is favored to be postinflammatory. No definite distant metastatic disease within the chest abdomen pelvis.   Ct CAP 6/29/2023:1.  Locally advanced pancreatic head mass not significantly changed. 2.  Increased periportal haziness and central liver heterogeneity which may relate to radiation.   3.  Indeterminate inferior right hepatic hypodensity as described above. Punctate left hepatic hypodensity also not previously evident, too small to characterize. Consider further evaluation with MRI as indicated.    MRI AP 7/15/2023:Primary neoplasm is noted within the pancreatic head and causes pancreatic ductal dilation and biliary ductal obstruction, which is decompressed via a stent. Nonspecific focal dilation of segment IV bile duct is noted.    CT CAP 10/2/2023: 1.  Compared to 07/28/2023, interval embolization of SMV tributaries in the epigastric region with new embolization coils in the right hepatic lobe following transhepatic catheterization. 2.  Previously noted small indeterminate low-attenuation lesion in the inferior right hepatic lobe segment 6 is no longer visualized. No new hepatic lesions.    3.  Stable 2.2 cm ill-defined residual pancreatic head tumor with no change in the vascular contact with the main portal vein, common hepatic and proper hepatic artery. 4.  No new metastatic disease in the chest or abdomen.   CT CAP 1/9/2024: 1.  Mild interval  increase in size of the primary tumor in the head of the pancreas. 2.  No evidence of metastatic disease in the chest, abdomen or pelvis.   CT AP 4/3/2024: 1.  There is worsening intrahepatic biliary dilation despite the presence of a biliary stent, suggesting stent occlusion. 2.  No significant change in the locally advanced tumor of the head of the pancreas since 03/12/2024.   CT CAP 6/21/2024: 1. No convincing change in the large locally advanced infiltrating ill-defined mass in the head and neck of the pancreas compared with 5/14/2024. 2. No specific evidence of distant metastatic disease in the chest, abdomen or pelvis.   XR ABDOMEN 1 VW PORTABLE 8/6/2024:  There is a gastric drainage tube with the tip below the diaphragm projecting over the gastric body with the sidehole below the GE junction in appropriate position. Gastric drainage tube with tip and sidehole projecting below the diaphragm over the gastric body.  X-ray Abdomen AP  8/5/2024: Air is seen scattered throughout normal caliber colon in a nonobstructive pattern. No dilated loops of bowel. Moderate colonic stool burden. No intraperitoneal free air within the limitations of this study. A biliary stent and embolization coils overlie the right upper quadrant. No suspicious osseous lesions.   Nonobstructive bowel gas pattern. I personally reviewed these image(s) along with the resident's/fellow's interpretations, certify that if a procedure was performed I was physically present, and agree with the final report.  CT Abdomen Pelvis with Contrast 8/5/2024: No suspicious pulmonary nodules in the lung bases. Hepatobiliary: Evaluation of the liver is somewhat limited secondary to streak artifact from the embolization coils. Within these limits there is no suspicious hepatic lesion. The gallbladder is decompressed and poorly evaluated. Common bile duct stent with expected pneumobilia. Mild intrahepatic biliary ductal dilatation is not significantly changed.  Spleen: No splenomegaly. Pancreas: Ill-defined infiltrating mass of the pancreatic head and neck, in keeping with pancreatic adenocarcinoma. There is atrophy and ductal dilatation of the pancreatic body and tail. Overall this tumor appears larger Adrenal Glands: No mass. Kidneys, Ureters, Bladder: No hydronephrosis. No suspicious renal lesion. No bladder mass. Gastrointestinal Tract: The stomach is distended with fluid and debris, suggesting there may be a degree of gastric outlet obstruction secondary to the pancreatic tumor. This is not significantly changed. There is no downstream obstruction. Pelvic Organs: No pelvic mass. Prostatomegaly. Peritoneum/Retroperitoneum: No ascites. Lymph Nodes: No lymphadenopathy. Musculoskeletal: No suspicious skeletal lesion.     No acute abdominopelvic abnormality. Attending addendum: The pancreatic tumor appears larger compared to 06/21/2024. There is gastric distention with debris suggesting chronic outlet obstruction ACTIONABLE ITEMS/RECOMMENDATIONS*: None. *An Actionable Finding is a finding that may be unrelated to the original reason for imaging but potentially actionable, meaning further investigation may be necessary. The Actionable Findings Vigilance Unit (AFVU) assists medical providers with responding to additional radiologic findings that are unexpected and potentially actionable. I personally reviewed these image(s) along with the resident's/fellow's interpretations, certify that if a procedure was performed I was physically present, and agree with the final report.   CT CAP 10/24/24:  1. Ill-defined soft tissue fullness at the a hay hepatis and pancreatic head and uncinate process fabella bases history of pancreatic adenocarcinoma.  A few blebs of gas are identified which may represent gas within the common bile duct.  2. Intrahepatic biliary ductal dilatation identified with nonvisualization of the gallbladder  3. Beam hardening artifact due to embolization coils  at the liver  4. Small 10 x 6 filling defect at the portal vein suggesting a small thrombus  5. Diffuse hazy mucosal prominence at the descending and ascending colon.  This may be merely due to underdistention.  A underlying colitis cannot be excluded  6. Findings of constipation with contrast and fluid distended loops of small bowel diffusely throughout suggesting a mild ileus  7. Prostatomegaly with calcifications  8. Findings and other details as above  9. Comparison to previous exam would allow further evaluation.  MRI Brain 11/4/24:  1. Tiny (subcentimeter), occasional focal areas of increased signal intensity (on the FLAIR sequences) are noted within the deep white matter and gray-white matter junctions of both cerebral hemispheres (these are only visualized on the FLAIR sequence with no contrast enhancement noted on postcontrast images). I suspect the changes represent chronic ischemic changes, however, follow-up imaging in 3-6 months to ensure stability may be helpful in excluding the very remote possibility of tiny metastases if felt be clinically indicated.     Pathology:  8/2/2022:  FNA head pancreas: SCANT MALIGNANT CELLS, FAVOR ADENOCARCINOMA  NGS:  No detectable genomic aberrations      CLINICAL HISTORY:       Patient: Warren P Lejeune is a 59 y.o. male.with a past medical history of past medical history of pancreatic ductal adenocarcinoma (Dx 8/2022)   Patient initially presented with  unintentional weight loss starting in 1/2022. He also had worsening back/shoulder and abdominal pain during this time. In July he finally presented to his PCP with darkening of the urine and lightening of the stool - with Jaundice that his wife noticed. Labs demonstrating cholestasis (bilirubin of 19).    Patient noted to have an abnormal CT scan after developing obstructive jaundice. 2 cm hypoenhancing mass noted in the head of the pancreas with intra and extrahepatic biliary ductal dilatation. Mass measured 2.5 x 2.1  "cm. There is also atrophy of the distal gland and upstream dilatation of the pancreas duct. There is no obvious vascular invasion. There were no focal liver lesions.    8/2/22- EGD/EUS/FNA. Final pathology showed ductal adenocarcinoma, moderately differentiated. ERCP on the same date performed, with placement of a fully covered metal biliary stent, 60 mm x 10 mm.    7/27/22- CA 19-9 was 681    8/21/22- CT CAP: Since 7/25/2022, the primary pancreatic head mass encasing the common hepatic artery remains stable. The intrahepatic biliary obstruction improved with interval placement of a CBD stent. No definite distant metastasis in the abdomen or pelvis. Small indeterminate left lower lobe pulmonary nodule can be followed.    09/03/2022 Germline genetic testing: Negative for germline pathogenic variants in any of the analyzed genes, Genes Analyzed: APC, DALE, BMPR1A, BRCA1, BRCA2, CDKN2A, EPCAM, MEN1, MLH1, MSH2, MSH6, NF1, PALB2, PMS2, SMAD4, STK11, TP53, TSC1, TSC2, and VHL. Genetic testing performed by [employer]; full report available in scanned documents.     s/p biliary stent placement, currently undergoing chemotherapy with gemcitabine/paclitaxel (last dose 6/26/2024), T2DM on insulin presenting for abdominal pain and constipation. CT A/P with distension in stomach suggesting gastric outlet obstruction secondary to pancreatic tumor. GI and Surg Onc consulted, pending evaluation. Poor PO tolerance, deferring NG tube now as vomiting resolves and having bowel movements.     Patient was recently admitted to the hospital at [facility]--Hospital Course:  "Findings on imaging were concerning for malignant gastric outlet obstruction from pancreatic cancer. After a discussion with surgical oncology, GI and GIMO, decision was made to proceed with duodenal stent (placed by GI). No indications for gastrojejunostomy bypass. Patient tolerated procedure well. We were able to advance to low fiber diet. Nutrition was consulted for " "low fiber diet education. Managed neoplasm related pain with PO morphine."     Patient is here today with his wife to continue chemotherapy close to home.  He is doing relatively well and voices no concerns.  MediPort was removed from his left chest wall to exposure of the MediPort.  Patient reports abdominal mid back pain, occasional muscle spasm and dizziness.  No fever, chills, sweats.  No chest pain or short of breath.    Chief Complaint: Adenocarcinoma of pancreas (Patient c/o bilateral leg swelling starting x2 weeks ago. He has tried using compression socks and increasing his water intake w/ no relief. Patient c/o abdominal swelling starting x2 weeks. He also reports constipation starting x1 day ago, he is not taking any medication for it.)      Interval History:  He returns to the clinic today for a 2 week follow-up and treatment clearance for C7 of Bonner/Cisplatin q2w. He reports generalized swelling for the past 2 weeks, mainly in his abdomen and bilateral lower legs. He has been wearing compression stockings and elevating his legs regularly. He continues with abdominal and lower back pain, he takes Percocet and MS cont for this. He is taking Miralax and laxative for opioid induced constipation, and Pregabalin 100 mg BID for bilateral hand and feet neuropathy. He has an appointment with GI @ [facility] on 1/8/2025 to replace duodenal stent. Labs reviewed with patient in detail.He does report trouble swallowing and feeling as though food is getting stuck, scheduled for EGD 11/21/2024. He missed EGD due to ER visit but will call to reschedule.      Past Medical History:   Diagnosis Date    Abdominal pain     Admission for chemotherapy     last chemo 9/12/24    Anxiety disorder, unspecified     Back pain     Bradycardia     Enlarged prostate     GERD (gastroesophageal reflux disease)     Hypertension     no cardiologist; no longer on meds since weight loss due to pancreatic cancer    Memory loss     Pancreatic cancer  "    Peripheral neuropathy     Type 2 diabetes mellitus with unspecified diabetic retinopathy without macular edema       Past Surgical History:   Procedure Laterality Date    bile duct stents times 2      EGD, WITH STENT INSERTION      INSERTION OF TUNNELED CENTRAL VENOUS CATHETER (CVC) WITH SUBCUTANEOUS PORT Right 9/20/2024    Procedure: [id number];  Surgeon: Moe Champagne MD;  Location: [facility];  Service: General;  Laterality: Right;    MEDIPORT REMOVAL      times 2     Family History   Problem Relation Name Age of Onset    Hypertension Mother      Stroke Father      Stroke Sister      Prostate cancer Brother            Review of patient's allergies indicates:  No Known Allergies   Current Outpatient Medications on File Prior to Visit   Medication Sig Dispense Refill    aluminum & magnesium hydroxide-simethicone (MYLANTA MAX STRENGTH) 400-400-40 mg/5 mL suspension Take 5 mLs by mouth 4 (four) times daily as needed (mouth sores). 335 mL 3    apixaban (ELIQUIS) 5 mg Tab Take 1 tablet (5 mg total) by mouth 2 (two) times daily. Take 2 tablets by mouth (10 mg) twice a day for 7 days, then 1 tablet (5 mg total) by mouth twice a day thereafter 74 tablet 3    cholecalciferol, vitamin D3, (VITAMIN D3) 50 mcg (2,000 unit) Tab Take 2,000 Units by mouth once daily.      dexAMETHasone (DECADRON) 4 MG Tab TAKE 2 TABLETS BY MOUTH ONCE DAILY AS DIRECTED ON DAYS 2 AND 3 OF CHEMOTHERAPY CYCLE      diphenhydrAMINE (BENADRYL) 12.5 mg/5 mL elixir Take 5 mLs (12.5 mg total) by mouth 4 (four) times daily as needed (mouth sores). 236 mL 3    diphenoxylate-atropine 2.5-0.025 mg (LOMOTIL) 2.5-0.025 mg per tablet Take 1 tablet by mouth 4 (four) times daily as needed for Diarrhea. 60 tablet 1    insulin aspart U-100 (NOVOLOG) 100 unit/mL injection Inject into the skin 3 (three) times daily before meals. prn      lactulose (CHRONULAC) 20 gram/30 mL Soln Take 15 mLs (10 g total) by mouth 2 (two) times daily. 900 mL 0    LIDOcaine  viscous HCl 2% (LIDOCAINE VISCOUS) 2 % Soln by Mucous Membrane route 4 (four) times daily as needed (mouth sores). Swish and spit 15 ml (5 ml benadryl, 5 ml mylanta, 5 ml lidocaine) by mouth 4 times daily as needed for mouth sores 100 mL 3    morphine (MS CONTIN) 15 MG 12 hr tablet Take 1 tablet (15 mg total) by mouth 2 (two) times daily. 30 tablet 0    multivitamin with folic acid 400 mcg Tab Take 1 tablet by mouth once daily.      ondansetron (ZOFRAN) 8 MG tablet Take 8 mg by mouth.      polyethylene glycol (GLYCOLAX) 17 gram PwPk Take 17 g by mouth.      prochlorperazine (COMPAZINE) 10 MG tablet Take 10 mg by mouth.      tamsulosin (FLOMAX) 0.4 mg Cap Take 1 capsule (0.4 mg total) by mouth once daily. 30 capsule 0    TRESIBA FLEXTOUCH U-200 200 unit/mL (3 mL) insulin pen Inject 14 Units into the skin.      vibegron (GEMTESA) 75 mg Tab Take 75 mg by mouth.      [DISCONTINUED] magnesium 250 mg Tab Take 1 tablet by mouth once daily.      [DISCONTINUED] oxyCODONE-acetaminophen (PERCOCET)  mg per tablet Take 1 tablet by mouth every 6 (six) hours as needed for Pain. 30 tablet 0    [DISCONTINUED] pregabalin (LYRICA) 100 MG capsule Take 100 mg by mouth.      OLANZapine (ZYPREXA) 5 MG tablet Take 1 tablet (5 mg total) by mouth nightly. 4 tablet 6    pantoprazole (PROTONIX) 40 MG tablet Take 1 tablet (40 mg total) by mouth once daily. 30 tablet 2     No current facility-administered medications on file prior to visit.      Review of Systems   Constitutional:  Positive for appetite change and fatigue. Negative for activity change, chills, diaphoresis, fever and unexpected weight change.   HENT:  Positive for trouble swallowing. Negative for nasal congestion, mouth sores, nosebleeds, sinus pressure/congestion and sore throat.    Eyes: Negative.    Respiratory:  Positive for shortness of breath. Negative for cough.    Cardiovascular:  Positive for leg swelling. Negative for chest pain and palpitations.  "  Gastrointestinal:  Positive for abdominal distention and constipation. Negative for abdominal pain, blood in stool, change in bowel habit, diarrhea, nausea and vomiting.   Endocrine: Negative.    Genitourinary:  Negative for bladder incontinence, decreased urine volume, difficulty urinating, dysuria, frequency, hematuria and urgency.   Musculoskeletal:  Positive for back pain. Negative for arthralgias, gait problem, joint swelling, leg pain and myalgias.   Integumentary:  Negative for rash.   Allergic/Immunologic: Negative.  Negative for frequent infections.   Neurological:  Positive for weakness. Negative for dizziness, tremors, syncope, light-headedness, numbness, headaches and memory loss.   Hematological:  Negative for adenopathy. Does not bruise/bleed easily.   Psychiatric/Behavioral:  Negative for agitation, confusion, hallucinations, sleep disturbance and suicidal ideas. The patient is not nervous/anxious.               Vitals:    12/18/24 0823   BP: 109/73   BP Location: Right arm   Patient Position: Sitting   Pulse: 65   Resp: 18   Temp: 98.1 °F (36.7 °C)   TempSrc: Oral   SpO2: 100%   Weight: 68.8 kg (151 lb 9.6 oz)   Height: 5' 7" (1.702 m)          Wt Readings from Last 6 Encounters:   12/18/24 68.8 kg (151 lb 9.6 oz)   12/04/24 64.5 kg (142 lb 3.2 oz)   11/21/24 63.5 kg (140 lb)   11/20/24 65.1 kg (143 lb 9.6 oz)   11/19/24 62.9 kg (138 lb 9.6 oz)   11/19/24 62.9 kg (138 lb 9.6 oz)     Body mass index is 23.74 kg/m².  Body surface area is 1.8 meters squared.  Physical Exam  Vitals and nursing note reviewed.   Constitutional:       General: He is not in acute distress.     Comments: thin   HENT:      Head: Normocephalic and atraumatic.      Mouth/Throat:      Mouth: Mucous membranes are moist.   Eyes:      General: No scleral icterus.     Extraocular Movements: Extraocular movements intact.      Conjunctiva/sclera: Conjunctivae normal.   Neck:      Vascular: No JVD.   Cardiovascular:      Rate and " Rhythm: Normal rate and regular rhythm.      Heart sounds: No murmur heard.  Pulmonary:      Effort: Pulmonary effort is normal.      Breath sounds: Normal breath sounds. No wheezing or rhonchi.   Chest:      Comments: Left chest wall scar from previous Mediport  Abdominal:      General: Bowel sounds are normal. There is no distension.      Palpations: Abdomen is soft.      Tenderness: There is no abdominal tenderness.   Musculoskeletal:         General: No swelling or deformity.      Cervical back: Neck supple.      Right lower leg: Edema present.      Left lower leg: Edema present.   Lymphadenopathy:      Head:      Right side of head: No submandibular adenopathy.      Left side of head: No submandibular adenopathy.      Cervical: No cervical adenopathy.      Upper Body:      Right upper body: No supraclavicular or axillary adenopathy.      Left upper body: No supraclavicular or axillary adenopathy.      Lower Body: No right inguinal adenopathy. No left inguinal adenopathy.   Skin:     General: Skin is warm.      Coloration: Skin is not jaundiced.      Findings: No rash.   Neurological:      General: No focal deficit present.      Mental Status: He is alert and oriented to person, place, and time.      Cranial Nerves: Cranial nerves 2-12 are intact.   Psychiatric:         Attention and Perception: Attention normal.         Behavior: Behavior is cooperative.       ECOG SCORE    1 - Restricted in strenuous activity-ambulatory and able to carry out work of a light nature         Laboratory:  CBC with Differential:  Lab Results   Component Value Date    WBC 8.78 12/18/2024    RBC 3.57 (L) 12/18/2024    HGB 10.6 (L) 12/18/2024    HCT 32.8 (L) 12/18/2024    MCV 91.9 12/18/2024    MCH 29.7 12/18/2024    MCHC 32.3 (L) 12/18/2024    RDW 15.2 12/18/2024     12/18/2024    MPV 10.2 12/18/2024        CMP:  Sodium   Date Value Ref Range Status   12/18/2024 139 136 - 145 mmol/L Final     Potassium   Date Value Ref Range  Status   12/18/2024 4.7 3.5 - 5.1 mmol/L Final     Chloride   Date Value Ref Range Status   12/18/2024 105 98 - 107 mmol/L Final     CO2   Date Value Ref Range Status   12/18/2024 28 22 - 29 mmol/L Final     Blood Urea Nitrogen   Date Value Ref Range Status   12/18/2024 5.0 (L) 8.4 - 25.7 mg/dL Final   10/20/2023 12 6 - 23 mg/dL Final     Creatinine   Date Value Ref Range Status   12/18/2024 0.82 0.72 - 1.25 mg/dL Final   10/20/2023 0.95 0.67 - 1.17 mg/dL Final     Calcium   Date Value Ref Range Status   12/18/2024 8.1 (L) 8.4 - 10.2 mg/dL Final   10/20/2023 8.7 8.4 - 10.2 mg/dL Final     Albumin   Date Value Ref Range Status   12/18/2024 2.7 (L) 3.5 - 5.0 g/dL Final     Bilirubin Total   Date Value Ref Range Status   12/18/2024 0.3 <=1.5 mg/dL Final     ALP   Date Value Ref Range Status   12/18/2024 350 (H) 40 - 150 unit/L Final     AST   Date Value Ref Range Status   12/18/2024 39 (H) 5 - 34 unit/L Final     ALT   Date Value Ref Range Status   12/18/2024 44 0 - 55 unit/L Final      Component 08/28/24 07/17/24 06/26/24 06/21/24 06/12/24 05/29/24   CA 19-9 2,385.0 High  2,150.0 High  1,567.0 High  1,183.0 High  1,000.0 High  959.9 High       Latest Reference Range & Units 09/24/24 08:52 10/23/24 07:57   CA 19-9 0.00 - 37.00 unit/mL 6,202.93 (H) 21,137.60 (H)   (H): Data is abnormally high       Assessment:       1. Adenocarcinoma of pancreas    2. Hypomagnesemia    3. Neuropathy    4. Swelling of both lower extremities    5. Cancer associated pain    6. On antineoplastic chemotherapy    7. Chemotherapy-induced peripheral neuropathy    8. Immunodeficiency due to chemotherapy    9. Elevated CA 19-9 level    10. Elevated alkaline phosphatase level                1) Locally advanced pancreatic cancer   ---8/2/2022 ERCP with metal biliary stent placement   ---Folfirinox (10/2022- 2/2023)  ---capecitabine RT (4/3/23 - 5/16/23)   ---Phase I study 2021-1176 SD-101 at [facility]--SD-101 2 mg in sodium chloride 0.9% (NS) 30 mL IVPB,  intravenous x 2 cycles (8/22/2023-11/22/2023)  ---Gemcitabine and paclitaxel protein bound (12/13/2023-- 7/2024)-->clinical progression and GOO  ---s/p biliary stent placement 4/4/2024  ---chemotherapy with gemcitabine/paclitaxel (last dose 6/26/2024)  ---duodenal stent (placed by GI)- 8/7/2024  ---Gemcitabine and CISplatin Every 2 Weeks (8/28/2024-present)   H/o Gastric outlet obstruction  ---s/p duodenal stent, severe protein calori malnutrition      Pain, neoplasm related pain  ---continue Percocet, Lyrica, Morphine     elevated ALP  --have improved significantly  Swelling   Bilateral lower extremity swelling and abdominal swelling  Lasix 20mg daily until swelling resolves.           Plan:       Patient with unresectable advanced pancreatic cancer to start 4th line treatment with cisplatin and Gemzar.     Labs stable.  Hold chemo today due to extreme swelling.   Lasix 20mg sent to pharmacy. He was instructed to stop once swelling improves.   Labs and treat in one week.   Continue Eliquis 5 mg BID for thrombus  Repeat MRI Brain in 3 months, due 2/2025  Continue Magic Mouthwash for esophagitis   Continue morphine and percocet for pain, refill sent for percocet  CT C/A/P every 3 months, next due 1/2025  RTC 3 week with NP with labs/C7  CBC, CMP,  Mg, CA 19 9 - 1 hr prior @ [facility]    The patient was seen, interviewed and examined. Pertinent lab and radiology studies were reviewed.   The patient was given ample opportunity to ask questions, and to the best of my abilities, all questions answered to satisfaction; patient demonstrated understanding of what we discussed and agreeable to the plan. Pt instructed to call should develop concerning signs/symptoms or have further questions.       Abby Bah, FNP-C  Oncology/Hematology   [facility]

## 2024-12-24 DIAGNOSIS — K21.9 GASTROESOPHAGEAL REFLUX DISEASE, UNSPECIFIED WHETHER ESOPHAGITIS PRESENT: ICD-10-CM

## 2024-12-26 ENCOUNTER — LAB VISIT (OUTPATIENT)
Dept: LAB | Facility: HOSPITAL | Age: 59
End: 2024-12-26
Payer: MEDICARE

## 2024-12-26 ENCOUNTER — HOSPITAL ENCOUNTER (OUTPATIENT)
Facility: HOSPITAL | Age: 59
Discharge: HOME | End: 2024-12-27
Attending: EMERGENCY MEDICINE | Admitting: INTERNAL MEDICINE
Payer: MEDICARE

## 2024-12-26 DIAGNOSIS — C25.9 PANCREATIC CANCER: ICD-10-CM

## 2024-12-26 DIAGNOSIS — R60.0 LOWER LEG EDEMA: ICD-10-CM

## 2024-12-26 DIAGNOSIS — C25.9 ADENOCARCINOMA OF PANCREAS: ICD-10-CM

## 2024-12-26 LAB
ALBUMIN SERPL-MCNC: 2.6 G/DL (ref 3.5–5)
ALBUMIN/GLOB SERPL: 0.8 RATIO (ref 1.1–2)
ALP SERPL-CCNC: 322 UNIT/L (ref 40–150)
ALT SERPL-CCNC: 46 UNIT/L (ref 0–55)
ANION GAP SERPL CALC-SCNC: 7 MEQ/L
AST SERPL-CCNC: 37 UNIT/L (ref 5–34)
BASOPHILS # BLD AUTO: 0.07 X10(3)/MCL
BASOPHILS NFR BLD AUTO: 0.7 %
BILIRUB SERPL-MCNC: 0.3 MG/DL
BILIRUB UR QL STRIP.AUTO: NEGATIVE
BNP BLD-MCNC: 49.7 PG/ML
BUN SERPL-MCNC: 6 MG/DL (ref 8.4–25.7)
CALCIUM SERPL-MCNC: 8.5 MG/DL (ref 8.4–10.2)
CANCER AG19-9 SERPL-ACNC: ABNORMAL UNIT/ML (ref 0–37)
CHLORIDE SERPL-SCNC: 102 MMOL/L (ref 98–107)
CLARITY UR: CLEAR
CO2 SERPL-SCNC: 28 MMOL/L (ref 22–29)
COLOR UR AUTO: YELLOW
CREAT SERPL-MCNC: 0.8 MG/DL (ref 0.72–1.25)
CREAT/UREA NIT SERPL: 8
EOSINOPHIL # BLD AUTO: 0.04 X10(3)/MCL (ref 0–0.9)
EOSINOPHIL NFR BLD AUTO: 0.4 %
ERYTHROCYTE [DISTWIDTH] IN BLOOD BY AUTOMATED COUNT: 15 % (ref 11.5–17)
GFR SERPLBLD CREATININE-BSD FMLA CKD-EPI: >60 ML/MIN/1.73/M2
GLOBULIN SER-MCNC: 3.4 GM/DL (ref 2.4–3.5)
GLUCOSE SERPL-MCNC: 175 MG/DL (ref 74–100)
GLUCOSE UR QL STRIP: NEGATIVE
HCT VFR BLD AUTO: 33 % (ref 42–52)
HGB BLD-MCNC: 10.5 G/DL (ref 14–18)
HGB UR QL STRIP: NEGATIVE
IMM GRANULOCYTES # BLD AUTO: 0.06 X10(3)/MCL (ref 0–0.04)
IMM GRANULOCYTES NFR BLD AUTO: 0.6 %
KETONES UR QL STRIP: NEGATIVE
LEUKOCYTE ESTERASE UR QL STRIP: NEGATIVE
LYMPHOCYTES # BLD AUTO: 1.24 X10(3)/MCL (ref 0.6–4.6)
LYMPHOCYTES NFR BLD AUTO: 12.7 %
MAGNESIUM SERPL-MCNC: 1.6 MG/DL (ref 1.6–2.6)
MCH RBC QN AUTO: 29.7 PG (ref 27–31)
MCHC RBC AUTO-ENTMCNC: 31.8 G/DL (ref 33–36)
MCV RBC AUTO: 93.2 FL (ref 80–94)
MONOCYTES # BLD AUTO: 1.07 X10(3)/MCL (ref 0.1–1.3)
MONOCYTES NFR BLD AUTO: 10.9 %
NEUTROPHILS # BLD AUTO: 7.3 X10(3)/MCL (ref 2.1–9.2)
NEUTROPHILS NFR BLD AUTO: 74.7 %
NITRITE UR QL STRIP: NEGATIVE
OHS QRS DURATION: 86 MS
OHS QTC CALCULATION: 403 MS
PH UR STRIP: 6 [PH]
PLATELET # BLD AUTO: 255 X10(3)/MCL (ref 130–400)
PMV BLD AUTO: 10.2 FL (ref 7.4–10.4)
POCT GLUCOSE: 154 MG/DL (ref 70–110)
POCT GLUCOSE: 270 MG/DL (ref 70–110)
POTASSIUM SERPL-SCNC: 4.2 MMOL/L (ref 3.5–5.1)
PROT SERPL-MCNC: 6 GM/DL (ref 6.4–8.3)
PROT UR QL STRIP: NEGATIVE
RBC # BLD AUTO: 3.54 X10(6)/MCL (ref 4.7–6.1)
SODIUM SERPL-SCNC: 137 MMOL/L (ref 136–145)
SP GR UR STRIP.AUTO: 1.01 (ref 1–1.03)
UROBILINOGEN UR STRIP-ACNC: 0.2
WBC # BLD AUTO: 9.78 X10(3)/MCL (ref 4.5–11.5)

## 2024-12-26 PROCEDURE — 85025 COMPLETE CBC W/AUTO DIFF WBC: CPT

## 2024-12-26 PROCEDURE — 96375 TX/PRO/DX INJ NEW DRUG ADDON: CPT | Mod: 59

## 2024-12-26 PROCEDURE — 36415 COLL VENOUS BLD VENIPUNCTURE: CPT

## 2024-12-26 PROCEDURE — 25000003 PHARM REV CODE 250: Performed by: INTERNAL MEDICINE

## 2024-12-26 PROCEDURE — 86301 IMMUNOASSAY TUMOR CA 19-9: CPT

## 2024-12-26 PROCEDURE — 99285 EMERGENCY DEPT VISIT HI MDM: CPT | Mod: 25

## 2024-12-26 PROCEDURE — 25000003 PHARM REV CODE 250: Performed by: EMERGENCY MEDICINE

## 2024-12-26 PROCEDURE — 93010 ELECTROCARDIOGRAM REPORT: CPT | Mod: ,,, | Performed by: INTERNAL MEDICINE

## 2024-12-26 PROCEDURE — 96372 THER/PROPH/DIAG INJ SC/IM: CPT | Mod: 59 | Performed by: INTERNAL MEDICINE

## 2024-12-26 PROCEDURE — 93005 ELECTROCARDIOGRAM TRACING: CPT

## 2024-12-26 PROCEDURE — 94761 N-INVAS EAR/PLS OXIMETRY MLT: CPT

## 2024-12-26 PROCEDURE — 81003 URINALYSIS AUTO W/O SCOPE: CPT | Performed by: EMERGENCY MEDICINE

## 2024-12-26 PROCEDURE — 96376 TX/PRO/DX INJ SAME DRUG ADON: CPT

## 2024-12-26 PROCEDURE — 83880 ASSAY OF NATRIURETIC PEPTIDE: CPT | Performed by: EMERGENCY MEDICINE

## 2024-12-26 PROCEDURE — 11000001 HC ACUTE MED/SURG PRIVATE ROOM

## 2024-12-26 PROCEDURE — 63600175 PHARM REV CODE 636 W HCPCS: Performed by: INTERNAL MEDICINE

## 2024-12-26 PROCEDURE — G0378 HOSPITAL OBSERVATION PER HR: HCPCS

## 2024-12-26 PROCEDURE — 63600175 PHARM REV CODE 636 W HCPCS: Performed by: EMERGENCY MEDICINE

## 2024-12-26 PROCEDURE — 83735 ASSAY OF MAGNESIUM: CPT

## 2024-12-26 PROCEDURE — 80053 COMPREHEN METABOLIC PANEL: CPT

## 2024-12-26 PROCEDURE — 96374 THER/PROPH/DIAG INJ IV PUSH: CPT

## 2024-12-26 RX ORDER — IBUPROFEN 200 MG
24 TABLET ORAL
Status: DISCONTINUED | OUTPATIENT
Start: 2024-12-26 | End: 2024-12-27 | Stop reason: HOSPADM

## 2024-12-26 RX ORDER — FUROSEMIDE 10 MG/ML
40 INJECTION INTRAMUSCULAR; INTRAVENOUS
Status: DISCONTINUED | OUTPATIENT
Start: 2024-12-26 | End: 2024-12-27 | Stop reason: HOSPADM

## 2024-12-26 RX ORDER — FUROSEMIDE 10 MG/ML
40 INJECTION INTRAMUSCULAR; INTRAVENOUS
Status: COMPLETED | OUTPATIENT
Start: 2024-12-26 | End: 2024-12-26

## 2024-12-26 RX ORDER — OXYCODONE AND ACETAMINOPHEN 10; 325 MG/1; MG/1
1 TABLET ORAL EVERY 6 HOURS PRN
Status: DISCONTINUED | OUTPATIENT
Start: 2024-12-26 | End: 2024-12-26

## 2024-12-26 RX ORDER — MUPIROCIN 20 MG/G
OINTMENT TOPICAL 2 TIMES DAILY
Status: DISCONTINUED | OUTPATIENT
Start: 2024-12-26 | End: 2024-12-27 | Stop reason: HOSPADM

## 2024-12-26 RX ORDER — TALC
6 POWDER (GRAM) TOPICAL NIGHTLY PRN
Status: DISCONTINUED | OUTPATIENT
Start: 2024-12-26 | End: 2024-12-27 | Stop reason: HOSPADM

## 2024-12-26 RX ORDER — OXYCODONE AND ACETAMINOPHEN 5; 325 MG/1; MG/1
2 TABLET ORAL EVERY 6 HOURS PRN
Status: DISCONTINUED | OUTPATIENT
Start: 2024-12-26 | End: 2024-12-27 | Stop reason: HOSPADM

## 2024-12-26 RX ORDER — PANTOPRAZOLE SODIUM 40 MG/1
40 TABLET, DELAYED RELEASE ORAL DAILY
Qty: 30 TABLET | Refills: 2 | Status: SHIPPED | OUTPATIENT
Start: 2024-12-26 | End: 2025-03-26

## 2024-12-26 RX ORDER — GLUCAGON 1 MG
1 KIT INJECTION
Status: DISCONTINUED | OUTPATIENT
Start: 2024-12-26 | End: 2024-12-27 | Stop reason: HOSPADM

## 2024-12-26 RX ORDER — PANTOPRAZOLE SODIUM 40 MG/10ML
40 INJECTION, POWDER, LYOPHILIZED, FOR SOLUTION INTRAVENOUS EVERY 12 HOURS
Status: DISCONTINUED | OUTPATIENT
Start: 2024-12-26 | End: 2024-12-27 | Stop reason: HOSPADM

## 2024-12-26 RX ORDER — INSULIN ASPART 100 [IU]/ML
0-5 INJECTION, SOLUTION INTRAVENOUS; SUBCUTANEOUS
Status: DISCONTINUED | OUTPATIENT
Start: 2024-12-26 | End: 2024-12-27 | Stop reason: HOSPADM

## 2024-12-26 RX ORDER — IBUPROFEN 200 MG
16 TABLET ORAL
Status: DISCONTINUED | OUTPATIENT
Start: 2024-12-26 | End: 2024-12-27 | Stop reason: HOSPADM

## 2024-12-26 RX ORDER — SODIUM CHLORIDE 0.9 % (FLUSH) 0.9 %
10 SYRINGE (ML) INJECTION
Status: DISCONTINUED | OUTPATIENT
Start: 2024-12-26 | End: 2024-12-27 | Stop reason: HOSPADM

## 2024-12-26 RX ADMIN — OXYCODONE HYDROCHLORIDE AND ACETAMINOPHEN 2 TABLET: 5; 325 TABLET ORAL at 09:12

## 2024-12-26 RX ADMIN — PANTOPRAZOLE SODIUM 40 MG: 40 INJECTION, POWDER, LYOPHILIZED, FOR SOLUTION INTRAVENOUS at 09:12

## 2024-12-26 RX ADMIN — MUPIROCIN 1 G: 20 OINTMENT TOPICAL at 09:12

## 2024-12-26 RX ADMIN — APIXABAN 5 MG: 5 TABLET, FILM COATED ORAL at 09:12

## 2024-12-26 RX ADMIN — INSULIN ASPART 1 UNITS: 100 INJECTION, SOLUTION INTRAVENOUS; SUBCUTANEOUS at 09:12

## 2024-12-26 RX ADMIN — FUROSEMIDE 40 MG: 10 INJECTION, SOLUTION INTRAMUSCULAR; INTRAVENOUS at 01:12

## 2024-12-26 RX ADMIN — Medication 6 MG: at 09:12

## 2024-12-26 RX ADMIN — FUROSEMIDE 40 MG: 10 INJECTION, SOLUTION INTRAMUSCULAR; INTRAVENOUS at 09:12

## 2024-12-26 RX ADMIN — PREGABALIN 100 MG: 25 CAPSULE ORAL at 09:12

## 2024-12-26 NOTE — H&P
[facility] Hospital Medicine History & Physical Examination       Patient Name: Warren P Lejeune  MRN: [id number]  Patient Class: IP- Inpatient   Admission Date: 12/26/2024   Admitting Physician: VIJAY Service   Length of Stay: 0  Attending Physician: Tristin Gambino MD  Primary Care Provider: Tristin Gambino MD  Face-to-Face encounter date: 12/26/2024  Code Status:  Chief Complaint: Leg Swelling (C/o generalized swelling x 1 week. Pt is a cancer pt and he has been on lasix PO for the past week but it is not helping so he was sent to ED for eval.)      Screening for Social Drivers for health:  Patient screened for food insecurity, housing instability, transportation needs, utility difficulties, and interpersonal safety (select all that apply as identified as concern)  []Housing or Food  []Transportation Needs  []Utility Difficulties  []Interpersonal safety  []None      Patient information was obtained from patient, patient's family, past medical records and ER records.  ED records were reviewed in detail and documented below    HISTORY OF PRESENT ILLNESS:   Warren P Lejeune is a 59 y.o. male who  has a past medical history of Abdominal pain, Admission for chemotherapy, Anxiety disorder, unspecified, Back pain, Bradycardia, Enlarged prostate, GERD (gastroesophageal reflux disease), Hypertension, Memory loss, Pancreatic cancer, Peripheral neuropathy, and Type 2 diabetes mellitus with unspecified diabetic retinopathy without macular edema..     A 59 years old male history of stage III pancreatic cancer presented to emergency room complain of lower extremity edema and abdominal distention for 3 weeks     Oncology tried Lasix 20 mg p.o. q.d. for 1 week but it did not help     Patient's dry weight is 140 lb currently patient will 160 lb     Patient was diagnosed with a stage III pancreatic cancer 2 years ago and treated chemotherapy at emergency room, WBC 9, hemoglobin 10, BUN 6, creatinine 0.8,  BNP 49     Chest x-ray was unremarkable     Patient was admitted to the hospital for bilateral lower extremity edema and then abdominal distention  PAST MEDICAL HISTORY:     Past Medical History:   Diagnosis Date    Abdominal pain     Admission for chemotherapy     last chemo 9/12/24    Anxiety disorder, unspecified     Back pain     Bradycardia     Enlarged prostate     GERD (gastroesophageal reflux disease)     Hypertension     no cardiologist; no longer on meds since weight loss due to pancreatic cancer    Memory loss     Pancreatic cancer     Peripheral neuropathy     Type 2 diabetes mellitus with unspecified diabetic retinopathy without macular edema        PAST SURGICAL HISTORY:     Past Surgical History:   Procedure Laterality Date    bile duct stents times 2      EGD, WITH STENT INSERTION      INSERTION OF TUNNELED CENTRAL VENOUS CATHETER (CVC) WITH SUBCUTANEOUS PORT Right 9/20/2024    Procedure: [id number];  Surgeon: Moe Champagne MD;  Location: [facility];  Service: General;  Laterality: Right;    MEDIPORT REMOVAL      times 2       ALLERGIES:   Patient has no known allergies.    FAMILY HISTORY:   Reviewed and negative    SOCIAL HISTORY:     Social History     Tobacco Use    Smoking status: Never     Passive exposure: Never    Smokeless tobacco: Former     Types: Chew   Substance Use Topics    Alcohol use: Not Currently        HOME MEDICATIONS:     Prior to Admission medications    Medication Sig Start Date End Date Taking? Authorizing Provider   aluminum & magnesium hydroxide-simethicone (MYLANTA MAX STRENGTH) 400-400-40 mg/5 mL suspension Take 5 mLs by mouth 4 (four) times daily as needed (mouth sores). 9/18/24 9/18/25 Yes Terri Marcelino FNP   apixaban (ELIQUIS) 5 mg Tab Take 1 tablet (5 mg total) by mouth 2 (two) times daily. Take 2 tablets by mouth (10 mg) twice a day for 7 days, then 1 tablet (5 mg total) by mouth twice a day thereafter 10/29/24  Yes Jena Messina MD    cholecalciferol, vitamin D3, (VITAMIN D3) 50 mcg (2,000 unit) Tab Take 2,000 Units by mouth once daily.   Yes Provider, Historical   dexAMETHasone (DECADRON) 4 MG Tab TAKE 2 TABLETS BY MOUTH ONCE DAILY AS DIRECTED ON DAYS 2 AND 3 OF CHEMOTHERAPY CYCLE 11/26/24  Yes Provider, Historical   diphenhydrAMINE (BENADRYL) 12.5 mg/5 mL elixir Take 5 mLs (12.5 mg total) by mouth 4 (four) times daily as needed (mouth sores). 9/18/24  Yes Terri Marcelino FNP   pantoprazole (PROTONIX) 40 MG tablet Take 1 tablet (40 mg total) by mouth once daily. 12/26/24 3/26/25 Yes Abby Bah FNP   pregabalin (LYRICA) 100 MG capsule Take 1 capsule (100 mg total) by mouth 2 (two) times daily. 12/18/24 7/16/25 Yes Abby Bah FNP   prochlorperazine (COMPAZINE) 10 MG tablet Take 10 mg by mouth. 6/12/24  Yes Provider, Historical   TRESIBA FLEXTOUCH U-200 200 unit/mL (3 mL) insulin pen Inject 14 Units into the skin. 3/12/24  Yes Provider, Historical   vibegron (GEMTESA) 75 mg Tab Take 75 mg by mouth.   Yes Provider, Historical   furosemide (LASIX) 20 MG tablet Take 1 tablet (20 mg total) by mouth once daily. As needed for swelling 12/18/24 12/18/25  Abby Bah FNP   insulin aspart U-100 (NOVOLOG) 100 unit/mL injection Inject into the skin 3 (three) times daily before meals. prn    Provider, Historical   lactulose (CHRONULAC) 20 gram/30 mL Soln Take 15 mLs (10 g total) by mouth 2 (two) times daily. 12/4/24 1/3/25  Terri Marcelino FNP   LIDOcaine viscous HCl 2% (LIDOCAINE VISCOUS) 2 % Soln by Mucous Membrane route 4 (four) times daily as needed (mouth sores). Swish and spit 15 ml (5 ml benadryl, 5 ml mylanta, 5 ml lidocaine) by mouth 4 times daily as needed for mouth sores 9/18/24   Terri Marcelino FNP   magnesium oxide (MAGOX) 400 mg (241.3 mg magnesium) tablet Take 1 tablet (400 mg total) by mouth once daily. 12/18/24 12/18/25  Abby Bah FNP   morphine (MS CONTIN) 15 MG 12 hr tablet Take 1 tablet (15 mg  total) by mouth 2 (two) times daily. 10/24/24   Sri Pacheco, ONIEL   multivitamin with folic acid 400 mcg Tab Take 1 tablet by mouth once daily.    Provider, Historical   OLANZapine (ZYPREXA) 5 MG tablet Take 1 tablet (5 mg total) by mouth nightly. 9/9/24 10/7/24  Danielle Bryant, ONIEL   ondansetron (ZOFRAN) 8 MG tablet Take 8 mg by mouth. 6/12/24   Provider, Historical   oxyCODONE-acetaminophen (PERCOCET)  mg per tablet Take 1 tablet by mouth every 6 (six) hours as needed for Pain. 12/18/24   Abby Bah FNP   polyethylene glycol (GLYCOLAX) 17 gram PwPk Take 17 g by mouth. 8/9/24   Provider, Historical   tamsulosin (FLOMAX) 0.4 mg Cap Take 1 capsule (0.4 mg total) by mouth once daily. 11/20/24 12/20/24  Terri Marcelino FNP       REVIEW OF SYSTEMS:   Except as documented, all other systems reviewed and negative     PHYSICAL EXAM:     VITAL SIGNS: 24 HRS MIN & MAX LAST   Temp  Min: 98.2 °F (36.8 °C)  Max: 98.4 °F (36.9 °C) 98.4 °F (36.9 °C)   BP  Min: 108/70  Max: 133/88 108/70   Pulse  Min: 60  Max: 67  64   Resp  Min: 18  Max: 20 20   SpO2  Min: 97 %  Max: 100 % 99 %     General appearance: Well-developed, well-nourished male in no apparent distress.  HENT: Atraumatic head. Moist mucous membranes of oral cavity.  Eyes: Normal extraocular movements.   Neck: Supple.   Lungs: Clear to auscultation bilaterally. No wheezing present.   Heart: Regular rate and rhythm. S1 and S2 present with no murmurs/gallop/rub. No pedal edema. No JVD present.   Abdomen: Soft, distended, non-tender. No rebound tenderness/guarding. Bowel sounds are normal.   Extremities: No cyanosis, clubbing, bilateral lower extremity 2+ edema  Skin: No Rash.   Neuro: Motor and sensory exams grossly intact. Good tone. Muscle strength 5/5 in all 4 extremities  Psych/mental status: Appropriate mood and affect. Responds appropriately to questions.     LABS AND IMAGING:     Recent Labs   Lab 12/26/24  0757   WBC 9.78   RBC 3.54*   HGB  10.5*   HCT 33.0*   MCV 93.2   MCH 29.7   MCHC 31.8*   RDW 15.0      MPV 10.2       Recent Labs   Lab 12/26/24  0757      K 4.2      CO2 28   BUN 6.0*   CREATININE 0.80   CALCIUM 8.5   MG 1.60   ALBUMIN 2.6*   ALKPHOS 322*   ALT 46   AST 37*   BILITOT 0.3       Microbiology Results (last 7 days)       ** No results found for the last 168 hours. **             X-Ray Chest AP Portable  Narrative: EXAMINATION:  XR CHEST AP PORTABLE    CLINICAL HISTORY:  , Malignant neoplasm of pancreas, unspecified.    COMPARISON:  09/10/2024    FINDINGS:  An AP view or more reveals the heart to be normal in size.  The trachea is midline.  No definite infiltrate or effusion is seen.  Degenerative changes are noted to the thoracic spine.  Inspiration is less than optimal.  There is interim placement of a right central line/MediPort with the tip superimposed over the SVC.  Impression: 1. No active cardiopulmonary disease identified    Electronically signed by: Elvis Holden  Date:    12/26/2024  Time:    10:11      ASSESSMENT & PLAN:     Bilateral lower extremity edema   Abdominal distention   Stage III pancreatic cancer   Anemia   Volume overload-dry weight 140 lb and currently weighs 160 lb  Hypertension   Diabetes     Continue Lasix 40 mg IV b.i.d.   Check cardiac echo   Check bilateral lower extremity ultrasound   Check daily weight   Check CBC BMP in a.m.    VTE Prophylaxis: will be placed on Heparin/Lovenox/ Xarelto/ SCD for DVT prophylaxis and will be advised to be as mobile as possible and sit in a chair as tolerated    Patient condition:  Stable/Fair/Guarded/ Serious/ Critical    __________________________________________________________________________  INPATIENT LIST OF MEDICATIONS     Scheduled Meds:   apixaban  5 mg Oral BID    furosemide (LASIX) injection  40 mg Intravenous Q12H    mupirocin   Nasal BID    pregabalin  100 mg Oral BID    vibegron  75 mg Oral Daily     Continuous Infusions:  PRN  Meds:.  Current Facility-Administered Medications:     melatonin, 6 mg, Oral, Nightly PRN    oxyCODONE-acetaminophen, 1 tablet, Oral, Q6H PRN    sodium chloride 0.9%, 10 mL, Intravenous, PRN      I, _ NP/PA have reviewed and discussed the case with  _   Please see the following addendum for further assessment and plan from there attending MD.    12/26/2024    ________________________________________________________________________________    MD Addendum:  IDr. ---assumed care of this patient today at ---am/pm  For the patient encounter, I performed the substantive portion of the visit, I reviewed the NP/PA documentation, treatment plan, and medical decision making.  I had face to face time with this patient     A. History:    B. Physical exam:    C. Medical decision making:    Discharge Planning and Disposition: No mobility needs. Ambulating well. Good social support system.   Anticipated discharge    If patient was admitted under observational status it is with my approval/permission.        All diagnosis and differential diagnosis have been reviewed; assessment and plan has been documented; I have personally reviewed the labs and test results that are presently available; I have reviewed the patients medication list; I have reviewed the consulting providers response and recommendations. I have reviewed or attempted to review medical records based upon their availability.    All of the patient and family questions have been addressed and answered. Patient's is agreeable to the above stated plan. I will continue to monitor closely and make adjustments to medical management as needed.    If patient was admitted under observational status it is with my approval/permission.      Tristin Gambino MD   12/26/2024

## 2024-12-26 NOTE — PLAN OF CARE
12/26/24 1613   Discharge Assessment   Assessment Type Discharge Planning Assessment   Confirmed/corrected address, phone number and insurance Yes   Confirmed Demographics Correct on Facesheet   Source of Information patient   Communicated MATTHIEU with patient/caregiver Date not available/Unable to determine   Reason For Admission Pancreatic cancer   People in Home spouse   Do you expect to return to your current living situation? Yes   Do you have help at home or someone to help you manage your care at home? Yes   Who are your caregiver(s) and their phone number(s)? Danielle Lejeune   Prior to hospitilization cognitive status: Unable to Assess   Current cognitive status: Alert/Oriented   Walking or Climbing Stairs Difficulty no   Dressing/Bathing Difficulty yes   Dressing/Bathing bathing difficulty, assistance 1 person;dressing difficulty, assistance 1 person   Dressing/Bathing Management Wife assist PRN   Home Accessibility wheelchair accessible   Home Layout Able to live on 1st floor   Equipment Currently Used at Home none   Patient currently being followed by outpatient case management? No   Do you currently have service(s) that help you manage your care at home? No   Do you have prescription coverage? Yes   Coverage [employer]   Who is going to help you get home at discharge? Spouse   How do you get to doctors appointments? car, drives self;family or friend will provide   Are you on dialysis? No   Do you take coumadin? No   Discharge Plan A Home with family   Discharge Plan B Home with family   DME Needed Upon Discharge  none   Discharge Plan discussed with: Patient   Transition of Care Barriers None   OTHER   Name(s) of People in Home Danielle Lejeune     Patient will need PCP upon dc. Currently chemo treatment is on hold due to swelling. Treatment was every other week at the infusion center. Oncologist is Dr. Messina

## 2024-12-26 NOTE — ED PROVIDER NOTES
Encounter Date: 12/26/2024       History     Chief Complaint   Patient presents with    Leg Swelling     C/o generalized swelling x 1 week. Pt is a cancer pt and he has been on lasix PO for the past week but it is not helping so he was sent to ED for eval.     HPI  59-year-old male history of pancreatic cancer, BPH, diabetes, portal vein thrombosis, hypertension referred from [facility] for increasing peripheral edema not controlled with oral Lasix.  Patient has a an 11 lb weight gain in one-week.  Family nurse practitioner states patient needs IV admission for IV Lasix given that they are unable to perform chemotherapy with his increasing edema and weight gain since they administer alot of fluids during these treatments.  Patient denies associated shortness of breath, chest pain, headache, fever, chills, abdominal pain, nausea, vomiting, diarrhea.  Patient with no history of CHF or CKD.  Review of patient's allergies indicates:  No Known Allergies  Past Medical History:   Diagnosis Date    Abdominal pain     Admission for chemotherapy     last chemo 9/12/24    Anxiety disorder, unspecified     Back pain     Bradycardia     Enlarged prostate     GERD (gastroesophageal reflux disease)     Hypertension     no cardiologist; no longer on meds since weight loss due to pancreatic cancer    Memory loss     Pancreatic cancer     Peripheral neuropathy     Type 2 diabetes mellitus with unspecified diabetic retinopathy without macular edema      Past Surgical History:   Procedure Laterality Date    bile duct stents times 2      EGD, WITH STENT INSERTION      INSERTION OF TUNNELED CENTRAL VENOUS CATHETER (CVC) WITH SUBCUTANEOUS PORT Right 9/20/2024    Procedure: [id number];  Surgeon: Moe Champagne MD;  Location: [facility];  Service: General;  Laterality: Right;    MEDIPORT REMOVAL      times 2     Family History   Problem Relation Name Age of Onset    Hypertension Mother      Stroke Father      Stroke Sister       Prostate cancer Brother       Social History     Tobacco Use    Smoking status: Never     Passive exposure: Never    Smokeless tobacco: Former     Types: Chew   Substance Use Topics    Alcohol use: Not Currently    Drug use: Never     Review of Systems   All other systems reviewed and are negative.      Physical Exam     Initial Vitals [12/26/24 0845]   BP Pulse Resp Temp SpO2   (!) 111/58 67 18 98.2 °F (36.8 °C) 100 %      MAP       --         Physical Exam    Nursing note and vitals reviewed.  Constitutional: He appears well-developed and well-nourished. He is cooperative.   HENT:   Head: Normocephalic and atraumatic.   Nose: Nose normal. Mouth/Throat: Oropharynx is clear and moist.   Eyes: Conjunctivae and lids are normal.   Neck: Trachea normal. Neck supple.   Normal range of motion.  Cardiovascular:  Normal rate, regular rhythm, normal heart sounds and intact distal pulses.           Pulmonary/Chest: Breath sounds normal. He has no wheezes. He has no rales.   Abdominal: Abdomen is soft. Bowel sounds are normal. He exhibits distension. He exhibits no mass. There is no abdominal tenderness. There is no rebound and no guarding.   Musculoskeletal:         General: Edema present. Normal range of motion.      Cervical back: Normal range of motion and neck supple.     Neurological: He is alert and oriented to person, place, and time. He displays normal reflexes. No cranial nerve deficit or sensory deficit. GCS score is 15. GCS eye subscore is 4. GCS verbal subscore is 5. GCS motor subscore is 6.   Skin: Skin is warm and dry. No rash noted.   Psychiatric: He has a normal mood and affect. His speech is normal and behavior is normal.         ED Course   Procedures  Labs Reviewed   URINALYSIS - Normal       Result Value    Color, UA Yellow      Appearance, UA Clear      Specific Gravity, UA 1.015      pH, UA 6.0      Protein, UA Negative      Glucose, UA Negative      Ketones, UA Negative      Blood, UA Negative       Bilirubin, UA Negative      Urobilinogen, UA 0.2      Nitrites, UA Negative      Leukocyte Esterase, UA Negative     B-TYPE NATRIURETIC PEPTIDE - Normal    Natriuretic Peptide 49.7       EKG Readings: (Independently Interpreted)   Sinus bradycardia 59, nonspecific T-wave changes inferiorly, no acute ST elevation or ST depressions, normal axis.     ECG Results              EKG 12-lead (Final result)        Collection Time Result Time QRS Duration OHS QTC Calculation    12/26/24 08:49:26 12/26/24 11:26:41 86 403                     Final result by Interface, Lab In [facility] (12/26/24 11:26:47)                   Narrative:    Test Reason : C25.9,    Vent. Rate :  59 BPM     Atrial Rate :  59 BPM     P-R Int : 164 ms          QRS Dur :  86 ms      QT Int : 408 ms       P-R-T Axes : -17  58 -19 degrees    QTcB Int : 403 ms    Sinus bradycardia  T wave abnormality, consider inferior ischemia  Abnormal ECG  No previous ECGs available  Confirmed by Michael Guillen ([id number]) on 12/26/2024 11:26:37 AM    Referred By: AAAREFERRAL SELF           Confirmed By: Michael Guillen                                  Imaging Results              X-Ray Chest AP Portable (Final result)  Result time 12/26/24 10:11:46      Final result by Elvis Holden MD (12/26/24 10:11:46)                   Impression:      1. No active cardiopulmonary disease identified      Electronically signed by: Elvis Holden  Date:    12/26/2024  Time:    10:11               Narrative:    EXAMINATION:  XR CHEST AP PORTABLE    CLINICAL HISTORY:  , Malignant neoplasm of pancreas, unspecified.    COMPARISON:  09/10/2024    FINDINGS:  An AP view or more reveals the heart to be normal in size.  The trachea is midline.  No definite infiltrate or effusion is seen.  Degenerative changes are noted to the thoracic spine.  Inspiration is less than optimal.  There is interim placement of a right central line/MediPort with the tip superimposed over the SVC.                                        Medications   sodium chloride 0.9% flush 10 mL (has no administration in time range)   melatonin tablet 6 mg (6 mg Oral Given 12/26/24 2105)   mupirocin 2 % ointment (1 g Nasal Given 12/26/24 2105)   furosemide injection 40 mg (40 mg Intravenous Given 12/27/24 0149)   apixaban tablet 5 mg (5 mg Oral Given 12/26/24 2105)   pregabalin capsule 100 mg (100 mg Oral Given 12/26/24 2105)   vibegron Tab 75 mg (75 mg Oral Not Given 12/26/24 1445)   oxyCODONE-acetaminophen 5-325 mg per tablet 2 tablet (2 tablets Oral Given 12/26/24 2154)   glucose chewable tablet 16 g (has no administration in time range)   glucose chewable tablet 24 g (has no administration in time range)   dextrose 50% injection 12.5 g (has no administration in time range)   dextrose 50% injection 25 g (has no administration in time range)   glucagon (human recombinant) injection 1 mg (has no administration in time range)   insulin aspart U-100 injection 0-5 Units (1 Units Subcutaneous Given 12/26/24 2155)   pantoprazole injection 40 mg (40 mg Intravenous Given 12/26/24 2105)   furosemide injection 40 mg (40 mg Intravenous Given 12/26/24 0923)     Medical Decision Making  Amount and/or Complexity of Data Reviewed  Labs: ordered.  Radiology: ordered.    Risk  OTC drugs.  Prescription drug management.  Decision regarding hospitalization.    59-year-old male history of pancreatic cancer, BPH, diabetes, hypertension, peripheral neuropathy, portal vein thrombosis referred from Cancer Center for IV Lasix given that the patient is continuing to retained fluid with more peripheral edema in spite of oral Lasix therapy.  Patient with no complaints of shortness of breath, chest pain, fever chills.  Labs remarkable for hemoglobin 10.5, hematocrit 33.0, glucose 175, alk phos 322.  BNP pending.  EKG with no acute ischemic changes.  Chest x-ray with no acute infiltrates or effusions, no cardiomegaly.  Patient received Lasix 40 mg here in ED and will be  admitted to the hospitalist service for continued IV diuresis.                                  Clinical Impression:  Final diagnoses:  [C25.9] Pancreatic cancer          ED Disposition Condition    Admit                 Mustapha Montero MD  12/27/24 0703

## 2024-12-27 VITALS
BODY MASS INDEX: 21.98 KG/M2 | HEART RATE: 67 BPM | DIASTOLIC BLOOD PRESSURE: 58 MMHG | OXYGEN SATURATION: 93 % | SYSTOLIC BLOOD PRESSURE: 108 MMHG | TEMPERATURE: 98 F | WEIGHT: 145 LBS | HEIGHT: 68 IN | RESPIRATION RATE: 20 BRPM

## 2024-12-27 LAB
ANION GAP SERPL CALC-SCNC: 8 MEQ/L
AORTIC ROOT ANNULUS: 3.81 CM
AORTIC VALVE CUSP SEPERATION: 1.17 CM
AV PEAK GRADIENT: 9 MMHG
BSA FOR ECHO PROCEDURE: 1.88 M2
BUN SERPL-MCNC: 8 MG/DL (ref 8.4–25.7)
CALCIUM SERPL-MCNC: 8.4 MG/DL (ref 8.4–10.2)
CHLORIDE SERPL-SCNC: 96 MMOL/L (ref 98–107)
CO2 SERPL-SCNC: 30 MMOL/L (ref 22–29)
CREAT SERPL-MCNC: 0.88 MG/DL (ref 0.72–1.25)
CREAT/UREA NIT SERPL: 9
CV ECHO LV RWT: 0.5 CM
DOP CALC AO PEAK VEL: 1.5 M/S
E WAVE DECELERATION TIME: 239.73 MSEC
E/A RATIO: 1.18
ECHO LV POSTERIOR WALL: 0.9 CM (ref 0.6–1.1)
FRACTIONAL SHORTENING: 36.1 % (ref 28–44)
GFR SERPLBLD CREATININE-BSD FMLA CKD-EPI: >60 ML/MIN/1.73/M2
GLUCOSE SERPL-MCNC: 370 MG/DL (ref 74–100)
INTERVENTRICULAR SEPTUM: 0.9 CM (ref 0.6–1.1)
LEFT INTERNAL DIMENSION IN SYSTOLE: 2.3 CM (ref 2.1–4)
LEFT VENTRICLE DIASTOLIC VOLUME INDEX: 29.82 ML/M2
LEFT VENTRICLE DIASTOLIC VOLUME: 53.08 ML
LEFT VENTRICLE MASS INDEX: 52.1 G/M2
LEFT VENTRICLE SYSTOLIC VOLUME INDEX: 10.4 ML/M2
LEFT VENTRICLE SYSTOLIC VOLUME: 18.59 ML
LEFT VENTRICULAR INTERNAL DIMENSION IN DIASTOLE: 3.6 CM (ref 3.5–6)
LEFT VENTRICULAR MASS: 92.8 G
LVED V (TEICH): 53.08 ML
LVES V (TEICH): 18.59 ML
MV PEAK A VEL: 0.74 M/S
MV PEAK E VEL: 0.87 M/S
MV STENOSIS PRESSURE HALF TIME: 69.52 MS
MV VALVE AREA P 1/2 METHOD: 3.16 CM2
OHS CV RV/LV RATIO: 0.81 CM
OHS LV EJECTION FRACTION SIMPSONS BIPLANE MOD: 64 %
PISA MRMAX VEL: 4.28 M/S
PISA TR MAX VEL: 2.28 M/S
POCT GLUCOSE: 92 MG/DL (ref 70–110)
POTASSIUM SERPL-SCNC: 4.6 MMOL/L (ref 3.5–5.1)
PV PEAK GRADIENT: 3 MMHG
PV PEAK VELOCITY: 0.93 M/S
RA PRESSURE ESTIMATED: 3 MMHG
RIGHT VENTRICLE DIASTOLIC BASEL DIMENSION: 2.9 CM
RIGHT VENTRICULAR END-DIASTOLIC DIMENSION: 2.86 CM
RV TB RVSP: 5 MMHG
SODIUM SERPL-SCNC: 134 MMOL/L (ref 136–145)
TR MAX PG: 21 MMHG
TRICUSPID VALVE PEAK A WAVE VELOCITY: 0.73 M/S
TV PEAK E VEL: 0.6 M/S
TV REST PULMONARY ARTERY PRESSURE: 24 MMHG
TV STENOSIS PRESSURE HALF TIME: 57.94 MS
TV VALVE AREA P 1/2 METHOD: 3.28 CM2
Z-SCORE OF LEFT VENTRICULAR DIMENSION IN END DIASTOLE: -3.12
Z-SCORE OF LEFT VENTRICULAR DIMENSION IN END SYSTOLE: -2.21

## 2024-12-27 PROCEDURE — 80048 BASIC METABOLIC PNL TOTAL CA: CPT | Performed by: INTERNAL MEDICINE

## 2024-12-27 PROCEDURE — 96376 TX/PRO/DX INJ SAME DRUG ADON: CPT | Mod: 59

## 2024-12-27 PROCEDURE — G0378 HOSPITAL OBSERVATION PER HR: HCPCS

## 2024-12-27 PROCEDURE — 96372 THER/PROPH/DIAG INJ SC/IM: CPT | Performed by: INTERNAL MEDICINE

## 2024-12-27 PROCEDURE — 94761 N-INVAS EAR/PLS OXIMETRY MLT: CPT

## 2024-12-27 PROCEDURE — 25000003 PHARM REV CODE 250: Performed by: INTERNAL MEDICINE

## 2024-12-27 PROCEDURE — 63600175 PHARM REV CODE 636 W HCPCS: Performed by: INTERNAL MEDICINE

## 2024-12-27 PROCEDURE — 25000003 PHARM REV CODE 250: Performed by: EMERGENCY MEDICINE

## 2024-12-27 PROCEDURE — 36415 COLL VENOUS BLD VENIPUNCTURE: CPT | Performed by: INTERNAL MEDICINE

## 2024-12-27 RX ADMIN — MUPIROCIN 1 G: 20 OINTMENT TOPICAL at 09:12

## 2024-12-27 RX ADMIN — FUROSEMIDE 40 MG: 10 INJECTION, SOLUTION INTRAMUSCULAR; INTRAVENOUS at 01:12

## 2024-12-27 RX ADMIN — PANTOPRAZOLE SODIUM 40 MG: 40 INJECTION, POWDER, LYOPHILIZED, FOR SOLUTION INTRAVENOUS at 08:12

## 2024-12-27 RX ADMIN — INSULIN ASPART 5 UNITS: 100 INJECTION, SOLUTION INTRAVENOUS; SUBCUTANEOUS at 12:12

## 2024-12-27 RX ADMIN — APIXABAN 5 MG: 5 TABLET, FILM COATED ORAL at 09:12

## 2024-12-27 RX ADMIN — PREGABALIN 100 MG: 25 CAPSULE ORAL at 09:12

## 2024-12-27 NOTE — DISCHARGE SUMMARY
[facility] Medicine discharge summary       Patient Name: Warren P Lejeune  MRN: [id number]  Patient Class: IP- Inpatient   Admission Date: 12/26/2024   Admitting Physician: VIJAY Service   Length of Stay: 1  Attending Physician: Tristin Gambino MD  Primary Care Provider: Tristin Gambino MD  Face-to-Face encounter date: 12/27/2024  Code Status:  Chief Complaint: Leg Swelling (C/o generalized swelling x 1 week. Pt is a cancer pt and he has been on lasix PO for the past week but it is not helping so he was sent to ED for eval.)      Screening for Social Drivers for health:  Patient screened for food insecurity, housing instability, transportation needs, utility difficulties, and interpersonal safety (select all that apply as identified as concern)  []Housing or Food  []Transportation Needs  []Utility Difficulties  []Interpersonal safety  []None      Patient information was obtained from patient, patient's family, past medical records and ER records.  ED records were reviewed in detail and documented below    HISTORY OF PRESENT ILLNESS:   Warren P Lejeune is a 59 y.o. male who  has a past medical history of Abdominal pain, Admission for chemotherapy, Anxiety disorder, unspecified, Back pain, Bradycardia, Enlarged prostate, GERD (gastroesophageal reflux disease), Hypertension, Memory loss, Pancreatic cancer, Peripheral neuropathy, and Type 2 diabetes mellitus with unspecified diabetic retinopathy without macular edema..     A 59 years old male history of stage III pancreatic cancer presented to emergency room complain of lower extremity edema and abdominal distention for 3 weeks     Oncology tried Lasix 20 mg p.o. q.d. for 1 week but it did not help     Patient's dry weight is 140 lb currently patient will 160 lb     Patient was diagnosed with a stage III pancreatic cancer 2 years ago and treated chemotherapy at emergency room, WBC 9, hemoglobin 10, BUN 6, creatinine 0.8, BNP 49      Chest x-ray was unremarkable     Patient was admitted to the hospital for bilateral lower extremity edema and then abdominal distention    December 27, 2024-patient was treated with Lasix 40 mg IV b.i.d.     The patient had 8000 cc of urine output, body weight decreased to 145 from 162 lb     The patient is doing well, no fever, no shortness for breath, no nausea     Bilateral lower extremity ultrasound shows no deep vein thrombosis     Patient could not get the cardiac echo secondary to holiday    Patient can go home today and have a follow-up with the Oncology  PAST MEDICAL HISTORY:     Past Medical History:   Diagnosis Date    Abdominal pain     Admission for chemotherapy     last chemo 9/12/24    Anxiety disorder, unspecified     Back pain     Bradycardia     Enlarged prostate     GERD (gastroesophageal reflux disease)     Hypertension     no cardiologist; no longer on meds since weight loss due to pancreatic cancer    Memory loss     Pancreatic cancer     Peripheral neuropathy     Type 2 diabetes mellitus with unspecified diabetic retinopathy without macular edema        PAST SURGICAL HISTORY:     Past Surgical History:   Procedure Laterality Date    bile duct stents times 2      EGD, WITH STENT INSERTION      INSERTION OF TUNNELED CENTRAL VENOUS CATHETER (CVC) WITH SUBCUTANEOUS PORT Right 9/20/2024    Procedure: [id number];  Surgeon: Moe Champagne MD;  Location: [facility];  Service: General;  Laterality: Right;    MEDIPORT REMOVAL      times 2       ALLERGIES:   Patient has no known allergies.    FAMILY HISTORY:   Reviewed and negative    SOCIAL HISTORY:     Social History     Tobacco Use    Smoking status: Never     Passive exposure: Never    Smokeless tobacco: Former     Types: Chew   Substance Use Topics    Alcohol use: Not Currently        HOME MEDICATIONS:     Prior to Admission medications    Medication Sig Start Date End Date Taking? Authorizing Provider   aluminum & magnesium  hydroxide-simethicone (MYLANTA MAX STRENGTH) 400-400-40 mg/5 mL suspension Take 5 mLs by mouth 4 (four) times daily as needed (mouth sores). 9/18/24 9/18/25 Yes Terri Marcelino FNP   apixaban (ELIQUIS) 5 mg Tab Take 1 tablet (5 mg total) by mouth 2 (two) times daily. Take 2 tablets by mouth (10 mg) twice a day for 7 days, then 1 tablet (5 mg total) by mouth twice a day thereafter 10/29/24  Yes Jena Messina MD   cholecalciferol, vitamin D3, (VITAMIN D3) 50 mcg (2,000 unit) Tab Take 2,000 Units by mouth once daily.   Yes Provider, Historical   dexAMETHasone (DECADRON) 4 MG Tab TAKE 2 TABLETS BY MOUTH ONCE DAILY AS DIRECTED ON DAYS 2 AND 3 OF CHEMOTHERAPY CYCLE 11/26/24  Yes Provider, Historical   diphenhydrAMINE (BENADRYL) 12.5 mg/5 mL elixir Take 5 mLs (12.5 mg total) by mouth 4 (four) times daily as needed (mouth sores). 9/18/24  Yes Terri Marcelino FNP   pantoprazole (PROTONIX) 40 MG tablet Take 1 tablet (40 mg total) by mouth once daily. 12/26/24 3/26/25 Yes Abby Bah FNP   pregabalin (LYRICA) 100 MG capsule Take 1 capsule (100 mg total) by mouth 2 (two) times daily. 12/18/24 7/16/25 Yes Abby Bah FNP   prochlorperazine (COMPAZINE) 10 MG tablet Take 10 mg by mouth. 6/12/24  Yes Provider, Historical   TRESIBA FLEXTOUCH U-200 200 unit/mL (3 mL) insulin pen Inject 14 Units into the skin. 3/12/24  Yes Provider, Historical   vibegron (GEMTESA) 75 mg Tab Take 75 mg by mouth.   Yes Provider, Historical   furosemide (LASIX) 20 MG tablet Take 1 tablet (20 mg total) by mouth once daily. As needed for swelling 12/18/24 12/18/25  Abby Bah FNP   insulin aspart U-100 (NOVOLOG) 100 unit/mL injection Inject into the skin 3 (three) times daily before meals. prn    Provider, Historical   lactulose (CHRONULAC) 20 gram/30 mL Soln Take 15 mLs (10 g total) by mouth 2 (two) times daily. 12/4/24 1/3/25  Terri Marcelino, ERNA   LIDOcaine viscous HCl 2% (LIDOCAINE VISCOUS) 2 % Soln by Mucous  Membrane route 4 (four) times daily as needed (mouth sores). Swish and spit 15 ml (5 ml benadryl, 5 ml mylanta, 5 ml lidocaine) by mouth 4 times daily as needed for mouth sores 9/18/24   Terri Marcelino FNP   magnesium oxide (MAGOX) 400 mg (241.3 mg magnesium) tablet Take 1 tablet (400 mg total) by mouth once daily. 12/18/24 12/18/25  Abby Bah FNP   morphine (MS CONTIN) 15 MG 12 hr tablet Take 1 tablet (15 mg total) by mouth 2 (two) times daily. 10/24/24   Sri Pacheco NP   multivitamin with folic acid 400 mcg Tab Take 1 tablet by mouth once daily.    Provider, Historical   OLANZapine (ZYPREXA) 5 MG tablet Take 1 tablet (5 mg total) by mouth nightly. 9/9/24 10/7/24  Danielle Bryant NP   ondansetron (ZOFRAN) 8 MG tablet Take 8 mg by mouth. 6/12/24   Provider, Historical   oxyCODONE-acetaminophen (PERCOCET)  mg per tablet Take 1 tablet by mouth every 6 (six) hours as needed for Pain. 12/18/24   Abby Bah FNP   polyethylene glycol (GLYCOLAX) 17 gram PwPk Take 17 g by mouth. 8/9/24   Provider, Historical   tamsulosin (FLOMAX) 0.4 mg Cap Take 1 capsule (0.4 mg total) by mouth once daily. 11/20/24 12/20/24  Terri Marcelino FNP       REVIEW OF SYSTEMS:   Except as documented, all other systems reviewed and negative     PHYSICAL EXAM:     VITAL SIGNS: 24 HRS MIN & MAX LAST   Temp  Min: 97.5 °F (36.4 °C)  Max: 98.9 °F (37.2 °C) 97.5 °F (36.4 °C)   BP  Min: 93/58  Max: 131/90 95/61   Pulse  Min: 57  Max: 95  74   Resp  Min: 20  Max: 20 20   SpO2  Min: 93 %  Max: 99 % 95 %     General appearance: Well-developed, well-nourished male in no apparent distress.  HENT: Atraumatic head. Moist mucous membranes of oral cavity.  Eyes: Normal extraocular movements.   Neck: Supple.   Lungs: Clear to auscultation bilaterally. No wheezing present.   Heart: Regular rate and rhythm. S1 and S2 present with no murmurs/gallop/rub. No pedal edema. No JVD present.   Abdomen: Soft, distended, non-tender. No  rebound tenderness/guarding. Bowel sounds are normal.   Extremities: No cyanosis, clubbing, bilateral lower extremity 1+ edema  Skin: No Rash.   Neuro: Motor and sensory exams grossly intact. Good tone. Muscle strength 5/5 in all 4 extremities  Psych/mental status: Appropriate mood and affect. Responds appropriately to questions.     LABS AND IMAGING:     Recent Labs   Lab 12/26/24  0757   WBC 9.78   RBC 3.54*   HGB 10.5*   HCT 33.0*   MCV 93.2   MCH 29.7   MCHC 31.8*   RDW 15.0      MPV 10.2       Recent Labs   Lab 12/26/24  0757      K 4.2      CO2 28   BUN 6.0*   CREATININE 0.80   CALCIUM 8.5   MG 1.60   ALBUMIN 2.6*   ALKPHOS 322*   ALT 46   AST 37*   BILITOT 0.3       Microbiology Results (last 7 days)       ** No results found for the last 168 hours. **             US Lower Extremity Veins Bilateral  Narrative: EXAMINATION:  VENOUS ULTRASOUND OF  THE BILATERAL LOWER EXTREMITY:    CLINICAL HISTORY:  , Lower extremity edema;    COMPARISON:  None available    FINDINGS:  There is normal compression and flow noted in the  common femoral vein, superficial femoral vein, popliteal vein, and  posterior tibial veinbilaterally.  No evidence of deep venous thrombosis is identified. Edema is evident within the subcutaneous tissue of the lower extremities bilaterally.  Impression: 1. No deep venous thrombosis identified to the lower extremities bilaterally.  2. Edema present in the subcutaneous tissue of the lower extremities bilaterally    Electronically signed by: Elvis Holden  Date:    12/26/2024  Time:    16:32  X-Ray Chest AP Portable  Narrative: EXAMINATION:  XR CHEST AP PORTABLE    CLINICAL HISTORY:  , Malignant neoplasm of pancreas, unspecified.    COMPARISON:  09/10/2024    FINDINGS:  An AP view or more reveals the heart to be normal in size.  The trachea is midline.  No definite infiltrate or effusion is seen.  Degenerative changes are noted to the thoracic spine.  Inspiration is less than  optimal.  There is interim placement of a right central line/MediPort with the tip superimposed over the SVC.  Impression: 1. No active cardiopulmonary disease identified    Electronically signed by: Elvis Holden  Date:    12/26/2024  Time:    10:11      ASSESSMENT & PLAN:   Discharge summary  Bilateral lower extremity edema   Abdominal distention   Stage III pancreatic cancer   Anemia   Volume overload-dry weight 140 lb and currently weighs 160 lb  Hypertension   Diabetes     Discharge condition-stable     Discharge destination-home-discharge time 30 minutes        VTE Prophylaxis: will be placed on Heparin/Lovenox/ Xarelto/ SCD for DVT prophylaxis and will be advised to be as mobile as possible and sit in a chair as tolerated    Patient condition:  Stable/Fair/Guarded/ Serious/ Critical    __________________________________________________________________________  INPATIENT LIST OF MEDICATIONS     Scheduled Meds:   apixaban  5 mg Oral BID    furosemide (LASIX) injection  40 mg Intravenous Q12H    mupirocin   Nasal BID    pantoprazole  40 mg Intravenous Q12H    pregabalin  100 mg Oral BID    vibegron  75 mg Oral Daily     Continuous Infusions:  PRN Meds:.  Current Facility-Administered Medications:     dextrose 50%, 12.5 g, Intravenous, PRN    dextrose 50%, 25 g, Intravenous, PRN    glucagon (human recombinant), 1 mg, Intramuscular, PRN    glucose, 16 g, Oral, PRN    glucose, 24 g, Oral, PRN    insulin aspart U-100, 0-5 Units, Subcutaneous, QID (AC + HS) PRN    melatonin, 6 mg, Oral, Nightly PRN    oxyCODONE-acetaminophen, 2 tablet, Oral, Q6H PRN    sodium chloride 0.9%, 10 mL, Intravenous, PRN      I, _ NP/PA have reviewed and discussed the case with  _   Please see the following addendum for further assessment and plan from there attending MD.    12/27/2024    ________________________________________________________________________________    MD Addendum:  Dr. STORM ---assumed care of this patient today at  ---am/pm  For the patient encounter, I performed the substantive portion of the visit, I reviewed the NP/PA documentation, treatment plan, and medical decision making.  I had face to face time with this patient     A. History:    B. Physical exam:    C. Medical decision making:    Discharge Planning and Disposition: No mobility needs. Ambulating well. Good social support system.   Anticipated discharge    If patient was admitted under observational status it is with my approval/permission.        All diagnosis and differential diagnosis have been reviewed; assessment and plan has been documented; I have personally reviewed the labs and test results that are presently available; I have reviewed the patients medication list; I have reviewed the consulting providers response and recommendations. I have reviewed or attempted to review medical records based upon their availability.    All of the patient and family questions have been addressed and answered. Patient's is agreeable to the above stated plan. I will continue to monitor closely and make adjustments to medical management as needed.    If patient was admitted under observational status it is with my approval/permission.      Tristin Gambino MD   12/27/2024

## 2024-12-27 NOTE — PLAN OF CARE
Problem: Adult Inpatient Plan of Care  Goal: Plan of Care Review  Outcome: Progressing  Goal: Patient-Specific Goal (Individualized)  Outcome: Progressing  Goal: Absence of Hospital-Acquired Illness or Injury  Outcome: Progressing  Goal: Optimal Comfort and Wellbeing  Outcome: Progressing  Goal: Readiness for Transition of Care  Outcome: Progressing     Problem: Infection  Goal: Absence of Infection Signs and Symptoms  Outcome: Progressing     Problem: Wound  Goal: Optimal Coping  Outcome: Progressing  Goal: Optimal Functional Ability  Outcome: Progressing  Goal: Absence of Infection Signs and Symptoms  Outcome: Progressing  Goal: Improved Oral Intake  Outcome: Progressing  Goal: Optimal Pain Control and Function  Outcome: Progressing  Goal: Skin Health and Integrity  Outcome: Progressing  Goal: Optimal Wound Healing  Outcome: Progressing     Problem: Pain Acute  Goal: Optimal Pain Control and Function  Reactivated     Problem: Comorbidity Management  Goal: Blood Pressure in Desired Range  Reactivated

## 2024-12-28 LAB — POCT GLUCOSE: 427 MG/DL (ref 70–110)

## 2024-12-30 ENCOUNTER — PATIENT OUTREACH (OUTPATIENT)
Dept: ADMINISTRATIVE | Facility: CLINIC | Age: 59
End: 2024-12-30
Payer: MEDICARE

## 2024-12-30 NOTE — H&P (VIEW-ONLY)
HEMATOLOGY/ONCOLOGY OFFICE CLINIC VISIT    Visit Information:    Initial Evaluation: 9/3/2024  Referring Provider: Alba Chisholm MD PhD ([facility]) -Cell [phone]  Other providers:  Code status: Not addressed    Diagnosis:  Advanced pancreatic ductal adenocarcinoma (Dx 8/2022)     Present treatment:  Gemzar monotherapy 8/28/2024  -Gemcitabine and CISplatin Every 2 Weeks (8/28/2024-present)--planned  Chemotherapy summary: CISplatin (PLATINOL) 53 mg in sodium chloride 0.9% (NS) 250 mL IVPB, intravenous, 0 of 12 cycles  gemcitabine (GEMZAR) 836 mg in sodium chloride 0.9% (NS) 250 mL IVPB, intravenous, 0 of 12 cycles   - Eliquis 10 mg BID x 7 days then 5 mg BID- started 10/29/24--present    Treatment/Oncology history:  -8/2/2022 ERCP with metal biliary stent placement   -Folfirinox (10/2022- 2/2023)  -capecitabine RT (4/3/23 - 5/16/23)   -Phase I study [id number] SD-101 at [facility]--SD-101 2 mg in sodium chloride 0.9% (NS) 30 mL IVPB, intravenous x 2 cycles (8/22/2023-11/22/2023)  -Gemcitabine and paclitaxel protein bound (12/13/2023-- 7/2024)-->clinical progression and GOO  -s/p biliary stent placement 4/4/2024  -chemotherapy with gemcitabine/paclitaxel (last dose 6/26/2024)  -duodenal stent (placed by GI)- 8/7/2024  -Gemcitabine and CISplatin Every 2 Weeks (8/28/2024-present)      Goal of care: life prolongation    Imaging:  CT CAP 8/21/2022: Since 7/25/2022, the primary pancreatic head mass encasing the common hepatic artery remains stable. The intrahepatic biliary obstruction improved with interval placement of a CBD stent. No definite distant metastasis in the abdomen or pelvis. Small indeterminate left lower lobe pulmonary nodule can be followed.  CT CAP 12/12/2022:  Primary pancreatic head tumor encasing the common hepatic artery is overall unchanged in size since 8/21/2022. Stable upstream pancreatic duct dilation and pancreatic atrophy. Interval placement of a cholecystostomy tube, with decompression of the  gallbladder. No definite evidence of distant metastatic disease in the chest, abdomen, and pelvis.   CT CAP 3/7/2023: Locally advanced pancreatic head tumor is slightly less conspicuous. Stable portacaval lymphadenopathy. Geographic hypodense regions have significantly increased throughout the liver compatible with fatty liver, to be correlated clinically regarding any potential concomitant hepatotoxicity; this appearance could obscure small low contrast liver lesions, to be better evaluated with MRI, if indicated. Unchanged mild jennifer appearance of the omentum is favored to be postinflammatory. No definite distant metastatic disease within the chest abdomen pelvis.   Ct CAP 6/29/2023:1.  Locally advanced pancreatic head mass not significantly changed. 2.  Increased periportal haziness and central liver heterogeneity which may relate to radiation.   3.  Indeterminate inferior right hepatic hypodensity as described above. Punctate left hepatic hypodensity also not previously evident, too small to characterize. Consider further evaluation with MRI as indicated.    MRI AP 7/15/2023:Primary neoplasm is noted within the pancreatic head and causes pancreatic ductal dilation and biliary ductal obstruction, which is decompressed via a stent. Nonspecific focal dilation of segment IV bile duct is noted.    CT CAP 10/2/2023: 1.  Compared to 07/28/2023, interval embolization of SMV tributaries in the epigastric region with new embolization coils in the right hepatic lobe following transhepatic catheterization. 2.  Previously noted small indeterminate low-attenuation lesion in the inferior right hepatic lobe segment 6 is no longer visualized. No new hepatic lesions.    3.  Stable 2.2 cm ill-defined residual pancreatic head tumor with no change in the vascular contact with the main portal vein, common hepatic and proper hepatic artery. 4.  No new metastatic disease in the chest or abdomen.   CT CAP 1/9/2024: 1.  Mild interval  increase in size of the primary tumor in the head of the pancreas. 2.  No evidence of metastatic disease in the chest, abdomen or pelvis.   CT AP 4/3/2024: 1.  There is worsening intrahepatic biliary dilation despite the presence of a biliary stent, suggesting stent occlusion. 2.  No significant change in the locally advanced tumor of the head of the pancreas since 03/12/2024.   CT CAP 6/21/2024: 1. No convincing change in the large locally advanced infiltrating ill-defined mass in the head and neck of the pancreas compared with 5/14/2024. 2. No specific evidence of distant metastatic disease in the chest, abdomen or pelvis.   XR ABDOMEN 1 VW PORTABLE 8/6/2024:  There is a gastric drainage tube with the tip below the diaphragm projecting over the gastric body with the sidehole below the GE junction in appropriate position. Gastric drainage tube with tip and sidehole projecting below the diaphragm over the gastric body.  X-ray Abdomen AP  8/5/2024: Air is seen scattered throughout normal caliber colon in a nonobstructive pattern. No dilated loops of bowel. Moderate colonic stool burden. No intraperitoneal free air within the limitations of this study. A biliary stent and embolization coils overlie the right upper quadrant. No suspicious osseous lesions.   Nonobstructive bowel gas pattern. I personally reviewed these image(s) along with the resident's/fellow's interpretations, certify that if a procedure was performed I was physically present, and agree with the final report.  CT Abdomen Pelvis with Contrast 8/5/2024: No suspicious pulmonary nodules in the lung bases. Hepatobiliary: Evaluation of the liver is somewhat limited secondary to streak artifact from the embolization coils. Within these limits there is no suspicious hepatic lesion. The gallbladder is decompressed and poorly evaluated. Common bile duct stent with expected pneumobilia. Mild intrahepatic biliary ductal dilatation is not significantly changed.  Spleen: No splenomegaly. Pancreas: Ill-defined infiltrating mass of the pancreatic head and neck, in keeping with pancreatic adenocarcinoma. There is atrophy and ductal dilatation of the pancreatic body and tail. Overall this tumor appears larger Adrenal Glands: No mass. Kidneys, Ureters, Bladder: No hydronephrosis. No suspicious renal lesion. No bladder mass. Gastrointestinal Tract: The stomach is distended with fluid and debris, suggesting there may be a degree of gastric outlet obstruction secondary to the pancreatic tumor. This is not significantly changed. There is no downstream obstruction. Pelvic Organs: No pelvic mass. Prostatomegaly. Peritoneum/Retroperitoneum: No ascites. Lymph Nodes: No lymphadenopathy. Musculoskeletal: No suspicious skeletal lesion.     No acute abdominopelvic abnormality. Attending addendum: The pancreatic tumor appears larger compared to 06/21/2024. There is gastric distention with debris suggesting chronic outlet obstruction ACTIONABLE ITEMS/RECOMMENDATIONS*: None. *An Actionable Finding is a finding that may be unrelated to the original reason for imaging but potentially actionable, meaning further investigation may be necessary. The Actionable Findings Vigilance Unit (AFVU) assists medical providers with responding to additional radiologic findings that are unexpected and potentially actionable. I personally reviewed these image(s) along with the resident's/fellow's interpretations, certify that if a procedure was performed I was physically present, and agree with the final report.   CT CAP 10/24/24:  1. Ill-defined soft tissue fullness at the a hay hepatis and pancreatic head and uncinate process fabella bases history of pancreatic adenocarcinoma.  A few blebs of gas are identified which may represent gas within the common bile duct.  2. Intrahepatic biliary ductal dilatation identified with nonvisualization of the gallbladder  3. Beam hardening artifact due to embolization coils  at the liver  4. Small 10 x 6 filling defect at the portal vein suggesting a small thrombus  5. Diffuse hazy mucosal prominence at the descending and ascending colon.  This may be merely due to underdistention.  A underlying colitis cannot be excluded  6. Findings of constipation with contrast and fluid distended loops of small bowel diffusely throughout suggesting a mild ileus  7. Prostatomegaly with calcifications  8. Findings and other details as above  9. Comparison to previous exam would allow further evaluation.  MRI Brain 11/4/24:  1. Tiny (subcentimeter), occasional focal areas of increased signal intensity (on the FLAIR sequences) are noted within the deep white matter and gray-white matter junctions of both cerebral hemispheres (these are only visualized on the FLAIR sequence with no contrast enhancement noted on postcontrast images). I suspect the changes represent chronic ischemic changes, however, follow-up imaging in 3-6 months to ensure stability may be helpful in excluding the very remote possibility of tiny metastases if felt be clinically indicated.     Pathology:  8/2/2022:  FNA head pancreas: SCANT MALIGNANT CELLS, FAVOR ADENOCARCINOMA  NGS:  No detectable genomic aberrations      CLINICAL HISTORY:       Patient: Warren P Lejeune is a 59 y.o. male.with a past medical history of past medical history of pancreatic ductal adenocarcinoma (Dx 8/2022)   Patient initially presented with  unintentional weight loss starting in 1/2022. He also had worsening back/shoulder and abdominal pain during this time. In July he finally presented to his PCP with darkening of the urine and lightening of the stool - with Jaundice that his wife noticed. Labs demonstrating cholestasis (bilirubin of 19).    Patient noted to have an abnormal CT scan after developing obstructive jaundice. 2 cm hypoenhancing mass noted in the head of the pancreas with intra and extrahepatic biliary ductal dilatation. Mass measured 2.5 x 2.1  "cm. There is also atrophy of the distal gland and upstream dilatation of the pancreas duct. There is no obvious vascular invasion. There were no focal liver lesions.    8/2/22- EGD/EUS/FNA. Final pathology showed ductal adenocarcinoma, moderately differentiated. ERCP on the same date performed, with placement of a fully covered metal biliary stent, 60 mm x 10 mm.    7/27/22- CA 19-9 was 681    8/21/22- CT CAP: Since 7/25/2022, the primary pancreatic head mass encasing the common hepatic artery remains stable. The intrahepatic biliary obstruction improved with interval placement of a CBD stent. No definite distant metastasis in the abdomen or pelvis. Small indeterminate left lower lobe pulmonary nodule can be followed.    09/03/2022 Germline genetic testing: Negative for germline pathogenic variants in any of the analyzed genes, Genes Analyzed: APC, DALE, BMPR1A, BRCA1, BRCA2, CDKN2A, EPCAM, MEN1, MLH1, MSH2, MSH6, NF1, PALB2, PMS2, SMAD4, STK11, TP53, TSC1, TSC2, and VHL. Genetic testing performed by [employer]; full report available in scanned documents.     s/p biliary stent placement, currently undergoing chemotherapy with gemcitabine/paclitaxel (last dose 6/26/2024), T2DM on insulin presenting for abdominal pain and constipation. CT A/P with distension in stomach suggesting gastric outlet obstruction secondary to pancreatic tumor. GI and Surg Onc consulted, pending evaluation. Poor PO tolerance, deferring NG tube now as vomiting resolves and having bowel movements.     Patient was recently admitted to the hospital at [facility]--Hospital Course:  "Findings on imaging were concerning for malignant gastric outlet obstruction from pancreatic cancer. After a discussion with surgical oncology, GI and GIMO, decision was made to proceed with duodenal stent (placed by GI). No indications for gastrojejunostomy bypass. Patient tolerated procedure well. We were able to advance to low fiber diet. Nutrition was consulted for " "low fiber diet education. Managed neoplasm related pain with PO morphine."     Patient is here today with his wife to continue chemotherapy close to home.  He is doing relatively well and voices no concerns.  MediPort was removed from his left chest wall to exposure of the MediPort.  Patient reports abdominal mid back pain, occasional muscle spasm and dizziness.  No fever, chills, sweats.  No chest pain or short of breath.    Chief Complaint: Adenocarcinoma of pancreas (Pt is c/o abd bloating, weight gain, and BLE swelling. He rates his pain today and 8 out of 10. )      Interval History:  Patient returns to clinic for a hospital follow up, accompanied by his wife. He is not feeling well today. He has gained 14 lbs since his last visit. He was seen in ED on 12/26 for BLE edema and was given IV lasix. He states this did help relieve some fluid but since then has had fluid build back up. He continues with swelling in his abdomen and bilateral lower legs. He has been wearing compression stockings and elevating his legs regularly. He continues with abdominal and lower back pain, he takes Percocet and MS cont for this. He is taking Miralax and laxative for opioid induced constipation, and Pregabalin 100 mg BID for bilateral hand and feet neuropathy. He has an appointment with GI @ [facility] on 1/8/2025 to replace duodenal stent. Labs reviewed with patient in detail. He does report trouble swallowing and feeling as though food is getting stuck, scheduled for EGD 11/21/2024. He missed EGD due to ER visit but will call to reschedule.      Past Medical History:   Diagnosis Date    Abdominal pain     Admission for chemotherapy     last chemo 9/12/24    Anxiety disorder, unspecified     Back pain     Bradycardia     Enlarged prostate     GERD (gastroesophageal reflux disease)     Hypertension     no cardiologist; no longer on meds since weight loss due to pancreatic cancer    Memory loss     Pancreatic cancer     Peripheral " neuropathy     Type 2 diabetes mellitus with unspecified diabetic retinopathy without macular edema       Past Surgical History:   Procedure Laterality Date    bile duct stents times 2      EGD, WITH STENT INSERTION      INSERTION OF TUNNELED CENTRAL VENOUS CATHETER (CVC) WITH SUBCUTANEOUS PORT Right 9/20/2024    Procedure: [id number];  Surgeon: Moe Champagne MD;  Location: [facility];  Service: General;  Laterality: Right;    MEDIPORT REMOVAL      times 2     Family History   Problem Relation Name Age of Onset    Hypertension Mother      Stroke Father      Stroke Sister      Prostate cancer Brother            Review of patient's allergies indicates:  No Known Allergies   Current Outpatient Medications on File Prior to Visit   Medication Sig Dispense Refill    aluminum & magnesium hydroxide-simethicone (MYLANTA MAX STRENGTH) 400-400-40 mg/5 mL suspension Take 5 mLs by mouth 4 (four) times daily as needed (mouth sores). 335 mL 3    apixaban (ELIQUIS) 5 mg Tab Take 1 tablet (5 mg total) by mouth 2 (two) times daily. Take 2 tablets by mouth (10 mg) twice a day for 7 days, then 1 tablet (5 mg total) by mouth twice a day thereafter 74 tablet 3    cholecalciferol, vitamin D3, (VITAMIN D3) 50 mcg (2,000 unit) Tab Take 2,000 Units by mouth once daily.      dexAMETHasone (DECADRON) 4 MG Tab TAKE 2 TABLETS BY MOUTH ONCE DAILY AS DIRECTED ON DAYS 2 AND 3 OF CHEMOTHERAPY CYCLE      diphenhydrAMINE (BENADRYL) 12.5 mg/5 mL elixir Take 5 mLs (12.5 mg total) by mouth 4 (four) times daily as needed (mouth sores). 236 mL 3    furosemide (LASIX) 20 MG tablet Take 1 tablet (20 mg total) by mouth once daily. As needed for swelling 30 tablet 0    insulin aspart U-100 (NOVOLOG) 100 unit/mL injection Inject into the skin 3 (three) times daily before meals. prn      lactulose (CHRONULAC) 20 gram/30 mL Soln Take 15 mLs (10 g total) by mouth 2 (two) times daily. 900 mL 0    LIDOcaine viscous HCl 2% (LIDOCAINE VISCOUS) 2 % Soln by  Mucous Membrane route 4 (four) times daily as needed (mouth sores). Swish and spit 15 ml (5 ml benadryl, 5 ml mylanta, 5 ml lidocaine) by mouth 4 times daily as needed for mouth sores 100 mL 3    magnesium oxide (MAGOX) 400 mg (241.3 mg magnesium) tablet Take 1 tablet (400 mg total) by mouth once daily. 90 tablet 2    morphine (MS CONTIN) 15 MG 12 hr tablet Take 1 tablet (15 mg total) by mouth 2 (two) times daily. 30 tablet 0    multivitamin with folic acid 400 mcg Tab Take 1 tablet by mouth once daily.      ondansetron (ZOFRAN) 8 MG tablet Take 8 mg by mouth.      oxyCODONE-acetaminophen (PERCOCET)  mg per tablet Take 1 tablet by mouth every 6 (six) hours as needed for Pain. 60 tablet 0    pantoprazole (PROTONIX) 40 MG tablet Take 1 tablet (40 mg total) by mouth once daily. 30 tablet 2    polyethylene glycol (GLYCOLAX) 17 gram PwPk Take 17 g by mouth.      pregabalin (LYRICA) 100 MG capsule Take 1 capsule (100 mg total) by mouth 2 (two) times daily. 60 capsule 6    prochlorperazine (COMPAZINE) 10 MG tablet Take 10 mg by mouth.      TRESIBA FLEXTOUCH U-200 200 unit/mL (3 mL) insulin pen Inject 14 Units into the skin.      vibegron (GEMTESA) 75 mg Tab Take 75 mg by mouth.      OLANZapine (ZYPREXA) 5 MG tablet Take 1 tablet (5 mg total) by mouth nightly. 4 tablet 6    tamsulosin (FLOMAX) 0.4 mg Cap Take 1 capsule (0.4 mg total) by mouth once daily. 30 capsule 0     No current facility-administered medications on file prior to visit.      Review of Systems   Constitutional:  Positive for appetite change and fatigue. Negative for activity change, chills, diaphoresis, fever and unexpected weight change.   HENT:  Positive for trouble swallowing. Negative for nasal congestion, mouth sores, nosebleeds, sinus pressure/congestion and sore throat.    Eyes: Negative.    Respiratory:  Positive for shortness of breath. Negative for cough.    Cardiovascular:  Positive for leg swelling. Negative for chest pain and  "palpitations.   Gastrointestinal:  Positive for abdominal distention and constipation. Negative for abdominal pain, blood in stool, change in bowel habit, diarrhea, nausea and vomiting.   Endocrine: Negative.    Genitourinary:  Negative for bladder incontinence, decreased urine volume, difficulty urinating, dysuria, frequency, hematuria and urgency.   Musculoskeletal:  Positive for back pain. Negative for arthralgias, gait problem, joint swelling, leg pain and myalgias.   Integumentary:  Negative for rash.   Allergic/Immunologic: Negative.  Negative for frequent infections.   Neurological:  Positive for weakness. Negative for dizziness, tremors, syncope, light-headedness, numbness, headaches and memory loss.   Hematological:  Negative for adenopathy. Does not bruise/bleed easily.   Psychiatric/Behavioral:  Negative for agitation, confusion, hallucinations, sleep disturbance and suicidal ideas. The patient is not nervous/anxious.               Vitals:    12/31/24 1123   BP: 120/82   BP Location: Left arm   Patient Position: Sitting   Pulse: 67   Resp: 16   Temp: 98.6 °F (37 °C)   TempSrc: Oral   SpO2: 98%   Weight: 72.1 kg (159 lb)   Height: 5' 7.99" (1.727 m)            Wt Readings from Last 6 Encounters:   12/31/24 72.1 kg (159 lb)   12/27/24 65.8 kg (145 lb)   12/26/24 73.5 kg (162 lb)   12/18/24 68.8 kg (151 lb 9.6 oz)   12/04/24 64.5 kg (142 lb 3.2 oz)   11/21/24 63.5 kg (140 lb)     Body mass index is 24.18 kg/m².  Body surface area is 1.86 meters squared.  Physical Exam  Vitals and nursing note reviewed.   Constitutional:       General: He is not in acute distress.     Appearance: He is ill-appearing.      Comments: thin   HENT:      Head: Normocephalic and atraumatic.      Mouth/Throat:      Mouth: Mucous membranes are moist.   Eyes:      General: No scleral icterus.     Extraocular Movements: Extraocular movements intact.      Conjunctiva/sclera: Conjunctivae normal.   Neck:      Vascular: No JVD. "   Cardiovascular:      Rate and Rhythm: Normal rate and regular rhythm.      Heart sounds: No murmur heard.  Pulmonary:      Effort: Pulmonary effort is normal.      Breath sounds: Normal breath sounds. No wheezing or rhonchi.   Chest:      Comments: Left chest wall scar from previous Mediport  Abdominal:      General: Bowel sounds are decreased. There is distension.      Palpations: There is fluid wave.      Tenderness: There is generalized abdominal tenderness.   Musculoskeletal:         General: No swelling or deformity.      Cervical back: Neck supple.      Right lower leg: Edema present.      Left lower leg: Edema present.   Lymphadenopathy:      Head:      Right side of head: No submandibular adenopathy.      Left side of head: No submandibular adenopathy.      Cervical: No cervical adenopathy.      Upper Body:      Right upper body: No supraclavicular or axillary adenopathy.      Left upper body: No supraclavicular or axillary adenopathy.      Lower Body: No right inguinal adenopathy. No left inguinal adenopathy.   Skin:     General: Skin is warm.      Coloration: Skin is not jaundiced.      Findings: No rash.   Neurological:      General: No focal deficit present.      Mental Status: He is alert and oriented to person, place, and time.      Cranial Nerves: Cranial nerves 2-12 are intact.   Psychiatric:         Attention and Perception: Attention normal.         Behavior: Behavior is cooperative.       ECOG SCORE    1 - Restricted in strenuous activity-ambulatory and able to carry out work of a light nature         Laboratory:  CBC with Differential:  Lab Results   Component Value Date    WBC 7.31 12/31/2024    RBC 3.60 (L) 12/31/2024    HGB 10.7 (L) 12/31/2024    HCT 33.3 (L) 12/31/2024    MCV 92.5 12/31/2024    MCH 29.7 12/31/2024    MCHC 32.1 (L) 12/31/2024    RDW 14.9 12/31/2024     12/31/2024    MPV 10.7 (H) 12/31/2024        CMP:  Sodium   Date Value Ref Range Status   12/31/2024 139 136 - 145  mmol/L Final     Potassium   Date Value Ref Range Status   12/31/2024 4.9 3.5 - 5.1 mmol/L Final     Chloride   Date Value Ref Range Status   12/31/2024 98 98 - 107 mmol/L Final     CO2   Date Value Ref Range Status   12/31/2024 30 (H) 22 - 29 mmol/L Final     Blood Urea Nitrogen   Date Value Ref Range Status   12/31/2024 6.0 (L) 8.4 - 25.7 mg/dL Final   10/20/2023 12 6 - 23 mg/dL Final     Creatinine   Date Value Ref Range Status   12/31/2024 0.71 (L) 0.72 - 1.25 mg/dL Final   10/20/2023 0.95 0.67 - 1.17 mg/dL Final     Calcium   Date Value Ref Range Status   12/31/2024 8.5 8.4 - 10.2 mg/dL Final   10/20/2023 8.7 8.4 - 10.2 mg/dL Final     Albumin   Date Value Ref Range Status   12/31/2024 2.6 (L) 3.5 - 5.0 g/dL Final     Bilirubin Total   Date Value Ref Range Status   12/31/2024 0.4 <=1.5 mg/dL Final     ALP   Date Value Ref Range Status   12/31/2024 431 (H) 40 - 150 unit/L Final     AST   Date Value Ref Range Status   12/31/2024 40 (H) 5 - 34 unit/L Final     ALT   Date Value Ref Range Status   12/31/2024 51 0 - 55 unit/L Final      Component 08/28/24 07/17/24 06/26/24 06/21/24 06/12/24 05/29/24   CA 19-9 2,385.0 High  2,150.0 High  1,567.0 High  1,183.0 High  1,000.0 High  959.9 High       Latest Reference Range & Units 09/24/24 08:52 10/23/24 07:57   CA 19-9 0.00 - 37.00 unit/mL 6,202.93 (H) 21,137.60 (H)      Latest Reference Range & Units 11/05/24 13:54 11/19/24 07:47 12/04/24 07:58 12/18/24 07:11 12/26/24 07:57   CA 19-9 0.00 - 37.00 unit/mL 23,324.00 (H) 19,155.71 (H) 31,691.58 (H) 23,541.97 (H) 31,586.90 (H)          Assessment:       1. Adenocarcinoma of pancreas    2. Elevated CA 19-9 level    3. On antineoplastic chemotherapy    4. Immunodeficiency due to chemotherapy    5. Chemotherapy-induced neuropathy        1) Locally advanced pancreatic cancer   ---8/2/2022 ERCP with metal biliary stent placement   ---Folfirinox (10/2022- 2/2023)  ---capecitabine RT (4/3/23 - 5/16/23)   ---Phase I study [id number]  SD-101 at [facility]--SD-101 2 mg in sodium chloride 0.9% (NS) 30 mL IVPB, intravenous x 2 cycles (8/22/2023-11/22/2023)  ---Gemcitabine and paclitaxel protein bound (12/13/2023-- 7/2024)-->clinical progression and GOO  ---s/p biliary stent placement 4/4/2024  ---chemotherapy with gemcitabine/paclitaxel (last dose 6/26/2024)  ---duodenal stent (placed by GI)- 8/7/2024  ---Gemcitabine and CISplatin Every 2 Weeks (8/28/2024-present)   H/o Gastric outlet obstruction  ---s/p duodenal stent, severe protein calori malnutrition      Pain, neoplasm related pain  ---continue Percocet, Lyrica, Morphine     elevated ALP  --have improved significantly  Swelling   Bilateral lower extremity swelling and abdominal swelling  Lasix 20mg daily until swelling resolves.           Plan:       Patient with unresectable advanced pancreatic cancer to start 4th line treatment with cisplatin and Gemzar and clinically suggestive of progression, now with distended abdomen and possible ascites. Will hold treatment for now and do restaging scans to eval for progression. Will order liquid biopsy as well. US abdomen to eval for ascites and diagnostic/therapeutic paracentesis    Labs stable  Hold treatment at this time until imaging is completed to evaluate disease  Order for U/S guided paracentesis therapeutic and diagnostic for abdominal swelling  Order for CT CAP to assess disease to be completed ASAP  Will order liquid bx  Lasix 20mg BID. He was instructed to stop once swelling improves.   Labs and treat in one week.   Continue Eliquis 5 mg BID for thrombus  Repeat MRI Brain in 3 months, due 2/2025  Continue Magic Mouthwash for esophagitis   Continue morphine and percocet for pain, refill sent for percocet  CT C/A/P every 3 months, next due 1/2025. Ordered today  RTC 2-3 week with MD for FU/labs/C7  CBC, CMP,  Mg, CA 19 9 - 1 hr prior @ [facility]    The patient was seen, interviewed and examined. Pertinent lab and radiology studies were reviewed.   The  patient was given ample opportunity to ask questions, and to the best of my abilities, all questions answered to satisfaction; patient demonstrated understanding of what we discussed and agreeable to the plan. Pt instructed to call should develop concerning signs/symptoms or have further questions.       Jena Messina MD  Hematology/Oncology  [facility]      Professional Services   I, Yessica Toro LPN, acted solely as a scribe for and in the presence of Dr. Jena Messina, who performed these services.

## 2024-12-30 NOTE — H&P (VIEW-ONLY)
HEMATOLOGY/ONCOLOGY OFFICE CLINIC VISIT    Visit Information:    Initial Evaluation: 9/3/2024  Referring Provider: Alba Chisholm MD PhD ([facility]) -Cell [phone]  Other providers:  Code status: Not addressed    Diagnosis:  Advanced pancreatic ductal adenocarcinoma (Dx 8/2022)     Present treatment:  Gemzar monotherapy 8/28/2024  -Gemcitabine and CISplatin Every 2 Weeks (8/28/2024-present)--planned  Chemotherapy summary: CISplatin (PLATINOL) 53 mg in sodium chloride 0.9% (NS) 250 mL IVPB, intravenous, 0 of 12 cycles  gemcitabine (GEMZAR) 836 mg in sodium chloride 0.9% (NS) 250 mL IVPB, intravenous, 0 of 12 cycles   - Eliquis 10 mg BID x 7 days then 5 mg BID- started 10/29/24--present    Treatment/Oncology history:  -8/2/2022 ERCP with metal biliary stent placement   -Folfirinox (10/2022- 2/2023)  -capecitabine RT (4/3/23 - 5/16/23)   -Phase I study [id number] SD-101 at [facility]--SD-101 2 mg in sodium chloride 0.9% (NS) 30 mL IVPB, intravenous x 2 cycles (8/22/2023-11/22/2023)  -Gemcitabine and paclitaxel protein bound (12/13/2023-- 7/2024)-->clinical progression and GOO  -s/p biliary stent placement 4/4/2024  -chemotherapy with gemcitabine/paclitaxel (last dose 6/26/2024)  -duodenal stent (placed by GI)- 8/7/2024  -Gemcitabine and CISplatin Every 2 Weeks (8/28/2024-present)      Goal of care: life prolongation    Imaging:  CT CAP 8/21/2022: Since 7/25/2022, the primary pancreatic head mass encasing the common hepatic artery remains stable. The intrahepatic biliary obstruction improved with interval placement of a CBD stent. No definite distant metastasis in the abdomen or pelvis. Small indeterminate left lower lobe pulmonary nodule can be followed.  CT CAP 12/12/2022:  Primary pancreatic head tumor encasing the common hepatic artery is overall unchanged in size since 8/21/2022. Stable upstream pancreatic duct dilation and pancreatic atrophy. Interval placement of a cholecystostomy tube, with decompression of the  gallbladder. No definite evidence of distant metastatic disease in the chest, abdomen, and pelvis.   CT CAP 3/7/2023: Locally advanced pancreatic head tumor is slightly less conspicuous. Stable portacaval lymphadenopathy. Geographic hypodense regions have significantly increased throughout the liver compatible with fatty liver, to be correlated clinically regarding any potential concomitant hepatotoxicity; this appearance could obscure small low contrast liver lesions, to be better evaluated with MRI, if indicated. Unchanged mild jennifer appearance of the omentum is favored to be postinflammatory. No definite distant metastatic disease within the chest abdomen pelvis.   Ct CAP 6/29/2023:1.  Locally advanced pancreatic head mass not significantly changed. 2.  Increased periportal haziness and central liver heterogeneity which may relate to radiation.   3.  Indeterminate inferior right hepatic hypodensity as described above. Punctate left hepatic hypodensity also not previously evident, too small to characterize. Consider further evaluation with MRI as indicated.    MRI AP 7/15/2023:Primary neoplasm is noted within the pancreatic head and causes pancreatic ductal dilation and biliary ductal obstruction, which is decompressed via a stent. Nonspecific focal dilation of segment IV bile duct is noted.    CT CAP 10/2/2023: 1.  Compared to 07/28/2023, interval embolization of SMV tributaries in the epigastric region with new embolization coils in the right hepatic lobe following transhepatic catheterization. 2.  Previously noted small indeterminate low-attenuation lesion in the inferior right hepatic lobe segment 6 is no longer visualized. No new hepatic lesions.    3.  Stable 2.2 cm ill-defined residual pancreatic head tumor with no change in the vascular contact with the main portal vein, common hepatic and proper hepatic artery. 4.  No new metastatic disease in the chest or abdomen.   CT CAP 1/9/2024: 1.  Mild interval  increase in size of the primary tumor in the head of the pancreas. 2.  No evidence of metastatic disease in the chest, abdomen or pelvis.   CT AP 4/3/2024: 1.  There is worsening intrahepatic biliary dilation despite the presence of a biliary stent, suggesting stent occlusion. 2.  No significant change in the locally advanced tumor of the head of the pancreas since 03/12/2024.   CT CAP 6/21/2024: 1. No convincing change in the large locally advanced infiltrating ill-defined mass in the head and neck of the pancreas compared with 5/14/2024. 2. No specific evidence of distant metastatic disease in the chest, abdomen or pelvis.   XR ABDOMEN 1 VW PORTABLE 8/6/2024:  There is a gastric drainage tube with the tip below the diaphragm projecting over the gastric body with the sidehole below the GE junction in appropriate position. Gastric drainage tube with tip and sidehole projecting below the diaphragm over the gastric body.  X-ray Abdomen AP  8/5/2024: Air is seen scattered throughout normal caliber colon in a nonobstructive pattern. No dilated loops of bowel. Moderate colonic stool burden. No intraperitoneal free air within the limitations of this study. A biliary stent and embolization coils overlie the right upper quadrant. No suspicious osseous lesions.   Nonobstructive bowel gas pattern. I personally reviewed these image(s) along with the resident's/fellow's interpretations, certify that if a procedure was performed I was physically present, and agree with the final report.  CT Abdomen Pelvis with Contrast 8/5/2024: No suspicious pulmonary nodules in the lung bases. Hepatobiliary: Evaluation of the liver is somewhat limited secondary to streak artifact from the embolization coils. Within these limits there is no suspicious hepatic lesion. The gallbladder is decompressed and poorly evaluated. Common bile duct stent with expected pneumobilia. Mild intrahepatic biliary ductal dilatation is not significantly changed.  Spleen: No splenomegaly. Pancreas: Ill-defined infiltrating mass of the pancreatic head and neck, in keeping with pancreatic adenocarcinoma. There is atrophy and ductal dilatation of the pancreatic body and tail. Overall this tumor appears larger Adrenal Glands: No mass. Kidneys, Ureters, Bladder: No hydronephrosis. No suspicious renal lesion. No bladder mass. Gastrointestinal Tract: The stomach is distended with fluid and debris, suggesting there may be a degree of gastric outlet obstruction secondary to the pancreatic tumor. This is not significantly changed. There is no downstream obstruction. Pelvic Organs: No pelvic mass. Prostatomegaly. Peritoneum/Retroperitoneum: No ascites. Lymph Nodes: No lymphadenopathy. Musculoskeletal: No suspicious skeletal lesion.     No acute abdominopelvic abnormality. Attending addendum: The pancreatic tumor appears larger compared to 06/21/2024. There is gastric distention with debris suggesting chronic outlet obstruction ACTIONABLE ITEMS/RECOMMENDATIONS*: None. *An Actionable Finding is a finding that may be unrelated to the original reason for imaging but potentially actionable, meaning further investigation may be necessary. The Actionable Findings Vigilance Unit (AFVU) assists medical providers with responding to additional radiologic findings that are unexpected and potentially actionable. I personally reviewed these image(s) along with the resident's/fellow's interpretations, certify that if a procedure was performed I was physically present, and agree with the final report.   CT CAP 10/24/24:  1. Ill-defined soft tissue fullness at the a hay hepatis and pancreatic head and uncinate process fabella bases history of pancreatic adenocarcinoma.  A few blebs of gas are identified which may represent gas within the common bile duct.  2. Intrahepatic biliary ductal dilatation identified with nonvisualization of the gallbladder  3. Beam hardening artifact due to embolization coils  at the liver  4. Small 10 x 6 filling defect at the portal vein suggesting a small thrombus  5. Diffuse hazy mucosal prominence at the descending and ascending colon.  This may be merely due to underdistention.  A underlying colitis cannot be excluded  6. Findings of constipation with contrast and fluid distended loops of small bowel diffusely throughout suggesting a mild ileus  7. Prostatomegaly with calcifications  8. Findings and other details as above  9. Comparison to previous exam would allow further evaluation.  MRI Brain 11/4/24:  1. Tiny (subcentimeter), occasional focal areas of increased signal intensity (on the FLAIR sequences) are noted within the deep white matter and gray-white matter junctions of both cerebral hemispheres (these are only visualized on the FLAIR sequence with no contrast enhancement noted on postcontrast images). I suspect the changes represent chronic ischemic changes, however, follow-up imaging in 3-6 months to ensure stability may be helpful in excluding the very remote possibility of tiny metastases if felt be clinically indicated.     Pathology:  8/2/2022:  FNA head pancreas: SCANT MALIGNANT CELLS, FAVOR ADENOCARCINOMA  NGS:  No detectable genomic aberrations      CLINICAL HISTORY:       Patient: Warren P Lejeune is a 59 y.o. male.with a past medical history of past medical history of pancreatic ductal adenocarcinoma (Dx 8/2022)   Patient initially presented with  unintentional weight loss starting in 1/2022. He also had worsening back/shoulder and abdominal pain during this time. In July he finally presented to his PCP with darkening of the urine and lightening of the stool - with Jaundice that his wife noticed. Labs demonstrating cholestasis (bilirubin of 19).    Patient noted to have an abnormal CT scan after developing obstructive jaundice. 2 cm hypoenhancing mass noted in the head of the pancreas with intra and extrahepatic biliary ductal dilatation. Mass measured 2.5 x 2.1  "cm. There is also atrophy of the distal gland and upstream dilatation of the pancreas duct. There is no obvious vascular invasion. There were no focal liver lesions.    8/2/22- EGD/EUS/FNA. Final pathology showed ductal adenocarcinoma, moderately differentiated. ERCP on the same date performed, with placement of a fully covered metal biliary stent, 60 mm x 10 mm.    7/27/22- CA 19-9 was 681    8/21/22- CT CAP: Since 7/25/2022, the primary pancreatic head mass encasing the common hepatic artery remains stable. The intrahepatic biliary obstruction improved with interval placement of a CBD stent. No definite distant metastasis in the abdomen or pelvis. Small indeterminate left lower lobe pulmonary nodule can be followed.    09/03/2022 Germline genetic testing: Negative for germline pathogenic variants in any of the analyzed genes, Genes Analyzed: APC, DALE, BMPR1A, BRCA1, BRCA2, CDKN2A, EPCAM, MEN1, MLH1, MSH2, MSH6, NF1, PALB2, PMS2, SMAD4, STK11, TP53, TSC1, TSC2, and VHL. Genetic testing performed by [employer]; full report available in scanned documents.     s/p biliary stent placement, currently undergoing chemotherapy with gemcitabine/paclitaxel (last dose 6/26/2024), T2DM on insulin presenting for abdominal pain and constipation. CT A/P with distension in stomach suggesting gastric outlet obstruction secondary to pancreatic tumor. GI and Surg Onc consulted, pending evaluation. Poor PO tolerance, deferring NG tube now as vomiting resolves and having bowel movements.     Patient was recently admitted to the hospital at [facility]--Hospital Course:  "Findings on imaging were concerning for malignant gastric outlet obstruction from pancreatic cancer. After a discussion with surgical oncology, GI and GIMO, decision was made to proceed with duodenal stent (placed by GI). No indications for gastrojejunostomy bypass. Patient tolerated procedure well. We were able to advance to low fiber diet. Nutrition was consulted for " "low fiber diet education. Managed neoplasm related pain with PO morphine."     Patient is here today with his wife to continue chemotherapy close to home.  He is doing relatively well and voices no concerns.  MediPort was removed from his left chest wall to exposure of the MediPort.  Patient reports abdominal mid back pain, occasional muscle spasm and dizziness.  No fever, chills, sweats.  No chest pain or short of breath.    Chief Complaint: Adenocarcinoma of pancreas (Pt is c/o abd bloating, weight gain, and BLE swelling. He rates his pain today and 8 out of 10. )      Interval History:  Patient returns to clinic for a hospital follow up, accompanied by his wife. He is not feeling well today. He has gained 14 lbs since his last visit. He was seen in ED on 12/26 for BLE edema and was given IV lasix. He states this did help relieve some fluid but since then has had fluid build back up. He continues with swelling in his abdomen and bilateral lower legs. He has been wearing compression stockings and elevating his legs regularly. He continues with abdominal and lower back pain, he takes Percocet and MS cont for this. He is taking Miralax and laxative for opioid induced constipation, and Pregabalin 100 mg BID for bilateral hand and feet neuropathy. He has an appointment with GI @ [facility] on 1/8/2025 to replace duodenal stent. Labs reviewed with patient in detail. He does report trouble swallowing and feeling as though food is getting stuck, scheduled for EGD 11/21/2024. He missed EGD due to ER visit but will call to reschedule.      Past Medical History:   Diagnosis Date    Abdominal pain     Admission for chemotherapy     last chemo 9/12/24    Anxiety disorder, unspecified     Back pain     Bradycardia     Enlarged prostate     GERD (gastroesophageal reflux disease)     Hypertension     no cardiologist; no longer on meds since weight loss due to pancreatic cancer    Memory loss     Pancreatic cancer     Peripheral " neuropathy     Type 2 diabetes mellitus with unspecified diabetic retinopathy without macular edema       Past Surgical History:   Procedure Laterality Date    bile duct stents times 2      EGD, WITH STENT INSERTION      INSERTION OF TUNNELED CENTRAL VENOUS CATHETER (CVC) WITH SUBCUTANEOUS PORT Right 9/20/2024    Procedure: [id number];  Surgeon: Moe Champagne MD;  Location: [facility];  Service: General;  Laterality: Right;    MEDIPORT REMOVAL      times 2     Family History   Problem Relation Name Age of Onset    Hypertension Mother      Stroke Father      Stroke Sister      Prostate cancer Brother            Review of patient's allergies indicates:  No Known Allergies   Current Outpatient Medications on File Prior to Visit   Medication Sig Dispense Refill    aluminum & magnesium hydroxide-simethicone (MYLANTA MAX STRENGTH) 400-400-40 mg/5 mL suspension Take 5 mLs by mouth 4 (four) times daily as needed (mouth sores). 335 mL 3    apixaban (ELIQUIS) 5 mg Tab Take 1 tablet (5 mg total) by mouth 2 (two) times daily. Take 2 tablets by mouth (10 mg) twice a day for 7 days, then 1 tablet (5 mg total) by mouth twice a day thereafter 74 tablet 3    cholecalciferol, vitamin D3, (VITAMIN D3) 50 mcg (2,000 unit) Tab Take 2,000 Units by mouth once daily.      dexAMETHasone (DECADRON) 4 MG Tab TAKE 2 TABLETS BY MOUTH ONCE DAILY AS DIRECTED ON DAYS 2 AND 3 OF CHEMOTHERAPY CYCLE      diphenhydrAMINE (BENADRYL) 12.5 mg/5 mL elixir Take 5 mLs (12.5 mg total) by mouth 4 (four) times daily as needed (mouth sores). 236 mL 3    furosemide (LASIX) 20 MG tablet Take 1 tablet (20 mg total) by mouth once daily. As needed for swelling 30 tablet 0    insulin aspart U-100 (NOVOLOG) 100 unit/mL injection Inject into the skin 3 (three) times daily before meals. prn      lactulose (CHRONULAC) 20 gram/30 mL Soln Take 15 mLs (10 g total) by mouth 2 (two) times daily. 900 mL 0    LIDOcaine viscous HCl 2% (LIDOCAINE VISCOUS) 2 % Soln by  Mucous Membrane route 4 (four) times daily as needed (mouth sores). Swish and spit 15 ml (5 ml benadryl, 5 ml mylanta, 5 ml lidocaine) by mouth 4 times daily as needed for mouth sores 100 mL 3    magnesium oxide (MAGOX) 400 mg (241.3 mg magnesium) tablet Take 1 tablet (400 mg total) by mouth once daily. 90 tablet 2    morphine (MS CONTIN) 15 MG 12 hr tablet Take 1 tablet (15 mg total) by mouth 2 (two) times daily. 30 tablet 0    multivitamin with folic acid 400 mcg Tab Take 1 tablet by mouth once daily.      ondansetron (ZOFRAN) 8 MG tablet Take 8 mg by mouth.      oxyCODONE-acetaminophen (PERCOCET)  mg per tablet Take 1 tablet by mouth every 6 (six) hours as needed for Pain. 60 tablet 0    pantoprazole (PROTONIX) 40 MG tablet Take 1 tablet (40 mg total) by mouth once daily. 30 tablet 2    polyethylene glycol (GLYCOLAX) 17 gram PwPk Take 17 g by mouth.      pregabalin (LYRICA) 100 MG capsule Take 1 capsule (100 mg total) by mouth 2 (two) times daily. 60 capsule 6    prochlorperazine (COMPAZINE) 10 MG tablet Take 10 mg by mouth.      TRESIBA FLEXTOUCH U-200 200 unit/mL (3 mL) insulin pen Inject 14 Units into the skin.      vibegron (GEMTESA) 75 mg Tab Take 75 mg by mouth.      OLANZapine (ZYPREXA) 5 MG tablet Take 1 tablet (5 mg total) by mouth nightly. 4 tablet 6    tamsulosin (FLOMAX) 0.4 mg Cap Take 1 capsule (0.4 mg total) by mouth once daily. 30 capsule 0     No current facility-administered medications on file prior to visit.      Review of Systems   Constitutional:  Positive for appetite change and fatigue. Negative for activity change, chills, diaphoresis, fever and unexpected weight change.   HENT:  Positive for trouble swallowing. Negative for nasal congestion, mouth sores, nosebleeds, sinus pressure/congestion and sore throat.    Eyes: Negative.    Respiratory:  Positive for shortness of breath. Negative for cough.    Cardiovascular:  Positive for leg swelling. Negative for chest pain and  "palpitations.   Gastrointestinal:  Positive for abdominal distention and constipation. Negative for abdominal pain, blood in stool, change in bowel habit, diarrhea, nausea and vomiting.   Endocrine: Negative.    Genitourinary:  Negative for bladder incontinence, decreased urine volume, difficulty urinating, dysuria, frequency, hematuria and urgency.   Musculoskeletal:  Positive for back pain. Negative for arthralgias, gait problem, joint swelling, leg pain and myalgias.   Integumentary:  Negative for rash.   Allergic/Immunologic: Negative.  Negative for frequent infections.   Neurological:  Positive for weakness. Negative for dizziness, tremors, syncope, light-headedness, numbness, headaches and memory loss.   Hematological:  Negative for adenopathy. Does not bruise/bleed easily.   Psychiatric/Behavioral:  Negative for agitation, confusion, hallucinations, sleep disturbance and suicidal ideas. The patient is not nervous/anxious.               Vitals:    12/31/24 1123   BP: 120/82   BP Location: Left arm   Patient Position: Sitting   Pulse: 67   Resp: 16   Temp: 98.6 °F (37 °C)   TempSrc: Oral   SpO2: 98%   Weight: 72.1 kg (159 lb)   Height: 5' 7.99" (1.727 m)            Wt Readings from Last 6 Encounters:   12/31/24 72.1 kg (159 lb)   12/27/24 65.8 kg (145 lb)   12/26/24 73.5 kg (162 lb)   12/18/24 68.8 kg (151 lb 9.6 oz)   12/04/24 64.5 kg (142 lb 3.2 oz)   11/21/24 63.5 kg (140 lb)     Body mass index is 24.18 kg/m².  Body surface area is 1.86 meters squared.  Physical Exam  Vitals and nursing note reviewed.   Constitutional:       General: He is not in acute distress.     Appearance: He is ill-appearing.      Comments: thin   HENT:      Head: Normocephalic and atraumatic.      Mouth/Throat:      Mouth: Mucous membranes are moist.   Eyes:      General: No scleral icterus.     Extraocular Movements: Extraocular movements intact.      Conjunctiva/sclera: Conjunctivae normal.   Neck:      Vascular: No JVD. "   Cardiovascular:      Rate and Rhythm: Normal rate and regular rhythm.      Heart sounds: No murmur heard.  Pulmonary:      Effort: Pulmonary effort is normal.      Breath sounds: Normal breath sounds. No wheezing or rhonchi.   Chest:      Comments: Left chest wall scar from previous Mediport  Abdominal:      General: Bowel sounds are decreased. There is distension.      Palpations: There is fluid wave.      Tenderness: There is generalized abdominal tenderness.   Musculoskeletal:         General: No swelling or deformity.      Cervical back: Neck supple.      Right lower leg: Edema present.      Left lower leg: Edema present.   Lymphadenopathy:      Head:      Right side of head: No submandibular adenopathy.      Left side of head: No submandibular adenopathy.      Cervical: No cervical adenopathy.      Upper Body:      Right upper body: No supraclavicular or axillary adenopathy.      Left upper body: No supraclavicular or axillary adenopathy.      Lower Body: No right inguinal adenopathy. No left inguinal adenopathy.   Skin:     General: Skin is warm.      Coloration: Skin is not jaundiced.      Findings: No rash.   Neurological:      General: No focal deficit present.      Mental Status: He is alert and oriented to person, place, and time.      Cranial Nerves: Cranial nerves 2-12 are intact.   Psychiatric:         Attention and Perception: Attention normal.         Behavior: Behavior is cooperative.       ECOG SCORE    1 - Restricted in strenuous activity-ambulatory and able to carry out work of a light nature         Laboratory:  CBC with Differential:  Lab Results   Component Value Date    WBC 7.31 12/31/2024    RBC 3.60 (L) 12/31/2024    HGB 10.7 (L) 12/31/2024    HCT 33.3 (L) 12/31/2024    MCV 92.5 12/31/2024    MCH 29.7 12/31/2024    MCHC 32.1 (L) 12/31/2024    RDW 14.9 12/31/2024     12/31/2024    MPV 10.7 (H) 12/31/2024        CMP:  Sodium   Date Value Ref Range Status   12/31/2024 139 136 - 145  mmol/L Final     Potassium   Date Value Ref Range Status   12/31/2024 4.9 3.5 - 5.1 mmol/L Final     Chloride   Date Value Ref Range Status   12/31/2024 98 98 - 107 mmol/L Final     CO2   Date Value Ref Range Status   12/31/2024 30 (H) 22 - 29 mmol/L Final     Blood Urea Nitrogen   Date Value Ref Range Status   12/31/2024 6.0 (L) 8.4 - 25.7 mg/dL Final   10/20/2023 12 6 - 23 mg/dL Final     Creatinine   Date Value Ref Range Status   12/31/2024 0.71 (L) 0.72 - 1.25 mg/dL Final   10/20/2023 0.95 0.67 - 1.17 mg/dL Final     Calcium   Date Value Ref Range Status   12/31/2024 8.5 8.4 - 10.2 mg/dL Final   10/20/2023 8.7 8.4 - 10.2 mg/dL Final     Albumin   Date Value Ref Range Status   12/31/2024 2.6 (L) 3.5 - 5.0 g/dL Final     Bilirubin Total   Date Value Ref Range Status   12/31/2024 0.4 <=1.5 mg/dL Final     ALP   Date Value Ref Range Status   12/31/2024 431 (H) 40 - 150 unit/L Final     AST   Date Value Ref Range Status   12/31/2024 40 (H) 5 - 34 unit/L Final     ALT   Date Value Ref Range Status   12/31/2024 51 0 - 55 unit/L Final      Component 08/28/24 07/17/24 06/26/24 06/21/24 06/12/24 05/29/24   CA 19-9 2,385.0 High  2,150.0 High  1,567.0 High  1,183.0 High  1,000.0 High  959.9 High       Latest Reference Range & Units 09/24/24 08:52 10/23/24 07:57   CA 19-9 0.00 - 37.00 unit/mL 6,202.93 (H) 21,137.60 (H)      Latest Reference Range & Units 11/05/24 13:54 11/19/24 07:47 12/04/24 07:58 12/18/24 07:11 12/26/24 07:57   CA 19-9 0.00 - 37.00 unit/mL 23,324.00 (H) 19,155.71 (H) 31,691.58 (H) 23,541.97 (H) 31,586.90 (H)          Assessment:       1. Adenocarcinoma of pancreas    2. Elevated CA 19-9 level    3. On antineoplastic chemotherapy    4. Immunodeficiency due to chemotherapy    5. Chemotherapy-induced neuropathy        1) Locally advanced pancreatic cancer   ---8/2/2022 ERCP with metal biliary stent placement   ---Folfirinox (10/2022- 2/2023)  ---capecitabine RT (4/3/23 - 5/16/23)   ---Phase I study [id number]  SD-101 at [facility]--SD-101 2 mg in sodium chloride 0.9% (NS) 30 mL IVPB, intravenous x 2 cycles (8/22/2023-11/22/2023)  ---Gemcitabine and paclitaxel protein bound (12/13/2023-- 7/2024)-->clinical progression and GOO  ---s/p biliary stent placement 4/4/2024  ---chemotherapy with gemcitabine/paclitaxel (last dose 6/26/2024)  ---duodenal stent (placed by GI)- 8/7/2024  ---Gemcitabine and CISplatin Every 2 Weeks (8/28/2024-present)   H/o Gastric outlet obstruction  ---s/p duodenal stent, severe protein calori malnutrition      Pain, neoplasm related pain  ---continue Percocet, Lyrica, Morphine     elevated ALP  --have improved significantly  Swelling   Bilateral lower extremity swelling and abdominal swelling  Lasix 20mg daily until swelling resolves.           Plan:       Patient with unresectable advanced pancreatic cancer to start 4th line treatment with cisplatin and Gemzar and clinically suggestive of progression, now with distended abdomen and possible ascites. Will hold treatment for now and do restaging scans to eval for progression. Will order liquid biopsy as well. US abdomen to eval for ascites and diagnostic/therapeutic paracentesis    Labs stable  Hold treatment at this time until imaging is completed to evaluate disease  Order for U/S guided paracentesis therapeutic and diagnostic for abdominal swelling  Order for CT CAP to assess disease to be completed ASAP  Will order liquid bx  Lasix 20mg BID. He was instructed to stop once swelling improves.   Labs and treat in one week.   Continue Eliquis 5 mg BID for thrombus  Repeat MRI Brain in 3 months, due 2/2025  Continue Magic Mouthwash for esophagitis   Continue morphine and percocet for pain, refill sent for percocet  CT C/A/P every 3 months, next due 1/2025. Ordered today  RTC 2-3 week with MD for FU/labs/C7  CBC, CMP,  Mg, CA 19 9 - 1 hr prior @ [facility]    The patient was seen, interviewed and examined. Pertinent lab and radiology studies were reviewed.   The  patient was given ample opportunity to ask questions, and to the best of my abilities, all questions answered to satisfaction; patient demonstrated understanding of what we discussed and agreeable to the plan. Pt instructed to call should develop concerning signs/symptoms or have further questions.       Jena Messina MD  Hematology/Oncology  [facility]      Professional Services   I, Yessica Toro LPN, acted solely as a scribe for and in the presence of Dr. Jena Messina, who performed these services.

## 2024-12-30 NOTE — PROGRESS NOTES
HEMATOLOGY/ONCOLOGY OFFICE CLINIC VISIT    Visit Information:    Initial Evaluation: 9/3/2024  Referring Provider: Alba Chisholm MD PhD ([facility]) -Cell [phone]  Other providers:  Code status: Not addressed    Diagnosis:  Advanced pancreatic ductal adenocarcinoma (Dx 8/2022)     Present treatment:  Gemzar monotherapy 8/28/2024  -Gemcitabine and CISplatin Every 2 Weeks (8/28/2024-present)--planned  Chemotherapy summary: CISplatin (PLATINOL) 53 mg in sodium chloride 0.9% (NS) 250 mL IVPB, intravenous, 0 of 12 cycles  gemcitabine (GEMZAR) 836 mg in sodium chloride 0.9% (NS) 250 mL IVPB, intravenous, 0 of 12 cycles   - Eliquis 10 mg BID x 7 days then 5 mg BID- started 10/29/24--present    Treatment/Oncology history:  -8/2/2022 ERCP with metal biliary stent placement   -Folfirinox (10/2022- 2/2023)  -capecitabine RT (4/3/23 - 5/16/23)   -Phase I study 2021-1176 SD-101 at [facility]--SD-101 2 mg in sodium chloride 0.9% (NS) 30 mL IVPB, intravenous x 2 cycles (8/22/2023-11/22/2023)  -Gemcitabine and paclitaxel protein bound (12/13/2023-- 7/2024)-->clinical progression and GOO  -s/p biliary stent placement 4/4/2024  -chemotherapy with gemcitabine/paclitaxel (last dose 6/26/2024)  -duodenal stent (placed by GI)- 8/7/2024  -Gemcitabine and CISplatin Every 2 Weeks (8/28/2024-present)      Goal of care: life prolongation    Imaging:  CT CAP 8/21/2022: Since 7/25/2022, the primary pancreatic head mass encasing the common hepatic artery remains stable. The intrahepatic biliary obstruction improved with interval placement of a CBD stent. No definite distant metastasis in the abdomen or pelvis. Small indeterminate left lower lobe pulmonary nodule can be followed.  CT CAP 12/12/2022:  Primary pancreatic head tumor encasing the common hepatic artery is overall unchanged in size since 8/21/2022. Stable upstream pancreatic duct dilation and pancreatic atrophy. Interval placement of a cholecystostomy tube, with decompression of the  gallbladder. No definite evidence of distant metastatic disease in the chest, abdomen, and pelvis.   CT CAP 3/7/2023: Locally advanced pancreatic head tumor is slightly less conspicuous. Stable portacaval lymphadenopathy. Geographic hypodense regions have significantly increased throughout the liver compatible with fatty liver, to be correlated clinically regarding any potential concomitant hepatotoxicity; this appearance could obscure small low contrast liver lesions, to be better evaluated with MRI, if indicated. Unchanged mild jennifer appearance of the omentum is favored to be postinflammatory. No definite distant metastatic disease within the chest abdomen pelvis.   Ct CAP 6/29/2023:1.  Locally advanced pancreatic head mass not significantly changed. 2.  Increased periportal haziness and central liver heterogeneity which may relate to radiation.   3.  Indeterminate inferior right hepatic hypodensity as described above. Punctate left hepatic hypodensity also not previously evident, too small to characterize. Consider further evaluation with MRI as indicated.    MRI AP 7/15/2023:Primary neoplasm is noted within the pancreatic head and causes pancreatic ductal dilation and biliary ductal obstruction, which is decompressed via a stent. Nonspecific focal dilation of segment IV bile duct is noted.    CT CAP 10/2/2023: 1.  Compared to 07/28/2023, interval embolization of SMV tributaries in the epigastric region with new embolization coils in the right hepatic lobe following transhepatic catheterization. 2.  Previously noted small indeterminate low-attenuation lesion in the inferior right hepatic lobe segment 6 is no longer visualized. No new hepatic lesions.    3.  Stable 2.2 cm ill-defined residual pancreatic head tumor with no change in the vascular contact with the main portal vein, common hepatic and proper hepatic artery. 4.  No new metastatic disease in the chest or abdomen.   CT CAP 1/9/2024: 1.  Mild interval  increase in size of the primary tumor in the head of the pancreas. 2.  No evidence of metastatic disease in the chest, abdomen or pelvis.   CT AP 4/3/2024: 1.  There is worsening intrahepatic biliary dilation despite the presence of a biliary stent, suggesting stent occlusion. 2.  No significant change in the locally advanced tumor of the head of the pancreas since 03/12/2024.   CT CAP 6/21/2024: 1. No convincing change in the large locally advanced infiltrating ill-defined mass in the head and neck of the pancreas compared with 5/14/2024. 2. No specific evidence of distant metastatic disease in the chest, abdomen or pelvis.   XR ABDOMEN 1 VW PORTABLE 8/6/2024:  There is a gastric drainage tube with the tip below the diaphragm projecting over the gastric body with the sidehole below the GE junction in appropriate position. Gastric drainage tube with tip and sidehole projecting below the diaphragm over the gastric body.  X-ray Abdomen AP  8/5/2024: Air is seen scattered throughout normal caliber colon in a nonobstructive pattern. No dilated loops of bowel. Moderate colonic stool burden. No intraperitoneal free air within the limitations of this study. A biliary stent and embolization coils overlie the right upper quadrant. No suspicious osseous lesions.   Nonobstructive bowel gas pattern. I personally reviewed these image(s) along with the resident's/fellow's interpretations, certify that if a procedure was performed I was physically present, and agree with the final report.  CT Abdomen Pelvis with Contrast 8/5/2024: No suspicious pulmonary nodules in the lung bases. Hepatobiliary: Evaluation of the liver is somewhat limited secondary to streak artifact from the embolization coils. Within these limits there is no suspicious hepatic lesion. The gallbladder is decompressed and poorly evaluated. Common bile duct stent with expected pneumobilia. Mild intrahepatic biliary ductal dilatation is not significantly changed.  Spleen: No splenomegaly. Pancreas: Ill-defined infiltrating mass of the pancreatic head and neck, in keeping with pancreatic adenocarcinoma. There is atrophy and ductal dilatation of the pancreatic body and tail. Overall this tumor appears larger Adrenal Glands: No mass. Kidneys, Ureters, Bladder: No hydronephrosis. No suspicious renal lesion. No bladder mass. Gastrointestinal Tract: The stomach is distended with fluid and debris, suggesting there may be a degree of gastric outlet obstruction secondary to the pancreatic tumor. This is not significantly changed. There is no downstream obstruction. Pelvic Organs: No pelvic mass. Prostatomegaly. Peritoneum/Retroperitoneum: No ascites. Lymph Nodes: No lymphadenopathy. Musculoskeletal: No suspicious skeletal lesion.     No acute abdominopelvic abnormality. Attending addendum: The pancreatic tumor appears larger compared to 06/21/2024. There is gastric distention with debris suggesting chronic outlet obstruction ACTIONABLE ITEMS/RECOMMENDATIONS*: None. *An Actionable Finding is a finding that may be unrelated to the original reason for imaging but potentially actionable, meaning further investigation may be necessary. The Actionable Findings Vigilance Unit (AFVU) assists medical providers with responding to additional radiologic findings that are unexpected and potentially actionable. I personally reviewed these image(s) along with the resident's/fellow's interpretations, certify that if a procedure was performed I was physically present, and agree with the final report.   CT CAP 10/24/24:  1. Ill-defined soft tissue fullness at the a hay hepatis and pancreatic head and uncinate process fabella bases history of pancreatic adenocarcinoma.  A few blebs of gas are identified which may represent gas within the common bile duct.  2. Intrahepatic biliary ductal dilatation identified with nonvisualization of the gallbladder  3. Beam hardening artifact due to embolization coils  at the liver  4. Small 10 x 6 filling defect at the portal vein suggesting a small thrombus  5. Diffuse hazy mucosal prominence at the descending and ascending colon.  This may be merely due to underdistention.  A underlying colitis cannot be excluded  6. Findings of constipation with contrast and fluid distended loops of small bowel diffusely throughout suggesting a mild ileus  7. Prostatomegaly with calcifications  8. Findings and other details as above  9. Comparison to previous exam would allow further evaluation.  MRI Brain 11/4/24:  1. Tiny (subcentimeter), occasional focal areas of increased signal intensity (on the FLAIR sequences) are noted within the deep white matter and gray-white matter junctions of both cerebral hemispheres (these are only visualized on the FLAIR sequence with no contrast enhancement noted on postcontrast images). I suspect the changes represent chronic ischemic changes, however, follow-up imaging in 3-6 months to ensure stability may be helpful in excluding the very remote possibility of tiny metastases if felt be clinically indicated.     Pathology:  8/2/2022:  FNA head pancreas: SCANT MALIGNANT CELLS, FAVOR ADENOCARCINOMA  NGS:  No detectable genomic aberrations      CLINICAL HISTORY:       Patient: Warren P Lejeune is a 59 y.o. male.with a past medical history of past medical history of pancreatic ductal adenocarcinoma (Dx 8/2022)   Patient initially presented with  unintentional weight loss starting in 1/2022. He also had worsening back/shoulder and abdominal pain during this time. In July he finally presented to his PCP with darkening of the urine and lightening of the stool - with Jaundice that his wife noticed. Labs demonstrating cholestasis (bilirubin of 19).    Patient noted to have an abnormal CT scan after developing obstructive jaundice. 2 cm hypoenhancing mass noted in the head of the pancreas with intra and extrahepatic biliary ductal dilatation. Mass measured 2.5 x 2.1  "cm. There is also atrophy of the distal gland and upstream dilatation of the pancreas duct. There is no obvious vascular invasion. There were no focal liver lesions.    8/2/22- EGD/EUS/FNA. Final pathology showed ductal adenocarcinoma, moderately differentiated. ERCP on the same date performed, with placement of a fully covered metal biliary stent, 60 mm x 10 mm.    7/27/22- CA 19-9 was 681    8/21/22- CT CAP: Since 7/25/2022, the primary pancreatic head mass encasing the common hepatic artery remains stable. The intrahepatic biliary obstruction improved with interval placement of a CBD stent. No definite distant metastasis in the abdomen or pelvis. Small indeterminate left lower lobe pulmonary nodule can be followed.    09/03/2022 Germline genetic testing: Negative for germline pathogenic variants in any of the analyzed genes, Genes Analyzed: APC, DALE, BMPR1A, BRCA1, BRCA2, CDKN2A, EPCAM, MEN1, MLH1, MSH2, MSH6, NF1, PALB2, PMS2, SMAD4, STK11, TP53, TSC1, TSC2, and VHL. Genetic testing performed by [employer]; full report available in scanned documents.     s/p biliary stent placement, currently undergoing chemotherapy with gemcitabine/paclitaxel (last dose 6/26/2024), T2DM on insulin presenting for abdominal pain and constipation. CT A/P with distension in stomach suggesting gastric outlet obstruction secondary to pancreatic tumor. GI and Surg Onc consulted, pending evaluation. Poor PO tolerance, deferring NG tube now as vomiting resolves and having bowel movements.     Patient was recently admitted to the hospital at [facility]--Hospital Course:  "Findings on imaging were concerning for malignant gastric outlet obstruction from pancreatic cancer. After a discussion with surgical oncology, GI and GIMO, decision was made to proceed with duodenal stent (placed by GI). No indications for gastrojejunostomy bypass. Patient tolerated procedure well. We were able to advance to low fiber diet. Nutrition was consulted for " "low fiber diet education. Managed neoplasm related pain with PO morphine."     Patient is here today with his wife to continue chemotherapy close to home.  He is doing relatively well and voices no concerns.  MediPort was removed from his left chest wall to exposure of the MediPort.  Patient reports abdominal mid back pain, occasional muscle spasm and dizziness.  No fever, chills, sweats.  No chest pain or short of breath.    Chief Complaint: Adenocarcinoma of pancreas (Pt is c/o abd bloating, weight gain, and BLE swelling. He rates his pain today and 8 out of 10. )      Interval History:  Patient returns to clinic for a hospital follow up, accompanied by his wife. He is not feeling well today. He has gained 14 lbs since his last visit. He was seen in ED on 12/26 for BLE edema and was given IV lasix. He states this did help relieve some fluid but since then has had fluid build back up. He continues with swelling in his abdomen and bilateral lower legs. He has been wearing compression stockings and elevating his legs regularly. He continues with abdominal and lower back pain, he takes Percocet and MS cont for this. He is taking Miralax and laxative for opioid induced constipation, and Pregabalin 100 mg BID for bilateral hand and feet neuropathy. He has an appointment with GI @ [facility] on 1/8/2025 to replace duodenal stent. Labs reviewed with patient in detail. He does report trouble swallowing and feeling as though food is getting stuck, scheduled for EGD 11/21/2024. He missed EGD due to ER visit but will call to reschedule.      Past Medical History:   Diagnosis Date    Abdominal pain     Admission for chemotherapy     last chemo 9/12/24    Anxiety disorder, unspecified     Back pain     Bradycardia     Enlarged prostate     GERD (gastroesophageal reflux disease)     Hypertension     no cardiologist; no longer on meds since weight loss due to pancreatic cancer    Memory loss     Pancreatic cancer     Peripheral " neuropathy     Type 2 diabetes mellitus with unspecified diabetic retinopathy without macular edema       Past Surgical History:   Procedure Laterality Date    bile duct stents times 2      EGD, WITH STENT INSERTION      INSERTION OF TUNNELED CENTRAL VENOUS CATHETER (CVC) WITH SUBCUTANEOUS PORT Right 9/20/2024    Procedure: [id number];  Surgeon: Moe Champagne MD;  Location: [facility];  Service: General;  Laterality: Right;    MEDIPORT REMOVAL      times 2     Family History   Problem Relation Name Age of Onset    Hypertension Mother      Stroke Father      Stroke Sister      Prostate cancer Brother            Review of patient's allergies indicates:  No Known Allergies   Current Outpatient Medications on File Prior to Visit   Medication Sig Dispense Refill    aluminum & magnesium hydroxide-simethicone (MYLANTA MAX STRENGTH) 400-400-40 mg/5 mL suspension Take 5 mLs by mouth 4 (four) times daily as needed (mouth sores). 335 mL 3    apixaban (ELIQUIS) 5 mg Tab Take 1 tablet (5 mg total) by mouth 2 (two) times daily. Take 2 tablets by mouth (10 mg) twice a day for 7 days, then 1 tablet (5 mg total) by mouth twice a day thereafter 74 tablet 3    cholecalciferol, vitamin D3, (VITAMIN D3) 50 mcg (2,000 unit) Tab Take 2,000 Units by mouth once daily.      dexAMETHasone (DECADRON) 4 MG Tab TAKE 2 TABLETS BY MOUTH ONCE DAILY AS DIRECTED ON DAYS 2 AND 3 OF CHEMOTHERAPY CYCLE      diphenhydrAMINE (BENADRYL) 12.5 mg/5 mL elixir Take 5 mLs (12.5 mg total) by mouth 4 (four) times daily as needed (mouth sores). 236 mL 3    furosemide (LASIX) 20 MG tablet Take 1 tablet (20 mg total) by mouth once daily. As needed for swelling 30 tablet 0    insulin aspart U-100 (NOVOLOG) 100 unit/mL injection Inject into the skin 3 (three) times daily before meals. prn      lactulose (CHRONULAC) 20 gram/30 mL Soln Take 15 mLs (10 g total) by mouth 2 (two) times daily. 900 mL 0    LIDOcaine viscous HCl 2% (LIDOCAINE VISCOUS) 2 % Soln by  Mucous Membrane route 4 (four) times daily as needed (mouth sores). Swish and spit 15 ml (5 ml benadryl, 5 ml mylanta, 5 ml lidocaine) by mouth 4 times daily as needed for mouth sores 100 mL 3    magnesium oxide (MAGOX) 400 mg (241.3 mg magnesium) tablet Take 1 tablet (400 mg total) by mouth once daily. 90 tablet 2    morphine (MS CONTIN) 15 MG 12 hr tablet Take 1 tablet (15 mg total) by mouth 2 (two) times daily. 30 tablet 0    multivitamin with folic acid 400 mcg Tab Take 1 tablet by mouth once daily.      ondansetron (ZOFRAN) 8 MG tablet Take 8 mg by mouth.      oxyCODONE-acetaminophen (PERCOCET)  mg per tablet Take 1 tablet by mouth every 6 (six) hours as needed for Pain. 60 tablet 0    pantoprazole (PROTONIX) 40 MG tablet Take 1 tablet (40 mg total) by mouth once daily. 30 tablet 2    polyethylene glycol (GLYCOLAX) 17 gram PwPk Take 17 g by mouth.      pregabalin (LYRICA) 100 MG capsule Take 1 capsule (100 mg total) by mouth 2 (two) times daily. 60 capsule 6    prochlorperazine (COMPAZINE) 10 MG tablet Take 10 mg by mouth.      TRESIBA FLEXTOUCH U-200 200 unit/mL (3 mL) insulin pen Inject 14 Units into the skin.      vibegron (GEMTESA) 75 mg Tab Take 75 mg by mouth.      OLANZapine (ZYPREXA) 5 MG tablet Take 1 tablet (5 mg total) by mouth nightly. 4 tablet 6    tamsulosin (FLOMAX) 0.4 mg Cap Take 1 capsule (0.4 mg total) by mouth once daily. 30 capsule 0     No current facility-administered medications on file prior to visit.      Review of Systems   Constitutional:  Positive for appetite change and fatigue. Negative for activity change, chills, diaphoresis, fever and unexpected weight change.   HENT:  Positive for trouble swallowing. Negative for nasal congestion, mouth sores, nosebleeds, sinus pressure/congestion and sore throat.    Eyes: Negative.    Respiratory:  Positive for shortness of breath. Negative for cough.    Cardiovascular:  Positive for leg swelling. Negative for chest pain and  "palpitations.   Gastrointestinal:  Positive for abdominal distention and constipation. Negative for abdominal pain, blood in stool, change in bowel habit, diarrhea, nausea and vomiting.   Endocrine: Negative.    Genitourinary:  Negative for bladder incontinence, decreased urine volume, difficulty urinating, dysuria, frequency, hematuria and urgency.   Musculoskeletal:  Positive for back pain. Negative for arthralgias, gait problem, joint swelling, leg pain and myalgias.   Integumentary:  Negative for rash.   Allergic/Immunologic: Negative.  Negative for frequent infections.   Neurological:  Positive for weakness. Negative for dizziness, tremors, syncope, light-headedness, numbness, headaches and memory loss.   Hematological:  Negative for adenopathy. Does not bruise/bleed easily.   Psychiatric/Behavioral:  Negative for agitation, confusion, hallucinations, sleep disturbance and suicidal ideas. The patient is not nervous/anxious.               Vitals:    12/31/24 1123   BP: 120/82   BP Location: Left arm   Patient Position: Sitting   Pulse: 67   Resp: 16   Temp: 98.6 °F (37 °C)   TempSrc: Oral   SpO2: 98%   Weight: 72.1 kg (159 lb)   Height: 5' 7.99" (1.727 m)            Wt Readings from Last 6 Encounters:   12/31/24 72.1 kg (159 lb)   12/27/24 65.8 kg (145 lb)   12/26/24 73.5 kg (162 lb)   12/18/24 68.8 kg (151 lb 9.6 oz)   12/04/24 64.5 kg (142 lb 3.2 oz)   11/21/24 63.5 kg (140 lb)     Body mass index is 24.18 kg/m².  Body surface area is 1.86 meters squared.  Physical Exam  Vitals and nursing note reviewed.   Constitutional:       General: He is not in acute distress.     Appearance: He is ill-appearing.      Comments: thin   HENT:      Head: Normocephalic and atraumatic.      Mouth/Throat:      Mouth: Mucous membranes are moist.   Eyes:      General: No scleral icterus.     Extraocular Movements: Extraocular movements intact.      Conjunctiva/sclera: Conjunctivae normal.   Neck:      Vascular: No JVD. "   Cardiovascular:      Rate and Rhythm: Normal rate and regular rhythm.      Heart sounds: No murmur heard.  Pulmonary:      Effort: Pulmonary effort is normal.      Breath sounds: Normal breath sounds. No wheezing or rhonchi.   Chest:      Comments: Left chest wall scar from previous Mediport  Abdominal:      General: Bowel sounds are decreased. There is distension.      Palpations: There is fluid wave.      Tenderness: There is generalized abdominal tenderness.   Musculoskeletal:         General: No swelling or deformity.      Cervical back: Neck supple.      Right lower leg: Edema present.      Left lower leg: Edema present.   Lymphadenopathy:      Head:      Right side of head: No submandibular adenopathy.      Left side of head: No submandibular adenopathy.      Cervical: No cervical adenopathy.      Upper Body:      Right upper body: No supraclavicular or axillary adenopathy.      Left upper body: No supraclavicular or axillary adenopathy.      Lower Body: No right inguinal adenopathy. No left inguinal adenopathy.   Skin:     General: Skin is warm.      Coloration: Skin is not jaundiced.      Findings: No rash.   Neurological:      General: No focal deficit present.      Mental Status: He is alert and oriented to person, place, and time.      Cranial Nerves: Cranial nerves 2-12 are intact.   Psychiatric:         Attention and Perception: Attention normal.         Behavior: Behavior is cooperative.       ECOG SCORE    1 - Restricted in strenuous activity-ambulatory and able to carry out work of a light nature         Laboratory:  CBC with Differential:  Lab Results   Component Value Date    WBC 7.31 12/31/2024    RBC 3.60 (L) 12/31/2024    HGB 10.7 (L) 12/31/2024    HCT 33.3 (L) 12/31/2024    MCV 92.5 12/31/2024    MCH 29.7 12/31/2024    MCHC 32.1 (L) 12/31/2024    RDW 14.9 12/31/2024     12/31/2024    MPV 10.7 (H) 12/31/2024        CMP:  Sodium   Date Value Ref Range Status   12/31/2024 139 136 - 145  mmol/L Final     Potassium   Date Value Ref Range Status   12/31/2024 4.9 3.5 - 5.1 mmol/L Final     Chloride   Date Value Ref Range Status   12/31/2024 98 98 - 107 mmol/L Final     CO2   Date Value Ref Range Status   12/31/2024 30 (H) 22 - 29 mmol/L Final     Blood Urea Nitrogen   Date Value Ref Range Status   12/31/2024 6.0 (L) 8.4 - 25.7 mg/dL Final   10/20/2023 12 6 - 23 mg/dL Final     Creatinine   Date Value Ref Range Status   12/31/2024 0.71 (L) 0.72 - 1.25 mg/dL Final   10/20/2023 0.95 0.67 - 1.17 mg/dL Final     Calcium   Date Value Ref Range Status   12/31/2024 8.5 8.4 - 10.2 mg/dL Final   10/20/2023 8.7 8.4 - 10.2 mg/dL Final     Albumin   Date Value Ref Range Status   12/31/2024 2.6 (L) 3.5 - 5.0 g/dL Final     Bilirubin Total   Date Value Ref Range Status   12/31/2024 0.4 <=1.5 mg/dL Final     ALP   Date Value Ref Range Status   12/31/2024 431 (H) 40 - 150 unit/L Final     AST   Date Value Ref Range Status   12/31/2024 40 (H) 5 - 34 unit/L Final     ALT   Date Value Ref Range Status   12/31/2024 51 0 - 55 unit/L Final      Component 08/28/24 07/17/24 06/26/24 06/21/24 06/12/24 05/29/24   CA 19-9 2,385.0 High  2,150.0 High  1,567.0 High  1,183.0 High  1,000.0 High  959.9 High       Latest Reference Range & Units 09/24/24 08:52 10/23/24 07:57   CA 19-9 0.00 - 37.00 unit/mL 6,202.93 (H) 21,137.60 (H)      Latest Reference Range & Units 11/05/24 13:54 11/19/24 07:47 12/04/24 07:58 12/18/24 07:11 12/26/24 07:57   CA 19-9 0.00 - 37.00 unit/mL 23,324.00 (H) 19,155.71 (H) 31,691.58 (H) 23,541.97 (H) 31,586.90 (H)          Assessment:       1. Adenocarcinoma of pancreas    2. Elevated CA 19-9 level    3. On antineoplastic chemotherapy    4. Immunodeficiency due to chemotherapy    5. Chemotherapy-induced neuropathy        1) Locally advanced pancreatic cancer   ---8/2/2022 ERCP with metal biliary stent placement   ---Folfirinox (10/2022- 2/2023)  ---capecitabine RT (4/3/23 - 5/16/23)   ---Phase I study [id number]  SD-101 at [facility]--SD-101 2 mg in sodium chloride 0.9% (NS) 30 mL IVPB, intravenous x 2 cycles (8/22/2023-11/22/2023)  ---Gemcitabine and paclitaxel protein bound (12/13/2023-- 7/2024)-->clinical progression and GOO  ---s/p biliary stent placement 4/4/2024  ---chemotherapy with gemcitabine/paclitaxel (last dose 6/26/2024)  ---duodenal stent (placed by GI)- 8/7/2024  ---Gemcitabine and CISplatin Every 2 Weeks (8/28/2024-present)   H/o Gastric outlet obstruction  ---s/p duodenal stent, severe protein calori malnutrition      Pain, neoplasm related pain  ---continue Percocet, Lyrica, Morphine     elevated ALP  --have improved significantly  Swelling   Bilateral lower extremity swelling and abdominal swelling  Lasix 20mg daily until swelling resolves.           Plan:       Patient with unresectable advanced pancreatic cancer to start 4th line treatment with cisplatin and Gemzar and clinically suggestive of progression, now with distended abdomen and possible ascites. Will hold treatment for now and do restaging scans to eval for progression. Will order liquid biopsy as well. US abdomen to eval for ascites and diagnostic/therapeutic paracentesis    Labs stable  Hold treatment at this time until imaging is completed to evaluate disease  Order for U/S guided paracentesis therapeutic and diagnostic for abdominal swelling  Order for CT CAP to assess disease to be completed ASAP  Will order liquid bx  Lasix 20mg BID. He was instructed to stop once swelling improves.   Labs and treat in one week.   Continue Eliquis 5 mg BID for thrombus  Repeat MRI Brain in 3 months, due 2/2025  Continue Magic Mouthwash for esophagitis   Continue morphine and percocet for pain, refill sent for percocet  CT C/A/P every 3 months, next due 1/2025. Ordered today  RTC 2-3 week with MD for FU/labs/C7  CBC, CMP,  Mg, CA 19 9 - 1 hr prior @ [facility]    The patient was seen, interviewed and examined. Pertinent lab and radiology studies were reviewed.   The  patient was given ample opportunity to ask questions, and to the best of my abilities, all questions answered to satisfaction; patient demonstrated understanding of what we discussed and agreeable to the plan. Pt instructed to call should develop concerning signs/symptoms or have further questions.       Jena Messina MD  Hematology/Oncology  [facility]      Professional Services   I, Yessica Toro LPN, acted solely as a scribe for and in the presence of Dr. Jena Messina, who performed these services.

## 2024-12-30 NOTE — PROGRESS NOTES
TCC call not required; pt not applicable as patient is currently being treated with chemotherapy.

## 2024-12-31 ENCOUNTER — OFFICE VISIT (OUTPATIENT)
Dept: HEMATOLOGY/ONCOLOGY | Facility: CLINIC | Age: 59
End: 2024-12-31
Payer: MEDICARE

## 2024-12-31 ENCOUNTER — LAB VISIT (OUTPATIENT)
Dept: LAB | Facility: HOSPITAL | Age: 59
End: 2024-12-31
Payer: MEDICARE

## 2024-12-31 VITALS
WEIGHT: 159 LBS | TEMPERATURE: 99 F | HEART RATE: 67 BPM | RESPIRATION RATE: 16 BRPM | SYSTOLIC BLOOD PRESSURE: 120 MMHG | DIASTOLIC BLOOD PRESSURE: 82 MMHG | HEIGHT: 68 IN | OXYGEN SATURATION: 98 % | BODY MASS INDEX: 24.1 KG/M2

## 2024-12-31 DIAGNOSIS — T45.1X5A IMMUNODEFICIENCY DUE TO CHEMOTHERAPY: ICD-10-CM

## 2024-12-31 DIAGNOSIS — R19.00 ABDOMINAL SWELLING: ICD-10-CM

## 2024-12-31 DIAGNOSIS — D84.821 IMMUNODEFICIENCY DUE TO CHEMOTHERAPY: ICD-10-CM

## 2024-12-31 DIAGNOSIS — Z79.899 ON ANTINEOPLASTIC CHEMOTHERAPY: ICD-10-CM

## 2024-12-31 DIAGNOSIS — R97.8 ELEVATED CA 19-9 LEVEL: ICD-10-CM

## 2024-12-31 DIAGNOSIS — T45.1X5A CHEMOTHERAPY-INDUCED NEUROPATHY: ICD-10-CM

## 2024-12-31 DIAGNOSIS — C25.9 ADENOCARCINOMA OF PANCREAS: Primary | ICD-10-CM

## 2024-12-31 DIAGNOSIS — Z79.899 IMMUNODEFICIENCY DUE TO CHEMOTHERAPY: ICD-10-CM

## 2024-12-31 DIAGNOSIS — C25.9 ADENOCARCINOMA OF PANCREAS: ICD-10-CM

## 2024-12-31 DIAGNOSIS — G62.0 CHEMOTHERAPY-INDUCED NEUROPATHY: ICD-10-CM

## 2024-12-31 LAB
ALBUMIN SERPL-MCNC: 2.6 G/DL (ref 3.5–5)
ALBUMIN/GLOB SERPL: 0.7 RATIO (ref 1.1–2)
ALP SERPL-CCNC: 431 UNIT/L (ref 40–150)
ALT SERPL-CCNC: 51 UNIT/L (ref 0–55)
ANION GAP SERPL CALC-SCNC: 11 MEQ/L
AST SERPL-CCNC: 40 UNIT/L (ref 5–34)
BASOPHILS # BLD AUTO: 0.04 X10(3)/MCL
BASOPHILS NFR BLD AUTO: 0.5 %
BILIRUB SERPL-MCNC: 0.4 MG/DL
BUN SERPL-MCNC: 6 MG/DL (ref 8.4–25.7)
CALCIUM SERPL-MCNC: 8.5 MG/DL (ref 8.4–10.2)
CANCER AG19-9 SERPL-ACNC: ABNORMAL UNIT/ML (ref 0–37)
CHLORIDE SERPL-SCNC: 98 MMOL/L (ref 98–107)
CO2 SERPL-SCNC: 30 MMOL/L (ref 22–29)
CREAT SERPL-MCNC: 0.71 MG/DL (ref 0.72–1.25)
CREAT/UREA NIT SERPL: 8
EOSINOPHIL # BLD AUTO: 0.03 X10(3)/MCL (ref 0–0.9)
EOSINOPHIL NFR BLD AUTO: 0.4 %
ERYTHROCYTE [DISTWIDTH] IN BLOOD BY AUTOMATED COUNT: 14.9 % (ref 11.5–17)
GFR SERPLBLD CREATININE-BSD FMLA CKD-EPI: >60 ML/MIN/1.73/M2
GLOBULIN SER-MCNC: 3.6 GM/DL (ref 2.4–3.5)
GLUCOSE SERPL-MCNC: 160 MG/DL (ref 74–100)
HCT VFR BLD AUTO: 33.3 % (ref 42–52)
HGB BLD-MCNC: 10.7 G/DL (ref 14–18)
IMM GRANULOCYTES # BLD AUTO: 0.03 X10(3)/MCL (ref 0–0.04)
IMM GRANULOCYTES NFR BLD AUTO: 0.4 %
LYMPHOCYTES # BLD AUTO: 0.58 X10(3)/MCL (ref 0.6–4.6)
LYMPHOCYTES NFR BLD AUTO: 7.9 %
MAGNESIUM SERPL-MCNC: 1.6 MG/DL (ref 1.6–2.6)
MCH RBC QN AUTO: 29.7 PG (ref 27–31)
MCHC RBC AUTO-ENTMCNC: 32.1 G/DL (ref 33–36)
MCV RBC AUTO: 92.5 FL (ref 80–94)
MONOCYTES # BLD AUTO: 0.87 X10(3)/MCL (ref 0.1–1.3)
MONOCYTES NFR BLD AUTO: 11.9 %
NEUTROPHILS # BLD AUTO: 5.76 X10(3)/MCL (ref 2.1–9.2)
NEUTROPHILS NFR BLD AUTO: 78.9 %
PLATELET # BLD AUTO: 245 X10(3)/MCL (ref 130–400)
PMV BLD AUTO: 10.7 FL (ref 7.4–10.4)
POTASSIUM SERPL-SCNC: 4.9 MMOL/L (ref 3.5–5.1)
PROT SERPL-MCNC: 6.2 GM/DL (ref 6.4–8.3)
RBC # BLD AUTO: 3.6 X10(6)/MCL (ref 4.7–6.1)
SODIUM SERPL-SCNC: 139 MMOL/L (ref 136–145)
WBC # BLD AUTO: 7.31 X10(3)/MCL (ref 4.5–11.5)

## 2024-12-31 PROCEDURE — 1159F MED LIST DOCD IN RCRD: CPT | Mod: CPTII,S$GLB,, | Performed by: INTERNAL MEDICINE

## 2024-12-31 PROCEDURE — 99999 PR PBB SHADOW E&M-EST. PATIENT-LVL V: CPT | Mod: PBBFAC,,, | Performed by: INTERNAL MEDICINE

## 2024-12-31 PROCEDURE — 80053 COMPREHEN METABOLIC PANEL: CPT

## 2024-12-31 PROCEDURE — 3079F DIAST BP 80-89 MM HG: CPT | Mod: CPTII,S$GLB,, | Performed by: INTERNAL MEDICINE

## 2024-12-31 PROCEDURE — 1160F RVW MEDS BY RX/DR IN RCRD: CPT | Mod: CPTII,S$GLB,, | Performed by: INTERNAL MEDICINE

## 2024-12-31 PROCEDURE — 36415 COLL VENOUS BLD VENIPUNCTURE: CPT

## 2024-12-31 PROCEDURE — 85025 COMPLETE CBC W/AUTO DIFF WBC: CPT

## 2024-12-31 PROCEDURE — 99215 OFFICE O/P EST HI 40 MIN: CPT | Mod: S$GLB,,, | Performed by: INTERNAL MEDICINE

## 2024-12-31 PROCEDURE — 3074F SYST BP LT 130 MM HG: CPT | Mod: CPTII,S$GLB,, | Performed by: INTERNAL MEDICINE

## 2024-12-31 PROCEDURE — 83735 ASSAY OF MAGNESIUM: CPT

## 2024-12-31 PROCEDURE — 3008F BODY MASS INDEX DOCD: CPT | Mod: CPTII,S$GLB,, | Performed by: INTERNAL MEDICINE

## 2024-12-31 PROCEDURE — 86301 IMMUNOASSAY TUMOR CA 19-9: CPT

## 2025-01-02 ENCOUNTER — HOSPITAL ENCOUNTER (OUTPATIENT)
Dept: INTERVENTIONAL RADIOLOGY/VASCULAR | Facility: HOSPITAL | Age: 60
Discharge: HOME | End: 2025-01-02
Attending: INTERNAL MEDICINE
Payer: MEDICARE

## 2025-01-02 DIAGNOSIS — Z87.898 H/O ASCITES: ICD-10-CM

## 2025-01-02 PROCEDURE — 49083 ABD PARACENTESIS W/IMAGING: CPT

## 2025-01-03 DIAGNOSIS — R97.8 ELEVATED CA 19-9 LEVEL: ICD-10-CM

## 2025-01-03 DIAGNOSIS — C25.9 ADENOCARCINOMA OF PANCREAS: Primary | ICD-10-CM

## 2025-01-03 DIAGNOSIS — R19.00 ABDOMINAL SWELLING: ICD-10-CM

## 2025-01-06 DIAGNOSIS — C25.9 ADENOCARCINOMA OF PANCREAS: ICD-10-CM

## 2025-01-06 DIAGNOSIS — G89.3 CANCER ASSOCIATED PAIN: ICD-10-CM

## 2025-01-06 RX ORDER — MORPHINE SULFATE 15 MG/1
15 TABLET, FILM COATED, EXTENDED RELEASE ORAL 2 TIMES DAILY
Qty: 30 TABLET | Refills: 0 | Status: SHIPPED | OUTPATIENT
Start: 2025-01-06

## 2025-01-06 RX ORDER — OXYCODONE AND ACETAMINOPHEN 10; 325 MG/1; MG/1
1 TABLET ORAL EVERY 4 HOURS PRN
Qty: 120 TABLET | Refills: 0 | Status: SHIPPED | OUTPATIENT
Start: 2025-01-06

## 2025-01-07 ENCOUNTER — HOSPITAL ENCOUNTER (OUTPATIENT)
Dept: RADIOLOGY | Facility: HOSPITAL | Age: 60
Discharge: HOME | End: 2025-01-07
Attending: INTERNAL MEDICINE
Payer: MEDICARE

## 2025-01-07 DIAGNOSIS — C25.9 ADENOCARCINOMA OF PANCREAS: ICD-10-CM

## 2025-01-07 PROCEDURE — 25500020 PHARM REV CODE 255: Performed by: INTERNAL MEDICINE

## 2025-01-07 PROCEDURE — 71260 CT THORAX DX C+: CPT | Mod: TC

## 2025-01-07 RX ORDER — DIATRIZOATE MEGLUMINE AND DIATRIZOATE SODIUM 660; 100 MG/ML; MG/ML
30 SOLUTION ORAL; RECTAL
Status: COMPLETED | OUTPATIENT
Start: 2025-01-07 | End: 2025-01-07

## 2025-01-07 RX ADMIN — IOHEXOL 100 ML: 350 INJECTION, SOLUTION INTRAVENOUS at 10:01

## 2025-01-07 RX ADMIN — DIATRIZOATE MEGLUMINE AND DIATRIZOATE SODIUM 30 ML: 660; 100 LIQUID ORAL; RECTAL at 08:01

## 2025-01-08 ENCOUNTER — HOSPITAL ENCOUNTER (OUTPATIENT)
Dept: INTERVENTIONAL RADIOLOGY/VASCULAR | Facility: HOSPITAL | Age: 60
Discharge: HOME | End: 2025-01-08
Attending: INTERNAL MEDICINE
Payer: MEDICARE

## 2025-01-08 VITALS
DIASTOLIC BLOOD PRESSURE: 60 MMHG | HEART RATE: 72 BPM | SYSTOLIC BLOOD PRESSURE: 104 MMHG | OXYGEN SATURATION: 100 % | RESPIRATION RATE: 15 BRPM

## 2025-01-08 DIAGNOSIS — C25.9 ADENOCARCINOMA OF PANCREAS: ICD-10-CM

## 2025-01-08 DIAGNOSIS — R97.8 ELEVATED CA 19-9 LEVEL: ICD-10-CM

## 2025-01-08 DIAGNOSIS — R19.00 ABDOMINAL SWELLING: ICD-10-CM

## 2025-01-08 PROCEDURE — 63600175 PHARM REV CODE 636 W HCPCS: Performed by: RADIOLOGY

## 2025-01-08 PROCEDURE — 49083 ABD PARACENTESIS W/IMAGING: CPT

## 2025-01-08 PROCEDURE — P9047 ALBUMIN (HUMAN), 25%, 50ML: HCPCS | Performed by: RADIOLOGY

## 2025-01-08 RX ORDER — ALBUMIN HUMAN 250 G/1000ML
25 SOLUTION INTRAVENOUS ONCE
Status: COMPLETED | OUTPATIENT
Start: 2025-01-08 | End: 2025-01-08

## 2025-01-08 RX ADMIN — ALBUMIN (HUMAN) 25 G: 12.5 SOLUTION INTRAVENOUS at 08:01

## 2025-01-08 NOTE — DISCHARGE SUMMARY
Radiology Post-Procedure Note    Pre Op Diagnosis: Ascites    Post Op Diagnosis: Same    Secondary Diagnoses:   Problem List Items Addressed This Visit    None  Visit Diagnoses       Adenocarcinoma of pancreas        Relevant Orders    IR Paracentesis with Imaging    Abdominal swelling        Relevant Orders    IR Paracentesis with Imaging    Elevated CA 19-9 level        Relevant Orders    IR Paracentesis with Imaging             Procedure: US Guided Paracentesis    Procedure performed by: Jerome Avalos MD    Assistant: None    Written Informed Consent Obtained: Yes    Specimen Removed: Clear fluid    Estimated Blood Loss: <10 cc    Condition: Stable    Outcome: The patient tolerated the procedure well and was without complications.    For further details please see the imaging report associated.    Disposition: Home or Self Care    Follow Up: With primary care provider    Discharge Instructions:  No discharge procedures on file.       Time Spent On Discharge: 2 minutes

## 2025-01-08 NOTE — INTERVAL H&P NOTE
The patient has been examined and the H&P has been reviewed:    I concur with the findings and no changes have occurred since H&P was written.58 yo M with pancreatic cancer and ascites presents for paracentesis.        There are no hospital problems to display for this patient.

## 2025-01-10 DIAGNOSIS — C25.9 ADENOCARCINOMA OF PANCREAS: ICD-10-CM

## 2025-01-10 DIAGNOSIS — K63.9 INTESTINAL DISORDER: Primary | ICD-10-CM

## 2025-01-10 DIAGNOSIS — M79.89 SWELLING OF BOTH LOWER EXTREMITIES: ICD-10-CM

## 2025-01-10 DIAGNOSIS — K83.8 PNEUMOBILIA: ICD-10-CM

## 2025-01-10 LAB — PSYCHE PATHOLOGY RESULT: NORMAL

## 2025-01-10 RX ORDER — DICYCLOMINE HYDROCHLORIDE 10 MG/1
10 CAPSULE ORAL
Qty: 120 CAPSULE | Refills: 0 | Status: SHIPPED | OUTPATIENT
Start: 2025-01-10 | End: 2025-02-09

## 2025-01-10 RX ORDER — FUROSEMIDE 20 MG/1
20 TABLET ORAL DAILY
Qty: 30 TABLET | Refills: 0 | Status: SHIPPED | OUTPATIENT
Start: 2025-01-10 | End: 2026-01-10

## 2025-01-13 RX ORDER — FUROSEMIDE 20 MG/1
20 TABLET ORAL DAILY
Qty: 30 TABLET | Refills: 0 | Status: ON HOLD | OUTPATIENT
Start: 2025-01-13 | End: 2025-01-17 | Stop reason: HOSPADM

## 2025-01-15 ENCOUNTER — HOSPITAL ENCOUNTER (INPATIENT)
Facility: HOSPITAL | Age: 60
LOS: 1 days | Discharge: HOME | DRG: 435 | End: 2025-01-17
Attending: STUDENT IN AN ORGANIZED HEALTH CARE EDUCATION/TRAINING PROGRAM | Admitting: INTERNAL MEDICINE
Payer: MEDICARE

## 2025-01-15 DIAGNOSIS — R06.02 SHORTNESS OF BREATH: ICD-10-CM

## 2025-01-15 DIAGNOSIS — R07.9 CHEST PAIN: ICD-10-CM

## 2025-01-15 DIAGNOSIS — C25.9 MALIGNANT NEOPLASM OF PANCREAS, UNSPECIFIED LOCATION OF MALIGNANCY: Primary | ICD-10-CM

## 2025-01-15 DIAGNOSIS — R18.8 OTHER ASCITES: ICD-10-CM

## 2025-01-15 DIAGNOSIS — R39.9 LOWER URINARY TRACT SYMPTOMS: ICD-10-CM

## 2025-01-15 LAB
ALBUMIN SERPL-MCNC: 2.3 G/DL (ref 3.5–5)
ALBUMIN/GLOB SERPL: 0.6 RATIO (ref 1.1–2)
ALP SERPL-CCNC: 448 UNIT/L (ref 40–150)
ALT SERPL-CCNC: 36 UNIT/L (ref 0–55)
ANION GAP SERPL CALC-SCNC: 2 MEQ/L
APTT PPP: 29.8 SECONDS (ref 23.2–33.7)
AST SERPL-CCNC: 38 UNIT/L (ref 5–34)
BACTERIA #/AREA URNS AUTO: ABNORMAL /HPF
BASOPHILS # BLD AUTO: 0.06 X10(3)/MCL
BASOPHILS NFR BLD AUTO: 0.5 %
BILIRUB SERPL-MCNC: 0.4 MG/DL
BILIRUB UR QL STRIP.AUTO: NEGATIVE
BUN SERPL-MCNC: 5.6 MG/DL (ref 8.4–25.7)
CALCIUM SERPL-MCNC: 8.3 MG/DL (ref 8.4–10.2)
CHLORIDE SERPL-SCNC: 104 MMOL/L (ref 98–107)
CLARITY UR: CLEAR
CO2 SERPL-SCNC: 29 MMOL/L (ref 22–29)
COLOR UR AUTO: YELLOW
CREAT SERPL-MCNC: 0.8 MG/DL (ref 0.72–1.25)
CREAT/UREA NIT SERPL: 7
EOSINOPHIL # BLD AUTO: 0.12 X10(3)/MCL (ref 0–0.9)
EOSINOPHIL NFR BLD AUTO: 1 %
ERYTHROCYTE [DISTWIDTH] IN BLOOD BY AUTOMATED COUNT: 13.8 % (ref 11.5–17)
GFR SERPLBLD CREATININE-BSD FMLA CKD-EPI: >60 ML/MIN/1.73/M2
GLOBULIN SER-MCNC: 3.6 GM/DL (ref 2.4–3.5)
GLUCOSE SERPL-MCNC: 195 MG/DL (ref 74–100)
GLUCOSE UR QL STRIP: NORMAL
HCT VFR BLD AUTO: 40.3 % (ref 42–52)
HGB BLD-MCNC: 12.8 G/DL (ref 14–18)
HGB UR QL STRIP: NEGATIVE
IMM GRANULOCYTES # BLD AUTO: 0.05 X10(3)/MCL (ref 0–0.04)
IMM GRANULOCYTES NFR BLD AUTO: 0.4 %
INR PPP: 1.3
KETONES UR QL STRIP: NEGATIVE
LEUKOCYTE ESTERASE UR QL STRIP: 250
LIPASE SERPL-CCNC: <4 U/L
LYMPHOCYTES # BLD AUTO: 1 X10(3)/MCL (ref 0.6–4.6)
LYMPHOCYTES NFR BLD AUTO: 8.5 %
MCH RBC QN AUTO: 29.5 PG (ref 27–31)
MCHC RBC AUTO-ENTMCNC: 31.8 G/DL (ref 33–36)
MCV RBC AUTO: 92.9 FL (ref 80–94)
MONOCYTES # BLD AUTO: 1.05 X10(3)/MCL (ref 0.1–1.3)
MONOCYTES NFR BLD AUTO: 8.9 %
MUCOUS THREADS URNS QL MICRO: ABNORMAL /LPF
NEUTROPHILS # BLD AUTO: 9.54 X10(3)/MCL (ref 2.1–9.2)
NEUTROPHILS NFR BLD AUTO: 80.7 %
NITRITE UR QL STRIP: NEGATIVE
NRBC BLD AUTO-RTO: 0 %
OHS QRS DURATION: 86 MS
OHS QTC CALCULATION: 392 MS
PH UR STRIP: 6 [PH]
PLATELET # BLD AUTO: 293 X10(3)/MCL (ref 130–400)
PMV BLD AUTO: 10.5 FL (ref 7.4–10.4)
POTASSIUM SERPL-SCNC: 5.1 MMOL/L (ref 3.5–5.1)
PROT SERPL-MCNC: 5.9 GM/DL (ref 6.4–8.3)
PROT UR QL STRIP: NEGATIVE
PROTHROMBIN TIME: 16.6 SECONDS (ref 12.5–14.5)
RBC # BLD AUTO: 4.34 X10(6)/MCL (ref 4.7–6.1)
RBC #/AREA URNS AUTO: ABNORMAL /HPF
SODIUM SERPL-SCNC: 135 MMOL/L (ref 136–145)
SP GR UR STRIP.AUTO: 1.02 (ref 1–1.03)
SQUAMOUS #/AREA URNS LPF: ABNORMAL /HPF
UROBILINOGEN UR STRIP-ACNC: NORMAL
WBC # BLD AUTO: 11.82 X10(3)/MCL (ref 4.5–11.5)
WBC #/AREA URNS AUTO: ABNORMAL /HPF

## 2025-01-15 PROCEDURE — 99285 EMERGENCY DEPT VISIT HI MDM: CPT | Mod: 25

## 2025-01-15 PROCEDURE — 63600175 PHARM REV CODE 636 W HCPCS: Performed by: RADIOLOGY

## 2025-01-15 PROCEDURE — 93010 ELECTROCARDIOGRAM REPORT: CPT | Mod: ,,, | Performed by: INTERNAL MEDICINE

## 2025-01-15 PROCEDURE — 85025 COMPLETE CBC W/AUTO DIFF WBC: CPT | Performed by: STUDENT IN AN ORGANIZED HEALTH CARE EDUCATION/TRAINING PROGRAM

## 2025-01-15 PROCEDURE — G0378 HOSPITAL OBSERVATION PER HR: HCPCS

## 2025-01-15 PROCEDURE — 87086 URINE CULTURE/COLONY COUNT: CPT | Performed by: STUDENT IN AN ORGANIZED HEALTH CARE EDUCATION/TRAINING PROGRAM

## 2025-01-15 PROCEDURE — 96376 TX/PRO/DX INJ SAME DRUG ADON: CPT

## 2025-01-15 PROCEDURE — 63600175 PHARM REV CODE 636 W HCPCS: Performed by: NURSE PRACTITIONER

## 2025-01-15 PROCEDURE — 63600175 PHARM REV CODE 636 W HCPCS: Performed by: INTERNAL MEDICINE

## 2025-01-15 PROCEDURE — 0W9G3ZZ DRAINAGE OF PERITONEAL CAVITY, PERCUTANEOUS APPROACH: ICD-10-PCS | Performed by: RADIOLOGY

## 2025-01-15 PROCEDURE — 81001 URINALYSIS AUTO W/SCOPE: CPT | Performed by: STUDENT IN AN ORGANIZED HEALTH CARE EDUCATION/TRAINING PROGRAM

## 2025-01-15 PROCEDURE — 96375 TX/PRO/DX INJ NEW DRUG ADDON: CPT

## 2025-01-15 PROCEDURE — 85610 PROTHROMBIN TIME: CPT | Performed by: STUDENT IN AN ORGANIZED HEALTH CARE EDUCATION/TRAINING PROGRAM

## 2025-01-15 PROCEDURE — 63600175 PHARM REV CODE 636 W HCPCS: Performed by: STUDENT IN AN ORGANIZED HEALTH CARE EDUCATION/TRAINING PROGRAM

## 2025-01-15 PROCEDURE — 96365 THER/PROPH/DIAG IV INF INIT: CPT

## 2025-01-15 PROCEDURE — 25000003 PHARM REV CODE 250: Performed by: INTERNAL MEDICINE

## 2025-01-15 PROCEDURE — 96366 THER/PROPH/DIAG IV INF ADDON: CPT

## 2025-01-15 PROCEDURE — P9047 ALBUMIN (HUMAN), 25%, 50ML: HCPCS | Performed by: INTERNAL MEDICINE

## 2025-01-15 PROCEDURE — 85730 THROMBOPLASTIN TIME PARTIAL: CPT | Performed by: STUDENT IN AN ORGANIZED HEALTH CARE EDUCATION/TRAINING PROGRAM

## 2025-01-15 PROCEDURE — 93005 ELECTROCARDIOGRAM TRACING: CPT

## 2025-01-15 PROCEDURE — 25000003 PHARM REV CODE 250: Performed by: NURSE PRACTITIONER

## 2025-01-15 PROCEDURE — 80053 COMPREHEN METABOLIC PANEL: CPT | Performed by: STUDENT IN AN ORGANIZED HEALTH CARE EDUCATION/TRAINING PROGRAM

## 2025-01-15 PROCEDURE — P9047 ALBUMIN (HUMAN), 25%, 50ML: HCPCS | Performed by: RADIOLOGY

## 2025-01-15 PROCEDURE — 83690 ASSAY OF LIPASE: CPT | Performed by: STUDENT IN AN ORGANIZED HEALTH CARE EDUCATION/TRAINING PROGRAM

## 2025-01-15 RX ORDER — NALOXONE HCL 0.4 MG/ML
0.02 VIAL (ML) INJECTION
Status: DISCONTINUED | OUTPATIENT
Start: 2025-01-15 | End: 2025-01-17 | Stop reason: HOSPADM

## 2025-01-15 RX ORDER — GLUCAGON 1 MG
1 KIT INJECTION
Status: DISCONTINUED | OUTPATIENT
Start: 2025-01-15 | End: 2025-01-17 | Stop reason: HOSPADM

## 2025-01-15 RX ORDER — ALBUMIN HUMAN 250 G/1000ML
25 SOLUTION INTRAVENOUS ONCE
Status: COMPLETED | OUTPATIENT
Start: 2025-01-15 | End: 2025-01-15

## 2025-01-15 RX ORDER — IBUPROFEN 200 MG
16 TABLET ORAL
Status: DISCONTINUED | OUTPATIENT
Start: 2025-01-15 | End: 2025-01-16

## 2025-01-15 RX ORDER — TAMSULOSIN HYDROCHLORIDE 0.4 MG/1
0.4 CAPSULE ORAL DAILY
Qty: 30 CAPSULE | Refills: 2 | Status: SHIPPED | OUTPATIENT
Start: 2025-01-15 | End: 2025-04-15

## 2025-01-15 RX ORDER — ONDANSETRON HYDROCHLORIDE 2 MG/ML
4 INJECTION, SOLUTION INTRAVENOUS EVERY 4 HOURS PRN
Status: DISCONTINUED | OUTPATIENT
Start: 2025-01-15 | End: 2025-01-17 | Stop reason: HOSPADM

## 2025-01-15 RX ORDER — PROCHLORPERAZINE EDISYLATE 5 MG/ML
5 INJECTION INTRAMUSCULAR; INTRAVENOUS EVERY 6 HOURS PRN
Status: DISCONTINUED | OUTPATIENT
Start: 2025-01-15 | End: 2025-01-17 | Stop reason: HOSPADM

## 2025-01-15 RX ORDER — IBUPROFEN 200 MG
24 TABLET ORAL
Status: DISCONTINUED | OUTPATIENT
Start: 2025-01-15 | End: 2025-01-16

## 2025-01-15 RX ORDER — ONDANSETRON HYDROCHLORIDE 2 MG/ML
4 INJECTION, SOLUTION INTRAVENOUS EVERY 4 HOURS PRN
Status: DISCONTINUED | OUTPATIENT
Start: 2025-01-15 | End: 2025-01-15 | Stop reason: SDUPTHER

## 2025-01-15 RX ORDER — ACETAMINOPHEN 325 MG/1
650 TABLET ORAL EVERY 4 HOURS PRN
Status: DISCONTINUED | OUTPATIENT
Start: 2025-01-15 | End: 2025-01-15 | Stop reason: SDUPTHER

## 2025-01-15 RX ORDER — PREGABALIN 50 MG/1
100 CAPSULE ORAL 2 TIMES DAILY
Status: DISCONTINUED | OUTPATIENT
Start: 2025-01-15 | End: 2025-01-17 | Stop reason: HOSPADM

## 2025-01-15 RX ORDER — SODIUM CHLORIDE 0.9 % (FLUSH) 0.9 %
10 SYRINGE (ML) INJECTION
Status: DISCONTINUED | OUTPATIENT
Start: 2025-01-15 | End: 2025-01-17 | Stop reason: HOSPADM

## 2025-01-15 RX ORDER — MORPHINE SULFATE 4 MG/ML
4 INJECTION, SOLUTION INTRAMUSCULAR; INTRAVENOUS ONCE
Status: COMPLETED | OUTPATIENT
Start: 2025-01-15 | End: 2025-01-15

## 2025-01-15 RX ORDER — ACETAMINOPHEN 500 MG
1000 TABLET ORAL EVERY 6 HOURS PRN
Status: DISCONTINUED | OUTPATIENT
Start: 2025-01-15 | End: 2025-01-15 | Stop reason: SDUPTHER

## 2025-01-15 RX ORDER — MORPHINE SULFATE 4 MG/ML
4 INJECTION, SOLUTION INTRAMUSCULAR; INTRAVENOUS EVERY 4 HOURS PRN
Status: DISCONTINUED | OUTPATIENT
Start: 2025-01-15 | End: 2025-01-17 | Stop reason: HOSPADM

## 2025-01-15 RX ORDER — PROCHLORPERAZINE EDISYLATE 5 MG/ML
5 INJECTION INTRAMUSCULAR; INTRAVENOUS EVERY 6 HOURS PRN
Status: DISCONTINUED | OUTPATIENT
Start: 2025-01-15 | End: 2025-01-15 | Stop reason: SDUPTHER

## 2025-01-15 RX ORDER — FUROSEMIDE 10 MG/ML
80 INJECTION INTRAMUSCULAR; INTRAVENOUS
Status: COMPLETED | OUTPATIENT
Start: 2025-01-15 | End: 2025-01-15

## 2025-01-15 RX ORDER — HYDROCODONE BITARTRATE AND ACETAMINOPHEN 10; 325 MG/1; MG/1
1 TABLET ORAL EVERY 6 HOURS PRN
Status: DISCONTINUED | OUTPATIENT
Start: 2025-01-15 | End: 2025-01-17 | Stop reason: HOSPADM

## 2025-01-15 RX ORDER — ACETAMINOPHEN 500 MG
1000 TABLET ORAL EVERY 6 HOURS PRN
Status: DISCONTINUED | OUTPATIENT
Start: 2025-01-15 | End: 2025-01-17 | Stop reason: HOSPADM

## 2025-01-15 RX ORDER — ALBUMIN HUMAN 250 G/1000ML
25 SOLUTION INTRAVENOUS ONCE
Status: DISCONTINUED | OUTPATIENT
Start: 2025-01-15 | End: 2025-01-15

## 2025-01-15 RX ORDER — ACETAMINOPHEN 325 MG/1
650 TABLET ORAL EVERY 4 HOURS PRN
Status: DISCONTINUED | OUTPATIENT
Start: 2025-01-15 | End: 2025-01-17 | Stop reason: HOSPADM

## 2025-01-15 RX ORDER — NALOXONE HCL 0.4 MG/ML
0.02 VIAL (ML) INJECTION
Status: DISCONTINUED | OUTPATIENT
Start: 2025-01-15 | End: 2025-01-15 | Stop reason: SDUPTHER

## 2025-01-15 RX ORDER — MORPHINE SULFATE 4 MG/ML
4 INJECTION, SOLUTION INTRAMUSCULAR; INTRAVENOUS
Status: COMPLETED | OUTPATIENT
Start: 2025-01-15 | End: 2025-01-15

## 2025-01-15 RX ORDER — MORPHINE SULFATE 15 MG/1
15 TABLET, FILM COATED, EXTENDED RELEASE ORAL 2 TIMES DAILY
Status: DISCONTINUED | OUTPATIENT
Start: 2025-01-15 | End: 2025-01-17 | Stop reason: HOSPADM

## 2025-01-15 RX ORDER — IPRATROPIUM BROMIDE AND ALBUTEROL SULFATE 2.5; .5 MG/3ML; MG/3ML
3 SOLUTION RESPIRATORY (INHALATION) EVERY 4 HOURS PRN
Status: DISCONTINUED | OUTPATIENT
Start: 2025-01-15 | End: 2025-01-17 | Stop reason: HOSPADM

## 2025-01-15 RX ORDER — ONDANSETRON HYDROCHLORIDE 2 MG/ML
4 INJECTION, SOLUTION INTRAVENOUS
Status: COMPLETED | OUTPATIENT
Start: 2025-01-15 | End: 2025-01-15

## 2025-01-15 RX ORDER — TAMSULOSIN HYDROCHLORIDE 0.4 MG/1
0.4 CAPSULE ORAL DAILY
Status: DISCONTINUED | OUTPATIENT
Start: 2025-01-16 | End: 2025-01-17 | Stop reason: HOSPADM

## 2025-01-15 RX ORDER — PANTOPRAZOLE SODIUM 40 MG/1
40 TABLET, DELAYED RELEASE ORAL DAILY
Status: DISCONTINUED | OUTPATIENT
Start: 2025-01-16 | End: 2025-01-17 | Stop reason: HOSPADM

## 2025-01-15 RX ADMIN — MORPHINE SULFATE 4 MG: 4 INJECTION, SOLUTION INTRAMUSCULAR; INTRAVENOUS at 08:01

## 2025-01-15 RX ADMIN — PREGABALIN 100 MG: 50 CAPSULE ORAL at 08:01

## 2025-01-15 RX ADMIN — ALBUMIN (HUMAN) 25 G: 12.5 SOLUTION INTRAVENOUS at 06:01

## 2025-01-15 RX ADMIN — HYDROCODONE BITARTRATE AND ACETAMINOPHEN 1 TABLET: 10; 325 TABLET ORAL at 06:01

## 2025-01-15 RX ADMIN — MORPHINE SULFATE 15 MG: 15 TABLET, EXTENDED RELEASE ORAL at 08:01

## 2025-01-15 RX ADMIN — APIXABAN 5 MG: 5 TABLET, FILM COATED ORAL at 08:01

## 2025-01-15 RX ADMIN — ONDANSETRON 4 MG: 2 INJECTION INTRAMUSCULAR; INTRAVENOUS at 04:01

## 2025-01-15 RX ADMIN — MORPHINE SULFATE 4 MG: 4 INJECTION, SOLUTION INTRAMUSCULAR; INTRAVENOUS at 04:01

## 2025-01-15 RX ADMIN — ALBUMIN (HUMAN) 25 G: 12.5 SOLUTION INTRAVENOUS at 12:01

## 2025-01-15 RX ADMIN — FUROSEMIDE 80 MG: 10 INJECTION, SOLUTION INTRAMUSCULAR; INTRAVENOUS at 04:01

## 2025-01-15 NOTE — INTERVAL H&P NOTE
The patient has been examined and the H&P has been reviewed:    I concur with the findings and no changes have occurred since H&P was written. Warren P Lejeune is a 59 y.o. male with pancreatic cancer and ascites who presents for paracentesis.           There are no hospital problems to display for this patient.

## 2025-01-15 NOTE — OP NOTE
Radiology Post-Procedure Note    Pre Op Diagnosis: Ascites    Post Op Diagnosis: Same    Secondary Diagnoses:   Problem List Items Addressed This Visit       Pancreatic cancer - Primary     Other Visit Diagnoses       Shortness of breath        Relevant Orders    EKG 12-lead (Completed)    Other ascites        Chest pain        Relevant Orders    EKG 12-lead    EKG 12-lead             Procedure: US Guided Paracentesis    Procedure performed by: Jerome Avalos MD    Assistant: None    Written Informed Consent Obtained: Yes    Specimen Removed: None    Estimated Blood Loss: <10 cc    Condition: Stable    Outcome: The patient tolerated the procedure well and was without complications.    For further details please see the imaging report associated.    Disposition: Transfer back to ER

## 2025-01-15 NOTE — H&P
[facility] Hospital Medicine History & Physical Examination       Patient Name: Warren P Lejeune  MRN: [id number]  Patient Class: OP- Observation   Admission Date: 1/15/2025   Admitting Physician: VIJAY Service   Length of Stay: 0  Attending Physician: Dr Cayetano Sahu  Primary Care Provider: Tristin Gambino MD  Face-to-Face encounter date: 01/15/2025  Code Status: Full Code  Chief Complaint: Abdominal Pain (Abd pain, ascites, and shortness of breath - hx of pancreatic ca - onc: sam - regularly has weekly paracentesis w IR - has not had one this wk)        Screening for Social Drivers for health:  Patient screened for food insecurity, housing instability, transportation needs, utility difficulties, and interpersonal safety (select all that apply as identified as concern)  []Housing or Food  []Transportation Needs  []Utility Difficulties  []Interpersonal safety  [x]None    Patient information was obtained from patient, patient's family, past medical records and/or ER records.     HISTORY OF PRESENT ILLNESS:   Warren P Lejeune is a 59 y.o. male who PMH includes anxiety, depression, pancreatic cancer status post chemotherapy, chronic back pain, BPH, GERD, HTN, dm type 2, diabetic retinopathy; presents to the ED at [facility] on 1/15/2025 with a primary complaint of peripheral edema with abdominal pain and swelling.  Patient reports he has weekly paracentesis secondary to recurrent ascites.  It is reported he missed his appointment this week and the swelling progress which he was directed to come to the ED for further evaluation.  Patient is followed outpatient with Oncology Services by Dr. Jena Messina.  He reports his shortness a breath has worsened as well he is not on supplemental oxygen therapy at home.  He was noted to be hypoxic on presentation in the ED requiring supplemental oxygen therapy.  No reports of injury, trauma, or falls; no reports of hematemesis, bright red bleeding  per rectum, black or tarry stools, hematochezia or any hemoptysis.  Lab work reviewed demonstrated WBCs 11.82, H&H 12.8/40.3, sodium 135, glucose 195, alkaline phosphatase 448, AST 38; other indices unremarkable.  Initial vital signs BP 99/66 pulse 73 respirations 20 temperature 98.8° F O2 saturation 93% on room air.  IR Services were consulted which patient had paracentesis done removing 6.9 L per staff reports.  Patient reports feeling much better post paracentesis and shortness a breath has resolved.  Vital signs are stable.  Patient has albumin ordered which nurse is in the process of administering at the time of examination.  Patient is admitted to hospital medicine services for further management.    PAST MEDICAL HISTORY:     Past Medical History:   Diagnosis Date    Abdominal pain     Admission for chemotherapy     last chemo 9/12/24    Anxiety disorder, unspecified     Back pain     Bradycardia     Enlarged prostate     GERD (gastroesophageal reflux disease)     Hypertension     no cardiologist; no longer on meds since weight loss due to pancreatic cancer    Memory loss     Pancreatic cancer     Peripheral neuropathy     Type 2 diabetes mellitus with unspecified diabetic retinopathy without macular edema        PAST SURGICAL HISTORY:     Past Surgical History:   Procedure Laterality Date    bile duct stents times 2      EGD, WITH STENT INSERTION      INSERTION OF TUNNELED CENTRAL VENOUS CATHETER (CVC) WITH SUBCUTANEOUS PORT Right 9/20/2024    Procedure: [id number];  Surgeon: Moe Champagne MD;  Location: [facility];  Service: General;  Laterality: Right;    MEDIPORT REMOVAL      times 2       ALLERGIES:   Patient has no known allergies.    FAMILY HISTORY:   Reviewed and negative    SOCIAL HISTORY:     Social History     Tobacco Use    Smoking status: Never     Passive exposure: Never    Smokeless tobacco: Former     Types: Chew   Substance Use Topics    Alcohol use: Not Currently        HOME  MEDICATIONS:   As documented  Prior to Admission medications    Medication Sig Start Date End Date Taking? Authorizing Provider   aluminum & magnesium hydroxide-simethicone (MYLANTA MAX STRENGTH) 400-400-40 mg/5 mL suspension Take 5 mLs by mouth 4 (four) times daily as needed (mouth sores). 9/18/24 9/18/25  Terri Marcelino FNP   apixaban (ELIQUIS) 5 mg Tab Take 1 tablet (5 mg total) by mouth 2 (two) times daily. Take 2 tablets by mouth (10 mg) twice a day for 7 days, then 1 tablet (5 mg total) by mouth twice a day thereafter 10/29/24   Jena Messina MD   cholecalciferol, vitamin D3, (VITAMIN D3) 50 mcg (2,000 unit) Tab Take 2,000 Units by mouth once daily.    Provider, Historical   dexAMETHasone (DECADRON) 4 MG Tab TAKE 2 TABLETS BY MOUTH ONCE DAILY AS DIRECTED ON DAYS 2 AND 3 OF CHEMOTHERAPY CYCLE 11/26/24   Provider, Historical   dicyclomine (BENTYL) 10 MG capsule Take 1 capsule (10 mg total) by mouth 4 (four) times daily before meals and nightly. 1/10/25 2/9/25  Abby Bah FNP   diphenhydrAMINE (BENADRYL) 12.5 mg/5 mL elixir Take 5 mLs (12.5 mg total) by mouth 4 (four) times daily as needed (mouth sores). 9/18/24   Terri Marcelino FNP   furosemide (LASIX) 20 MG tablet Take 1 tablet (20 mg total) by mouth once daily. As needed for swelling 1/13/25 1/13/26  Abby Bah FNP   insulin aspart U-100 (NOVOLOG) 100 unit/mL injection Inject into the skin 3 (three) times daily before meals. prn    Provider, Historical   LIDOcaine viscous HCl 2% (LIDOCAINE VISCOUS) 2 % Soln by Mucous Membrane route 4 (four) times daily as needed (mouth sores). Swish and spit 15 ml (5 ml benadryl, 5 ml mylanta, 5 ml lidocaine) by mouth 4 times daily as needed for mouth sores 9/18/24   Terri Marcelino FNP   magnesium oxide (MAGOX) 400 mg (241.3 mg magnesium) tablet Take 1 tablet (400 mg total) by mouth once daily. 12/18/24 12/18/25  Abby Bah FNP   morphine (MS CONTIN) 15 MG 12 hr tablet Take 1 tablet  (15 mg total) by mouth 2 (two) times daily. 1/6/25   Abby Bah FNP   multivitamin with folic acid 400 mcg Tab Take 1 tablet by mouth once daily.    Provider, Historical   OLANZapine (ZYPREXA) 5 MG tablet Take 1 tablet (5 mg total) by mouth nightly. 9/9/24 10/7/24  Danielle Bryant, NP   ondansetron (ZOFRAN) 8 MG tablet Take 8 mg by mouth. 6/12/24   Provider, Historical   oxyCODONE-acetaminophen (PERCOCET)  mg per tablet Take 1 tablet by mouth every 4 (four) hours as needed for Pain. 1/6/25   Abby Bah FNP   pantoprazole (PROTONIX) 40 MG tablet Take 1 tablet (40 mg total) by mouth once daily. 12/26/24 3/26/25  Abby Bah FNP   polyethylene glycol (GLYCOLAX) 17 gram PwPk Take 17 g by mouth. 8/9/24   Provider, Historical   pregabalin (LYRICA) 100 MG capsule Take 1 capsule (100 mg total) by mouth 2 (two) times daily. 12/18/24 7/16/25  Abby Bah FNP   prochlorperazine (COMPAZINE) 10 MG tablet Take 10 mg by mouth. 6/12/24   Provider, Historical   tamsulosin (FLOMAX) 0.4 mg Cap Take 1 capsule (0.4 mg total) by mouth once daily. 1/15/25 4/15/25  Abby Bah FNP   TRESIBA FLEXTOUCH U-200 200 unit/mL (3 mL) insulin pen Inject 14 Units into the skin. 3/12/24   Provider, Historical   vibegron (GEMTESA) 75 mg Tab Take 75 mg by mouth.    Provider, Historical   tamsulosin (FLOMAX) 0.4 mg Cap Take 1 capsule (0.4 mg total) by mouth once daily. 11/20/24 1/15/25  Terri Marcelino FNP       REVIEW OF SYSTEMS:   Except as documented, all other systems reviewed and negative     PHYSICAL EXAM:     VITAL SIGNS: 24 HRS MIN & MAX LAST   Temp  Min: 98.8 °F (37.1 °C)  Max: 98.8 °F (37.1 °C) 98.8 °F (37.1 °C)   BP  Min: 94/68  Max: 112/76 106/72   Pulse  Min: 62  Max: 77  66   Resp  Min: 10  Max: 20 14   SpO2  Min: 92 %  Max: 97 % 97 %       General appearance:  Well-developed well-nourished chronically ill-appearing male looks older than stated age; nontoxic, wife at the bedside  HENT: Atraumatic head.  Moist mucous membranes of oral cavity.  Eyes:  PERRL  Lungs: Clear to auscultation bilaterally. No wheezing present.   Heart: Regular rate and rhythm. S1 and S2 present capillary refill brisk  Abdomen: Soft, mildly distended, non-tender. No rebound tenderness/guarding. Bowel sounds are normal.   Extremities: No cyanosis, clubbing, trace edema lower extremity  Skin:  Warm and dry  Neuro:  Oriented x3 no acute focal deficits   Psych/mental status:  Flat affect, cooperative    LABS AND IMAGING:     Recent Labs   Lab 01/15/25  0304   WBC 11.82*   RBC 4.34*   HGB 12.8*   HCT 40.3*   MCV 92.9   MCH 29.5   MCHC 31.8*   RDW 13.8      MPV 10.5*       Recent Labs   Lab 01/15/25  0304   *   K 5.1      CO2 29   BUN 5.6*   CREATININE 0.80   CALCIUM 8.3*   ALBUMIN 2.3*   ALKPHOS 448*   ALT 36   AST 38*   BILITOT 0.4       Microbiology Results (last 7 days)       ** No results found for the last 168 hours. **             IR Paracentesis with Imaging  Narrative: PROCEDURE:  Ultrasound Guided Paracentesis    CLINICAL HISTORY:  59-year-old male with pancreatic cancer and ascites    ANESTHESIA:  Local anesthesia    PHYSICIANS:  Jerome Avalos MD    PROCEDURAL SUMMARY:  After informed consent was obtained, the patient was placed supine on the ultrasound table. A limited ultrasound of the abdomen was performed with Dr. Avalos present in the room.  This confirmed the presence of an appropriate volume of ascites for drainage.  The planned skin entry site and route for needle passage for paracentesis was then determined. The skin overlying the right lower quadrant of the abdomen was marked.  The site was then prepped and draped in the usual sterile fashion.    The soft tissues were anesthetized with lidocaine and a dermatotomy was performed.  Under ultrasound guidance, a sheath needle was advanced from the skin entry site into the peritoneal cavity.  When the needle entered the peritoneal cavity, fluid was aspirated.   The sheath was then advanced over the needle into the cavity.  The needle was removed.  Aspiration was performed and a total of 5.5 L of clear fluid was drained from the peritoneal cavity.  The catheter was then removed.  A sterile dressing was applied.    The patient tolerated the procedure well and was without any apparent complications.  Impression: Technically successful ultrasound-guided paracentesis.    Electronically signed by: Jerome Avalos  Date:    01/08/2025  Time:    10:46        ASSESSMENT & PLAN:   ASSESSMENT:   recurrent ascites-status post weekly paracentesis-POA   pancreatic cancer-POA  Exertional dyspnea with shortness a breath-POA, now resolved   Leukocytosis-likely stress response-POA  Anemia-chronic disease-POA   DM type 2 with hyperglycemia-POA   HTN-POA   Cancer related fatigue-POA      PLAN:  Consult IR Services appreciate assistance and recommendations   Consult Dr Messina- per patient requiest  Patient is post paracentesis with removal of 6.9 L   Albumin has been ordered   Accurate I&O   Daily weights   PRN pain management   Resume home medication as deemed necessary   Repeat lab work in a.m.         VTE Prophylaxis: Eliquis for DVT prophylaxis and will be advised to be as mobile as possible and sit in a chair as tolerated    Patient condition:  Stable  __________________________________________________________________________  INPATIENT LIST OF MEDICATIONS     Scheduled Meds:  Continuous Infusions:  PRN Meds:.  Current Facility-Administered Medications:     acetaminophen, 1,000 mg, Oral, Q6H PRN    acetaminophen, 650 mg, Oral, Q4H PRN    albuterol-ipratropium, 3 mL, Nebulization, Q4H PRN    dextrose 50%, 12.5 g, Intravenous, PRN    dextrose 50%, 12.5 g, Intravenous, PRN    dextrose 50%, 25 g, Intravenous, PRN    dextrose 50%, 25 g, Intravenous, PRN    glucagon (human recombinant), 1 mg, Intramuscular, PRN    glucagon (human recombinant), 1 mg, Intramuscular, PRN    glucose, 16 g, Oral,  PRN    glucose, 16 g, Oral, PRN    glucose, 24 g, Oral, PRN    glucose, 24 g, Oral, PRN    naloxone, 0.02 mg, Intravenous, PRN    ondansetron, 4 mg, Intravenous, Q4H PRN    prochlorperazine, 5 mg, Intravenous, Q6H PRN    sodium chloride 0.9%, 10 mL, Intravenous, PRN    sodium chloride 0.9%, 10 mL, Intravenous, PRN      I, ERNA Abebe have reviewed and discussed the case with   Dr. Cayetano Sahu Please see the following addendum for further assessment and plan from their attending MD.  ERNA Carmen   01/15/2025    ________________________________________________________________________________    MD Addendum:  I, Dr. Cayetano Sahu MD assumed care of this patient today   For the patient encounter, I performed the substantive portion of the visit, I reviewed the NP/PA documentation, treatment plan, and medical decision making.  I had face to face time with this patient     59-year-old male with unresectable advanced pancreatic cancer to start 4th line treatment with cisplatin and Gemzar, undergoing restaging scans to eval for progression, presented to ED today  with complaints of abdominal distention and ascites, SOB. Reports missing  his scheduled paracentesis.    Pt is  S/p 6.9 L paracentesis today.Symptoms improved .      stable on room air     General :  Weak appearing , pt's spouse at bedside during my interview   Abdomen: Soft, distended  Lungs: Diminished breath sounds bases   Heart: Regular  Neuro: Alert, oriented     MDM     Received 25 g albumin, give another 25 g  Monitor hemodynamics  Resume home medications   Follow Heme-Onc recommendations         If patient was admitted under observation status, it was with my permission.

## 2025-01-15 NOTE — PLAN OF CARE
Pt is , 2 children, no living will or POA. NO PCP.    01/15/25 1007   Discharge Assessment   Assessment Type Discharge Planning Assessment   Confirmed/corrected address, phone number and insurance Yes   Confirmed Demographics Correct on Facesheet   Source of Information patient   When was your last doctors appointment?   (NO PCP)   Communicated MATTHIEU with patient/caregiver Date not available/Unable to determine   People in Home spouse   Do you expect to return to your current living situation? Yes   Do you have help at home or someone to help you manage your care at home? Yes   Who are your caregiver(s) and their phone number(s)? wife Terri [phone]   Prior to hospitilization cognitive status: Unable to Assess   Current cognitive status: Alert/Oriented   Walking or Climbing Stairs Difficulty no   Dressing/Bathing Difficulty no   Home Accessibility wheelchair accessible   Home Layout Able to live on 1st floor   Equipment Currently Used at Home glucometer   Readmission within 30 days? Yes   Patient currently being followed by outpatient case management? No   Do you currently have service(s) that help you manage your care at home? No   Do you take prescription medications? Yes  (Delano Plascencia)   Do you have prescription coverage? Yes   Coverage [employer] Medicare   Do you have any problems affording any of your prescribed medications? TBD   Is the patient taking medications as prescribed? yes   Who is going to help you get home at discharge? wife   How do you get to doctors appointments? car, drives self;family or friend will provide   Are you on dialysis? No   Do you take coumadin? No   Discharge Plan A Other  (TBD)   DME Needed Upon Discharge  other (see comments)  (TBD)   Discharge Plan discussed with: Patient   Transition of Care Barriers None   Physical Activity   On average, how many days per week do you engage in moderate to strenuous exercise (like a brisk walk)? 7 days   On average, how many minutes  do you engage in exercise at this level? 150+ min   Financial Resource Strain   How hard is it for you to pay for the very basics like food, housing, medical care, and heating? Very Hard   Housing Stability   In the last 12 months, was there a time when you were not able to pay the mortgage or rent on time? Y   At any time in the past 12 months, were you homeless or living in a shelter (including now)? N   Transportation Needs   Has the lack of transportation kept you from medical appointments, meetings, work or from getting things needed for daily living? Yes, it has kept me from non-medical meetings, appointments, work or from getting things that I need.  (trouble affording gas money)   Food Insecurity   Within the past 12 months, you worried that your food would run out before you got the money to buy more. Never true   Within the past 12 months, the food you bought just didn't last and you didn't have money to get more. Never true   Stress   Do you feel stress - tense, restless, nervous, or anxious, or unable to sleep at night because your mind is troubled all the time - these days? Very much   Utilities   In the past 12 months has the electric, gas, oil, or water company threatened to shut off services in your home? No   Health Literacy   How often do you need to have someone help you when you read instructions, pamphlets, or other written material from your doctor or pharmacy? Rarely   OTHER   Name(s) of People in Home wife Danielle Lejeune

## 2025-01-15 NOTE — Clinical Note
Diagnosis: Shortness of breath [786.05.ICD-9-CM]   Future Attending Provider: NATIVIDAD FLORES [[id number]]   Admit to which facility:: [facility] [[id number]]

## 2025-01-15 NOTE — ED PROVIDER NOTES
Encounter Date: 1/15/2025    SCRIBE #1 NOTE: I, Zane Platt, am scribing for, and in the presence of,  Erich Aguilar MD. I have scribed the following portions of the note - the EKG reading. Other sections scribed: HPI, ROS, PE.       History     Chief Complaint   Patient presents with    Abdominal Pain     Abd pain, ascites, and shortness of breath - hx of pancreatic ca - onc: panelli - regularly has weekly paracentesis w IR - has not had one this wk     Patient is a 59 y.o. male with a PMHx of GERD, HTN, pancreatic cancer, and DM II presenting to the ED with swelling and SOB that onset 3 weeks ago. Patient's wife reports he has been undergoing paracentesis for the last 2 weeks and missed his appointment this week. She states he has been swelling in his abdomen, legs, and feet since with associated SOB.  Patient has been taking Lasix 20 mg.    The history is provided by the spouse. No  was used.     Review of patient's allergies indicates:  No Known Allergies  Past Medical History:   Diagnosis Date    Abdominal pain     Admission for chemotherapy     last chemo 9/12/24    Anxiety disorder, unspecified     Back pain     Bradycardia     Enlarged prostate     GERD (gastroesophageal reflux disease)     Hypertension     no cardiologist; no longer on meds since weight loss due to pancreatic cancer    Memory loss     Pancreatic cancer     Peripheral neuropathy     Type 2 diabetes mellitus with unspecified diabetic retinopathy without macular edema      Past Surgical History:   Procedure Laterality Date    bile duct stents times 2      EGD, WITH STENT INSERTION      INSERTION OF TUNNELED CENTRAL VENOUS CATHETER (CVC) WITH SUBCUTANEOUS PORT Right 9/20/2024    Procedure: [id number];  Surgeon: Moe Champagne MD;  Location: [facility];  Service: General;  Laterality: Right;    MEDIPORT REMOVAL      times 2     Family History   Problem Relation Name Age of Onset    Hypertension Mother       Stroke Father      Stroke Sister      Prostate cancer Brother       Social History     Tobacco Use    Smoking status: Never     Passive exposure: Never    Smokeless tobacco: Former     Types: Chew   Substance Use Topics    Alcohol use: Not Currently    Drug use: Never     Review of Systems   Constitutional:  Negative for fever.   HENT:  Negative for sore throat.    Eyes:  Negative for visual disturbance.   Respiratory:  Positive for shortness of breath.    Cardiovascular:  Positive for leg swelling. Negative for chest pain.   Gastrointestinal:  Positive for abdominal distention. Negative for abdominal pain.   Genitourinary:  Negative for dysuria.   Musculoskeletal:  Positive for neck stiffness. Negative for joint swelling.   Skin:  Negative for rash.   Neurological:  Negative for weakness.   Psychiatric/Behavioral:  Negative for confusion.    All other systems reviewed and are negative.      Physical Exam     Initial Vitals [01/15/25 0207]   BP Pulse Resp Temp SpO2   99/66 73 20 98.8 °F (37.1 °C) (!) 93 %      MAP       --         Physical Exam    Nursing note and vitals reviewed.  Constitutional: He appears well-developed and well-nourished. He is not diaphoretic. No distress.   HENT:   Head: Normocephalic and atraumatic.   Eyes: Conjunctivae and EOM are normal. Pupils are equal, round, and reactive to light.   Neck:   Normal range of motion.  Cardiovascular:  Normal rate, regular rhythm, normal heart sounds and intact distal pulses.           No murmur heard.  Pulmonary/Chest: Breath sounds normal. No respiratory distress. He has no wheezes. He has no rales.   Abdominal: Abdomen is soft. He exhibits distension. There is no abdominal tenderness.   Musculoskeletal:         General: No tenderness or edema. Normal range of motion.      Cervical back: Normal range of motion.     Neurological: He is alert and oriented to person, place, and time. No cranial nerve deficit.   Skin: Skin is warm and dry. Capillary refill  takes less than 2 seconds. No rash noted. No erythema.   Psychiatric: He has a normal mood and affect.         ED Course   Procedures  Labs Reviewed   COMPREHENSIVE METABOLIC PANEL - Abnormal       Result Value    Sodium 135 (*)     Potassium 5.1      Chloride 104      CO2 29      Glucose 195 (*)     Blood Urea Nitrogen 5.6 (*)     Creatinine 0.80      Calcium 8.3 (*)     Protein Total 5.9 (*)     Albumin 2.3 (*)     Globulin 3.6 (*)     Albumin/Globulin Ratio 0.6 (*)     Bilirubin Total 0.4       (*)     ALT 36      AST 38 (*)     eGFR >60      Anion Gap 2.0      BUN/Creatinine Ratio 7     CBC WITH DIFFERENTIAL - Abnormal    WBC 11.82 (*)     RBC 4.34 (*)     Hgb 12.8 (*)     Hct 40.3 (*)     MCV 92.9      MCH 29.5      MCHC 31.8 (*)     RDW 13.8      Platelet 293      MPV 10.5 (*)     Neut % 80.7      Lymph % 8.5      Mono % 8.9      Eos % 1.0      Basophil % 0.5      Imm Grans % 0.4      Neut # 9.54 (*)     Lymph # 1.00      Mono # 1.05      Eos # 0.12      Baso # 0.06      Imm Gran # 0.05 (*)     NRBC% 0.0     PROTIME-INR - Abnormal    PT 16.6 (*)     INR 1.3     LIPASE - Normal    Lipase Level <4     APTT - Normal    PTT 29.8     CBC W/ AUTO DIFFERENTIAL    Narrative:     The following orders were created for panel order CBC auto differential.  Procedure                               Abnormality         Status                     ---------                               -----------         ------                     CBC with Differential[[id number]]       Abnormal            Final result                 Please view results for these tests on the individual orders.   URINALYSIS, REFLEX TO URINE CULTURE     EKG Readings: (Independently Interpreted)   Initial Reading: No STEMI. Rhythm: Normal Sinus Rhythm. Heart Rate: 74. Ectopy: No Ectopy. Conduction: Normal. ST Segments: Normal ST Segments. T Waves: Normal. Axis: Normal. Clinical Impression: Normal Sinus Rhythm   Done at 0207.        Imaging Results     None          Medications   morphine injection 4 mg (4 mg Intravenous Given 1/15/25 0419)   ondansetron injection 4 mg (4 mg Intravenous Given 1/15/25 0419)   furosemide injection 80 mg (80 mg Intravenous Given 1/15/25 0419)     Medical Decision Making  Judging by the patient's chief complaint and pertinent history, the patient has the following possible differential diagnoses, including but not limited to the following.  Some of these are deemed to be lower likelihood and some more likely based on my physical exam and history combined with possible lab work and/or imaging studies.   Please see the pertinent studies, and refer to the HPI.  Some of these diagnoses will take further evaluation to fully rule out, perhaps as an outpatient and the patient was encouraged to follow up when discharged for more comprehensive evaluation.      Ascites, volume overload, pancreatic cancer    Patient is a 59-year-old male presents to emergency department for volume overload, ascites.  See HPI.  See physical exam.  States has a weekly paracentesis.  Unable to schedule outpatient paracentesis today.  Has a result came to the ER for shortness of breath.  Given slug of Lasix with some improvement in his shortness of breath.  Discussed with interventional radiology.  Consult placed.  Discussed with medicine for observation.    Problems Addressed:  Malignant neoplasm of pancreas, unspecified location of malignancy: acute illness or injury that poses a threat to life or bodily functions  Other ascites: acute illness or injury that poses a threat to life or bodily functions  Shortness of breath: acute illness or injury that poses a threat to life or bodily functions    Amount and/or Complexity of Data Reviewed  Independent Historian: spouse     Details: See HPI  Labs: ordered.  ECG/medicine tests: ordered and independent interpretation performed.     Details: No STEMI. Rhythm: Normal Sinus Rhythm. Heart Rate: 74. Ectopy: No Ectopy.  Conduction: Normal. ST Segments: Normal ST Segments. T Waves: Normal. Axis: Normal. Clinical Impression: Normal Sinus Rhythm   Done at 0207.   Discussion of management or test interpretation with external provider(s): Discussed with IR  Discussed with medicine    Risk  Prescription drug management.            Scribe Attestation:   Scribe #1: I performed the above scribed service and the documentation accurately describes the services I performed. I attest to the accuracy of the note.    Attending Attestation:           Physician Attestation for Scribe:  Physician Attestation Statement for Scribe #1: I, Erich Aguilar MD, reviewed documentation, as scribed by Zane Platt in my presence, and it is both accurate and complete.             ED Course as of 01/15/25 0806   Wed Shankar 15, 2025   0802 Discussed with Interventional Radiology Dr. Lynch.  Spoke with IR team.  Unsure if the patient will be able to go to paracentesis today. [RP]   0805 Discussed with hospital medicine who will place the patient observation. [RP]      ED Course User Index  [RP] Erich Aguilar MD                           Clinical Impression:  Final diagnoses:  [R06.02] Shortness of breath  [C25.9] Malignant neoplasm of pancreas, unspecified location of malignancy (Primary)  [R18.8] Other ascites          ED Disposition Condition    Observation Stable                Erich Aguilar MD  01/15/25 0806     room air

## 2025-01-16 LAB
ALBUMIN SERPL-MCNC: 2.4 G/DL (ref 3.5–5)
ALBUMIN/GLOB SERPL: 1.1 RATIO (ref 1.1–2)
ALP SERPL-CCNC: 306 UNIT/L (ref 40–150)
ALT SERPL-CCNC: 20 UNIT/L (ref 0–55)
ANION GAP SERPL CALC-SCNC: 1 MEQ/L
AST SERPL-CCNC: 22 UNIT/L (ref 5–34)
BASOPHILS # BLD AUTO: 0.04 X10(3)/MCL
BASOPHILS NFR BLD AUTO: 0.7 %
BILIRUB SERPL-MCNC: 0.3 MG/DL
BUN SERPL-MCNC: 6 MG/DL (ref 8.4–25.7)
CALCIUM SERPL-MCNC: 7.9 MG/DL (ref 8.4–10.2)
CHLORIDE SERPL-SCNC: 101 MMOL/L (ref 98–107)
CO2 SERPL-SCNC: 32 MMOL/L (ref 22–29)
CREAT SERPL-MCNC: 0.78 MG/DL (ref 0.72–1.25)
CREAT/UREA NIT SERPL: 8
EOSINOPHIL # BLD AUTO: 0.16 X10(3)/MCL (ref 0–0.9)
EOSINOPHIL NFR BLD AUTO: 2.9 %
ERYTHROCYTE [DISTWIDTH] IN BLOOD BY AUTOMATED COUNT: 13.5 % (ref 11.5–17)
GFR SERPLBLD CREATININE-BSD FMLA CKD-EPI: >60 ML/MIN/1.73/M2
GLOBULIN SER-MCNC: 2.1 GM/DL (ref 2.4–3.5)
GLUCOSE SERPL-MCNC: 245 MG/DL (ref 74–100)
HCT VFR BLD AUTO: 32.5 % (ref 42–52)
HGB BLD-MCNC: 10.3 G/DL (ref 14–18)
IMM GRANULOCYTES # BLD AUTO: 0.02 X10(3)/MCL (ref 0–0.04)
IMM GRANULOCYTES NFR BLD AUTO: 0.4 %
LYMPHOCYTES # BLD AUTO: 0.76 X10(3)/MCL (ref 0.6–4.6)
LYMPHOCYTES NFR BLD AUTO: 13.7 %
MCH RBC QN AUTO: 28.9 PG (ref 27–31)
MCHC RBC AUTO-ENTMCNC: 31.7 G/DL (ref 33–36)
MCV RBC AUTO: 91.3 FL (ref 80–94)
MONOCYTES # BLD AUTO: 0.55 X10(3)/MCL (ref 0.1–1.3)
MONOCYTES NFR BLD AUTO: 9.9 %
NEUTROPHILS # BLD AUTO: 4.03 X10(3)/MCL (ref 2.1–9.2)
NEUTROPHILS NFR BLD AUTO: 72.4 %
NRBC BLD AUTO-RTO: 0 %
PLATELET # BLD AUTO: 162 X10(3)/MCL (ref 130–400)
PMV BLD AUTO: 10.8 FL (ref 7.4–10.4)
POCT GLUCOSE: 108 MG/DL (ref 70–110)
POCT GLUCOSE: 201 MG/DL (ref 70–110)
POCT GLUCOSE: 201 MG/DL (ref 70–110)
POCT GLUCOSE: 220 MG/DL (ref 70–110)
POTASSIUM SERPL-SCNC: 4.7 MMOL/L (ref 3.5–5.1)
PROT SERPL-MCNC: 4.5 GM/DL (ref 6.4–8.3)
RBC # BLD AUTO: 3.56 X10(6)/MCL (ref 4.7–6.1)
SODIUM SERPL-SCNC: 134 MMOL/L (ref 136–145)
WBC # BLD AUTO: 5.56 X10(3)/MCL (ref 4.5–11.5)

## 2025-01-16 PROCEDURE — 25000003 PHARM REV CODE 250: Performed by: NURSE PRACTITIONER

## 2025-01-16 PROCEDURE — 11000001 HC ACUTE MED/SURG PRIVATE ROOM

## 2025-01-16 PROCEDURE — 99223 1ST HOSP IP/OBS HIGH 75: CPT | Mod: ,,, | Performed by: INTERNAL MEDICINE

## 2025-01-16 PROCEDURE — 63600175 PHARM REV CODE 636 W HCPCS: Performed by: NURSE PRACTITIONER

## 2025-01-16 PROCEDURE — 85025 COMPLETE CBC W/AUTO DIFF WBC: CPT | Performed by: NURSE PRACTITIONER

## 2025-01-16 PROCEDURE — 36415 COLL VENOUS BLD VENIPUNCTURE: CPT | Performed by: NURSE PRACTITIONER

## 2025-01-16 PROCEDURE — 80053 COMPREHEN METABOLIC PANEL: CPT | Performed by: NURSE PRACTITIONER

## 2025-01-16 PROCEDURE — 25000003 PHARM REV CODE 250: Performed by: INTERNAL MEDICINE

## 2025-01-16 PROCEDURE — 96372 THER/PROPH/DIAG INJ SC/IM: CPT | Performed by: INTERNAL MEDICINE

## 2025-01-16 PROCEDURE — 63600175 PHARM REV CODE 636 W HCPCS: Performed by: INTERNAL MEDICINE

## 2025-01-16 RX ORDER — POLYETHYLENE GLYCOL 3350 17 G/17G
17 POWDER, FOR SOLUTION ORAL DAILY PRN
Status: DISCONTINUED | OUTPATIENT
Start: 2025-01-16 | End: 2025-01-17 | Stop reason: HOSPADM

## 2025-01-16 RX ORDER — IBUPROFEN 200 MG
16 TABLET ORAL
Status: DISCONTINUED | OUTPATIENT
Start: 2025-01-16 | End: 2025-01-17 | Stop reason: HOSPADM

## 2025-01-16 RX ORDER — INSULIN ASPART 100 [IU]/ML
0-5 INJECTION, SOLUTION INTRAVENOUS; SUBCUTANEOUS
Status: DISCONTINUED | OUTPATIENT
Start: 2025-01-16 | End: 2025-01-17 | Stop reason: HOSPADM

## 2025-01-16 RX ORDER — LANOLIN ALCOHOL/MO/W.PET/CERES
400 CREAM (GRAM) TOPICAL DAILY
Status: DISCONTINUED | OUTPATIENT
Start: 2025-01-16 | End: 2025-01-17 | Stop reason: HOSPADM

## 2025-01-16 RX ORDER — INSULIN GLARGINE 100 [IU]/ML
10 INJECTION, SOLUTION SUBCUTANEOUS NIGHTLY
Status: DISCONTINUED | OUTPATIENT
Start: 2025-01-16 | End: 2025-01-17 | Stop reason: HOSPADM

## 2025-01-16 RX ORDER — IBUPROFEN 200 MG
24 TABLET ORAL
Status: DISCONTINUED | OUTPATIENT
Start: 2025-01-16 | End: 2025-01-17 | Stop reason: HOSPADM

## 2025-01-16 RX ORDER — GLUCAGON 1 MG
1 KIT INJECTION
Status: DISCONTINUED | OUTPATIENT
Start: 2025-01-16 | End: 2025-01-17 | Stop reason: HOSPADM

## 2025-01-16 RX ORDER — FUROSEMIDE 40 MG/1
40 TABLET ORAL DAILY
Status: DISCONTINUED | OUTPATIENT
Start: 2025-01-16 | End: 2025-01-17 | Stop reason: HOSPADM

## 2025-01-16 RX ADMIN — DOCUSATE SODIUM 50 MG: 50 CAPSULE, LIQUID FILLED ORAL at 08:01

## 2025-01-16 RX ADMIN — TAMSULOSIN HYDROCHLORIDE 0.4 MG: 0.4 CAPSULE ORAL at 08:01

## 2025-01-16 RX ADMIN — DOCUSATE SODIUM 50 MG: 50 CAPSULE, LIQUID FILLED ORAL at 10:01

## 2025-01-16 RX ADMIN — HYDROCODONE BITARTRATE AND ACETAMINOPHEN 1 TABLET: 10; 325 TABLET ORAL at 04:01

## 2025-01-16 RX ADMIN — PANTOPRAZOLE SODIUM 40 MG: 40 TABLET, DELAYED RELEASE ORAL at 08:01

## 2025-01-16 RX ADMIN — MORPHINE SULFATE 15 MG: 15 TABLET, EXTENDED RELEASE ORAL at 10:01

## 2025-01-16 RX ADMIN — INSULIN ASPART 2 UNITS: 100 INJECTION, SOLUTION INTRAVENOUS; SUBCUTANEOUS at 08:01

## 2025-01-16 RX ADMIN — MORPHINE SULFATE 15 MG: 15 TABLET, EXTENDED RELEASE ORAL at 08:01

## 2025-01-16 RX ADMIN — FUROSEMIDE 40 MG: 40 TABLET ORAL at 12:01

## 2025-01-16 RX ADMIN — INSULIN GLARGINE 10 UNITS: 100 INJECTION, SOLUTION SUBCUTANEOUS at 10:01

## 2025-01-16 RX ADMIN — Medication 400 MG: at 12:01

## 2025-01-16 RX ADMIN — APIXABAN 5 MG: 5 TABLET, FILM COATED ORAL at 10:01

## 2025-01-16 RX ADMIN — MORPHINE SULFATE 4 MG: 4 INJECTION, SOLUTION INTRAMUSCULAR; INTRAVENOUS at 09:01

## 2025-01-16 RX ADMIN — INSULIN ASPART 2 UNITS: 100 INJECTION, SOLUTION INTRAVENOUS; SUBCUTANEOUS at 05:01

## 2025-01-16 RX ADMIN — POLYETHYLENE GLYCOL 3350 17 G: 17 POWDER, FOR SOLUTION ORAL at 08:01

## 2025-01-16 RX ADMIN — INSULIN ASPART 1 UNITS: 100 INJECTION, SOLUTION INTRAVENOUS; SUBCUTANEOUS at 10:01

## 2025-01-16 RX ADMIN — PREGABALIN 100 MG: 50 CAPSULE ORAL at 08:01

## 2025-01-16 RX ADMIN — APIXABAN 5 MG: 5 TABLET, FILM COATED ORAL at 08:01

## 2025-01-16 RX ADMIN — PREGABALIN 100 MG: 50 CAPSULE ORAL at 10:01

## 2025-01-16 RX ADMIN — HYDROCODONE BITARTRATE AND ACETAMINOPHEN 1 TABLET: 10; 325 TABLET ORAL at 02:01
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
## 2025-01-16 NOTE — CONSULTS
[facility] - Oncology Acute  Hematology/Oncology  Progress Note      Consult Requested By: Cayetano Sahu MD    Reason for Consult:   Diagnosis:  Advanced pancreatic ductal adenocarcinoma (Dx 8/2022)      Present treatment:  Gemzar monotherapy 8/28/2024  -Gemcitabine and CISplatin Every 2 Weeks (8/28/2024-present)--planned       Treatment/Oncology history:  -8/2/2022 ERCP with metal biliary stent placement   -Folfirinox (10/2022- 2/2023)  -capecitabine RT (4/3/23 - 5/16/23)   -Phase I study 2021-1176 SD-101 at [facility]--SD-101 2 mg in sodium chloride 0.9% (NS) 30 mL IVPB, intravenous x 2 cycles (8/22/2023-11/22/2023)  -Gemcitabine and paclitaxel protein bound (12/13/2023-- 7/2024)-->clinical progression and GOO  -s/p biliary stent placement 4/4/2024  -chemotherapy with gemcitabine/paclitaxel (last dose 6/26/2024)  -duodenal stent (placed by GI)- 8/7/2024  -Gemcitabine and CISplatin Every 2 Weeks (8/28/2024-present)     SUBJECTIVE:     History of Present Illness:  Patient is a 59 y.o. male .with a past medical history of past medical history of pancreatic ductal adenocarcinoma (Dx 8/2022)   Patient initially presented with  unintentional weight loss starting in 1/2022. He also had worsening back/shoulder and abdominal pain during this time. In July he finally presented to his PCP with darkening of the urine and lightening of the stool - with Jaundice that his wife noticed. Labs demonstrating cholestasis (bilirubin of 19).    Patient noted to have an abnormal CT scan after developing obstructive jaundice. 2 cm hypoenhancing mass noted in the head of the pancreas with intra and extrahepatic biliary ductal dilatation. Mass measured 2.5 x 2.1 cm. There is also atrophy of the distal gland and upstream dilatation of the pancreas duct. There is no obvious vascular invasion. There were no focal liver lesions.    8/2/22- EGD/EUS/FNA. Final pathology showed ductal adenocarcinoma, moderately differentiated. ERCP  "on the same date performed, with placement of a fully covered metal biliary stent, 60 mm x 10 mm.    7/27/22- CA 19-9 was 681    8/21/22- CT CAP: Since 7/25/2022, the primary pancreatic head mass encasing the common hepatic artery remains stable. The intrahepatic biliary obstruction improved with interval placement of a CBD stent. No definite distant metastasis in the abdomen or pelvis. Small indeterminate left lower lobe pulmonary nodule can be followed.    09/03/2022 Germline genetic testing: Negative for germline pathogenic variants in any of the analyzed genes, Genes Analyzed: APC, DALE, BMPR1A, BRCA1, BRCA2, CDKN2A, EPCAM, MEN1, MLH1, MSH2, MSH6, NF1, PALB2, PMS2, SMAD4, STK11, TP53, TSC1, TSC2, and VHL. Genetic testing performed by [employer]; full report available in scanned documents.      s/p biliary stent placement, currently undergoing chemotherapy with gemcitabine/paclitaxel (last dose 6/26/2024), T2DM on insulin presenting for abdominal pain and constipation. CT A/P with distension in stomach suggesting gastric outlet obstruction secondary to pancreatic tumor. GI and Surg Onc consulted, pending evaluation. Poor PO tolerance, deferring NG tube now as vomiting resolves and having bowel movements.     Patient was recently admitted to the hospital at [facility]--Hospital Course:  "Findings on imaging were concerning for malignant gastric outlet obstruction from pancreatic cancer. After a discussion with surgical oncology, GI and GIMO, decision was made to proceed with duodenal stent (placed by GI). No indications for gastrojejunostomy bypass. Patient tolerated procedure well. We were able to advance to low fiber diet. Nutrition was consulted for low fiber diet education. Managed neoplasm related pain with PO morphine."      Patient is here today with his wife to continue chemotherapy close to home.  He is doing relatively well and voices no concerns.  MediPort was removed from his left chest wall to exposure " of the [facility].  Patient reports abdominal mid back pain, occasional muscle spasm and dizziness.  No fever, chills, sweats.  No chest pain or short of breath.     Last chemo 12/04/2024. Patient was last seen by me at the office on 12/30/2024.  This was a hospital follow-up visit the time he was not feeling well.  Performance status was slowly declining and he gained 14 lb.  Chemotherapy was held that day and he was referred for ultrasound-guided paracentesis.  Imaging 10/24/2024 showed Small 10 x 6 filling defect at the portal vein suggesting a small thrombus he was started on Eliquis.    CT chest abdomen and pelvis 01/07/2025: There is a large amount of ascites. Pneumobilia noted.  Right hepatic lobe embolization coils again noted.  Liver is otherwise unremarkable. There is persistent ill-defined soft tissue masslike area about the pancreatic head and abutting the 1st portion of the edema and distal stomach.  There is masslike area measures approximately 5.2 x 4.8 cm. The spleen, adrenal glands, and kidneys are unremarkable.  There is persistent but decreased size filling defect within the portal vein suggesting nonocclusive thrombus. Generalized anasarca     Patient is seen in rounds this afternoon.  Wife on the phone.  He denies any fever, chills, sweats.  No chest pain or short of breath.  He reports distended abdomen lower extremity edema.  He does have shortness of breath but no chest pain.  No nausea or vomiting.  Liver enzymes normal.    Continuous Infusions:  Scheduled Meds:   apixaban  5 mg Oral BID    docusate sodium  50 mg Oral BID    furosemide  40 mg Oral Daily    insulin glargine U-100 (Lantus)  10 Units Subcutaneous QHS    magnesium oxide  400 mg Oral Daily    morphine  15 mg Oral BID    pantoprazole  40 mg Oral Daily    pregabalin  100 mg Oral BID    tamsulosin  0.4 mg Oral Daily    vibegron  75 mg Oral Daily     PRN Meds:  Current Facility-Administered Medications:     acetaminophen, 1,000 mg,  Oral, Q6H PRN    acetaminophen, 650 mg, Oral, Q4H PRN    albuterol-ipratropium, 3 mL, Nebulization, Q4H PRN    dextrose 50%, 12.5 g, Intravenous, PRN    dextrose 50%, 12.5 g, Intravenous, PRN    dextrose 50%, 12.5 g, Intravenous, PRN    dextrose 50%, 25 g, Intravenous, PRN    dextrose 50%, 25 g, Intravenous, PRN    dextrose 50%, 25 g, Intravenous, PRN    glucagon (human recombinant), 1 mg, Intramuscular, PRN    glucagon (human recombinant), 1 mg, Intramuscular, PRN    glucagon (human recombinant), 1 mg, Intramuscular, PRN    glucose, 16 g, Oral, PRN    glucose, 24 g, Oral, PRN    HYDROcodone-acetaminophen, 1 tablet, Oral, Q6H PRN    insulin aspart U-100, 0-5 Units, Subcutaneous, QID (AC + HS) PRN    morphine, 4 mg, Intravenous, Q4H PRN    naloxone, 0.02 mg, Intravenous, PRN    ondansetron, 4 mg, Intravenous, Q4H PRN    polyethylene glycol, 17 g, Oral, Daily PRN    prochlorperazine, 5 mg, Intravenous, Q6H PRN    sodium chloride 0.9%, 10 mL, Intravenous, PRN    sodium chloride 0.9%, 10 mL, Intravenous, PRN    Past Medical History:   Diagnosis Date    Abdominal pain     Admission for chemotherapy     last chemo 9/12/24    Anxiety disorder, unspecified     Back pain     Bradycardia     Enlarged prostate     GERD (gastroesophageal reflux disease)     Hypertension     no cardiologist; no longer on meds since weight loss due to pancreatic cancer    Memory loss     Pancreatic cancer     Peripheral neuropathy     Type 2 diabetes mellitus with unspecified diabetic retinopathy without macular edema      Past Surgical History:   Procedure Laterality Date    bile duct stents times 2      EGD, WITH STENT INSERTION      INSERTION OF TUNNELED CENTRAL VENOUS CATHETER (CVC) WITH SUBCUTANEOUS PORT Right 9/20/2024    Procedure: ARMOTHGMQ-YTOA-S-CATH;  Surgeon: Moe Champagne MD;  Location: [facility];  Service: General;  Laterality: Right;    MEDIPORT REMOVAL      times 2     Family History   Problem Relation Name Age of Onset     Hypertension Mother      Stroke Father      Stroke Sister      Prostate cancer Brother       Social History     Tobacco Use    Smoking status: Never     Passive exposure: Never    Smokeless tobacco: Former     Types: Chew   Substance Use Topics    Alcohol use: Not Currently    Drug use: Never       Review of patient's allergies indicates:  No Known Allergies   No current facility-administered medications on file prior to encounter.     Current Outpatient Medications on File Prior to Encounter   Medication Sig Dispense Refill    apixaban (ELIQUIS) 5 mg Tab Take 1 tablet (5 mg total) by mouth 2 (two) times daily. Take 2 tablets by mouth (10 mg) twice a day for 7 days, then 1 tablet (5 mg total) by mouth twice a day thereafter 74 tablet 3    cholecalciferol, vitamin D3, (VITAMIN D3) 50 mcg (2,000 unit) Tab Take 2,000 Units by mouth once daily.      furosemide (LASIX) 20 MG tablet Take 1 tablet (20 mg total) by mouth once daily. As needed for swelling 30 tablet 0    magnesium oxide (MAGOX) 400 mg (241.3 mg magnesium) tablet Take 1 tablet (400 mg total) by mouth once daily. 90 tablet 2    morphine (MS CONTIN) 15 MG 12 hr tablet Take 1 tablet (15 mg total) by mouth 2 (two) times daily. 30 tablet 0    multivitamin with folic acid 400 mcg Tab Take 1 tablet by mouth once daily.      oxyCODONE-acetaminophen (PERCOCET)  mg per tablet Take 1 tablet by mouth every 4 (four) hours as needed for Pain. 120 tablet 0    pantoprazole (PROTONIX) 40 MG tablet Take 1 tablet (40 mg total) by mouth once daily. 30 tablet 2    pregabalin (LYRICA) 100 MG capsule Take 1 capsule (100 mg total) by mouth 2 (two) times daily. 60 capsule 6    vibegron (GEMTESA) 75 mg Tab Take 75 mg by mouth.      aluminum & magnesium hydroxide-simethicone (MYLANTA MAX STRENGTH) 400-400-40 mg/5 mL suspension Take 5 mLs by mouth 4 (four) times daily as needed (mouth sores). 335 mL 3    dexAMETHasone (DECADRON) 4 MG Tab TAKE 2 TABLETS BY MOUTH ONCE DAILY AS  DIRECTED ON DAYS 2 AND 3 OF CHEMOTHERAPY CYCLE      dicyclomine (BENTYL) 10 MG capsule Take 1 capsule (10 mg total) by mouth 4 (four) times daily before meals and nightly. 120 capsule 0    diphenhydrAMINE (BENADRYL) 12.5 mg/5 mL elixir Take 5 mLs (12.5 mg total) by mouth 4 (four) times daily as needed (mouth sores). 236 mL 3    insulin aspart U-100 (NOVOLOG) 100 unit/mL injection Inject into the skin 3 (three) times daily before meals. prn      LIDOcaine viscous HCl 2% (LIDOCAINE VISCOUS) 2 % Soln by Mucous Membrane route 4 (four) times daily as needed (mouth sores). Swish and spit 15 ml (5 ml benadryl, 5 ml mylanta, 5 ml lidocaine) by mouth 4 times daily as needed for mouth sores 100 mL 3    OLANZapine (ZYPREXA) 5 MG tablet Take 1 tablet (5 mg total) by mouth nightly. 4 tablet 6    ondansetron (ZOFRAN) 8 MG tablet Take 8 mg by mouth.      polyethylene glycol (GLYCOLAX) 17 gram PwPk Take 17 g by mouth.      prochlorperazine (COMPAZINE) 10 MG tablet Take 10 mg by mouth.      TRESIBA FLEXTOUCH U-200 200 unit/mL (3 mL) insulin pen Inject 14 Units into the skin.         Review of Systems   Constitutional:  Positive for activity change, appetite change and fatigue. Negative for chills, diaphoresis, fever and unexpected weight change.   HENT:  Negative for nasal congestion, mouth sores, nosebleeds, sinus pressure/congestion, sore throat and trouble swallowing.    Eyes: Negative.    Respiratory:  Positive for shortness of breath. Negative for cough.    Cardiovascular:  Negative for chest pain and palpitations.   Gastrointestinal:  Positive for abdominal distention. Negative for abdominal pain, blood in stool, change in bowel habit, constipation, diarrhea, nausea and vomiting.   Endocrine: Negative.    Genitourinary:  Negative for bladder incontinence, decreased urine volume, difficulty urinating, dysuria, frequency, hematuria and urgency.   Musculoskeletal:  Negative for arthralgias, back pain, gait problem, joint  swelling, leg pain and myalgias.   Integumentary:  Negative for rash.   Allergic/Immunologic: Negative.    Neurological:  Positive for weakness. Negative for dizziness, tremors, syncope, light-headedness, numbness, headaches and memory loss.   Hematological:  Negative for adenopathy. Does not bruise/bleed easily.   Psychiatric/Behavioral:  Negative for agitation, confusion, hallucinations, sleep disturbance and suicidal ideas. The patient is not nervous/anxious.          OBJECTIVE:     Vital Signs (Most Recent)  Temp: 98.7 °F (37.1 °C) (01/16/25 1151)  Pulse: 60 (01/16/25 1151)  Resp: 19 (01/16/25 1151)  BP: 106/67 (01/16/25 1151)  SpO2: 97 % (01/16/25 1151)    Pain Assessment: No pain reported at this time    Vital Signs Range (Last 24H):  Temp:  [97.6 °F (36.4 °C)-98.7 °F (37.1 °C)]   Pulse:  [55-60]   Resp:  [18-20]   BP: ()/(59-72)   SpO2:  [94 %-98 %]     Physical Exam:  Physical Exam  Vitals and nursing note reviewed.   Constitutional:       General: He is not in acute distress.     Appearance: He is ill-appearing.   HENT:      Head: Normocephalic and atraumatic.      Mouth/Throat:      Mouth: Mucous membranes are moist.   Eyes:      General: No scleral icterus.     Extraocular Movements: Extraocular movements intact.      Conjunctiva/sclera: Conjunctivae normal.   Neck:      Vascular: No JVD.   Cardiovascular:      Rate and Rhythm: Normal rate and regular rhythm.      Heart sounds: No murmur heard.  Pulmonary:      Effort: Pulmonary effort is normal.      Breath sounds: Normal breath sounds. No wheezing or rhonchi.   Abdominal:      General: Bowel sounds are decreased. There is distension.      Palpations: Abdomen is soft.      Tenderness: There is generalized abdominal tenderness.   Musculoskeletal:         General: No swelling or deformity.      Cervical back: Neck supple.   Lymphadenopathy:      Head:      Right side of head: No submandibular adenopathy.      Left side of head: No submandibular  "adenopathy.      Cervical: No cervical adenopathy.      Upper Body:      Right upper body: No supraclavicular or axillary adenopathy.      Left upper body: No supraclavicular or axillary adenopathy.      Lower Body: No right inguinal adenopathy. No left inguinal adenopathy.   Skin:     General: Skin is warm.      Coloration: Skin is not jaundiced.      Findings: No rash.   Neurological:      Mental Status: He is alert and oriented to person, place, and time.      Cranial Nerves: Cranial nerves 2-12 are intact.      Motor: Weakness present.   Psychiatric:         Attention and Perception: Attention normal.         Behavior: Behavior is cooperative.         Laboratory:  CBC with Differential:  Recent Labs   Lab 01/16/25  0402   WBC 5.56   RBC 3.56*   HCT 32.5*   HGB 10.3*   MCV 91.3   MCH 28.9   RDW 13.5      MPV 10.8*     CMP:  Recent Labs   Lab 01/16/25  0402   CALCIUM 7.9*   ALBUMIN 2.4*   *   K 4.7   CO2 32*      BUN 6.0*   CREATININE 0.78   ALKPHOS 306*   ALT 20   AST 22   BILITOT 0.3     BMP:   Recent Labs   Lab 01/16/25  0402   CALCIUM 7.9*   *   K 4.7   CO2 32*      BUN 6.0*   CREATININE 0.78     LFTs:   Recent Labs   Lab 01/16/25  0402   ALT 20   AST 22   ALKPHOS 306*   BILITOT 0.3   ALBUMIN 2.4*      Latest Reference Range & Units 11/19/24 07:47 12/04/24 07:58 12/18/24 07:11 12/26/24 07:57 12/31/24 08:12   CA 19-9 0.00 - 37.00 unit/mL 19,155.71 (H) 31,691.58 (H) 23,541.97 (H) 31,586.90 (H) 35,696.74 (H)   (H): Data is abnormally high  Haptoglobin: No results for input(s): "HAPTOGLOBIN" in the last 24 hours.  Tumor Markers: No results for input(s): "PSA", "CEA", "", "AFPTM", "JH6928", "" in the last 24 hours.    Invalid input(s): "ALGTM"  Immunology: No results for input(s): "SPEP", "RORY", "WANDER", "FREELAMBDALI" in the last 24 hours.  Coagulation: No results for input(s): "PT", "INR", "APTT" in the last 24 hours.  Specimen (24h ago, onward)      None      "     Microbiology Results (last 7 days)       Procedure Component Value Units Date/Time    Urine culture [[id number]] Collected: 01/15/25 0413    Order Status: Sent Specimen: Urine Updated: 01/15/25 2058            Diagnostic Results:  Imaging Results              IR Paracentesis with Imaging (Final result)  Result time 01/15/25 11:58:26      Final result by Jerome Avalos MD (01/15/25 11:58:26)                   Impression:      Technically successful ultrasound-guided paracentesis.      Electronically signed by: Jerome Avalos  Date:    01/15/2025  Time:    11:58               Narrative:    PROCEDURE:  Ultrasound Guided Paracentesis    CLINICAL HISTORY:  59-year-old male with pancreatic cancer and ascites    ANESTHESIA:  Local anesthesia    PHYSICIANS:  Jerome Avalos MD    PROCEDURAL SUMMARY:  After informed consent was obtained, the patient was placed supine on the ultrasound table. A limited ultrasound of the abdomen was performed with Dr. Avalos present in the room.  This confirmed the presence of an appropriate volume of ascites for drainage.  The planned skin entry site and route for needle passage for paracentesis was then determined. The skin overlying the right lower quadrant of the abdomen was marked.  The site was then prepped and draped in the usual sterile fashion.    The soft tissues were anesthetized with lidocaine and a dermatotomy was performed.  Under ultrasound guidance, a sheath needle was advanced from the skin entry site into the peritoneal cavity.  When the needle entered the peritoneal cavity, fluid was aspirated.  The sheath was then advanced over the needle into the cavity.  The needle was removed.  Aspiration was performed and a total of 6.9 L of clear fluid was drained from the peritoneal cavity.  The catheter was then removed.  A sterile dressing was applied.    The patient tolerated the procedure well and was without any apparent complications.                                           ASSESSMENT/PLAN:     Patient Active Problem List   Diagnosis    Pancreatic cancer    Cancer associated pain    Chemotherapy-induced peripheral neuropathy    Constipation due to opioid therapy    Portal vein thrombosis    Elevated alkaline phosphatase level   1) unresectable Advanced pancreatic cancer   ---8/2/2022 ERCP with metal biliary stent placement   ---Folfirinox (10/2022- 2/2023)  ---capecitabine RT (4/3/23 - 5/16/23)   ---Phase I study 2021-1176 SD-101 at [facility]--SD-101 2 mg in sodium chloride 0.9% (NS) 30 mL IVPB, intravenous x 2 cycles (8/22/2023-11/22/2023)  ---Gemcitabine and paclitaxel protein bound (12/13/2023-- 7/2024)-->clinical progression and GOO  ---s/p biliary stent placement 4/4/2024  ---chemotherapy with gemcitabine/paclitaxel (last dose 6/26/2024)  ---duodenal stent (placed by GI)- 8/7/2024  ---Gemcitabine and CISplatin Every 2 Weeks (8/28/2024-present)  ---last chemotherapy on 12/04/2024--cycle 6  ---CA 19-9 significantly elevated.    2) H/o Gastric outlet obstruction  ---s/p duodenal stent, severe protein calori malnutrition      3) Pain, neoplasm related pain  ---continue Percocet and Lyrica    4) ascites  --intermittent therapeutic paracentesis  --fluid negative so far   --last ultrasound-guided on 01/15/2025 paracentesis and 6.9 L of fluid was removed.  Patient had albumin after procedure.    Plan  Patient with unresectable advanced pancreatic cancer likely stage IV at this time.  He started 4th line of chemotherapy with Gemzar and cisplatin every 2 weeks for which he got 6 cycles, last 12/04/2024, and has been on hold since then due to abdominal distention requiring therapeutic paracentesis.  Last paracentesis yesterday were 6.9 L of fluid were removed.  Today his abdomen is significantly distended again.  Patient likely with progression of disease.  Even though cytology of the fluid has been negative this rapid reaccumulation highly suggestive of malignant ascites.    Continue  supportive care.  If patient is stable, no fever, okay to discharge home to follow-up with me at the clinic.      Thank you for the consult  We will be available for any questions      Jena Messina MD  Hematology/Oncology  [facility]

## 2025-01-16 NOTE — PLAN OF CARE
Problem: Adult Inpatient Plan of Care  Goal: Plan of Care Review  Outcome: Progressing  Flowsheets (Taken 1/15/2025 1923)  Plan of Care Reviewed With: patient  Goal: Patient-Specific Goal (Individualized)  Outcome: Progressing  Flowsheets (Taken 1/15/2025 1923)  Individualized Care Needs: manage pain, accurate intake and output  Goal: Absence of Hospital-Acquired Illness or Injury  Outcome: Progressing  Intervention: Identify and Manage Fall Risk  Flowsheets (Taken 1/15/2025 1923)  Safety Promotion/Fall Prevention:   assistive device/personal item within reach   Fall Risk reviewed with patient/family   nonskid shoes/socks when out of bed   side rails raised x 3  Intervention: Prevent Skin Injury  Flowsheets (Taken 1/15/2025 1923)  Body Position:   weight shifting   position changed independently  Intervention: Prevent and Manage VTE (Venous Thromboembolism) Risk  Flowsheets (Taken 1/15/2025 1923)  VTE Prevention/Management: (apixaban)   ambulation promoted   other (see comments)  Intervention: Prevent Infection  Flowsheets (Taken 1/15/2025 1923)  Infection Prevention:   rest/sleep promoted   single patient room provided   hand hygiene promoted  Goal: Optimal Comfort and Wellbeing  Outcome: Progressing  Intervention: Monitor Pain and Promote Comfort  Flowsheets (Taken 1/15/2025 1923)  Pain Management Interventions:   care clustered   quiet environment facilitated  Intervention: Provide Person-Centered Care  Flowsheets (Taken 1/15/2025 1923)  Trust Relationship/Rapport:   care explained   choices provided   questions answered   questions encouraged  Goal: Readiness for Transition of Care  Outcome: Progressing

## 2025-01-17 VITALS
SYSTOLIC BLOOD PRESSURE: 91 MMHG | HEART RATE: 93 BPM | HEIGHT: 68 IN | BODY MASS INDEX: 23.17 KG/M2 | DIASTOLIC BLOOD PRESSURE: 55 MMHG | OXYGEN SATURATION: 92 % | RESPIRATION RATE: 18 BRPM | TEMPERATURE: 99 F | WEIGHT: 152.88 LBS

## 2025-01-17 PROBLEM — R18.8 OTHER ASCITES: Status: ACTIVE | Noted: 2025-01-17

## 2025-01-17 LAB
POCT GLUCOSE: 146 MG/DL (ref 70–110)
POCT GLUCOSE: 53 MG/DL (ref 70–110)
POCT GLUCOSE: 62 MG/DL (ref 70–110)

## 2025-01-17 PROCEDURE — 25000003 PHARM REV CODE 250: Performed by: INTERNAL MEDICINE

## 2025-01-17 PROCEDURE — 25000003 PHARM REV CODE 250

## 2025-01-17 PROCEDURE — 99233 SBSQ HOSP IP/OBS HIGH 50: CPT | Mod: ,,, | Performed by: INTERNAL MEDICINE

## 2025-01-17 PROCEDURE — 25000003 PHARM REV CODE 250: Performed by: NURSE PRACTITIONER

## 2025-01-17 RX ORDER — FUROSEMIDE 40 MG/1
40 TABLET ORAL DAILY
Qty: 30 TABLET | Refills: 1 | Status: SHIPPED | OUTPATIENT
Start: 2025-01-17 | End: 2026-01-17

## 2025-01-17 RX ADMIN — DOCUSATE SODIUM 50 MG: 50 CAPSULE, LIQUID FILLED ORAL at 09:01

## 2025-01-17 RX ADMIN — FUROSEMIDE 40 MG: 40 TABLET ORAL at 09:01

## 2025-01-17 RX ADMIN — APIXABAN 5 MG: 5 TABLET, FILM COATED ORAL at 09:01

## 2025-01-17 RX ADMIN — HYDROCODONE BITARTRATE AND ACETAMINOPHEN 1 TABLET: 10; 325 TABLET ORAL at 02:01

## 2025-01-17 RX ADMIN — SODIUM CHLORIDE 250 ML: 9 INJECTION, SOLUTION INTRAVENOUS at 03:01

## 2025-01-17 RX ADMIN — TAMSULOSIN HYDROCHLORIDE 0.4 MG: 0.4 CAPSULE ORAL at 09:01

## 2025-01-17 RX ADMIN — Medication 400 MG: at 09:01

## 2025-01-17 RX ADMIN — PREGABALIN 100 MG: 50 CAPSULE ORAL at 09:01

## 2025-01-17 RX ADMIN — HYDROCODONE BITARTRATE AND ACETAMINOPHEN 1 TABLET: 10; 325 TABLET ORAL at 09:01

## 2025-01-17 RX ADMIN — MORPHINE SULFATE 15 MG: 15 TABLET, EXTENDED RELEASE ORAL at 09:01

## 2025-01-17 RX ADMIN — PANTOPRAZOLE SODIUM 40 MG: 40 TABLET, DELAYED RELEASE ORAL at 09:01

## 2025-01-17 NOTE — PROGRESS NOTES
[facility] - Oncology Acute  Hematology/Oncology  Progress Note      Consult Requested By: Todd Montes De Oca MD    Reason for Consult:   Diagnosis:  Advanced pancreatic ductal adenocarcinoma (Dx 8/2022)      Present treatment:  Gemzar monotherapy 8/28/2024  -Gemcitabine and CISplatin Every 2 Weeks (8/28/2024-present)--planned       Treatment/Oncology history:  -8/2/2022 ERCP with metal biliary stent placement   -Folfirinox (10/2022- 2/2023)  -capecitabine RT (4/3/23 - 5/16/23)   -Phase I study 2021-1176 SD-101 at [facility]--SD-101 2 mg in sodium chloride 0.9% (NS) 30 mL IVPB, intravenous x 2 cycles (8/22/2023-11/22/2023)  -Gemcitabine and paclitaxel protein bound (12/13/2023-- 7/2024)-->clinical progression and GOO  -s/p biliary stent placement 4/4/2024  -chemotherapy with gemcitabine/paclitaxel (last dose 6/26/2024)  -duodenal stent (placed by GI)- 8/7/2024  -Gemcitabine and CISplatin Every 2 Weeks (8/28/2024-present)     SUBJECTIVE:     History of Present Illness:  Patient is a 59 y.o. male .with a past medical history of past medical history of pancreatic ductal adenocarcinoma (Dx 8/2022)   Patient initially presented with  unintentional weight loss starting in 1/2022. He also had worsening back/shoulder and abdominal pain during this time. In July he finally presented to his PCP with darkening of the urine and lightening of the stool - with Jaundice that his wife noticed. Labs demonstrating cholestasis (bilirubin of 19).    Patient noted to have an abnormal CT scan after developing obstructive jaundice. 2 cm hypoenhancing mass noted in the head of the pancreas with intra and extrahepatic biliary ductal dilatation. Mass measured 2.5 x 2.1 cm. There is also atrophy of the distal gland and upstream dilatation of the pancreas duct. There is no obvious vascular invasion. There were no focal liver lesions.    8/2/22- EGD/EUS/FNA. Final pathology showed ductal adenocarcinoma, moderately differentiated. ERCP on  "the same date performed, with placement of a fully covered metal biliary stent, 60 mm x 10 mm.    7/27/22- CA 19-9 was 681    8/21/22- CT CAP: Since 7/25/2022, the primary pancreatic head mass encasing the common hepatic artery remains stable. The intrahepatic biliary obstruction improved with interval placement of a CBD stent. No definite distant metastasis in the abdomen or pelvis. Small indeterminate left lower lobe pulmonary nodule can be followed.    09/03/2022 Germline genetic testing: Negative for germline pathogenic variants in any of the analyzed genes, Genes Analyzed: APC, DALE, BMPR1A, BRCA1, BRCA2, CDKN2A, EPCAM, MEN1, MLH1, MSH2, MSH6, NF1, PALB2, PMS2, SMAD4, STK11, TP53, TSC1, TSC2, and VHL. Genetic testing performed by [employer]; full report available in scanned documents.      s/p biliary stent placement, currently undergoing chemotherapy with gemcitabine/paclitaxel (last dose 6/26/2024), T2DM on insulin presenting for abdominal pain and constipation. CT A/P with distension in stomach suggesting gastric outlet obstruction secondary to pancreatic tumor. GI and Surg Onc consulted, pending evaluation. Poor PO tolerance, deferring NG tube now as vomiting resolves and having bowel movements.     Patient was recently admitted to the hospital at [facility]--Hospital Course:  "Findings on imaging were concerning for malignant gastric outlet obstruction from pancreatic cancer. After a discussion with surgical oncology, GI and GIMO, decision was made to proceed with duodenal stent (placed by GI). No indications for gastrojejunostomy bypass. Patient tolerated procedure well. We were able to advance to low fiber diet. Nutrition was consulted for low fiber diet education. Managed neoplasm related pain with PO morphine."      Patient is here today with his wife to continue chemotherapy close to home.  He is doing relatively well and voices no concerns.  MediPort was removed from his left chest wall to exposure of " the [facility].  Patient reports abdominal mid back pain, occasional muscle spasm and dizziness.  No fever, chills, sweats.  No chest pain or short of breath.     Last chemo 12/04/2024. Patient was last seen by me at the office on 12/30/2024.  This was a hospital follow-up visit the time he was not feeling well.  Performance status was slowly declining and he gained 14 lb.  Chemotherapy was held that day and he was referred for ultrasound-guided paracentesis.  Imaging 10/24/2024 showed Small 10 x 6 filling defect at the portal vein suggesting a small thrombus he was started on Eliquis.    CT chest abdomen and pelvis 01/07/2025: There is a large amount of ascites. Pneumobilia noted.  Right hepatic lobe embolization coils again noted.  Liver is otherwise unremarkable. There is persistent ill-defined soft tissue masslike area about the pancreatic head and abutting the 1st portion of the edema and distal stomach.  There is masslike area measures approximately 5.2 x 4.8 cm. The spleen, adrenal glands, and kidneys are unremarkable.  There is persistent but decreased size filling defect within the portal vein suggesting nonocclusive thrombus. Generalized anasarca     Patient is seen in rounds this afternoon.  Wife on the phone.  He denies any fever, chills, sweats.  No chest pain or short of breath.  He reports distended abdomen lower extremity edema.  He does have shortness of breath but no chest pain.  No nausea or vomiting.  Liver enzymes normal.    Continuous Infusions:  Scheduled Meds:   apixaban  5 mg Oral BID    docusate sodium  50 mg Oral BID    furosemide  40 mg Oral Daily    insulin glargine U-100 (Lantus)  10 Units Subcutaneous QHS    magnesium oxide  400 mg Oral Daily    morphine  15 mg Oral BID    pantoprazole  40 mg Oral Daily    pregabalin  100 mg Oral BID    tamsulosin  0.4 mg Oral Daily    vibegron  75 mg Oral Daily     PRN Meds:  Current Facility-Administered Medications:     acetaminophen, 1,000 mg, Oral,  Q6H PRN    acetaminophen, 650 mg, Oral, Q4H PRN    albuterol-ipratropium, 3 mL, Nebulization, Q4H PRN    dextrose 50%, 12.5 g, Intravenous, PRN    dextrose 50%, 12.5 g, Intravenous, PRN    dextrose 50%, 12.5 g, Intravenous, PRN    dextrose 50%, 25 g, Intravenous, PRN    dextrose 50%, 25 g, Intravenous, PRN    dextrose 50%, 25 g, Intravenous, PRN    glucagon (human recombinant), 1 mg, Intramuscular, PRN    glucagon (human recombinant), 1 mg, Intramuscular, PRN    glucagon (human recombinant), 1 mg, Intramuscular, PRN    glucose, 16 g, Oral, PRN    glucose, 24 g, Oral, PRN    HYDROcodone-acetaminophen, 1 tablet, Oral, Q6H PRN    insulin aspart U-100, 0-5 Units, Subcutaneous, QID (AC + HS) PRN    morphine, 4 mg, Intravenous, Q4H PRN    naloxone, 0.02 mg, Intravenous, PRN    ondansetron, 4 mg, Intravenous, Q4H PRN    polyethylene glycol, 17 g, Oral, Daily PRN    prochlorperazine, 5 mg, Intravenous, Q6H PRN    sodium chloride 0.9%, 10 mL, Intravenous, PRN    sodium chloride 0.9%, 10 mL, Intravenous, PRN    Past Medical History:   Diagnosis Date    Abdominal pain     Admission for chemotherapy     last chemo 9/12/24    Anxiety disorder, unspecified     Back pain     Bradycardia     Enlarged prostate     GERD (gastroesophageal reflux disease)     Hypertension     no cardiologist; no longer on meds since weight loss due to pancreatic cancer    Memory loss     Pancreatic cancer     Peripheral neuropathy     Type 2 diabetes mellitus with unspecified diabetic retinopathy without macular edema      Past Surgical History:   Procedure Laterality Date    bile duct stents times 2      EGD, WITH STENT INSERTION      INSERTION OF TUNNELED CENTRAL VENOUS CATHETER (CVC) WITH SUBCUTANEOUS PORT Right 9/20/2024    Procedure: [id number];  Surgeon: Moe Champagne MD;  [facility];  Service: General;  Laterality: Right;    MEDIPORT REMOVAL      times 2     Family History   Problem Relation Name Age of Onset    Hypertension  Mother      Stroke Father      Stroke Sister      Prostate cancer Brother       Social History     Tobacco Use    Smoking status: Never     Passive exposure: Never    Smokeless tobacco: Former     Types: Chew   Substance Use Topics    Alcohol use: Not Currently    Drug use: Never       Review of patient's allergies indicates:  No Known Allergies   No current facility-administered medications on file prior to encounter.     Current Outpatient Medications on File Prior to Encounter   Medication Sig Dispense Refill    apixaban (ELIQUIS) 5 mg Tab Take 1 tablet (5 mg total) by mouth 2 (two) times daily. Take 2 tablets by mouth (10 mg) twice a day for 7 days, then 1 tablet (5 mg total) by mouth twice a day thereafter 74 tablet 3    cholecalciferol, vitamin D3, (VITAMIN D3) 50 mcg (2,000 unit) Tab Take 2,000 Units by mouth once daily.      magnesium oxide (MAGOX) 400 mg (241.3 mg magnesium) tablet Take 1 tablet (400 mg total) by mouth once daily. 90 tablet 2    morphine (MS CONTIN) 15 MG 12 hr tablet Take 1 tablet (15 mg total) by mouth 2 (two) times daily. 30 tablet 0    multivitamin with folic acid 400 mcg Tab Take 1 tablet by mouth once daily.      oxyCODONE-acetaminophen (PERCOCET)  mg per tablet Take 1 tablet by mouth every 4 (four) hours as needed for Pain. 120 tablet 0    pantoprazole (PROTONIX) 40 MG tablet Take 1 tablet (40 mg total) by mouth once daily. 30 tablet 2    pregabalin (LYRICA) 100 MG capsule Take 1 capsule (100 mg total) by mouth 2 (two) times daily. 60 capsule 6    vibegron (GEMTESA) 75 mg Tab Take 75 mg by mouth.      [DISCONTINUED] furosemide (LASIX) 20 MG tablet Take 1 tablet (20 mg total) by mouth once daily. As needed for swelling 30 tablet 0    aluminum & magnesium hydroxide-simethicone (MYLANTA MAX STRENGTH) 400-400-40 mg/5 mL suspension Take 5 mLs by mouth 4 (four) times daily as needed (mouth sores). 335 mL 3    dexAMETHasone (DECADRON) 4 MG Tab TAKE 2 TABLETS BY MOUTH ONCE DAILY AS  DIRECTED ON DAYS 2 AND 3 OF CHEMOTHERAPY CYCLE      dicyclomine (BENTYL) 10 MG capsule Take 1 capsule (10 mg total) by mouth 4 (four) times daily before meals and nightly. 120 capsule 0    diphenhydrAMINE (BENADRYL) 12.5 mg/5 mL elixir Take 5 mLs (12.5 mg total) by mouth 4 (four) times daily as needed (mouth sores). 236 mL 3    insulin aspart U-100 (NOVOLOG) 100 unit/mL injection Inject into the skin 3 (three) times daily before meals. prn      LIDOcaine viscous HCl 2% (LIDOCAINE VISCOUS) 2 % Soln by Mucous Membrane route 4 (four) times daily as needed (mouth sores). Swish and spit 15 ml (5 ml benadryl, 5 ml mylanta, 5 ml lidocaine) by mouth 4 times daily as needed for mouth sores 100 mL 3    OLANZapine (ZYPREXA) 5 MG tablet Take 1 tablet (5 mg total) by mouth nightly. 4 tablet 6    ondansetron (ZOFRAN) 8 MG tablet Take 8 mg by mouth.      polyethylene glycol (GLYCOLAX) 17 gram PwPk Take 17 g by mouth.      prochlorperazine (COMPAZINE) 10 MG tablet Take 10 mg by mouth.      TRESIBA FLEXTOUCH U-200 200 unit/mL (3 mL) insulin pen Inject 14 Units into the skin.         Review of Systems   Constitutional:  Positive for activity change, appetite change and fatigue. Negative for chills, diaphoresis, fever and unexpected weight change.   HENT:  Negative for nasal congestion, mouth sores, nosebleeds, sinus pressure/congestion, sore throat and trouble swallowing.    Eyes: Negative.    Respiratory:  Positive for shortness of breath. Negative for cough.    Cardiovascular:  Negative for chest pain and palpitations.   Gastrointestinal:  Positive for abdominal distention. Negative for abdominal pain, blood in stool, change in bowel habit, constipation, diarrhea, nausea and vomiting.   Endocrine: Negative.    Genitourinary:  Negative for bladder incontinence, decreased urine volume, difficulty urinating, dysuria, frequency, hematuria and urgency.   Musculoskeletal:  Negative for arthralgias, back pain, gait problem, joint  swelling, leg pain and myalgias.   Integumentary:  Negative for rash.   Allergic/Immunologic: Negative.    Neurological:  Positive for weakness. Negative for dizziness, tremors, syncope, light-headedness, numbness, headaches and memory loss.   Hematological:  Negative for adenopathy. Does not bruise/bleed easily.   Psychiatric/Behavioral:  Negative for agitation, confusion, hallucinations, sleep disturbance and suicidal ideas. The patient is not nervous/anxious.          OBJECTIVE:     Vital Signs (Most Recent)  Temp: 97.6 °F (36.4 °C) (01/17/25 0640)  Pulse: 89 (01/17/25 0640)  Resp: 18 (01/17/25 0640)  BP: 114/68 (01/17/25 0640)  SpO2: (!) 93 % (01/17/25 0640)    Pain Assessment: No pain reported at this time    Vital Signs Range (Last 24H):  Temp:  [97.4 °F (36.3 °C)-98.8 °F (37.1 °C)]   Pulse:  [60-92]   Resp:  [16-20]   BP: ()/(59-72)   SpO2:  [90 %-97 %]     Physical Exam:  Physical Exam  Vitals and nursing note reviewed.   Constitutional:       General: He is not in acute distress.     Appearance: He is ill-appearing.   HENT:      Head: Normocephalic and atraumatic.      Mouth/Throat:      Mouth: Mucous membranes are moist.   Eyes:      General: No scleral icterus.     Extraocular Movements: Extraocular movements intact.      Conjunctiva/sclera: Conjunctivae normal.   Neck:      Vascular: No JVD.   Cardiovascular:      Rate and Rhythm: Normal rate and regular rhythm.      Heart sounds: No murmur heard.  Pulmonary:      Effort: Pulmonary effort is normal.      Breath sounds: Normal breath sounds. No wheezing or rhonchi.   Abdominal:      General: Bowel sounds are decreased. There is distension.      Palpations: Abdomen is soft.      Tenderness: There is generalized abdominal tenderness.   Musculoskeletal:         General: No swelling or deformity.      Cervical back: Neck supple.   Lymphadenopathy:      Head:      Right side of head: No submandibular adenopathy.      Left side of head: No submandibular  "adenopathy.      Cervical: No cervical adenopathy.      Upper Body:      Right upper body: No supraclavicular or axillary adenopathy.      Left upper body: No supraclavicular or axillary adenopathy.      Lower Body: No right inguinal adenopathy. No left inguinal adenopathy.   Skin:     General: Skin is warm.      Coloration: Skin is not jaundiced.      Findings: No rash.   Neurological:      Mental Status: He is alert and oriented to person, place, and time.      Cranial Nerves: Cranial nerves 2-12 are intact.      Motor: Weakness present.   Psychiatric:         Attention and Perception: Attention normal.         Behavior: Behavior is cooperative.         Laboratory:  CBC with Differential:  No results for input(s): "WBC", "NEUTROPCT", "LYMPH", "MONOPCT", "EOSPCT", "BASOPHIL", "RBC", "HCT", "HGB", "MCV", "MCH", "RDW", "PLT", "MPV" in the last 24 hours.    Invalid input(s): "MCMC"    CMP:  No results for input(s): "GLU", "CALCIUM", "ALBUMIN", "PROT", "NA", "K", "CO2", "CL", "BUN", "CREATININE", "ALKPHOS", "ALT", "AST", "BILITOT" in the last 24 hours.    BMP:   No results for input(s): "GLU", "CALCIUM", "NA", "K", "CO2", "CL", "BUN", "CREATININE" in the last 24 hours.    LFTs:   No results for input(s): "ALT", "AST", "ALKPHOS", "BILITOT", "PROT", "ALBUMIN" in the last 24 hours.     Latest Reference Range & Units 11/19/24 07:47 12/04/24 07:58 12/18/24 07:11 12/26/24 07:57 12/31/24 08:12   CA 19-9 0.00 - 37.00 unit/mL 19,155.71 (H) 31,691.58 (H) 23,541.97 (H) 31,586.90 (H) 35,696.74 (H)   (H): Data is abnormally high  Haptoglobin: No results for input(s): "HAPTOGLOBIN" in the last 24 hours.  Tumor Markers: No results for input(s): "PSA", "CEA", "", "AFPTM", "PN9508", "" in the last 24 hours.    Invalid input(s): "ALGTM"  Immunology: No results for input(s): "SPEP", "RORY", "WANDER", "FREELAMBDALI" in the last 24 hours.  Coagulation: No results for input(s): "PT", "INR", "APTT" in the last 24 hours.  Specimen (24h " ago, onward)      None          Microbiology Results (last 7 days)       Procedure Component Value Units Date/Time    Urine culture [[id number]] Collected: 01/15/25 0413    Order Status: Completed Specimen: Urine Updated: 01/17/25 0649     Urine Culture No Growth At 24 Hours            Diagnostic Results:  Imaging Results              IR Paracentesis with Imaging (Final result)  Result time 01/15/25 11:58:26      Final result by Jerome Avalos MD (01/15/25 11:58:26)                   Impression:      Technically successful ultrasound-guided paracentesis.      Electronically signed by: Jerome Avalos  Date:    01/15/2025  Time:    11:58               Narrative:    PROCEDURE:  Ultrasound Guided Paracentesis    CLINICAL HISTORY:  59-year-old male with pancreatic cancer and ascites    ANESTHESIA:  Local anesthesia    PHYSICIANS:  Jerome Avalos MD    PROCEDURAL SUMMARY:  After informed consent was obtained, the patient was placed supine on the ultrasound table. A limited ultrasound of the abdomen was performed with Dr. Avalos present in the room.  This confirmed the presence of an appropriate volume of ascites for drainage.  The planned skin entry site and route for needle passage for paracentesis was then determined. The skin overlying the right lower quadrant of the abdomen was marked.  The site was then prepped and draped in the usual sterile fashion.    The soft tissues were anesthetized with lidocaine and a dermatotomy was performed.  Under ultrasound guidance, a sheath needle was advanced from the skin entry site into the peritoneal cavity.  When the needle entered the peritoneal cavity, fluid was aspirated.  The sheath was then advanced over the needle into the cavity.  The needle was removed.  Aspiration was performed and a total of 6.9 L of clear fluid was drained from the peritoneal cavity.  The catheter was then removed.  A sterile dressing was applied.    The patient tolerated the procedure well and was  without any apparent complications.                                          ASSESSMENT/PLAN:     Patient Active Problem List   Diagnosis    Pancreatic cancer    Cancer associated pain    Chemotherapy-induced peripheral neuropathy    Constipation due to opioid therapy    Portal vein thrombosis    Elevated alkaline phosphatase level    Other ascites   1) unresectable Advanced pancreatic cancer   ---8/2/2022 ERCP with metal biliary stent placement   ---Folfirinox (10/2022- 2/2023)  ---capecitabine RT (4/3/23 - 5/16/23)   ---Phase I study 2021-1176 SD-101 at [facility]--SD-101 2 mg in sodium chloride 0.9% (NS) 30 mL IVPB, intravenous x 2 cycles (8/22/2023-11/22/2023)  ---Gemcitabine and paclitaxel protein bound (12/13/2023-- 7/2024)-->clinical progression and GOO  ---s/p biliary stent placement 4/4/2024  ---chemotherapy with gemcitabine/paclitaxel (last dose 6/26/2024)  ---duodenal stent (placed by GI)- 8/7/2024  ---Gemcitabine and CISplatin Every 2 Weeks (8/28/2024-present)  ---last chemotherapy on 12/04/2024--cycle 6  ---CA 19-9 significantly elevated.    2) H/o Gastric outlet obstruction  ---s/p duodenal stent, severe protein calori malnutrition      3) Pain, neoplasm related pain  ---continue Percocet and Lyrica    4) ascites  --intermittent therapeutic paracentesis  --fluid negative so far   --last ultrasound-guided on 01/15/2025 paracentesis and 6.9 L of fluid was removed.  Patient had albumin after procedure.    Plan  Patient with unresectable advanced pancreatic cancer likely stage IV at this time.  He started 4th line of chemotherapy with Gemzar and cisplatin every 2 weeks for which he got 6 cycles, last 12/04/2024, and has been on hold since then due to abdominal distention requiring therapeutic paracentesis.  Last paracentesis yesterday were 6.9 L of fluid were removed.  Today his abdomen is significantly distended again.  Patient likely with progression of disease.  Even though cytology of the fluid has been  negative this rapid reaccumulation highly suggestive of malignant ascites.    Continue supportive care.  If patient is stable, no fever, okay to discharge home to follow-up with me at the clinic.      Thank you for the consult  We will be available for any questions      eJna Messina MD  Hematology/Oncology  [facility]

## 2025-01-17 NOTE — DISCHARGE INSTRUCTIONS
Please return to the ER with any worsening of your symptoms including:    -Shortness of breath  -Excessive fluid in the abdomen  -Uncontrolled pain    Resume all appropriate home medicine. Call your primary care provider and reach out with any further questions or concerns. Please refer to the [facility] on call nurse care line if needing any assistance with appointment scheduling of health advisement.

## 2025-01-17 NOTE — DISCHARGE SUMMARY
Hospital Medicine  Discharge Summary    Patient Name: Warren P Lejeune  MRN: [id number]  Admit Date: 1/15/2025  Discharge Date: 1/17/2025   Status: IP- Inpatient   Length of Stay: 1      PHYSICIANS   Admitting Physician: Todd Montes De Oca MD  Discharging Physician: Todd Montes De Oca MD.  Primary Care Physician: Tristin Gambino MD  Consults: Hematology/Oncology      DISCHARGE DIAGNOSES   Recurrent ascites  Advanced pancreatic ductal adenocarcinoma, chemo currently on hold  Portal vein thrombosis, on Eliquis (possibly cause of recurrent ascites)  Insulin-dependent diabetes mellitus  Cancer-related pain and deconditioning      PROCEDURES   Paracentesis - 1/15/2025      HOSPITAL COURSE     59 y.o. male with a history that includes cancer involving head of the pancreas, chemotherapy currently on hold, presented to the [facility] 1/15 with abdominal distention and LE swelling.  Also endorsed SOB.  Patient reports he has recently been getting paracentesis weekly due to recurrent ascites, starting about 3 weeks ago.  Patient is followed outpatient by Dr. Jena Messina, locally (also follows with [facility]), and chemo has been on hold since 12/31 due to swelling. Reports he has had 2 paracentesis since that time, but last was 1/08, as it seems there were some miscommunications with scheduling.  Of note Echo from 12/26 was grossly unremarkable, noting normal EF of 60-65%, without diastolic dysfunction.    Evaluation in ED noted patient  hypoxic, requiring supplemental oxygen.  Exam noted marked abd distention.  Labs with albumin 2.3, INR 1.3; LFTs/Bili unremarkable.  IR was consulted and patient underwent paracentesis, apparently removing 6.9 L; given a dose of albumin post-procedure.  Patient was admitted to  services for further management.  His Lasix was increased, though abd still a bit distended, Abd US repeated today prior to discharge and notes small to moderate residual ascites, however radiologist notes there was not enough fluid  for further drainage.  Patient will continue to follow as prior for his weekly paracentesis as needed.  Otherwise he is stable for discharge home.       STATUS  Improved    DISPOSITION  Discharge to home health    DIET  Low sodium    ACTIVITY  As tolerated      FOLLOW-UP       Jena Messina MD Follow up in 2 week(s).    Specialty: Hematology and Oncology  Why: Dr. Messina's office will be in contact to schedule this appointment.  Contact information:  [address] [place] [id number]  [phone]                             DISCHARGE MEDICATION RECONCILIATION     CONTINUE with CHANGES     furosemide 40 MG tablet  Commonly known as: LASIX  Take 1 tablet (40 mg total) by mouth once daily.            CONTINUE     aluminum & magnesium hydroxide-simethicone 400-400-40 mg/5 mL suspension  Commonly known as: MYLANTA MAX STRENGTH  Take 5 mLs by mouth 4 (four) times daily as needed (mouth sores).     apixaban 5 mg Tab  Commonly known as: ELIQUIS  Take 1 tablet (5 mg total) by mouth 2 (two) times daily. Take 2 tablets by mouth (10 mg) twice a day for 7 days, then 1 tablet (5 mg total) by mouth twice a day thereafter     cholecalciferol (vitamin D3) 50 mcg (2,000 unit) Tab  Commonly known as: VITAMIN D3     dexAMETHasone 4 MG Tab  Commonly known as: DECADRON     dicyclomine 10 MG capsule  Commonly known as: BENTYL  Take 1 capsule (10 mg total) by mouth 4 (four) times daily before meals and nightly.     diphenhydrAMINE 12.5 mg/5 mL elixir  Commonly known as: BENADRYL  Take 5 mLs (12.5 mg total) by mouth 4 (four) times daily as needed (mouth sores).     GEMTESA 75 mg Tab  Generic drug: vibegron     insulin aspart U-100 100 unit/mL injection  Commonly known as: NovoLOG     LIDOcaine viscous HCl 2% 2 % Soln  Commonly known as: LIDOcaine VISCOUS  by Mucous Membrane route 4 (four) times daily as needed (mouth sores). Swish and spit 15 ml (5 ml benadryl, 5 ml mylanta, 5 ml lidocaine) by mouth 4 times daily  as needed for mouth sores     magnesium oxide 400 mg (241.3 mg magnesium) tablet  Commonly known as: MAGOX  Take 1 tablet (400 mg total) by mouth once daily.     morphine 15 MG 12 hr tablet  Commonly known as: MS CONTIN  Take 1 tablet (15 mg total) by mouth 2 (two) times daily.     multivitamin with folic acid 400 mcg Tab     OLANZapine 5 MG tablet  Commonly known as: ZyPREXA  Take 1 tablet (5 mg total) by mouth nightly.     ondansetron 8 MG tablet  Commonly known as: ZOFRAN     oxyCODONE-acetaminophen  mg per tablet  Commonly known as: PERCOCET  Take 1 tablet by mouth every 4 (four) hours as needed for Pain.     pantoprazole 40 MG tablet  Commonly known as: PROTONIX  Take 1 tablet (40 mg total) by mouth once daily.     polyethylene glycol 17 gram Pwpk  Commonly known as: GLYCOLAX     pregabalin 100 MG capsule  Commonly known as: LYRICA  Take 1 capsule (100 mg total) by mouth 2 (two) times daily.     prochlorperazine 10 MG tablet  Commonly known as: COMPAZINE     tamsulosin 0.4 mg Cap  Commonly known as: FLOMAX  Take 1 capsule (0.4 mg total) by mouth once daily.     TRESIBA FLEXTOUCH U-200 200 unit/mL (3 mL) insulin pen  Generic drug: insulin degludec            PHYSICAL EXAM   VITALS: T 99.1 °F (37.3 °C)   BP (!) 91/55   P 93   RR 18   O2 (!) 92 %    GENERAL: Awake and in NAD  LUNGS: CTA B/L  CVS: Normal rate  GI/: Soft, distended but NT, bowel sounds present  EXTREMITIES:  1+ LE edema  NEURO: AAOx3  PSYCH: Cooperative      Discharge time: 33 minutes     Todd Montes De Oca MD  [facility]       DIAGNOSITCS   CBC:   Recent Labs   Lab 01/15/25  0304 01/16/25  0402   WBC 11.82* 5.56   HGB 12.8* 10.3*   HCT 40.3* 32.5*    162       CMP:   Recent Labs   Lab 01/15/25  0304 01/16/25  0402   CALCIUM 8.3* 7.9*   ALBUMIN 2.3* 2.4*   * 134*   K 5.1 4.7   CO2 29 32*    101   BUN 5.6* 6.0*   CREATININE 0.80 0.78   ALKPHOS 448* 306*   ALT 36 20   AST 38* 22   BILITOT 0.4 0.3     Estimated Creatinine  Clearance: 99.9 mL/min (based on SCr of 0.78 mg/dL).    COAG:  Recent Labs   Lab 01/15/25  0304   APTT 29.8   INR 1.3         Recent Labs   Lab 01/15/25  0304   LIPASE <4       Recent Labs     01/16/25  1152 01/16/25  1621 01/16/25  2119 01/17/25  0633 01/17/25  0749 01/17/25  1140   POCTGLUCOSE 108 201* 220* 53* 62* 146*        US Abdomen Limited  Result Date: 1/17/2025  EXAMINATION: US ABDOMEN LIMITED CLINICAL HISTORY: Ascites. COMPARISON: 8 January 2025.. FINDINGS: Targeted scanning of the abdomen to evaluate for ascites.  Small to moderate volume ascites is seen, primarily in the left lower quadrant.   Impression:  Small to moderate volume ascites.   Electronically signed by: Tai Cotton Date:    01/17/2025 Time:    11:16    IR Paracentesis with Imaging  Result Date: 1/15/2025  PROCEDURE: Ultrasound Guided Paracentesis CLINICAL HISTORY: 59-year-old male with pancreatic cancer and ascites ANESTHESIA: Local anesthesia PHYSICIANS: Jerome Avalos MD PROCEDURAL SUMMARY: After informed consent was obtained, the patient was placed supine on the ultrasound table. A limited ultrasound of the abdomen was performed with Dr. Avalos present in the room.  This confirmed the presence of an appropriate volume of ascites for drainage.  The planned skin entry site and route for needle passage for paracentesis was then determined. The skin overlying the right lower quadrant of the abdomen was marked.  The site was then prepped and draped in the usual sterile fashion. The soft tissues were anesthetized with lidocaine and a dermatotomy was performed.  Under ultrasound guidance, a sheath needle was advanced from the skin entry site into the peritoneal cavity.  When the needle entered the peritoneal cavity, fluid was aspirated.  The sheath was then advanced over the needle into the cavity.  The needle was removed.  Aspiration was performed and a total of 6.9 L of clear fluid was drained from the peritoneal cavity.  The catheter was  then removed.  A sterile dressing was applied. The patient tolerated the procedure well and was without any apparent complications.   Impression:  Technically successful ultrasound-guided paracentesis.   Electronically signed by: Jerome Avalos Date:    01/15/2025 Time:    11:58   10:11

## 2025-01-18 LAB — BACTERIA UR CULT: NORMAL

## 2025-01-22 ENCOUNTER — PATIENT OUTREACH (OUTPATIENT)
Dept: ADMINISTRATIVE | Facility: CLINIC | Age: 60
End: 2025-01-22
Payer: MEDICARE

## 2025-01-24 DIAGNOSIS — R19.00 ABDOMINAL SWELLING: ICD-10-CM

## 2025-01-24 DIAGNOSIS — C25.9 ADENOCARCINOMA OF PANCREAS: ICD-10-CM

## 2025-01-24 DIAGNOSIS — G89.3 CANCER ASSOCIATED PAIN: ICD-10-CM

## 2025-01-24 DIAGNOSIS — M79.89 SWELLING OF BOTH LOWER EXTREMITIES: Primary | ICD-10-CM

## 2025-01-24 DIAGNOSIS — K83.8 PNEUMOBILIA: ICD-10-CM

## 2025-01-26 ENCOUNTER — HOSPITAL ENCOUNTER (EMERGENCY)
Facility: HOSPITAL | Age: 60
Discharge: HOME | End: 2025-01-26
Attending: EMERGENCY MEDICINE
Payer: MEDICARE

## 2025-01-26 VITALS
HEART RATE: 73 BPM | HEIGHT: 68 IN | RESPIRATION RATE: 16 BRPM | TEMPERATURE: 98 F | SYSTOLIC BLOOD PRESSURE: 123 MMHG | DIASTOLIC BLOOD PRESSURE: 89 MMHG | BODY MASS INDEX: 24.71 KG/M2 | OXYGEN SATURATION: 96 % | WEIGHT: 163 LBS

## 2025-01-26 DIAGNOSIS — J20.5 RSV BRONCHITIS: Primary | ICD-10-CM

## 2025-01-26 DIAGNOSIS — Z85.07 H/O PANCREATIC CANCER: ICD-10-CM

## 2025-01-26 LAB
ALBUMIN SERPL-MCNC: 2.5 G/DL (ref 3.5–5)
ALBUMIN/GLOB SERPL: 0.7 RATIO (ref 1.1–2)
ALP SERPL-CCNC: 428 UNIT/L (ref 40–150)
ALT SERPL-CCNC: 35 UNIT/L (ref 0–55)
ANION GAP SERPL CALC-SCNC: 8 MEQ/L
AST SERPL-CCNC: 43 UNIT/L (ref 5–34)
BASOPHILS # BLD AUTO: 0.03 X10(3)/MCL
BASOPHILS NFR BLD AUTO: 0.5 %
BILIRUB SERPL-MCNC: 0.4 MG/DL
BUN SERPL-MCNC: 8 MG/DL (ref 8.4–25.7)
CALCIUM SERPL-MCNC: 8.6 MG/DL (ref 8.4–10.2)
CHLORIDE SERPL-SCNC: 101 MMOL/L (ref 98–107)
CO2 SERPL-SCNC: 28 MMOL/L (ref 22–29)
CREAT SERPL-MCNC: 0.77 MG/DL (ref 0.72–1.25)
CREAT/UREA NIT SERPL: 10
EOSINOPHIL # BLD AUTO: 0.02 X10(3)/MCL (ref 0–0.9)
EOSINOPHIL NFR BLD AUTO: 0.4 %
ERYTHROCYTE [DISTWIDTH] IN BLOOD BY AUTOMATED COUNT: 13.8 % (ref 11.5–17)
FLUAV AG UPPER RESP QL IA.RAPID: NOT DETECTED
FLUBV AG UPPER RESP QL IA.RAPID: NOT DETECTED
GFR SERPLBLD CREATININE-BSD FMLA CKD-EPI: >60 ML/MIN/1.73/M2
GLOBULIN SER-MCNC: 3.7 GM/DL (ref 2.4–3.5)
GLUCOSE SERPL-MCNC: 169 MG/DL (ref 74–100)
HCT VFR BLD AUTO: 38.1 % (ref 42–52)
HGB BLD-MCNC: 12.4 G/DL (ref 14–18)
IMM GRANULOCYTES # BLD AUTO: 0.01 X10(3)/MCL (ref 0–0.04)
IMM GRANULOCYTES NFR BLD AUTO: 0.2 %
INR PPP: 1.2
LIPASE SERPL-CCNC: <4 U/L
LYMPHOCYTES # BLD AUTO: 0.93 X10(3)/MCL (ref 0.6–4.6)
LYMPHOCYTES NFR BLD AUTO: 16.9 %
MCH RBC QN AUTO: 28.8 PG (ref 27–31)
MCHC RBC AUTO-ENTMCNC: 32.5 G/DL (ref 33–36)
MCV RBC AUTO: 88.6 FL (ref 80–94)
MONOCYTES # BLD AUTO: 0.62 X10(3)/MCL (ref 0.1–1.3)
MONOCYTES NFR BLD AUTO: 11.3 %
NEUTROPHILS # BLD AUTO: 3.89 X10(3)/MCL (ref 2.1–9.2)
NEUTROPHILS NFR BLD AUTO: 70.7 %
NRBC BLD AUTO-RTO: 0 %
PLATELET # BLD AUTO: 211 X10(3)/MCL (ref 130–400)
PMV BLD AUTO: 10.5 FL (ref 7.4–10.4)
POTASSIUM SERPL-SCNC: 4.5 MMOL/L (ref 3.5–5.1)
PROT SERPL-MCNC: 6.2 GM/DL (ref 6.4–8.3)
PROTHROMBIN TIME: 15.8 SECONDS (ref 12.4–14.9)
RBC # BLD AUTO: 4.3 X10(6)/MCL (ref 4.7–6.1)
RSV A 5' UTR RNA NPH QL NAA+PROBE: DETECTED
SARS-COV-2 RNA RESP QL NAA+PROBE: NOT DETECTED
SODIUM SERPL-SCNC: 137 MMOL/L (ref 136–145)
WBC # BLD AUTO: 5.5 X10(3)/MCL (ref 4.5–11.5)

## 2025-01-26 PROCEDURE — 0241U COVID/RSV/FLU A&B PCR: CPT | Performed by: EMERGENCY MEDICINE

## 2025-01-26 PROCEDURE — 85610 PROTHROMBIN TIME: CPT | Performed by: EMERGENCY MEDICINE

## 2025-01-26 PROCEDURE — 83690 ASSAY OF LIPASE: CPT | Performed by: EMERGENCY MEDICINE

## 2025-01-26 PROCEDURE — 80053 COMPREHEN METABOLIC PANEL: CPT | Performed by: EMERGENCY MEDICINE

## 2025-01-26 PROCEDURE — 99284 EMERGENCY DEPT VISIT MOD MDM: CPT | Mod: 25

## 2025-01-26 PROCEDURE — 85025 COMPLETE CBC W/AUTO DIFF WBC: CPT | Performed by: EMERGENCY MEDICINE

## 2025-01-26 RX ORDER — BENZONATATE 100 MG/1
100 CAPSULE ORAL 3 TIMES DAILY PRN
Qty: 20 CAPSULE | Refills: 0 | Status: SHIPPED | OUTPATIENT
Start: 2025-01-26

## 2025-01-26 RX ORDER — ALBUTEROL SULFATE 90 UG/1
1-2 INHALANT RESPIRATORY (INHALATION) EVERY 6 HOURS PRN
Qty: 18 G | Refills: 0 | Status: SHIPPED | OUTPATIENT
Start: 2025-01-26 | End: 2026-01-26

## 2025-01-26 NOTE — ED PROVIDER NOTES
Encounter Date: 1/26/2025       History     Chief Complaint   Patient presents with    Cough     C/o productive cough x 3 days, diarrhea yesterday with abdominal cramping, fluid retention due to pancreatic cancer     HPI  60-year-old male history of pancreatic cancer, BPH, diabetes, peripheral neuropathy, hypertension, GERD now with complaints of 3 day history of productive cough of yellow sputum , diarrhea and mild abdominal cramping.  Patient denies associated substernal chest pain, fever, chills, headache, dysuria, hematuria.  No history of trauma.  Review of patient's allergies indicates:  No Known Allergies  Past Medical History:   Diagnosis Date    Abdominal pain     Admission for chemotherapy     last chemo 9/12/24    Anxiety disorder, unspecified     Back pain     Bradycardia     Enlarged prostate     GERD (gastroesophageal reflux disease)     Hypertension     no cardiologist; no longer on meds since weight loss due to pancreatic cancer    Memory loss     Pancreatic cancer     Peripheral neuropathy     Type 2 diabetes mellitus with unspecified diabetic retinopathy without macular edema      Past Surgical History:   Procedure Laterality Date    bile duct stents times 2      EGD, WITH STENT INSERTION      INSERTION OF TUNNELED CENTRAL VENOUS CATHETER (CVC) WITH SUBCUTANEOUS PORT Right 9/20/2024    Procedure: [id number];  Surgeon: Moe Champagne MD;  Location: [facility];  Service: General;  Laterality: Right;    MEDIPORT REMOVAL      times 2     Family History   Problem Relation Name Age of Onset    Hypertension Mother      Stroke Father      Stroke Sister      Prostate cancer Brother       Social History     Tobacco Use    Smoking status: Never     Passive exposure: Never    Smokeless tobacco: Former     Types: Chew   Substance Use Topics    Alcohol use: Not Currently    Drug use: Never     Review of Systems   All other systems reviewed and are negative.      Physical Exam     Initial Vitals  [01/26/25 0947]   BP Pulse Resp Temp SpO2   (!) 100/58 79 20 98.2 °F (36.8 °C) 97 %      MAP       --         Physical Exam    Nursing note and vitals reviewed.  Constitutional: He appears well-developed. He is cooperative.   HENT:   Head: Normocephalic and atraumatic.   Eyes: Conjunctivae and lids are normal.   Neck: Trachea normal. Neck supple.   Normal range of motion.  Cardiovascular:  Normal rate, regular rhythm, normal heart sounds and intact distal pulses.           Pulmonary/Chest: No respiratory distress. He has no wheezes. He has no rhonchi. He has no rales. He exhibits no tenderness.   Abdominal: Abdomen is soft. Bowel sounds are normal. He exhibits distension. He exhibits no mass. There is no abdominal tenderness. There is no rebound and no guarding.   Musculoskeletal:         General: Normal range of motion.      Cervical back: Normal range of motion and neck supple.     Neurological: He is alert and oriented to person, place, and time. He has normal strength. He displays normal reflexes. No cranial nerve deficit or sensory deficit.   Skin: Skin is warm and dry. No rash noted.   Psychiatric: He has a normal mood and affect. His speech is normal and behavior is normal. Judgment and thought content normal.         ED Course   Procedures  Labs Reviewed   COVID/RSV/FLU A&B PCR - Abnormal       Result Value    Influenza A PCR Not Detected      Influenza B PCR Not Detected      Respiratory Syncytial Virus PCR Detected (*)     SARS-CoV-2 PCR Not Detected      Narrative:     The Xpert Xpress SARS-CoV-2/FLU/RSV plus is a rapid, multiplexed real-time PCR test intended for the simultaneous qualitative detection and differentiation of SARS-CoV-2, Influenza A, Influenza B, and respiratory syncytial virus (RSV) viral RNA in either nasopharyngeal swab or nasal swab specimens.         COMPREHENSIVE METABOLIC PANEL - Abnormal    Sodium 137      Potassium 4.5      Chloride 101      CO2 28      Glucose 169 (*)     Blood  Urea Nitrogen 8.0 (*)     Creatinine 0.77      Calcium 8.6      Protein Total 6.2 (*)     Albumin 2.5 (*)     Globulin 3.7 (*)     Albumin/Globulin Ratio 0.7 (*)     Bilirubin Total 0.4       (*)     ALT 35      AST 43 (*)     eGFR >60      Anion Gap 8.0      BUN/Creatinine Ratio 10     CBC WITH DIFFERENTIAL - Abnormal    WBC 5.50      RBC 4.30 (*)     Hgb 12.4 (*)     Hct 38.1 (*)     MCV 88.6      MCH 28.8      MCHC 32.5 (*)     RDW 13.8      Platelet 211      MPV 10.5 (*)     Neut % 70.7      Lymph % 16.9      Mono % 11.3      Eos % 0.4      Basophil % 0.5      Imm Grans % 0.2      Neut # 3.89      Lymph # 0.93      Mono # 0.62      Eos # 0.02      Baso # 0.03      Imm Gran # 0.01      NRBC% 0.0     PROTIME-INR - Abnormal    PT 15.8 (*)     INR 1.2     LIPASE - Normal    Lipase Level <4     CBC W/ AUTO DIFFERENTIAL    Narrative:     The following orders were created for panel order CBC auto differential.  Procedure                               Abnormality         Status                     ---------                               -----------         ------                     CBC with Differential[[id number]]       Abnormal            Final result                 Please view results for these tests on the individual orders.          Imaging Results              X-Ray Chest AP Portable (Final result)  Result time 01/26/25 11:35:40      Final result by Ton Vincent MD (01/26/25 11:35:40)                   Impression:      No radiographic evidence of an acute cardiopulmonary process.      Electronically signed by: Ton Vincent  Date:    01/26/2025  Time:    11:35               Narrative:    EXAMINATION:  XR CHEST AP PORTABLE    CLINICAL HISTORY:  Acute upper respiratory infection, unspecified    COMPARISON:  None    FINDINGS:  Single AP chest radiograph is provided for evaluation.    Right-sided chest port is unchanged in position.    Cardiac silhouette is normal in size.    Vertically oriented soft  tissue interface along the left lateral lung likely reflects a skin fold.    No focal consolidation, pneumothorax, or pleural effusion.    No acute osseous findings.                                       Medications - No data to display  Medical Decision Making  Amount and/or Complexity of Data Reviewed  Labs: ordered.  Radiology: ordered.    Risk  Prescription drug management.    60-year-old male history of pancreatic cancer now with complaints of 3 day history of productive cough, diarrhea and mild abdominal cramping.  Vital signs stable in ED, afebrile, O2 sat 97% on room air.  On exam patient with some mild wheezing with no accessory muscle use and speaking in full sentences.  Patient does have distended abdomen which is nontender.  Labs remarkable for hemoglobin of 12.4, alk phos 428.  Chest x-ray with no obvious infiltrates or effusions.  RSV positive, influenza COVID negative.  We will discharge home with albuterol inhaler, close follow up with PCP in 2 days for recheck, return for worsening symptoms or any other concerns.  Patient does state he has an appointment with his oncologist in 2 days for further evaluation.                                  Clinical Impression:  Final diagnoses:  [J20.5] RSV bronchitis (Primary)  [Z85.07] H/O pancreatic cancer          ED Disposition Condition    Discharge Stable          ED Prescriptions       Medication Sig Dispense Start Date End Date Auth. Provider    albuterol (PROVENTIL/VENTOLIN HFA) 90 mcg/actuation inhaler Inhale 1-2 puffs into the lungs every 6 (six) hours as needed for Wheezing. Rescue 18 g 1/26/2025 1/26/2026 Mustapha Montero MD    benzonatate (TESSALON) 100 MG capsule Take 1 capsule (100 mg total) by mouth 3 (three) times daily as needed for Cough. 20 capsule 1/26/2025 -- Mustapha Montero MD          Follow-up Information       Follow up With Specialties Details Why Contact Info    Primary care  Schedule an appointment as soon as possible for a  visit in 2 days  Follow up with your primary care provider and oncologist in 2 days             Mustapha Montero MD  01/27/25 0655

## 2025-01-27 ENCOUNTER — HOSPITAL ENCOUNTER (INPATIENT)
Facility: HOSPITAL | Age: 60
LOS: 1 days | Discharge: HOME | DRG: 435 | End: 2025-01-29
Attending: EMERGENCY MEDICINE | Admitting: INTERNAL MEDICINE
Payer: MEDICARE

## 2025-01-27 DIAGNOSIS — J20.5 RSV BRONCHITIS: ICD-10-CM

## 2025-01-27 DIAGNOSIS — C25.9 MALIGNANT NEOPLASM OF PANCREAS, UNSPECIFIED LOCATION OF MALIGNANCY: ICD-10-CM

## 2025-01-27 DIAGNOSIS — R09.02 HYPOXIA: ICD-10-CM

## 2025-01-27 DIAGNOSIS — R06.02 SHORTNESS OF BREATH: ICD-10-CM

## 2025-01-27 DIAGNOSIS — R18.8 ASCITES: ICD-10-CM

## 2025-01-27 DIAGNOSIS — R07.9 CHEST PAIN: ICD-10-CM

## 2025-01-27 PROBLEM — D64.9 ANEMIA: Status: ACTIVE | Noted: 2025-01-27

## 2025-01-27 LAB
ALBUMIN SERPL-MCNC: 2.4 G/DL (ref 3.5–5)
ALBUMIN/GLOB SERPL: 0.7 RATIO (ref 1.1–2)
ALP SERPL-CCNC: 443 UNIT/L (ref 40–150)
ALT SERPL-CCNC: 31 UNIT/L (ref 0–55)
ANION GAP SERPL CALC-SCNC: 8 MEQ/L
APTT PPP: 28.6 SECONDS (ref 23.2–33.7)
AST SERPL-CCNC: 40 UNIT/L (ref 5–34)
B PERT.PT PRMT NPH QL NAA+NON-PROBE: NOT DETECTED
BASOPHILS # BLD AUTO: 0.03 X10(3)/MCL
BASOPHILS NFR BLD AUTO: 0.4 %
BILIRUB SERPL-MCNC: 0.4 MG/DL
BNP BLD-MCNC: 61.8 PG/ML
BUN SERPL-MCNC: 9.4 MG/DL (ref 8.4–25.7)
C PNEUM DNA NPH QL NAA+NON-PROBE: NOT DETECTED
CALCIUM SERPL-MCNC: 8.4 MG/DL (ref 8.4–10.2)
CHLORIDE SERPL-SCNC: 102 MMOL/L (ref 98–107)
CO2 SERPL-SCNC: 27 MMOL/L (ref 22–29)
CREAT SERPL-MCNC: 0.83 MG/DL (ref 0.72–1.25)
CREAT/UREA NIT SERPL: 11
EOSINOPHIL # BLD AUTO: 0.02 X10(3)/MCL (ref 0–0.9)
EOSINOPHIL NFR BLD AUTO: 0.3 %
ERYTHROCYTE [DISTWIDTH] IN BLOOD BY AUTOMATED COUNT: 13.8 % (ref 11.5–17)
EST. AVERAGE GLUCOSE BLD GHB EST-MCNC: 174.3 MG/DL
GFR SERPLBLD CREATININE-BSD FMLA CKD-EPI: >60 ML/MIN/1.73/M2
GLOBULIN SER-MCNC: 3.6 GM/DL (ref 2.4–3.5)
GLUCOSE SERPL-MCNC: 173 MG/DL (ref 74–100)
HADV DNA NPH QL NAA+NON-PROBE: NOT DETECTED
HBA1C MFR BLD: 7.7 %
HCOV 229E RNA NPH QL NAA+NON-PROBE: NOT DETECTED
HCOV HKU1 RNA NPH QL NAA+NON-PROBE: NOT DETECTED
HCOV NL63 RNA NPH QL NAA+NON-PROBE: NOT DETECTED
HCOV OC43 RNA NPH QL NAA+NON-PROBE: NOT DETECTED
HCT VFR BLD AUTO: 37.9 % (ref 42–52)
HGB BLD-MCNC: 12.5 G/DL (ref 14–18)
HMPV RNA NPH QL NAA+NON-PROBE: NOT DETECTED
HPIV1 RNA NPH QL NAA+NON-PROBE: NOT DETECTED
HPIV2 RNA NPH QL NAA+NON-PROBE: NOT DETECTED
HPIV3 RNA NPH QL NAA+NON-PROBE: NOT DETECTED
HPIV4 RNA NPH QL NAA+NON-PROBE: NOT DETECTED
IMM GRANULOCYTES # BLD AUTO: 0.02 X10(3)/MCL (ref 0–0.04)
IMM GRANULOCYTES NFR BLD AUTO: 0.3 %
INR PPP: 1.3
LYMPHOCYTES # BLD AUTO: 1 X10(3)/MCL (ref 0.6–4.6)
LYMPHOCYTES NFR BLD AUTO: 13.9 %
M PNEUMO DNA NPH QL NAA+NON-PROBE: NOT DETECTED
MCH RBC QN AUTO: 28.5 PG (ref 27–31)
MCHC RBC AUTO-ENTMCNC: 33 G/DL (ref 33–36)
MCV RBC AUTO: 86.5 FL (ref 80–94)
MONOCYTES # BLD AUTO: 0.66 X10(3)/MCL (ref 0.1–1.3)
MONOCYTES NFR BLD AUTO: 9.1 %
NEUTROPHILS # BLD AUTO: 5.49 X10(3)/MCL (ref 2.1–9.2)
NEUTROPHILS NFR BLD AUTO: 76 %
NRBC BLD AUTO-RTO: 0 %
OHS QRS DURATION: 88 MS
OHS QTC CALCULATION: 414 MS
PLATELET # BLD AUTO: 242 X10(3)/MCL (ref 130–400)
PMV BLD AUTO: 10.4 FL (ref 7.4–10.4)
POCT GLUCOSE: 161 MG/DL (ref 70–110)
POTASSIUM SERPL-SCNC: 3.6 MMOL/L (ref 3.5–5.1)
PROT SERPL-MCNC: 6 GM/DL (ref 6.4–8.3)
PROTHROMBIN TIME: 15.7 SECONDS (ref 12.5–14.5)
RBC # BLD AUTO: 4.38 X10(6)/MCL (ref 4.7–6.1)
RSV RNA NPH QL NAA+NON-PROBE: NOT DETECTED
RV+EV RNA NPH QL NAA+NON-PROBE: NOT DETECTED
SODIUM SERPL-SCNC: 137 MMOL/L (ref 136–145)
TROPONIN I SERPL-MCNC: <0.01 NG/ML (ref 0–0.04)
WBC # BLD AUTO: 7.22 X10(3)/MCL (ref 4.5–11.5)

## 2025-01-27 PROCEDURE — 96374 THER/PROPH/DIAG INJ IV PUSH: CPT

## 2025-01-27 PROCEDURE — 96375 TX/PRO/DX INJ NEW DRUG ADDON: CPT

## 2025-01-27 PROCEDURE — 63600175 PHARM REV CODE 636 W HCPCS: Performed by: EMERGENCY MEDICINE

## 2025-01-27 PROCEDURE — 99285 EMERGENCY DEPT VISIT HI MDM: CPT | Mod: 25

## 2025-01-27 PROCEDURE — 94640 AIRWAY INHALATION TREATMENT: CPT

## 2025-01-27 PROCEDURE — 87798 DETECT AGENT NOS DNA AMP: CPT | Performed by: STUDENT IN AN ORGANIZED HEALTH CARE EDUCATION/TRAINING PROGRAM

## 2025-01-27 PROCEDURE — 85730 THROMBOPLASTIN TIME PARTIAL: CPT

## 2025-01-27 PROCEDURE — 25000242 PHARM REV CODE 250 ALT 637 W/ HCPCS: Performed by: EMERGENCY MEDICINE

## 2025-01-27 PROCEDURE — 85610 PROTHROMBIN TIME: CPT

## 2025-01-27 PROCEDURE — 94760 N-INVAS EAR/PLS OXIMETRY 1: CPT

## 2025-01-27 PROCEDURE — 87486 CHLMYD PNEUM DNA AMP PROBE: CPT | Performed by: STUDENT IN AN ORGANIZED HEALTH CARE EDUCATION/TRAINING PROGRAM

## 2025-01-27 PROCEDURE — 99900035 HC TECH TIME PER 15 MIN (STAT)

## 2025-01-27 PROCEDURE — 80053 COMPREHEN METABOLIC PANEL: CPT

## 2025-01-27 PROCEDURE — 84484 ASSAY OF TROPONIN QUANT: CPT

## 2025-01-27 PROCEDURE — 85025 COMPLETE CBC W/AUTO DIFF WBC: CPT

## 2025-01-27 PROCEDURE — 82962 GLUCOSE BLOOD TEST: CPT

## 2025-01-27 PROCEDURE — 99900031 HC PATIENT EDUCATION (STAT)

## 2025-01-27 PROCEDURE — 93010 ELECTROCARDIOGRAM REPORT: CPT | Mod: ,,, | Performed by: INTERNAL MEDICINE

## 2025-01-27 PROCEDURE — 83036 HEMOGLOBIN GLYCOSYLATED A1C: CPT | Performed by: STUDENT IN AN ORGANIZED HEALTH CARE EDUCATION/TRAINING PROGRAM

## 2025-01-27 PROCEDURE — 63600175 PHARM REV CODE 636 W HCPCS: Performed by: STUDENT IN AN ORGANIZED HEALTH CARE EDUCATION/TRAINING PROGRAM

## 2025-01-27 PROCEDURE — 96372 THER/PROPH/DIAG INJ SC/IM: CPT | Performed by: STUDENT IN AN ORGANIZED HEALTH CARE EDUCATION/TRAINING PROGRAM

## 2025-01-27 PROCEDURE — 25000003 PHARM REV CODE 250: Performed by: NURSE PRACTITIONER

## 2025-01-27 PROCEDURE — 93005 ELECTROCARDIOGRAM TRACING: CPT

## 2025-01-27 PROCEDURE — G0378 HOSPITAL OBSERVATION PER HR: HCPCS

## 2025-01-27 PROCEDURE — 83880 ASSAY OF NATRIURETIC PEPTIDE: CPT

## 2025-01-27 PROCEDURE — 25000003 PHARM REV CODE 250: Performed by: STUDENT IN AN ORGANIZED HEALTH CARE EDUCATION/TRAINING PROGRAM

## 2025-01-27 RX ORDER — ONDANSETRON HYDROCHLORIDE 2 MG/ML
4 INJECTION, SOLUTION INTRAVENOUS
Status: COMPLETED | OUTPATIENT
Start: 2025-01-27 | End: 2025-01-27

## 2025-01-27 RX ORDER — ONDANSETRON HYDROCHLORIDE 2 MG/ML
4 INJECTION, SOLUTION INTRAVENOUS EVERY 8 HOURS PRN
Status: DISCONTINUED | OUTPATIENT
Start: 2025-01-27 | End: 2025-01-27

## 2025-01-27 RX ORDER — SODIUM CHLORIDE 0.9 % (FLUSH) 0.9 %
10 SYRINGE (ML) INJECTION
Status: DISCONTINUED | OUTPATIENT
Start: 2025-01-27 | End: 2025-01-29 | Stop reason: HOSPADM

## 2025-01-27 RX ORDER — INSULIN GLARGINE 100 [IU]/ML
7 INJECTION, SOLUTION SUBCUTANEOUS NIGHTLY
Status: DISCONTINUED | OUTPATIENT
Start: 2025-01-27 | End: 2025-01-29 | Stop reason: HOSPADM

## 2025-01-27 RX ORDER — POLYETHYLENE GLYCOL 3350 17 G/17G
17 POWDER, FOR SOLUTION ORAL DAILY
Status: DISCONTINUED | OUTPATIENT
Start: 2025-01-28 | End: 2025-01-29 | Stop reason: HOSPADM

## 2025-01-27 RX ORDER — DICYCLOMINE HYDROCHLORIDE 10 MG/1
10 CAPSULE ORAL
Status: DISCONTINUED | OUTPATIENT
Start: 2025-01-27 | End: 2025-01-29 | Stop reason: HOSPADM

## 2025-01-27 RX ORDER — ACETAMINOPHEN 325 MG/1
650 TABLET ORAL EVERY 4 HOURS PRN
Status: DISCONTINUED | OUTPATIENT
Start: 2025-01-27 | End: 2025-01-29 | Stop reason: HOSPADM

## 2025-01-27 RX ORDER — TAMSULOSIN HYDROCHLORIDE 0.4 MG/1
0.4 CAPSULE ORAL DAILY
Status: DISCONTINUED | OUTPATIENT
Start: 2025-01-28 | End: 2025-01-29 | Stop reason: HOSPADM

## 2025-01-27 RX ORDER — GLUCAGON 1 MG
1 KIT INJECTION
Status: DISCONTINUED | OUTPATIENT
Start: 2025-01-27 | End: 2025-01-29 | Stop reason: HOSPADM

## 2025-01-27 RX ORDER — ALUMINUM HYDROXIDE, MAGNESIUM HYDROXIDE, AND SIMETHICONE 1200; 120; 1200 MG/30ML; MG/30ML; MG/30ML
30 SUSPENSION ORAL 4 TIMES DAILY PRN
Status: DISCONTINUED | OUTPATIENT
Start: 2025-01-27 | End: 2025-01-29 | Stop reason: HOSPADM

## 2025-01-27 RX ORDER — MORPHINE SULFATE 4 MG/ML
4 INJECTION, SOLUTION INTRAMUSCULAR; INTRAVENOUS
Status: COMPLETED | OUTPATIENT
Start: 2025-01-27 | End: 2025-01-27

## 2025-01-27 RX ORDER — PREGABALIN 50 MG/1
100 CAPSULE ORAL 2 TIMES DAILY
Status: DISCONTINUED | OUTPATIENT
Start: 2025-01-27 | End: 2025-01-29 | Stop reason: HOSPADM

## 2025-01-27 RX ORDER — ONDANSETRON HYDROCHLORIDE 2 MG/ML
4 INJECTION, SOLUTION INTRAVENOUS EVERY 8 HOURS PRN
Status: DISCONTINUED | OUTPATIENT
Start: 2025-01-27 | End: 2025-01-29 | Stop reason: HOSPADM

## 2025-01-27 RX ORDER — OLANZAPINE 5 MG/1
5 TABLET ORAL NIGHTLY
Status: DISCONTINUED | OUTPATIENT
Start: 2025-01-27 | End: 2025-01-29 | Stop reason: HOSPADM

## 2025-01-27 RX ORDER — MORPHINE SULFATE 4 MG/ML
2 INJECTION, SOLUTION INTRAMUSCULAR; INTRAVENOUS EVERY 6 HOURS PRN
Status: DISCONTINUED | OUTPATIENT
Start: 2025-01-27 | End: 2025-01-27

## 2025-01-27 RX ORDER — PANTOPRAZOLE SODIUM 40 MG/1
40 TABLET, DELAYED RELEASE ORAL
Status: DISCONTINUED | OUTPATIENT
Start: 2025-01-28 | End: 2025-01-29 | Stop reason: HOSPADM

## 2025-01-27 RX ORDER — TALC
9 POWDER (GRAM) TOPICAL NIGHTLY PRN
Status: DISCONTINUED | OUTPATIENT
Start: 2025-01-27 | End: 2025-01-29 | Stop reason: HOSPADM

## 2025-01-27 RX ORDER — BENZONATATE 100 MG/1
200 CAPSULE ORAL 3 TIMES DAILY PRN
Status: DISCONTINUED | OUTPATIENT
Start: 2025-01-27 | End: 2025-01-29 | Stop reason: HOSPADM

## 2025-01-27 RX ORDER — NALOXONE HCL 0.4 MG/ML
0.02 VIAL (ML) INJECTION
Status: DISCONTINUED | OUTPATIENT
Start: 2025-01-27 | End: 2025-01-29 | Stop reason: HOSPADM

## 2025-01-27 RX ORDER — ACETAMINOPHEN 325 MG/1
650 TABLET ORAL EVERY 8 HOURS PRN
Status: DISCONTINUED | OUTPATIENT
Start: 2025-01-27 | End: 2025-01-29 | Stop reason: HOSPADM

## 2025-01-27 RX ORDER — FUROSEMIDE 40 MG/1
40 TABLET ORAL DAILY
Status: DISCONTINUED | OUTPATIENT
Start: 2025-01-28 | End: 2025-01-29 | Stop reason: HOSPADM

## 2025-01-27 RX ORDER — CODEINE PHOSPHATE AND GUAIFENESIN 10; 100 MG/5ML; MG/5ML
5 SOLUTION ORAL NIGHTLY PRN
Status: DISCONTINUED | OUTPATIENT
Start: 2025-01-27 | End: 2025-01-29 | Stop reason: HOSPADM

## 2025-01-27 RX ORDER — GUAIFENESIN AND DEXTROMETHORPHAN HYDROBROMIDE 10; 100 MG/5ML; MG/5ML
5 SYRUP ORAL EVERY 4 HOURS PRN
Status: DISCONTINUED | OUTPATIENT
Start: 2025-01-27 | End: 2025-01-29 | Stop reason: HOSPADM

## 2025-01-27 RX ORDER — IPRATROPIUM BROMIDE AND ALBUTEROL SULFATE 2.5; .5 MG/3ML; MG/3ML
3 SOLUTION RESPIRATORY (INHALATION)
Status: COMPLETED | OUTPATIENT
Start: 2025-01-27 | End: 2025-01-27

## 2025-01-27 RX ORDER — SIMETHICONE 80 MG
1 TABLET,CHEWABLE ORAL 4 TIMES DAILY PRN
Status: DISCONTINUED | OUTPATIENT
Start: 2025-01-27 | End: 2025-01-29 | Stop reason: HOSPADM

## 2025-01-27 RX ORDER — INSULIN ASPART 100 [IU]/ML
0-10 INJECTION, SOLUTION INTRAVENOUS; SUBCUTANEOUS
Status: DISCONTINUED | OUTPATIENT
Start: 2025-01-27 | End: 2025-01-29 | Stop reason: HOSPADM

## 2025-01-27 RX ORDER — ONDANSETRON 4 MG/1
8 TABLET, ORALLY DISINTEGRATING ORAL EVERY 8 HOURS PRN
Status: DISCONTINUED | OUTPATIENT
Start: 2025-01-27 | End: 2025-01-29 | Stop reason: HOSPADM

## 2025-01-27 RX ORDER — LANOLIN ALCOHOL/MO/W.PET/CERES
400 CREAM (GRAM) TOPICAL DAILY
Status: DISCONTINUED | OUTPATIENT
Start: 2025-01-28 | End: 2025-01-29 | Stop reason: HOSPADM

## 2025-01-27 RX ORDER — IBUPROFEN 200 MG
24 TABLET ORAL
Status: DISCONTINUED | OUTPATIENT
Start: 2025-01-27 | End: 2025-01-29 | Stop reason: HOSPADM

## 2025-01-27 RX ORDER — BISACODYL 10 MG/1
10 SUPPOSITORY RECTAL DAILY PRN
Status: DISCONTINUED | OUTPATIENT
Start: 2025-01-27 | End: 2025-01-29 | Stop reason: HOSPADM

## 2025-01-27 RX ORDER — IPRATROPIUM BROMIDE AND ALBUTEROL SULFATE 2.5; .5 MG/3ML; MG/3ML
3 SOLUTION RESPIRATORY (INHALATION) EVERY 4 HOURS PRN
Status: DISCONTINUED | OUTPATIENT
Start: 2025-01-27 | End: 2025-01-29 | Stop reason: HOSPADM

## 2025-01-27 RX ORDER — IBUPROFEN 200 MG
16 TABLET ORAL
Status: DISCONTINUED | OUTPATIENT
Start: 2025-01-27 | End: 2025-01-29 | Stop reason: HOSPADM

## 2025-01-27 RX ORDER — OXYCODONE AND ACETAMINOPHEN 10; 325 MG/1; MG/1
1 TABLET ORAL EVERY 4 HOURS PRN
Status: DISCONTINUED | OUTPATIENT
Start: 2025-01-27 | End: 2025-01-29 | Stop reason: HOSPADM

## 2025-01-27 RX ORDER — MORPHINE SULFATE 15 MG/1
15 TABLET, FILM COATED, EXTENDED RELEASE ORAL 2 TIMES DAILY
Status: DISCONTINUED | OUTPATIENT
Start: 2025-01-27 | End: 2025-01-29 | Stop reason: HOSPADM

## 2025-01-27 RX ORDER — POLYETHYLENE GLYCOL 3350 17 G/17G
17 POWDER, FOR SOLUTION ORAL 3 TIMES DAILY PRN
Status: DISCONTINUED | OUTPATIENT
Start: 2025-01-27 | End: 2025-01-29 | Stop reason: HOSPADM

## 2025-01-27 RX ORDER — ENOXAPARIN SODIUM 100 MG/ML
1 INJECTION SUBCUTANEOUS EVERY 12 HOURS
Status: DISCONTINUED | OUTPATIENT
Start: 2025-01-27 | End: 2025-01-29

## 2025-01-27 RX ADMIN — ENOXAPARIN SODIUM 70 MG: 80 INJECTION SUBCUTANEOUS at 09:01

## 2025-01-27 RX ADMIN — ACETAMINOPHEN 650 MG: 325 TABLET, FILM COATED ORAL at 08:01

## 2025-01-27 RX ADMIN — DICYCLOMINE HYDROCHLORIDE 10 MG: 10 CAPSULE ORAL at 09:01

## 2025-01-27 RX ADMIN — IPRATROPIUM BROMIDE AND ALBUTEROL SULFATE 3 ML: .5; 3 SOLUTION RESPIRATORY (INHALATION) at 04:01

## 2025-01-27 RX ADMIN — MORPHINE SULFATE 4 MG: 4 INJECTION, SOLUTION INTRAMUSCULAR; INTRAVENOUS at 04:01

## 2025-01-27 RX ADMIN — ONDANSETRON 4 MG: 2 INJECTION INTRAMUSCULAR; INTRAVENOUS at 04:01

## 2025-01-27 RX ADMIN — INSULIN GLARGINE 7 UNITS: 100 INJECTION, SOLUTION SUBCUTANEOUS at 09:01

## 2025-01-27 RX ADMIN — MORPHINE SULFATE 15 MG: 15 TABLET, EXTENDED RELEASE ORAL at 09:01

## 2025-01-27 RX ADMIN — OLANZAPINE 5 MG: 5 TABLET, FILM COATED ORAL at 09:01

## 2025-01-27 RX ADMIN — PREGABALIN 100 MG: 50 CAPSULE ORAL at 09:01
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
## 2025-01-27 NOTE — FIRST PROVIDER EVALUATION
"Medical screening examination initiated.  I have conducted a focused provider triage encounter, findings are as follows:    Brief history of present illness:  60 year old male presents to ER with c/o SOB and abdomen swelling. Diagnosed with RSV yesterday. Patient has hx of pancreatic cancer, scheduled to start treatment tomorrow.     Vitals:    01/27/25 1215 01/27/25 1216   BP: 108/77    Pulse: 90    Resp: (!) 23    Temp: 98.1 °F (36.7 °C)    TempSrc: Oral    SpO2: (!) 92% 95%   Weight:  73.9 kg (163 lb)   Height: 5' 9" (1.753 m)        Pertinent physical exam:  awake and alert, nad    Brief workup plan:  labs, EKG, cxr    Preliminary workup initiated; this workup will be continued and followed by the physician or advanced practice provider that is assigned to the patient when roomed.  "

## 2025-01-27 NOTE — Clinical Note
Diagnosis: Ascites [770101]   Future Attending Provider: MARVIN YIN [[id number]]   Admit to which facility:: [facility] [[id number]]

## 2025-01-28 LAB
ALBUMIN SERPL-MCNC: 1.9 G/DL (ref 3.5–5)
ALBUMIN/GLOB SERPL: 0.7 RATIO (ref 1.1–2)
ALP SERPL-CCNC: 334 UNIT/L (ref 40–150)
ALT SERPL-CCNC: 22 UNIT/L (ref 0–55)
ANION GAP SERPL CALC-SCNC: 6 MEQ/L
AST SERPL-CCNC: 32 UNIT/L (ref 5–34)
BASOPHILS # BLD AUTO: 0.05 X10(3)/MCL
BASOPHILS NFR BLD AUTO: 0.7 %
BILIRUB SERPL-MCNC: 0.2 MG/DL
BUN SERPL-MCNC: 9.1 MG/DL (ref 8.4–25.7)
CALCIUM SERPL-MCNC: 7.8 MG/DL (ref 8.4–10.2)
CHLORIDE SERPL-SCNC: 103 MMOL/L (ref 98–107)
CO2 SERPL-SCNC: 28 MMOL/L (ref 22–29)
CREAT SERPL-MCNC: 0.76 MG/DL (ref 0.72–1.25)
CREAT/UREA NIT SERPL: 12
EOSINOPHIL # BLD AUTO: 0.04 X10(3)/MCL (ref 0–0.9)
EOSINOPHIL NFR BLD AUTO: 0.6 %
ERYTHROCYTE [DISTWIDTH] IN BLOOD BY AUTOMATED COUNT: 13.7 % (ref 11.5–17)
GFR SERPLBLD CREATININE-BSD FMLA CKD-EPI: >60 ML/MIN/1.73/M2
GLOBULIN SER-MCNC: 2.8 GM/DL (ref 2.4–3.5)
GLUCOSE SERPL-MCNC: 47 MG/DL (ref 74–100)
HCT VFR BLD AUTO: 32.6 % (ref 42–52)
HGB BLD-MCNC: 10.9 G/DL (ref 14–18)
IMM GRANULOCYTES # BLD AUTO: 0.01 X10(3)/MCL (ref 0–0.04)
IMM GRANULOCYTES NFR BLD AUTO: 0.1 %
LYMPHOCYTES # BLD AUTO: 2.36 X10(3)/MCL (ref 0.6–4.6)
LYMPHOCYTES NFR BLD AUTO: 33.4 %
MCH RBC QN AUTO: 29.6 PG (ref 27–31)
MCHC RBC AUTO-ENTMCNC: 33.4 G/DL (ref 33–36)
MCV RBC AUTO: 88.6 FL (ref 80–94)
MONOCYTES # BLD AUTO: 0.72 X10(3)/MCL (ref 0.1–1.3)
MONOCYTES NFR BLD AUTO: 10.2 %
NEUTROPHILS # BLD AUTO: 3.89 X10(3)/MCL (ref 2.1–9.2)
NEUTROPHILS NFR BLD AUTO: 55 %
NRBC BLD AUTO-RTO: 0 %
PLATELET # BLD AUTO: 197 X10(3)/MCL (ref 130–400)
PMV BLD AUTO: 10.4 FL (ref 7.4–10.4)
POCT GLUCOSE: 102 MG/DL (ref 70–110)
POCT GLUCOSE: 157 MG/DL (ref 70–110)
POCT GLUCOSE: 42 MG/DL (ref 70–110)
POCT GLUCOSE: 47 MG/DL (ref 70–110)
POTASSIUM SERPL-SCNC: 3.2 MMOL/L (ref 3.5–5.1)
PROT SERPL-MCNC: 4.7 GM/DL (ref 6.4–8.3)
RBC # BLD AUTO: 3.68 X10(6)/MCL (ref 4.7–6.1)
SODIUM SERPL-SCNC: 137 MMOL/L (ref 136–145)
WBC # BLD AUTO: 7.07 X10(3)/MCL (ref 4.5–11.5)

## 2025-01-28 PROCEDURE — 36415 COLL VENOUS BLD VENIPUNCTURE: CPT | Performed by: STUDENT IN AN ORGANIZED HEALTH CARE EDUCATION/TRAINING PROGRAM

## 2025-01-28 PROCEDURE — 96372 THER/PROPH/DIAG INJ SC/IM: CPT | Performed by: STUDENT IN AN ORGANIZED HEALTH CARE EDUCATION/TRAINING PROGRAM

## 2025-01-28 PROCEDURE — 0W9G3ZZ DRAINAGE OF PERITONEAL CAVITY, PERCUTANEOUS APPROACH: ICD-10-PCS | Performed by: RADIOLOGY

## 2025-01-28 PROCEDURE — 25000003 PHARM REV CODE 250: Performed by: STUDENT IN AN ORGANIZED HEALTH CARE EDUCATION/TRAINING PROGRAM

## 2025-01-28 PROCEDURE — 27000207 HC ISOLATION

## 2025-01-28 PROCEDURE — 63600175 PHARM REV CODE 636 W HCPCS: Performed by: STUDENT IN AN ORGANIZED HEALTH CARE EDUCATION/TRAINING PROGRAM

## 2025-01-28 PROCEDURE — 85025 COMPLETE CBC W/AUTO DIFF WBC: CPT | Performed by: STUDENT IN AN ORGANIZED HEALTH CARE EDUCATION/TRAINING PROGRAM

## 2025-01-28 PROCEDURE — 11000001 HC ACUTE MED/SURG PRIVATE ROOM

## 2025-01-28 PROCEDURE — 80053 COMPREHEN METABOLIC PANEL: CPT | Performed by: STUDENT IN AN ORGANIZED HEALTH CARE EDUCATION/TRAINING PROGRAM

## 2025-01-28 PROCEDURE — 99900035 HC TECH TIME PER 15 MIN (STAT)

## 2025-01-28 PROCEDURE — 94760 N-INVAS EAR/PLS OXIMETRY 1: CPT

## 2025-01-28 PROCEDURE — 63600175 PHARM REV CODE 636 W HCPCS: Performed by: RADIOLOGY

## 2025-01-28 PROCEDURE — P9047 ALBUMIN (HUMAN), 25%, 50ML: HCPCS | Performed by: RADIOLOGY

## 2025-01-28 PROCEDURE — 99900031 HC PATIENT EDUCATION (STAT)

## 2025-01-28 PROCEDURE — 27000221 HC OXYGEN, UP TO 24 HOURS

## 2025-01-28 PROCEDURE — 94799 UNLISTED PULMONARY SVC/PX: CPT

## 2025-01-28 RX ORDER — ALBUMIN HUMAN 250 G/1000ML
50 SOLUTION INTRAVENOUS ONCE
Status: COMPLETED | OUTPATIENT
Start: 2025-01-28 | End: 2025-01-28

## 2025-01-28 RX ORDER — IBUPROFEN 400 MG/1
400 TABLET ORAL EVERY 8 HOURS
Status: DISCONTINUED | OUTPATIENT
Start: 2025-01-28 | End: 2025-01-29 | Stop reason: HOSPADM

## 2025-01-28 RX ORDER — POTASSIUM CHLORIDE 20 MEQ/1
40 TABLET, EXTENDED RELEASE ORAL 2 TIMES DAILY
Status: COMPLETED | OUTPATIENT
Start: 2025-01-28 | End: 2025-01-28

## 2025-01-28 RX ORDER — SODIUM CHLORIDE 9 MG/ML
INJECTION, SOLUTION INTRAVENOUS CONTINUOUS
Status: DISCONTINUED | OUTPATIENT
Start: 2025-01-29 | End: 2025-01-29

## 2025-01-28 RX ORDER — ALBUMIN HUMAN 250 G/1000ML
25 SOLUTION INTRAVENOUS ONCE
Status: COMPLETED | OUTPATIENT
Start: 2025-01-29 | End: 2025-01-29

## 2025-01-28 RX ADMIN — POTASSIUM CHLORIDE 40 MEQ: 1500 TABLET, EXTENDED RELEASE ORAL at 08:01

## 2025-01-28 RX ADMIN — DICYCLOMINE HYDROCHLORIDE 10 MG: 10 CAPSULE ORAL at 12:01

## 2025-01-28 RX ADMIN — ALBUMIN (HUMAN) 50 G: 12.5 SOLUTION INTRAVENOUS at 03:01

## 2025-01-28 RX ADMIN — Medication 400 MG: at 08:01

## 2025-01-28 RX ADMIN — PREGABALIN 100 MG: 50 CAPSULE ORAL at 08:01

## 2025-01-28 RX ADMIN — FUROSEMIDE 40 MG: 40 TABLET ORAL at 08:01

## 2025-01-28 RX ADMIN — POTASSIUM CHLORIDE 40 MEQ: 1500 TABLET, EXTENDED RELEASE ORAL at 10:01

## 2025-01-28 RX ADMIN — IBUPROFEN 400 MG: 400 TABLET, FILM COATED ORAL at 06:01

## 2025-01-28 RX ADMIN — PREGABALIN 100 MG: 50 CAPSULE ORAL at 10:01

## 2025-01-28 RX ADMIN — DICYCLOMINE HYDROCHLORIDE 10 MG: 10 CAPSULE ORAL at 10:01

## 2025-01-28 RX ADMIN — OLANZAPINE 5 MG: 5 TABLET, FILM COATED ORAL at 10:01

## 2025-01-28 RX ADMIN — ENOXAPARIN SODIUM 70 MG: 80 INJECTION SUBCUTANEOUS at 08:01

## 2025-01-28 RX ADMIN — POLYETHYLENE GLYCOL 3350 17 G: 17 POWDER, FOR SOLUTION ORAL at 08:01

## 2025-01-28 RX ADMIN — ENOXAPARIN SODIUM 70 MG: 80 INJECTION SUBCUTANEOUS at 10:01

## 2025-01-28 RX ADMIN — DICYCLOMINE HYDROCHLORIDE 10 MG: 10 CAPSULE ORAL at 07:01

## 2025-01-28 RX ADMIN — TAMSULOSIN HYDROCHLORIDE 0.4 MG: 0.4 CAPSULE ORAL at 08:01

## 2025-01-28 RX ADMIN — MORPHINE SULFATE 15 MG: 15 TABLET, EXTENDED RELEASE ORAL at 08:01

## 2025-01-28 RX ADMIN — MORPHINE SULFATE 15 MG: 15 TABLET, EXTENDED RELEASE ORAL at 10:01

## 2025-01-28 RX ADMIN — THERA TABS 1 TABLET: TAB at 08:01

## 2025-01-28 RX ADMIN — ACETAMINOPHEN 650 MG: 325 TABLET ORAL at 03:01

## 2025-01-28 RX ADMIN — PANTOPRAZOLE SODIUM 40 MG: 40 TABLET, DELAYED RELEASE ORAL at 07:01

## 2025-01-28 RX ADMIN — ACETAMINOPHEN 650 MG: 325 TABLET, FILM COATED ORAL at 07:01

## 2025-01-28 RX ADMIN — DICYCLOMINE HYDROCHLORIDE 10 MG: 10 CAPSULE ORAL at 06:01
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
## 2025-01-28 NOTE — OP NOTE
Radiology Post-Procedure Note    Pre Op Diagnosis: Ascites    Post Op Diagnosis: Same    Secondary Diagnoses:   Problem List Items Addressed This Visit       Pancreatic cancer    * (Principal) Ascites     Other Visit Diagnoses       Shortness of breath        Relevant Orders    EKG 12-lead (Completed)    X-Ray Chest AP Portable (Completed)    RSV bronchitis        Hypoxia        Chest pain        Relevant Orders    EKG 12-lead             Procedure: US Guided Paracentesis    Procedure performed by: Jerome Avalos MD    Assistant: None    Written Informed Consent Obtained: Yes    Specimen Removed: None    Estimated Blood Loss: <10 cc    Condition: Stable    Outcome: The patient tolerated the procedure well and was without complications.    For further details please see the imaging report associated.    Disposition: Transfer back to inpatient unit.

## 2025-01-28 NOTE — INTERVAL H&P NOTE
The patient has been examined and the H&P has been reviewed:    I concur with the findings and no changes have occurred since H&P was written. Warren P Lejeune is a 60 y.o. male with Ascites & pancreatic cancer who presents for paracentesis.           Active Hospital Problems    Diagnosis  POA    *Ascites [R18.8]  Yes    Anemia [D64.9]  Yes      Resolved Hospital Problems   No resolved problems to display.
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
## 2025-01-28 NOTE — NURSING
Critical glucose of 47 called to the floor by lab; rechecked PCOT which resulted 42; pt asymptomatic, stated that he does not feel different; informed pt of the glucose protocol; pt refused stating that he does not like the IV glucose bc it makes him feel funny but will take some OJ with sugar instead; fixed pt OJ x 2 with 8 packs of sugar; rechecked glucose after consumption of juice and sugar, glucose 47; still refused IV glucose and continues to be asymptomatic; pt eating candy and agreed to have PCOT rechecked in approx 30 -40 mins; wife at bedside and agrees with patient; will continue POC

## 2025-01-29 VITALS
DIASTOLIC BLOOD PRESSURE: 54 MMHG | OXYGEN SATURATION: 92 % | TEMPERATURE: 98 F | BODY MASS INDEX: 23.8 KG/M2 | SYSTOLIC BLOOD PRESSURE: 86 MMHG | HEIGHT: 69 IN | WEIGHT: 160.69 LBS | HEART RATE: 46 BPM | RESPIRATION RATE: 18 BRPM

## 2025-01-29 LAB
ANION GAP SERPL CALC-SCNC: 5 MEQ/L
BASOPHILS # BLD AUTO: 0.01 X10(3)/MCL
BASOPHILS # BLD AUTO: 0.01 X10(3)/MCL
BASOPHILS NFR BLD AUTO: 0.4 %
BASOPHILS NFR BLD AUTO: 0.5 %
BUN SERPL-MCNC: 10.8 MG/DL (ref 8.4–25.7)
CA-I BLD-SCNC: 1.08 MMOL/L (ref 1.12–1.32)
CALCIUM SERPL-MCNC: 7.2 MG/DL (ref 8.4–10.2)
CHLORIDE SERPL-SCNC: 106 MMOL/L (ref 98–107)
CO2 SERPL-SCNC: 28 MMOL/L (ref 22–29)
CREAT SERPL-MCNC: 0.75 MG/DL (ref 0.72–1.25)
CREAT/UREA NIT SERPL: 14
EOSINOPHIL # BLD AUTO: 0.02 X10(3)/MCL (ref 0–0.9)
EOSINOPHIL # BLD AUTO: 0.02 X10(3)/MCL (ref 0–0.9)
EOSINOPHIL NFR BLD AUTO: 0.8 %
EOSINOPHIL NFR BLD AUTO: 1 %
ERYTHROCYTE [DISTWIDTH] IN BLOOD BY AUTOMATED COUNT: 13.8 % (ref 11.5–17)
ERYTHROCYTE [DISTWIDTH] IN BLOOD BY AUTOMATED COUNT: 13.9 % (ref 11.5–17)
GFR SERPLBLD CREATININE-BSD FMLA CKD-EPI: >60 ML/MIN/1.73/M2
GLUCOSE SERPL-MCNC: 80 MG/DL (ref 74–100)
HCT VFR BLD AUTO: 26.2 % (ref 42–52)
HCT VFR BLD AUTO: 28.8 % (ref 42–52)
HGB BLD-MCNC: 8.4 G/DL (ref 14–18)
HGB BLD-MCNC: 9.3 G/DL (ref 14–18)
IMM GRANULOCYTES # BLD AUTO: 0 X10(3)/MCL (ref 0–0.04)
IMM GRANULOCYTES # BLD AUTO: 0.01 X10(3)/MCL (ref 0–0.04)
IMM GRANULOCYTES NFR BLD AUTO: 0 %
IMM GRANULOCYTES NFR BLD AUTO: 0.4 %
LYMPHOCYTES # BLD AUTO: 0.47 X10(3)/MCL (ref 0.6–4.6)
LYMPHOCYTES # BLD AUTO: 0.5 X10(3)/MCL (ref 0.6–4.6)
LYMPHOCYTES NFR BLD AUTO: 19.7 %
LYMPHOCYTES NFR BLD AUTO: 23.9 %
MCH RBC QN AUTO: 29.2 PG (ref 27–31)
MCH RBC QN AUTO: 29.5 PG (ref 27–31)
MCHC RBC AUTO-ENTMCNC: 32.1 G/DL (ref 33–36)
MCHC RBC AUTO-ENTMCNC: 32.3 G/DL (ref 33–36)
MCV RBC AUTO: 91 FL (ref 80–94)
MCV RBC AUTO: 91.4 FL (ref 80–94)
MONOCYTES # BLD AUTO: 0.21 X10(3)/MCL (ref 0.1–1.3)
MONOCYTES # BLD AUTO: 0.29 X10(3)/MCL (ref 0.1–1.3)
MONOCYTES NFR BLD AUTO: 10.7 %
MONOCYTES NFR BLD AUTO: 11.4 %
NEUTROPHILS # BLD AUTO: 1.26 X10(3)/MCL (ref 2.1–9.2)
NEUTROPHILS # BLD AUTO: 1.71 X10(3)/MCL (ref 2.1–9.2)
NEUTROPHILS NFR BLD AUTO: 63.9 %
NEUTROPHILS NFR BLD AUTO: 67.3 %
NRBC BLD AUTO-RTO: 0 %
NRBC BLD AUTO-RTO: 0 %
PLATELET # BLD AUTO: 101 X10(3)/MCL (ref 130–400)
PLATELET # BLD AUTO: 98 X10(3)/MCL (ref 130–400)
PLATELETS.RETICULATED NFR BLD AUTO: 2.9 % (ref 0.9–11.2)
PLATELETS.RETICULATED NFR BLD AUTO: 3.4 % (ref 0.9–11.2)
PMV BLD AUTO: 10.2 FL (ref 7.4–10.4)
PMV BLD AUTO: 10.6 FL (ref 7.4–10.4)
POCT GLUCOSE: 129 MG/DL (ref 70–110)
POCT GLUCOSE: 136 MG/DL (ref 70–110)
POCT GLUCOSE: 88 MG/DL (ref 70–110)
POTASSIUM SERPL-SCNC: 3.7 MMOL/L (ref 3.5–5.1)
RBC # BLD AUTO: 2.88 X10(6)/MCL (ref 4.7–6.1)
RBC # BLD AUTO: 3.15 X10(6)/MCL (ref 4.7–6.1)
SODIUM SERPL-SCNC: 139 MMOL/L (ref 136–145)
WBC # BLD AUTO: 2.01 X10(3)/MCL (ref 4.5–11.5)
WBC # BLD AUTO: 2.54 X10(3)/MCL (ref 4.5–11.5)

## 2025-01-29 PROCEDURE — 85025 COMPLETE CBC W/AUTO DIFF WBC: CPT | Performed by: STUDENT IN AN ORGANIZED HEALTH CARE EDUCATION/TRAINING PROGRAM

## 2025-01-29 PROCEDURE — 99900035 HC TECH TIME PER 15 MIN (STAT)

## 2025-01-29 PROCEDURE — 25000003 PHARM REV CODE 250: Performed by: STUDENT IN AN ORGANIZED HEALTH CARE EDUCATION/TRAINING PROGRAM

## 2025-01-29 PROCEDURE — 94799 UNLISTED PULMONARY SVC/PX: CPT

## 2025-01-29 PROCEDURE — 36415 COLL VENOUS BLD VENIPUNCTURE: CPT | Performed by: STUDENT IN AN ORGANIZED HEALTH CARE EDUCATION/TRAINING PROGRAM

## 2025-01-29 PROCEDURE — 63600175 PHARM REV CODE 636 W HCPCS: Performed by: STUDENT IN AN ORGANIZED HEALTH CARE EDUCATION/TRAINING PROGRAM

## 2025-01-29 PROCEDURE — 80048 BASIC METABOLIC PNL TOTAL CA: CPT | Performed by: STUDENT IN AN ORGANIZED HEALTH CARE EDUCATION/TRAINING PROGRAM

## 2025-01-29 PROCEDURE — 82803 BLOOD GASES ANY COMBINATION: CPT

## 2025-01-29 PROCEDURE — P9047 ALBUMIN (HUMAN), 25%, 50ML: HCPCS | Performed by: STUDENT IN AN ORGANIZED HEALTH CARE EDUCATION/TRAINING PROGRAM

## 2025-01-29 RX ORDER — CALCIUM GLUCONATE 20 MG/ML
1 INJECTION, SOLUTION INTRAVENOUS ONCE
Status: COMPLETED | OUTPATIENT
Start: 2025-01-29 | End: 2025-01-29

## 2025-01-29 RX ORDER — POTASSIUM CHLORIDE 20 MEQ/1
40 TABLET, EXTENDED RELEASE ORAL ONCE
Status: COMPLETED | OUTPATIENT
Start: 2025-01-29 | End: 2025-01-29

## 2025-01-29 RX ORDER — CALCIUM CARBONATE 200(500)MG
500 TABLET,CHEWABLE ORAL 2 TIMES DAILY
Status: DISCONTINUED | OUTPATIENT
Start: 2025-01-29 | End: 2025-01-29

## 2025-01-29 RX ORDER — MIDODRINE HYDROCHLORIDE 5 MG/1
5 TABLET ORAL
Status: DISCONTINUED | OUTPATIENT
Start: 2025-01-29 | End: 2025-01-29 | Stop reason: HOSPADM

## 2025-01-29 RX ORDER — MUPIROCIN 20 MG/G
OINTMENT TOPICAL 2 TIMES DAILY
Status: DISCONTINUED | OUTPATIENT
Start: 2025-01-29 | End: 2025-01-29 | Stop reason: HOSPADM

## 2025-01-29 RX ORDER — POTASSIUM CHLORIDE 20 MEQ/1
40 TABLET, EXTENDED RELEASE ORAL ONCE
Status: DISCONTINUED | OUTPATIENT
Start: 2025-01-29 | End: 2025-01-29

## 2025-01-29 RX ORDER — CALCIUM CARBONATE 200(500)MG
1000 TABLET,CHEWABLE ORAL 2 TIMES DAILY
Status: DISCONTINUED | OUTPATIENT
Start: 2025-01-29 | End: 2025-01-29 | Stop reason: HOSPADM

## 2025-01-29 RX ORDER — MIDODRINE HYDROCHLORIDE 5 MG/1
5 TABLET ORAL
Qty: 21 TABLET | Refills: 0 | Status: SHIPPED | OUTPATIENT
Start: 2025-01-29 | End: 2025-02-05

## 2025-01-29 RX ADMIN — Medication 400 MG: at 09:01

## 2025-01-29 RX ADMIN — THERA TABS 1 TABLET: TAB at 09:01

## 2025-01-29 RX ADMIN — POLYETHYLENE GLYCOL 3350 17 G: 17 POWDER, FOR SOLUTION ORAL at 09:01

## 2025-01-29 RX ADMIN — SODIUM CHLORIDE: 9 INJECTION, SOLUTION INTRAVENOUS at 11:01

## 2025-01-29 RX ADMIN — MORPHINE SULFATE 15 MG: 15 TABLET, EXTENDED RELEASE ORAL at 09:01

## 2025-01-29 RX ADMIN — ENOXAPARIN SODIUM 70 MG: 80 INJECTION SUBCUTANEOUS at 09:01

## 2025-01-29 RX ADMIN — TAMSULOSIN HYDROCHLORIDE 0.4 MG: 0.4 CAPSULE ORAL at 09:01

## 2025-01-29 RX ADMIN — DICYCLOMINE HYDROCHLORIDE 10 MG: 10 CAPSULE ORAL at 11:01

## 2025-01-29 RX ADMIN — CALCIUM GLUCONATE 1 G: 20 INJECTION, SOLUTION INTRAVENOUS at 02:01

## 2025-01-29 RX ADMIN — DICYCLOMINE HYDROCHLORIDE 10 MG: 10 CAPSULE ORAL at 06:01

## 2025-01-29 RX ADMIN — MUPIROCIN: 20 OINTMENT TOPICAL at 06:01

## 2025-01-29 RX ADMIN — SODIUM CHLORIDE 500 ML: 9 INJECTION, SOLUTION INTRAVENOUS at 12:01

## 2025-01-29 RX ADMIN — POTASSIUM CHLORIDE 40 MEQ: 1500 TABLET, EXTENDED RELEASE ORAL at 09:01

## 2025-01-29 RX ADMIN — PREGABALIN 100 MG: 50 CAPSULE ORAL at 09:01

## 2025-01-29 RX ADMIN — MIDODRINE HYDROCHLORIDE 5 MG: 5 TABLET ORAL at 09:01

## 2025-01-29 RX ADMIN — IBUPROFEN 400 MG: 400 TABLET, FILM COATED ORAL at 06:01

## 2025-01-29 RX ADMIN — ALBUMIN (HUMAN) 25 G: 12.5 SOLUTION INTRAVENOUS at 12:01

## 2025-01-29 RX ADMIN — IBUPROFEN 400 MG: 400 TABLET, FILM COATED ORAL at 02:01

## 2025-01-29 RX ADMIN — FUROSEMIDE 40 MG: 40 TABLET ORAL at 09:01

## 2025-01-29 RX ADMIN — PANTOPRAZOLE SODIUM 40 MG: 40 TABLET, DELAYED RELEASE ORAL at 06:01

## 2025-01-29 RX ADMIN — MUPIROCIN: 20 OINTMENT TOPICAL at 09:01

## 2025-01-29 RX ADMIN — ANTACID TABLETS 1000 MG: 500 TABLET, CHEWABLE ORAL at 02:01

## 2025-01-29 RX ADMIN — IBUPROFEN 400 MG: 400 TABLET, FILM COATED ORAL at 12:01

## 2025-01-29 RX ADMIN — SODIUM CHLORIDE: 9 INJECTION, SOLUTION INTRAVENOUS at 01:01

## 2025-01-29 RX ADMIN — MIDODRINE HYDROCHLORIDE 5 MG: 5 TABLET ORAL at 11:01
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
## 2025-01-30 ENCOUNTER — PATIENT OUTREACH (OUTPATIENT)
Dept: ADMINISTRATIVE | Facility: CLINIC | Age: 60
End: 2025-01-30
Payer: MEDICARE

## 2025-01-30 NOTE — PROGRESS NOTES
TCC call not required; pt not applicable as patient is receiving chemotherapy (per MD's progress note: Gemcitabine and Cisplatin every 2 weeks)

## 2025-02-01 DIAGNOSIS — K83.8 PNEUMOBILIA: ICD-10-CM

## 2025-02-01 DIAGNOSIS — K63.9 INTESTINAL DISORDER: ICD-10-CM

## 2025-02-03 ENCOUNTER — HOSPITAL ENCOUNTER (OUTPATIENT)
Dept: INTERVENTIONAL RADIOLOGY/VASCULAR | Facility: HOSPITAL | Age: 60
Discharge: HOME | End: 2025-02-03
Attending: INTERNAL MEDICINE
Payer: MEDICARE

## 2025-02-03 DIAGNOSIS — C25.9 ADENOCARCINOMA OF PANCREAS: ICD-10-CM

## 2025-02-03 DIAGNOSIS — R97.8 ELEVATED CA 19-9 LEVEL: ICD-10-CM

## 2025-02-03 DIAGNOSIS — R19.00 ABDOMINAL SWELLING: ICD-10-CM

## 2025-02-03 PROCEDURE — 49083 ABD PARACENTESIS W/IMAGING: CPT

## 2025-02-03 RX ORDER — DICYCLOMINE HYDROCHLORIDE 10 MG/1
CAPSULE ORAL
Qty: 120 CAPSULE | Refills: 0 | Status: SHIPPED | OUTPATIENT
Start: 2025-02-03 | End: 2025-03-03

## 2025-02-03 NOTE — INTERVAL H&P NOTE
The patient has been examined and the H&P has been reviewed:    I concur with the findings and no changes have occurred since H&P was written. Warren P Lejeune is a 60 y.o. male with Pancreatic cancer and ascites who presents for paracentesis.           There are no hospital problems to display for this patient.

## 2025-02-03 NOTE — DISCHARGE SUMMARY
Radiology Post-Procedure Note    Pre Op Diagnosis: Ascites    Post Op Diagnosis: Same    Secondary Diagnoses:   Problem List Items Addressed This Visit    None  Visit Diagnoses       Adenocarcinoma of pancreas        Relevant Orders    IR Paracentesis with Imaging    Abdominal swelling        Relevant Orders    IR Paracentesis with Imaging    Elevated CA 19-9 level        Relevant Orders    IR Paracentesis with Imaging             Procedure: US Guided Paracentesis    Procedure performed by: Jerome Avalos MD    Assistant: None    Written Informed Consent Obtained: Yes    Specimen Removed: None    Estimated Blood Loss: <10 cc    Condition: Stable    Outcome: The patient tolerated the procedure well and was without complications.    For further details please see the imaging report associated.    Disposition: Home or Self Care    Follow Up: With primary care provider    Discharge Instructions:  No discharge procedures on file.       Time Spent On Discharge: 2 minutes

## 2025-02-03 NOTE — H&P (VIEW-ONLY)
HEMATOLOGY/ONCOLOGY OFFICE CLINIC VISIT    Visit Information:    Initial Evaluation: 9/3/2024  Referring Provider: Alba Chisholm MD PhD ([facility]) -Cell [phone]  Other providers:  Code status: Not addressed    Diagnosis:  Advanced pancreatic ductal adenocarcinoma (Dx 8/2022)     Present treatment:  Gemzar monotherapy 8/28/2024  -Gemcitabine and CISplatin Every 2 Weeks (8/28/2024-present)--planned  Chemotherapy summary: CISplatin (PLATINOL) 53 mg in sodium chloride 0.9% (NS) 250 mL IVPB, intravenous, 0 of 12 cycles  gemcitabine (GEMZAR) 836 mg in sodium chloride 0.9% (NS) 250 mL IVPB, intravenous, 0 of 12 cycles   - Eliquis 10 mg BID x 7 days then 5 mg BID- started 10/29/24--present  - weekly therapeutic paracentesis PRN    Treatment/Oncology history:  -8/2/2022 ERCP with metal biliary stent placement   -Folfirinox (10/2022- 2/2023)  -capecitabine RT (4/3/23 - 5/16/23)   -Phase I study 2021-1176 SD-101 at [facility]--SD-101 2 mg in sodium chloride 0.9% (NS) 30 mL IVPB, intravenous x 2 cycles (8/22/2023-11/22/2023)  -Gemcitabine and paclitaxel protein bound (12/13/2023-- 7/2024)-->clinical progression and GOO  -s/p biliary stent placement 4/4/2024  -chemotherapy with gemcitabine/paclitaxel (last dose 6/26/2024)  -duodenal stent (placed by GI)- 8/7/2024  -Gemcitabine and CISplatin Every 2 Weeks (8/28/2024-present)      Goal of care: life prolongation    Imaging:  CT CAP 8/21/2022: Since 7/25/2022, the primary pancreatic head mass encasing the common hepatic artery remains stable. The intrahepatic biliary obstruction improved with interval placement of a CBD stent. No definite distant metastasis in the abdomen or pelvis. Small indeterminate left lower lobe pulmonary nodule can be followed.  CT CAP 12/12/2022:  Primary pancreatic head tumor encasing the common hepatic artery is overall unchanged in size since 8/21/2022. Stable upstream pancreatic duct dilation and pancreatic atrophy. Interval placement of a cholecystostomy  tube, with decompression of the gallbladder. No definite evidence of distant metastatic disease in the chest, abdomen, and pelvis.   CT CAP 3/7/2023: Locally advanced pancreatic head tumor is slightly less conspicuous. Stable portacaval lymphadenopathy. Geographic hypodense regions have significantly increased throughout the liver compatible with fatty liver, to be correlated clinically regarding any potential concomitant hepatotoxicity; this appearance could obscure small low contrast liver lesions, to be better evaluated with MRI, if indicated. Unchanged mild jennifer appearance of the omentum is favored to be postinflammatory. No definite distant metastatic disease within the chest abdomen pelvis.   Ct CAP 6/29/2023:1.  Locally advanced pancreatic head mass not significantly changed. 2.  Increased periportal haziness and central liver heterogeneity which may relate to radiation.   3.  Indeterminate inferior right hepatic hypodensity as described above. Punctate left hepatic hypodensity also not previously evident, too small to characterize. Consider further evaluation with MRI as indicated.    MRI AP 7/15/2023:Primary neoplasm is noted within the pancreatic head and causes pancreatic ductal dilation and biliary ductal obstruction, which is decompressed via a stent. Nonspecific focal dilation of segment IV bile duct is noted.    CT CAP 10/2/2023: 1.  Compared to 07/28/2023, interval embolization of SMV tributaries in the epigastric region with new embolization coils in the right hepatic lobe following transhepatic catheterization. 2.  Previously noted small indeterminate low-attenuation lesion in the inferior right hepatic lobe segment 6 is no longer visualized. No new hepatic lesions.    3.  Stable 2.2 cm ill-defined residual pancreatic head tumor with no change in the vascular contact with the main portal vein, common hepatic and proper hepatic artery. 4.  No new metastatic disease in the chest or abdomen.   CT CAP  1/9/2024: 1.  Mild interval increase in size of the primary tumor in the head of the pancreas. 2.  No evidence of metastatic disease in the chest, abdomen or pelvis.   CT AP 4/3/2024: 1.  There is worsening intrahepatic biliary dilation despite the presence of a biliary stent, suggesting stent occlusion. 2.  No significant change in the locally advanced tumor of the head of the pancreas since 03/12/2024.   CT CAP 6/21/2024: 1. No convincing change in the large locally advanced infiltrating ill-defined mass in the head and neck of the pancreas compared with 5/14/2024. 2. No specific evidence of distant metastatic disease in the chest, abdomen or pelvis.   XR ABDOMEN 1 VW PORTABLE 8/6/2024:  There is a gastric drainage tube with the tip below the diaphragm projecting over the gastric body with the sidehole below the GE junction in appropriate position. Gastric drainage tube with tip and sidehole projecting below the diaphragm over the gastric body.  X-ray Abdomen AP  8/5/2024: Air is seen scattered throughout normal caliber colon in a nonobstructive pattern. No dilated loops of bowel. Moderate colonic stool burden. No intraperitoneal free air within the limitations of this study. A biliary stent and embolization coils overlie the right upper quadrant. No suspicious osseous lesions.   Nonobstructive bowel gas pattern. I personally reviewed these image(s) along with the resident's/fellow's interpretations, certify that if a procedure was performed I was physically present, and agree with the final report.  CT Abdomen Pelvis with Contrast 8/5/2024: No suspicious pulmonary nodules in the lung bases. Hepatobiliary: Evaluation of the liver is somewhat limited secondary to streak artifact from the embolization coils. Within these limits there is no suspicious hepatic lesion. The gallbladder is decompressed and poorly evaluated. Common bile duct stent with expected pneumobilia. Mild intrahepatic biliary ductal dilatation is  not significantly changed. Spleen: No splenomegaly. Pancreas: Ill-defined infiltrating mass of the pancreatic head and neck, in keeping with pancreatic adenocarcinoma. There is atrophy and ductal dilatation of the pancreatic body and tail. Overall this tumor appears larger Adrenal Glands: No mass. Kidneys, Ureters, Bladder: No hydronephrosis. No suspicious renal lesion. No bladder mass. Gastrointestinal Tract: The stomach is distended with fluid and debris, suggesting there may be a degree of gastric outlet obstruction secondary to the pancreatic tumor. This is not significantly changed. There is no downstream obstruction. Pelvic Organs: No pelvic mass. Prostatomegaly. Peritoneum/Retroperitoneum: No ascites. Lymph Nodes: No lymphadenopathy. Musculoskeletal: No suspicious skeletal lesion.     No acute abdominopelvic abnormality. Attending addendum: The pancreatic tumor appears larger compared to 06/21/2024. There is gastric distention with debris suggesting chronic outlet obstruction ACTIONABLE ITEMS/RECOMMENDATIONS*: None. *An Actionable Finding is a finding that may be unrelated to the original reason for imaging but potentially actionable, meaning further investigation may be necessary. The Actionable Findings Vigilance Unit (AFVU) assists medical providers with responding to additional radiologic findings that are unexpected and potentially actionable. I personally reviewed these image(s) along with the resident's/fellow's interpretations, certify that if a procedure was performed I was physically present, and agree with the final report.   CT CAP 10/24/24:  1. Ill-defined soft tissue fullness at the a hay hepatis and pancreatic head and uncinate process fabella bases history of pancreatic adenocarcinoma.  A few blebs of gas are identified which may represent gas within the common bile duct.  2. Intrahepatic biliary ductal dilatation identified with nonvisualization of the gallbladder  3. Beam hardening  artifact due to embolization coils at the liver  4. Small 10 x 6 filling defect at the portal vein suggesting a small thrombus  5. Diffuse hazy mucosal prominence at the descending and ascending colon.  This may be merely due to underdistention.  A underlying colitis cannot be excluded  6. Findings of constipation with contrast and fluid distended loops of small bowel diffusely throughout suggesting a mild ileus  7. Prostatomegaly with calcifications  8. Findings and other details as above  9. Comparison to previous exam would allow further evaluation.  MRI Brain 11/4/24:  1. Tiny (subcentimeter), occasional focal areas of increased signal intensity (on the FLAIR sequences) are noted within the deep white matter and gray-white matter junctions of both cerebral hemispheres (these are only visualized on the FLAIR sequence with no contrast enhancement noted on postcontrast images). I suspect the changes represent chronic ischemic changes, however, follow-up imaging in 3-6 months to ensure stability may be helpful in excluding the very remote possibility of tiny metastases if felt be clinically indicated.     Pathology:  8/2/2022:  FNA head pancreas: SCANT MALIGNANT CELLS, FAVOR ADENOCARCINOMA  NGS:  No detectable genomic aberrations      CLINICAL HISTORY:       Patient: Warren P Lejeune is a 60 y.o. male.with a past medical history of past medical history of pancreatic ductal adenocarcinoma (Dx 8/2022)   Patient initially presented with  unintentional weight loss starting in 1/2022. He also had worsening back/shoulder and abdominal pain during this time. In July he finally presented to his PCP with darkening of the urine and lightening of the stool - with Jaundice that his wife noticed. Labs demonstrating cholestasis (bilirubin of 19).    Patient noted to have an abnormal CT scan after developing obstructive jaundice. 2 cm hypoenhancing mass noted in the head of the pancreas with intra and extrahepatic biliary ductal  "dilatation. Mass measured 2.5 x 2.1 cm. There is also atrophy of the distal gland and upstream dilatation of the pancreas duct. There is no obvious vascular invasion. There were no focal liver lesions.    8/2/22- EGD/EUS/FNA. Final pathology showed ductal adenocarcinoma, moderately differentiated. ERCP on the same date performed, with placement of a fully covered metal biliary stent, 60 mm x 10 mm.    7/27/22- CA 19-9 was 681    8/21/22- CT CAP: Since 7/25/2022, the primary pancreatic head mass encasing the common hepatic artery remains stable. The intrahepatic biliary obstruction improved with interval placement of a CBD stent. No definite distant metastasis in the abdomen or pelvis. Small indeterminate left lower lobe pulmonary nodule can be followed.    09/03/2022 Germline genetic testing: Negative for germline pathogenic variants in any of the analyzed genes, Genes Analyzed: APC, DALE, BMPR1A, BRCA1, BRCA2, CDKN2A, EPCAM, MEN1, MLH1, MSH2, MSH6, NF1, PALB2, PMS2, SMAD4, STK11, TP53, TSC1, TSC2, and VHL. Genetic testing performed by [employer]; full report available in scanned documents.     s/p biliary stent placement, currently undergoing chemotherapy with gemcitabine/paclitaxel (last dose 6/26/2024), T2DM on insulin presenting for abdominal pain and constipation. CT A/P with distension in stomach suggesting gastric outlet obstruction secondary to pancreatic tumor. GI and Surg Onc consulted, pending evaluation. Poor PO tolerance, deferring NG tube now as vomiting resolves and having bowel movements.     Patient was recently admitted to the hospital at [facility]--Hospital Course:  "Findings on imaging were concerning for malignant gastric outlet obstruction from pancreatic cancer. After a discussion with surgical oncology, GI and GIMO, decision was made to proceed with duodenal stent (placed by GI). No indications for gastrojejunostomy bypass. Patient tolerated procedure well. We were able to advance to low fiber " "diet. Nutrition was consulted for low fiber diet education. Managed neoplasm related pain with PO morphine."     Patient is here today with his wife to continue chemotherapy close to home.  He is doing relatively well and voices no concerns.  MediPort was removed from his left chest wall to exposure of the MediPort.  Patient reports abdominal mid back pain, occasional muscle spasm and dizziness.  No fever, chills, sweats.  No chest pain or short of breath.    Chief Complaint: Adenocarcinoma of pancreas (Pt continues with abd bloating and pain. He feels weak and fatigue today. )      Interval History:  Patient returns to clinic for a follow up, accompanied by his wife. He is not feeling well today. He continues with weakness and fatigue. He reports abdominal pain 8/10 today but did receive paracentesis yesterday. He is receiving weekly diagnostic and therapeutic paracentesis. IR removed 5.2 L of fluid. He continues with swelling in his abdomen and bilateral lower legs, takes Lasix 40 mg daily. He has been wearing compression stockings and elevating his legs regularly. He takes Percocet and MS cont for this. He is taking Miralax and laxative for opioid induced constipation, and Pregabalin 100 mg BID for bilateral hand and feet neuropathy. Labs reviewed with patient in detail.     Hospice: I had a long conversation with the patient regarding end of life issues, palliative, comfort care as well as hospice. Explained that Palliative care services focuses on providing patients with relief from the symptoms. The goal is to improve quality of life for both the patient and the family. Palliative care focuses on symptoms such as pain, shortness of breath, fatigue, constipation, nausea, loss of appetite, difficulty sleeping and depression, etc. Hospice focuses on caring, not curing and in most cases care is provided in the patient's home. This is offer when patient's are not a candidate for aggressive chemotherapy or treatment " or if patient's performance status is not acceptable for treatment. This service goes hand to hand with comfort care and is provided to patients that prognosis is expected to be <  6 months.     Past Medical History:   Diagnosis Date    Abdominal pain     Admission for chemotherapy     last chemo 9/12/24    Anxiety disorder, unspecified     Back pain     Bradycardia     Enlarged prostate     GERD (gastroesophageal reflux disease)     Hypertension     no cardiologist; no longer on meds since weight loss due to pancreatic cancer    Memory loss     Pancreatic cancer     Peripheral neuropathy     Type 2 diabetes mellitus with unspecified diabetic retinopathy without macular edema       Past Surgical History:   Procedure Laterality Date    bile duct stents times 2      EGD, WITH STENT INSERTION      INSERTION OF TUNNELED CENTRAL VENOUS CATHETER (CVC) WITH SUBCUTANEOUS PORT Right 9/20/2024    Procedure: SIRNYURIY-OFFY-Z-CATH;  Surgeon: Moe Champagne MD;  Location: [facility];  Service: General;  Laterality: Right;    MEDIPORT REMOVAL      times 2     Family History   Problem Relation Name Age of Onset    Hypertension Mother      Stroke Father      Stroke Sister      Prostate cancer Brother            Review of patient's allergies indicates:  No Known Allergies   Current Outpatient Medications on File Prior to Visit   Medication Sig Dispense Refill    albuterol (PROVENTIL/VENTOLIN HFA) 90 mcg/actuation inhaler Inhale 1-2 puffs into the lungs every 6 (six) hours as needed for Wheezing. Rescue 18 g 0    aluminum & magnesium hydroxide-simethicone (MYLANTA MAX STRENGTH) 400-400-40 mg/5 mL suspension Take 5 mLs by mouth 4 (four) times daily as needed (mouth sores). 335 mL 3    apixaban (ELIQUIS) 5 mg Tab Take 1 tablet (5 mg total) by mouth 2 (two) times daily. Take 2 tablets by mouth (10 mg) twice a day for 7 days, then 1 tablet (5 mg total) by mouth twice a day thereafter 74 tablet 3    benzonatate (TESSALON) 100 MG capsule  Take 1 capsule (100 mg total) by mouth 3 (three) times daily as needed for Cough. 20 capsule 0    cholecalciferol, vitamin D3, (VITAMIN D3) 50 mcg (2,000 unit) Tab Take 2,000 Units by mouth once daily.      dexAMETHasone (DECADRON) 4 MG Tab TAKE 2 TABLETS BY MOUTH ONCE DAILY AS DIRECTED ON DAYS 2 AND 3 OF CHEMOTHERAPY CYCLE      dicyclomine (BENTYL) 10 MG capsule TAKE 1 CAPSULE BY MOUTH 4 TIMES DAILY BEFORE MEAL(S) AND NIGHTLY 120 capsule 0    diphenhydrAMINE (BENADRYL) 12.5 mg/5 mL elixir Take 5 mLs (12.5 mg total) by mouth 4 (four) times daily as needed (mouth sores). 236 mL 3    furosemide (LASIX) 40 MG tablet Take 1 tablet (40 mg total) by mouth once daily. 30 tablet 1    insulin aspart U-100 (NOVOLOG) 100 unit/mL injection Inject into the skin 3 (three) times daily before meals. prn      LIDOcaine viscous HCl 2% (LIDOCAINE VISCOUS) 2 % Soln by Mucous Membrane route 4 (four) times daily as needed (mouth sores). Swish and spit 15 ml (5 ml benadryl, 5 ml mylanta, 5 ml lidocaine) by mouth 4 times daily as needed for mouth sores 100 mL 3    magnesium oxide (MAGOX) 400 mg (241.3 mg magnesium) tablet Take 1 tablet (400 mg total) by mouth once daily. 90 tablet 2    midodrine (PROAMATINE) 5 MG Tab Take 1 tablet (5 mg total) by mouth 3 (three) times daily with meals. for 7 days 21 tablet 0    morphine (MS CONTIN) 15 MG 12 hr tablet Take 1 tablet (15 mg total) by mouth 2 (two) times daily. 30 tablet 0    multivitamin with folic acid 400 mcg Tab Take 1 tablet by mouth once daily.      ondansetron (ZOFRAN) 8 MG tablet Take 8 mg by mouth.      oxyCODONE-acetaminophen (PERCOCET)  mg per tablet Take 1 tablet by mouth every 4 (four) hours as needed for Pain. 120 tablet 0    pantoprazole (PROTONIX) 40 MG tablet Take 1 tablet (40 mg total) by mouth once daily. 30 tablet 2    polyethylene glycol (GLYCOLAX) 17 gram PwPk Take 17 g by mouth.      pregabalin (LYRICA) 100 MG capsule Take 1 capsule (100 mg total) by mouth 2 (two)  times daily. 60 capsule 6    prochlorperazine (COMPAZINE) 10 MG tablet Take 10 mg by mouth.      tamsulosin (FLOMAX) 0.4 mg Cap Take 1 capsule (0.4 mg total) by mouth once daily. 30 capsule 2    TRESIBA FLEXTOUCH U-200 200 unit/mL (3 mL) insulin pen Inject 14 Units into the skin.      vibegron (GEMTESA) 75 mg Tab Take 75 mg by mouth.      OLANZapine (ZYPREXA) 5 MG tablet Take 1 tablet (5 mg total) by mouth nightly. 4 tablet 6     No current facility-administered medications on file prior to visit.      Review of Systems   Constitutional:  Positive for appetite change and fatigue. Negative for activity change, chills, diaphoresis, fever and unexpected weight change.   HENT:  Positive for trouble swallowing. Negative for nasal congestion, mouth sores, nosebleeds, sinus pressure/congestion and sore throat.    Eyes: Negative.    Respiratory:  Positive for shortness of breath. Negative for cough.    Cardiovascular:  Positive for leg swelling. Negative for chest pain and palpitations.   Gastrointestinal:  Positive for abdominal distention and constipation. Negative for abdominal pain, blood in stool, change in bowel habit, diarrhea, nausea and vomiting.   Endocrine: Negative.    Genitourinary:  Negative for bladder incontinence, decreased urine volume, difficulty urinating, dysuria, frequency, hematuria and urgency.   Musculoskeletal:  Positive for back pain. Negative for arthralgias, gait problem, joint swelling, leg pain and myalgias.   Integumentary:  Negative for rash.   Allergic/Immunologic: Negative.  Negative for frequent infections.   Neurological:  Positive for weakness. Negative for dizziness, tremors, syncope, light-headedness, numbness, headaches and memory loss.   Hematological:  Negative for adenopathy. Does not bruise/bleed easily.   Psychiatric/Behavioral:  Negative for agitation, confusion, hallucinations, sleep disturbance and suicidal ideas. The patient is not nervous/anxious.               Vitals:     "02/04/25 1444   BP: 116/74   BP Location: Right arm   Patient Position: Sitting   Pulse: 74   Resp: 16   Temp: 98.3 °F (36.8 °C)   TempSrc: Oral   SpO2: 100%   Weight: 63.8 kg (140 lb 11.2 oz)   Height: 5' 9.02" (1.753 m)       Wt Readings from Last 6 Encounters:   02/04/25 63.8 kg (140 lb 11.2 oz)   02/04/25 63.8 kg (140 lb 11.2 oz)   01/27/25 72.9 kg (160 lb 11.5 oz)   01/26/25 73.9 kg (163 lb)   01/17/25 69.4 kg (152 lb 14.4 oz)   12/31/24 72.1 kg (159 lb)     Body mass index is 20.77 kg/m².  Body surface area is 1.76 meters squared.  Physical Exam  Vitals and nursing note reviewed.   Constitutional:       General: He is not in acute distress.     Appearance: He is ill-appearing.      Comments: thin   HENT:      Head: Normocephalic and atraumatic.      Mouth/Throat:      Mouth: Mucous membranes are moist.   Eyes:      General: No scleral icterus.     Extraocular Movements: Extraocular movements intact.      Conjunctiva/sclera: Conjunctivae normal.   Neck:      Vascular: No JVD.   Cardiovascular:      Rate and Rhythm: Normal rate and regular rhythm.      Heart sounds: No murmur heard.  Pulmonary:      Effort: Pulmonary effort is normal.      Breath sounds: Normal breath sounds. No wheezing or rhonchi.   Chest:      Comments: Left chest wall scar from previous Mediport  Abdominal:      General: Bowel sounds are decreased. There is distension.      Palpations: There is fluid wave.      Tenderness: There is generalized abdominal tenderness.   Musculoskeletal:         General: No swelling or deformity.      Cervical back: Neck supple.      Right lower leg: Edema present.      Left lower leg: Edema present.   Lymphadenopathy:      Head:      Right side of head: No submandibular adenopathy.      Left side of head: No submandibular adenopathy.      Cervical: No cervical adenopathy.      Upper Body:      Right upper body: No supraclavicular or axillary adenopathy.      Left upper body: No supraclavicular or axillary " adenopathy.      Lower Body: No right inguinal adenopathy. No left inguinal adenopathy.   Skin:     General: Skin is warm.      Coloration: Skin is not jaundiced.      Findings: No rash.   Neurological:      General: No focal deficit present.      Mental Status: He is alert and oriented to person, place, and time.      Cranial Nerves: Cranial nerves 2-12 are intact.   Psychiatric:         Attention and Perception: Attention normal.         Behavior: Behavior is cooperative.       ECOG SCORE    2 - Capable of all selfcare but unable to carry out any work activities, active > 50% of hours         Laboratory:  CBC with Differential:  Lab Results   Component Value Date    WBC 5.22 02/04/2025    RBC 3.86 (L) 02/04/2025    HGB 11.1 (L) 02/04/2025    HCT 33.5 (L) 02/04/2025    MCV 86.8 02/04/2025    MCH 28.8 02/04/2025    MCHC 33.1 02/04/2025    RDW 13.5 02/04/2025     02/04/2025    MPV 10.5 (H) 02/04/2025        CMP:  Sodium   Date Value Ref Range Status   02/04/2025 138 136 - 145 mmol/L Final     Potassium   Date Value Ref Range Status   02/04/2025 4.1 3.5 - 5.1 mmol/L Final     Chloride   Date Value Ref Range Status   02/04/2025 105 98 - 107 mmol/L Final     CO2   Date Value Ref Range Status   02/04/2025 27 22 - 29 mmol/L Final     Blood Urea Nitrogen   Date Value Ref Range Status   02/04/2025 9.0 8.4 - 25.7 mg/dL Final   10/20/2023 12 6 - 23 mg/dL Final     Creatinine   Date Value Ref Range Status   02/04/2025 0.67 (L) 0.72 - 1.25 mg/dL Final   10/20/2023 0.95 0.67 - 1.17 mg/dL Final     Calcium   Date Value Ref Range Status   02/04/2025 8.1 (L) 8.4 - 10.2 mg/dL Final   10/20/2023 8.7 8.4 - 10.2 mg/dL Final     Albumin   Date Value Ref Range Status   02/04/2025 2.4 (L) 3.5 - 5.0 g/dL Final     Bilirubin Total   Date Value Ref Range Status   02/04/2025 0.6 <=1.5 mg/dL Final     ALP   Date Value Ref Range Status   02/04/2025 434 (H) 40 - 150 unit/L Final     AST   Date Value Ref Range Status   02/04/2025 59 (H) 5  - 34 unit/L Final     ALT   Date Value Ref Range Status   02/04/2025 40 0 - 55 unit/L Final      Component 08/28/24 07/17/24 06/26/24 06/21/24 06/12/24 05/29/24   CA 19-9 2,385.0 High  2,150.0 High  1,567.0 High  1,183.0 High  1,000.0 High  959.9 High       Latest Reference Range & Units 09/24/24 08:52 10/23/24 07:57   CA 19-9 0.00 - 37.00 unit/mL 6,202.93 (H) 21,137.60 (H)      Latest Reference Range & Units 11/05/24 13:54 11/19/24 07:47 12/04/24 07:58 12/18/24 07:11 12/26/24 07:57   CA 19-9 0.00 - 37.00 unit/mL 23,324.00 (H) 19,155.71 (H) 31,691.58 (H) 23,541.97 (H) 31,586.90 (H)          Assessment:       1. Malignant neoplasm of head of pancreas    2. Other ascites    3. Elevated alkaline phosphatase level    4. Portal vein thrombosis    5. Chemotherapy-induced peripheral neuropathy    6. Cancer associated pain        1) Locally advanced pancreatic cancer   ---8/2/2022 ERCP with metal biliary stent placement   ---Folfirinox (10/2022- 2/2023)  ---capecitabine RT (4/3/23 - 5/16/23)   ---Phase I study [id number] SD-101 at [facility]--SD-101 2 mg in sodium chloride 0.9% (NS) 30 mL IVPB, intravenous x 2 cycles (8/22/2023-11/22/2023)  ---Gemcitabine and paclitaxel protein bound (12/13/2023-- 7/2024)-->clinical progression and GOO  ---s/p biliary stent placement 4/4/2024  ---chemotherapy with gemcitabine/paclitaxel (last dose 6/26/2024)  ---duodenal stent (placed by GI)- 8/7/2024  ---Gemcitabine and CISplatin Every 2 Weeks (8/28/2024-present)  --- Now requiring weekly therapeutic paracentesis, started 1/2/25   Liquid bx 12/31/24; insufficient sample  [place]/Cis stopped 2/4/25 d/t intolerance; last cycle C6D1 given 12/4/25   H/o Gastric outlet obstruction  ---s/p duodenal stent, severe protein calori malnutrition      Pain, neoplasm related pain  ---continue Percocet, Lyrica, Morphine     elevated ALP    Swelling   Bilateral lower extremity swelling and abdominal swelling  Improved    Ascites   -Likely malignant   -S/p therapeutic  paracentesis          Plan:       Patient with unresectable advanced pancreatic cancer to start 4th line treatment with cisplatin and Gemzar and clinically suggestive of progression, now with distended abdomen and recurrent ascites, likely malignant as they reaccumulate rather quick. Restaging scans without significant disease progression but significant amount of ascites. We order liquid biopsy but unfortunately sample was not satisfactory for processing.  He had liquid biopsy in the past and no mutations were identified.    As mentioned above we discussed hospice but he is not ready yet.  He knows that he is not able to tolerate chemotherapy well but he is not opposed to try monotherapy capecitabine.  We discussed also liposomal irinotecan but he will prefer to start with Xeloda only.      Labs stable  Stop [place]/Cis due to intolerance   Plan for Xeloda 1000 mg/m2 BID D1-14/21 day cycle; has had chemo education in past; advised patient to contact clinic when received prior to starting medication  Standing order for weekly paracentesis therapeutic and diagnostic for abdominal swelling as needed  Liquid bx 12/31/24--insufficient sample  Lasix 20mg BID.   Weekly cbc, cmp once Xeloda is started  Continue Eliquis 5 mg BID for thrombus  Repeat MRI Brain in 3 months, due 2/2025  Continue Magic Mouthwash for esophagitis   Continue morphine and percocet for pain, refill sent for percocet  CT C/A/P every 3 months  MPF q3m and today  RTC 2 weeks with NP for FU/labs/tox check  CBC, CMP,  Mg, CA 19 9 - 1 day prior @ [facility] or [facility]    The patient was seen, interviewed and examined. Pertinent lab and radiology studies were reviewed.   The patient was given ample opportunity to ask questions, and to the best of my abilities, all questions answered to satisfaction; patient demonstrated understanding of what we discussed and agreeable to the plan. Pt instructed to call should develop concerning signs/symptoms or have further  questions.     Visit today included increased complexity associated with the care of the episodic problem pancreatic cancer, addressing and managing the longitudinal care of the patient's Stage ICC Pancreatic Carcinoma.       Jena Messina MD  Hematology/Oncology  [facility]      Professional Services   I, Yessica Toro LPN, acted solely as a scribe for and in the presence of Dr. Jena Messina, who performed these services.     I spent a total of 40 minutes on the day of the visit.This includes face to face time and non-face to face time preparing to see the patient (eg, review of tests), obtaining and/or reviewing separately obtained history, documenting clinical information in the electronic or other health record, independently interpreting results and communicating results to the patient/family/caregiver, or care coordinator.

## 2025-02-03 NOTE — PROGRESS NOTES
HEMATOLOGY/ONCOLOGY OFFICE CLINIC VISIT    Visit Information:    Initial Evaluation: 9/3/2024  Referring Provider: Alba Chisholm MD PhD ([facility]) -Cell [phone]  Other providers:  Code status: Not addressed    Diagnosis:  Advanced pancreatic ductal adenocarcinoma (Dx 8/2022)     Present treatment:  Gemzar monotherapy 8/28/2024  -Gemcitabine and CISplatin Every 2 Weeks (8/28/2024-present)--planned  Chemotherapy summary: CISplatin (PLATINOL) 53 mg in sodium chloride 0.9% (NS) 250 mL IVPB, intravenous, 0 of 12 cycles  gemcitabine (GEMZAR) 836 mg in sodium chloride 0.9% (NS) 250 mL IVPB, intravenous, 0 of 12 cycles   - Eliquis 10 mg BID x 7 days then 5 mg BID- started 10/29/24--present  - weekly therapeutic paracentesis PRN    Treatment/Oncology history:  -8/2/2022 ERCP with metal biliary stent placement   -Folfirinox (10/2022- 2/2023)  -capecitabine RT (4/3/23 - 5/16/23)   -Phase I study 2021-1176 SD-101 at [facility]--SD-101 2 mg in sodium chloride 0.9% (NS) 30 mL IVPB, intravenous x 2 cycles (8/22/2023-11/22/2023)  -Gemcitabine and paclitaxel protein bound (12/13/2023-- 7/2024)-->clinical progression and GOO  -s/p biliary stent placement 4/4/2024  -chemotherapy with gemcitabine/paclitaxel (last dose 6/26/2024)  -duodenal stent (placed by GI)- 8/7/2024  -Gemcitabine and CISplatin Every 2 Weeks (8/28/2024-present)      Goal of care: life prolongation    Imaging:  CT CAP 8/21/2022: Since 7/25/2022, the primary pancreatic head mass encasing the common hepatic artery remains stable. The intrahepatic biliary obstruction improved with interval placement of a CBD stent. No definite distant metastasis in the abdomen or pelvis. Small indeterminate left lower lobe pulmonary nodule can be followed.  CT CAP 12/12/2022:  Primary pancreatic head tumor encasing the common hepatic artery is overall unchanged in size since 8/21/2022. Stable upstream pancreatic duct dilation and pancreatic atrophy. Interval placement of a cholecystostomy  tube, with decompression of the gallbladder. No definite evidence of distant metastatic disease in the chest, abdomen, and pelvis.   CT CAP 3/7/2023: Locally advanced pancreatic head tumor is slightly less conspicuous. Stable portacaval lymphadenopathy. Geographic hypodense regions have significantly increased throughout the liver compatible with fatty liver, to be correlated clinically regarding any potential concomitant hepatotoxicity; this appearance could obscure small low contrast liver lesions, to be better evaluated with MRI, if indicated. Unchanged mild jennifer appearance of the omentum is favored to be postinflammatory. No definite distant metastatic disease within the chest abdomen pelvis.   Ct CAP 6/29/2023:1.  Locally advanced pancreatic head mass not significantly changed. 2.  Increased periportal haziness and central liver heterogeneity which may relate to radiation.   3.  Indeterminate inferior right hepatic hypodensity as described above. Punctate left hepatic hypodensity also not previously evident, too small to characterize. Consider further evaluation with MRI as indicated.    MRI AP 7/15/2023:Primary neoplasm is noted within the pancreatic head and causes pancreatic ductal dilation and biliary ductal obstruction, which is decompressed via a stent. Nonspecific focal dilation of segment IV bile duct is noted.    CT CAP 10/2/2023: 1.  Compared to 07/28/2023, interval embolization of SMV tributaries in the epigastric region with new embolization coils in the right hepatic lobe following transhepatic catheterization. 2.  Previously noted small indeterminate low-attenuation lesion in the inferior right hepatic lobe segment 6 is no longer visualized. No new hepatic lesions.    3.  Stable 2.2 cm ill-defined residual pancreatic head tumor with no change in the vascular contact with the main portal vein, common hepatic and proper hepatic artery. 4.  No new metastatic disease in the chest or abdomen.   CT CAP  1/9/2024: 1.  Mild interval increase in size of the primary tumor in the head of the pancreas. 2.  No evidence of metastatic disease in the chest, abdomen or pelvis.   CT AP 4/3/2024: 1.  There is worsening intrahepatic biliary dilation despite the presence of a biliary stent, suggesting stent occlusion. 2.  No significant change in the locally advanced tumor of the head of the pancreas since 03/12/2024.   CT CAP 6/21/2024: 1. No convincing change in the large locally advanced infiltrating ill-defined mass in the head and neck of the pancreas compared with 5/14/2024. 2. No specific evidence of distant metastatic disease in the chest, abdomen or pelvis.   XR ABDOMEN 1 VW PORTABLE 8/6/2024:  There is a gastric drainage tube with the tip below the diaphragm projecting over the gastric body with the sidehole below the GE junction in appropriate position. Gastric drainage tube with tip and sidehole projecting below the diaphragm over the gastric body.  X-ray Abdomen AP  8/5/2024: Air is seen scattered throughout normal caliber colon in a nonobstructive pattern. No dilated loops of bowel. Moderate colonic stool burden. No intraperitoneal free air within the limitations of this study. A biliary stent and embolization coils overlie the right upper quadrant. No suspicious osseous lesions.   Nonobstructive bowel gas pattern. I personally reviewed these image(s) along with the resident's/fellow's interpretations, certify that if a procedure was performed I was physically present, and agree with the final report.  CT Abdomen Pelvis with Contrast 8/5/2024: No suspicious pulmonary nodules in the lung bases. Hepatobiliary: Evaluation of the liver is somewhat limited secondary to streak artifact from the embolization coils. Within these limits there is no suspicious hepatic lesion. The gallbladder is decompressed and poorly evaluated. Common bile duct stent with expected pneumobilia. Mild intrahepatic biliary ductal dilatation is  not significantly changed. Spleen: No splenomegaly. Pancreas: Ill-defined infiltrating mass of the pancreatic head and neck, in keeping with pancreatic adenocarcinoma. There is atrophy and ductal dilatation of the pancreatic body and tail. Overall this tumor appears larger Adrenal Glands: No mass. Kidneys, Ureters, Bladder: No hydronephrosis. No suspicious renal lesion. No bladder mass. Gastrointestinal Tract: The stomach is distended with fluid and debris, suggesting there may be a degree of gastric outlet obstruction secondary to the pancreatic tumor. This is not significantly changed. There is no downstream obstruction. Pelvic Organs: No pelvic mass. Prostatomegaly. Peritoneum/Retroperitoneum: No ascites. Lymph Nodes: No lymphadenopathy. Musculoskeletal: No suspicious skeletal lesion.     No acute abdominopelvic abnormality. Attending addendum: The pancreatic tumor appears larger compared to 06/21/2024. There is gastric distention with debris suggesting chronic outlet obstruction ACTIONABLE ITEMS/RECOMMENDATIONS*: None. *An Actionable Finding is a finding that may be unrelated to the original reason for imaging but potentially actionable, meaning further investigation may be necessary. The Actionable Findings Vigilance Unit (AFVU) assists medical providers with responding to additional radiologic findings that are unexpected and potentially actionable. I personally reviewed these image(s) along with the resident's/fellow's interpretations, certify that if a procedure was performed I was physically present, and agree with the final report.   CT CAP 10/24/24:  1. Ill-defined soft tissue fullness at the a hay hepatis and pancreatic head and uncinate process fabella bases history of pancreatic adenocarcinoma.  A few blebs of gas are identified which may represent gas within the common bile duct.  2. Intrahepatic biliary ductal dilatation identified with nonvisualization of the gallbladder  3. Beam hardening  artifact due to embolization coils at the liver  4. Small 10 x 6 filling defect at the portal vein suggesting a small thrombus  5. Diffuse hazy mucosal prominence at the descending and ascending colon.  This may be merely due to underdistention.  A underlying colitis cannot be excluded  6. Findings of constipation with contrast and fluid distended loops of small bowel diffusely throughout suggesting a mild ileus  7. Prostatomegaly with calcifications  8. Findings and other details as above  9. Comparison to previous exam would allow further evaluation.  MRI Brain 11/4/24:  1. Tiny (subcentimeter), occasional focal areas of increased signal intensity (on the FLAIR sequences) are noted within the deep white matter and gray-white matter junctions of both cerebral hemispheres (these are only visualized on the FLAIR sequence with no contrast enhancement noted on postcontrast images). I suspect the changes represent chronic ischemic changes, however, follow-up imaging in 3-6 months to ensure stability may be helpful in excluding the very remote possibility of tiny metastases if felt be clinically indicated.     Pathology:  8/2/2022:  FNA head pancreas: SCANT MALIGNANT CELLS, FAVOR ADENOCARCINOMA  NGS:  No detectable genomic aberrations      CLINICAL HISTORY:       Patient: Warren P Lejeune is a 60 y.o. male.with a past medical history of past medical history of pancreatic ductal adenocarcinoma (Dx 8/2022)   Patient initially presented with  unintentional weight loss starting in 1/2022. He also had worsening back/shoulder and abdominal pain during this time. In July he finally presented to his PCP with darkening of the urine and lightening of the stool - with Jaundice that his wife noticed. Labs demonstrating cholestasis (bilirubin of 19).    Patient noted to have an abnormal CT scan after developing obstructive jaundice. 2 cm hypoenhancing mass noted in the head of the pancreas with intra and extrahepatic biliary ductal  "dilatation. Mass measured 2.5 x 2.1 cm. There is also atrophy of the distal gland and upstream dilatation of the pancreas duct. There is no obvious vascular invasion. There were no focal liver lesions.    8/2/22- EGD/EUS/FNA. Final pathology showed ductal adenocarcinoma, moderately differentiated. ERCP on the same date performed, with placement of a fully covered metal biliary stent, 60 mm x 10 mm.    7/27/22- CA 19-9 was 681    8/21/22- CT CAP: Since 7/25/2022, the primary pancreatic head mass encasing the common hepatic artery remains stable. The intrahepatic biliary obstruction improved with interval placement of a CBD stent. No definite distant metastasis in the abdomen or pelvis. Small indeterminate left lower lobe pulmonary nodule can be followed.    09/03/2022 Germline genetic testing: Negative for germline pathogenic variants in any of the analyzed genes, Genes Analyzed: APC, DALE, BMPR1A, BRCA1, BRCA2, CDKN2A, EPCAM, MEN1, MLH1, MSH2, MSH6, NF1, PALB2, PMS2, SMAD4, STK11, TP53, TSC1, TSC2, and VHL. Genetic testing performed by [employer]; full report available in scanned documents.     s/p biliary stent placement, currently undergoing chemotherapy with gemcitabine/paclitaxel (last dose 6/26/2024), T2DM on insulin presenting for abdominal pain and constipation. CT A/P with distension in stomach suggesting gastric outlet obstruction secondary to pancreatic tumor. GI and Surg Onc consulted, pending evaluation. Poor PO tolerance, deferring NG tube now as vomiting resolves and having bowel movements.     Patient was recently admitted to the hospital at [facility]--Hospital Course:  "Findings on imaging were concerning for malignant gastric outlet obstruction from pancreatic cancer. After a discussion with surgical oncology, GI and GIMO, decision was made to proceed with duodenal stent (placed by GI). No indications for gastrojejunostomy bypass. Patient tolerated procedure well. We were able to advance to low fiber " "diet. Nutrition was consulted for low fiber diet education. Managed neoplasm related pain with PO morphine."     Patient is here today with his wife to continue chemotherapy close to home.  He is doing relatively well and voices no concerns.  MediPort was removed from his left chest wall to exposure of the MediPort.  Patient reports abdominal mid back pain, occasional muscle spasm and dizziness.  No fever, chills, sweats.  No chest pain or short of breath.    Chief Complaint: Adenocarcinoma of pancreas (Pt continues with abd bloating and pain. He feels weak and fatigue today. )      Interval History:  Patient returns to clinic for a follow up, accompanied by his wife. He is not feeling well today. He continues with weakness and fatigue. He reports abdominal pain 8/10 today but did receive paracentesis yesterday. He is receiving weekly diagnostic and therapeutic paracentesis. IR removed 5.2 L of fluid. He continues with swelling in his abdomen and bilateral lower legs, takes Lasix 40 mg daily. He has been wearing compression stockings and elevating his legs regularly. He takes Percocet and MS cont for this. He is taking Miralax and laxative for opioid induced constipation, and Pregabalin 100 mg BID for bilateral hand and feet neuropathy. Labs reviewed with patient in detail.     Hospice: I had a long conversation with the patient regarding end of life issues, palliative, comfort care as well as hospice. Explained that Palliative care services focuses on providing patients with relief from the symptoms. The goal is to improve quality of life for both the patient and the family. Palliative care focuses on symptoms such as pain, shortness of breath, fatigue, constipation, nausea, loss of appetite, difficulty sleeping and depression, etc. Hospice focuses on caring, not curing and in most cases care is provided in the patient's home. This is offer when patient's are not a candidate for aggressive chemotherapy or treatment " or if patient's performance status is not acceptable for treatment. This service goes hand to hand with comfort care and is provided to patients that prognosis is expected to be <  6 months.     Past Medical History:   Diagnosis Date    Abdominal pain     Admission for chemotherapy     last chemo 9/12/24    Anxiety disorder, unspecified     Back pain     Bradycardia     Enlarged prostate     GERD (gastroesophageal reflux disease)     Hypertension     no cardiologist; no longer on meds since weight loss due to pancreatic cancer    Memory loss     Pancreatic cancer     Peripheral neuropathy     Type 2 diabetes mellitus with unspecified diabetic retinopathy without macular edema       Past Surgical History:   Procedure Laterality Date    bile duct stents times 2      EGD, WITH STENT INSERTION      INSERTION OF TUNNELED CENTRAL VENOUS CATHETER (CVC) WITH SUBCUTANEOUS PORT Right 9/20/2024    Procedure: [id number];  Surgeon: Moe Champagne MD;  [facility];  Service: General;  Laterality: Right;    MEDIPORT REMOVAL      times 2     Family History   Problem Relation Name Age of Onset    Hypertension Mother      Stroke Father      Stroke Sister      Prostate cancer Brother            Review of patient's allergies indicates:  No Known Allergies   Current Outpatient Medications on File Prior to Visit   Medication Sig Dispense Refill    albuterol (PROVENTIL/VENTOLIN HFA) 90 mcg/actuation inhaler Inhale 1-2 puffs into the lungs every 6 (six) hours as needed for Wheezing. Rescue 18 g 0    aluminum & magnesium hydroxide-simethicone (MYLANTA MAX STRENGTH) 400-400-40 mg/5 mL suspension Take 5 mLs by mouth 4 (four) times daily as needed (mouth sores). 335 mL 3    apixaban (ELIQUIS) 5 mg Tab Take 1 tablet (5 mg total) by mouth 2 (two) times daily. Take 2 tablets by mouth (10 mg) twice a day for 7 days, then 1 tablet (5 mg total) by mouth twice a day thereafter 74 tablet 3    benzonatate (TESSALON) 100 MG capsule  Take 1 capsule (100 mg total) by mouth 3 (three) times daily as needed for Cough. 20 capsule 0    cholecalciferol, vitamin D3, (VITAMIN D3) 50 mcg (2,000 unit) Tab Take 2,000 Units by mouth once daily.      dexAMETHasone (DECADRON) 4 MG Tab TAKE 2 TABLETS BY MOUTH ONCE DAILY AS DIRECTED ON DAYS 2 AND 3 OF CHEMOTHERAPY CYCLE      dicyclomine (BENTYL) 10 MG capsule TAKE 1 CAPSULE BY MOUTH 4 TIMES DAILY BEFORE MEAL(S) AND NIGHTLY 120 capsule 0    diphenhydrAMINE (BENADRYL) 12.5 mg/5 mL elixir Take 5 mLs (12.5 mg total) by mouth 4 (four) times daily as needed (mouth sores). 236 mL 3    furosemide (LASIX) 40 MG tablet Take 1 tablet (40 mg total) by mouth once daily. 30 tablet 1    insulin aspart U-100 (NOVOLOG) 100 unit/mL injection Inject into the skin 3 (three) times daily before meals. prn      LIDOcaine viscous HCl 2% (LIDOCAINE VISCOUS) 2 % Soln by Mucous Membrane route 4 (four) times daily as needed (mouth sores). Swish and spit 15 ml (5 ml benadryl, 5 ml mylanta, 5 ml lidocaine) by mouth 4 times daily as needed for mouth sores 100 mL 3    magnesium oxide (MAGOX) 400 mg (241.3 mg magnesium) tablet Take 1 tablet (400 mg total) by mouth once daily. 90 tablet 2    midodrine (PROAMATINE) 5 MG Tab Take 1 tablet (5 mg total) by mouth 3 (three) times daily with meals. for 7 days 21 tablet 0    morphine (MS CONTIN) 15 MG 12 hr tablet Take 1 tablet (15 mg total) by mouth 2 (two) times daily. 30 tablet 0    multivitamin with folic acid 400 mcg Tab Take 1 tablet by mouth once daily.      ondansetron (ZOFRAN) 8 MG tablet Take 8 mg by mouth.      oxyCODONE-acetaminophen (PERCOCET)  mg per tablet Take 1 tablet by mouth every 4 (four) hours as needed for Pain. 120 tablet 0    pantoprazole (PROTONIX) 40 MG tablet Take 1 tablet (40 mg total) by mouth once daily. 30 tablet 2    polyethylene glycol (GLYCOLAX) 17 gram PwPk Take 17 g by mouth.      pregabalin (LYRICA) 100 MG capsule Take 1 capsule (100 mg total) by mouth 2 (two)  times daily. 60 capsule 6    prochlorperazine (COMPAZINE) 10 MG tablet Take 10 mg by mouth.      tamsulosin (FLOMAX) 0.4 mg Cap Take 1 capsule (0.4 mg total) by mouth once daily. 30 capsule 2    TRESIBA FLEXTOUCH U-200 200 unit/mL (3 mL) insulin pen Inject 14 Units into the skin.      vibegron (GEMTESA) 75 mg Tab Take 75 mg by mouth.      OLANZapine (ZYPREXA) 5 MG tablet Take 1 tablet (5 mg total) by mouth nightly. 4 tablet 6     No current facility-administered medications on file prior to visit.      Review of Systems   Constitutional:  Positive for appetite change and fatigue. Negative for activity change, chills, diaphoresis, fever and unexpected weight change.   HENT:  Positive for trouble swallowing. Negative for nasal congestion, mouth sores, nosebleeds, sinus pressure/congestion and sore throat.    Eyes: Negative.    Respiratory:  Positive for shortness of breath. Negative for cough.    Cardiovascular:  Positive for leg swelling. Negative for chest pain and palpitations.   Gastrointestinal:  Positive for abdominal distention and constipation. Negative for abdominal pain, blood in stool, change in bowel habit, diarrhea, nausea and vomiting.   Endocrine: Negative.    Genitourinary:  Negative for bladder incontinence, decreased urine volume, difficulty urinating, dysuria, frequency, hematuria and urgency.   Musculoskeletal:  Positive for back pain. Negative for arthralgias, gait problem, joint swelling, leg pain and myalgias.   Integumentary:  Negative for rash.   Allergic/Immunologic: Negative.  Negative for frequent infections.   Neurological:  Positive for weakness. Negative for dizziness, tremors, syncope, light-headedness, numbness, headaches and memory loss.   Hematological:  Negative for adenopathy. Does not bruise/bleed easily.   Psychiatric/Behavioral:  Negative for agitation, confusion, hallucinations, sleep disturbance and suicidal ideas. The patient is not nervous/anxious.               Vitals:     "02/04/25 1444   BP: 116/74   BP Location: Right arm   Patient Position: Sitting   Pulse: 74   Resp: 16   Temp: 98.3 °F (36.8 °C)   TempSrc: Oral   SpO2: 100%   Weight: 63.8 kg (140 lb 11.2 oz)   Height: 5' 9.02" (1.753 m)       Wt Readings from Last 6 Encounters:   02/04/25 63.8 kg (140 lb 11.2 oz)   02/04/25 63.8 kg (140 lb 11.2 oz)   01/27/25 72.9 kg (160 lb 11.5 oz)   01/26/25 73.9 kg (163 lb)   01/17/25 69.4 kg (152 lb 14.4 oz)   12/31/24 72.1 kg (159 lb)     Body mass index is 20.77 kg/m².  Body surface area is 1.76 meters squared.  Physical Exam  Vitals and nursing note reviewed.   Constitutional:       General: He is not in acute distress.     Appearance: He is ill-appearing.      Comments: thin   HENT:      Head: Normocephalic and atraumatic.      Mouth/Throat:      Mouth: Mucous membranes are moist.   Eyes:      General: No scleral icterus.     Extraocular Movements: Extraocular movements intact.      Conjunctiva/sclera: Conjunctivae normal.   Neck:      Vascular: No JVD.   Cardiovascular:      Rate and Rhythm: Normal rate and regular rhythm.      Heart sounds: No murmur heard.  Pulmonary:      Effort: Pulmonary effort is normal.      Breath sounds: Normal breath sounds. No wheezing or rhonchi.   Chest:      Comments: Left chest wall scar from previous Mediport  Abdominal:      General: Bowel sounds are decreased. There is distension.      Palpations: There is fluid wave.      Tenderness: There is generalized abdominal tenderness.   Musculoskeletal:         General: No swelling or deformity.      Cervical back: Neck supple.      Right lower leg: Edema present.      Left lower leg: Edema present.   Lymphadenopathy:      Head:      Right side of head: No submandibular adenopathy.      Left side of head: No submandibular adenopathy.      Cervical: No cervical adenopathy.      Upper Body:      Right upper body: No supraclavicular or axillary adenopathy.      Left upper body: No supraclavicular or axillary " adenopathy.      Lower Body: No right inguinal adenopathy. No left inguinal adenopathy.   Skin:     General: Skin is warm.      Coloration: Skin is not jaundiced.      Findings: No rash.   Neurological:      General: No focal deficit present.      Mental Status: He is alert and oriented to person, place, and time.      Cranial Nerves: Cranial nerves 2-12 are intact.   Psychiatric:         Attention and Perception: Attention normal.         Behavior: Behavior is cooperative.       ECOG SCORE    2 - Capable of all selfcare but unable to carry out any work activities, active > 50% of hours         Laboratory:  CBC with Differential:  Lab Results   Component Value Date    WBC 5.22 02/04/2025    RBC 3.86 (L) 02/04/2025    HGB 11.1 (L) 02/04/2025    HCT 33.5 (L) 02/04/2025    MCV 86.8 02/04/2025    MCH 28.8 02/04/2025    MCHC 33.1 02/04/2025    RDW 13.5 02/04/2025     02/04/2025    MPV 10.5 (H) 02/04/2025        CMP:  Sodium   Date Value Ref Range Status   02/04/2025 138 136 - 145 mmol/L Final     Potassium   Date Value Ref Range Status   02/04/2025 4.1 3.5 - 5.1 mmol/L Final     Chloride   Date Value Ref Range Status   02/04/2025 105 98 - 107 mmol/L Final     CO2   Date Value Ref Range Status   02/04/2025 27 22 - 29 mmol/L Final     Blood Urea Nitrogen   Date Value Ref Range Status   02/04/2025 9.0 8.4 - 25.7 mg/dL Final   10/20/2023 12 6 - 23 mg/dL Final     Creatinine   Date Value Ref Range Status   02/04/2025 0.67 (L) 0.72 - 1.25 mg/dL Final   10/20/2023 0.95 0.67 - 1.17 mg/dL Final     Calcium   Date Value Ref Range Status   02/04/2025 8.1 (L) 8.4 - 10.2 mg/dL Final   10/20/2023 8.7 8.4 - 10.2 mg/dL Final     Albumin   Date Value Ref Range Status   02/04/2025 2.4 (L) 3.5 - 5.0 g/dL Final     Bilirubin Total   Date Value Ref Range Status   02/04/2025 0.6 <=1.5 mg/dL Final     ALP   Date Value Ref Range Status   02/04/2025 434 (H) 40 - 150 unit/L Final     AST   Date Value Ref Range Status   02/04/2025 59 (H) 5  - 34 unit/L Final     ALT   Date Value Ref Range Status   02/04/2025 40 0 - 55 unit/L Final      Component 08/28/24 07/17/24 06/26/24 06/21/24 06/12/24 05/29/24   CA 19-9 2,385.0 High  2,150.0 High  1,567.0 High  1,183.0 High  1,000.0 High  959.9 High       Latest Reference Range & Units 09/24/24 08:52 10/23/24 07:57   CA 19-9 0.00 - 37.00 unit/mL 6,202.93 (H) 21,137.60 (H)      Latest Reference Range & Units 11/05/24 13:54 11/19/24 07:47 12/04/24 07:58 12/18/24 07:11 12/26/24 07:57   CA 19-9 0.00 - 37.00 unit/mL 23,324.00 (H) 19,155.71 (H) 31,691.58 (H) 23,541.97 (H) 31,586.90 (H)          Assessment:       1. Malignant neoplasm of head of pancreas    2. Other ascites    3. Elevated alkaline phosphatase level    4. Portal vein thrombosis    5. Chemotherapy-induced peripheral neuropathy    6. Cancer associated pain        1) Locally advanced pancreatic cancer   ---8/2/2022 ERCP with metal biliary stent placement   ---Folfirinox (10/2022- 2/2023)  ---capecitabine RT (4/3/23 - 5/16/23)   ---Phase I study 2021-1176 SD-101 at [facility]--SD-101 2 mg in sodium chloride 0.9% (NS) 30 mL IVPB, intravenous x 2 cycles (8/22/2023-11/22/2023)  ---Gemcitabine and paclitaxel protein bound (12/13/2023-- 7/2024)-->clinical progression and GOO  ---s/p biliary stent placement 4/4/2024  ---chemotherapy with gemcitabine/paclitaxel (last dose 6/26/2024)  ---duodenal stent (placed by GI)- 8/7/2024  ---Gemcitabine and CISplatin Every 2 Weeks (8/28/2024-present)  --- Now requiring weekly therapeutic paracentesis, started 1/2/25   Liquid bx 12/31/24; insufficient sample  [place]/Cis stopped 2/4/25 d/t intolerance; last cycle C6D1 given 12/4/25   H/o Gastric outlet obstruction  ---s/p duodenal stent, severe protein calori malnutrition      Pain, neoplasm related pain  ---continue Percocet, Lyrica, Morphine     elevated ALP    Swelling   Bilateral lower extremity swelling and abdominal swelling  Improved    Ascites   -Likely malignant   -S/p therapeutic  paracentesis          Plan:       Patient with unresectable advanced pancreatic cancer to start 4th line treatment with cisplatin and Gemzar and clinically suggestive of progression, now with distended abdomen and recurrent ascites, likely malignant as they reaccumulate rather quick. Restaging scans without significant disease progression but significant amount of ascites. We order liquid biopsy but unfortunately sample was not satisfactory for processing.  He had liquid biopsy in the past and no mutations were identified.    As mentioned above we discussed hospice but he is not ready yet.  He knows that he is not able to tolerate chemotherapy well but he is not opposed to try monotherapy capecitabine.  We discussed also liposomal irinotecan but he will prefer to start with Xeloda only.      Labs stable  Stop [place]/Cis due to intolerance   Plan for Xeloda 1000 mg/m2 BID D1-14/21 day cycle; has had chemo education in past; advised patient to contact clinic when received prior to starting medication  Standing order for weekly paracentesis therapeutic and diagnostic for abdominal swelling as needed  Liquid bx 12/31/24--insufficient sample  Lasix 20mg BID.   Weekly cbc, cmp once Xeloda is started  Continue Eliquis 5 mg BID for thrombus  Repeat MRI Brain in 3 months, due 2/2025  Continue Magic Mouthwash for esophagitis   Continue morphine and percocet for pain, refill sent for percocet  CT C/A/P every 3 months  MPF q3m and today  RTC 2 weeks with NP for FU/labs/tox check  CBC, CMP,  Mg, CA 19 9 - 1 day prior @ [facility] or [facility]    The patient was seen, interviewed and examined. Pertinent lab and radiology studies were reviewed.   The patient was given ample opportunity to ask questions, and to the best of my abilities, all questions answered to satisfaction; patient demonstrated understanding of what we discussed and agreeable to the plan. Pt instructed to call should develop concerning signs/symptoms or have further  questions.     Visit today included increased complexity associated with the care of the episodic problem pancreatic cancer, addressing and managing the longitudinal care of the patient's Stage ICC Pancreatic Carcinoma.       Jena Messina MD  Hematology/Oncology  [facility]      Professional Services   I, Yessica Toro LPN, acted solely as a scribe for and in the presence of Dr. Jena Messina, who performed these services.     I spent a total of 40 minutes on the day of the visit.This includes face to face time and non-face to face time preparing to see the patient (eg, review of tests), obtaining and/or reviewing separately obtained history, documenting clinical information in the electronic or other health record, independently interpreting results and communicating results to the patient/family/caregiver, or care coordinator.

## 2025-02-04 ENCOUNTER — OFFICE VISIT (OUTPATIENT)
Dept: HEMATOLOGY/ONCOLOGY | Facility: CLINIC | Age: 60
End: 2025-02-04
Payer: MEDICARE

## 2025-02-04 ENCOUNTER — INFUSION (OUTPATIENT)
Dept: INFUSION THERAPY | Facility: HOSPITAL | Age: 60
End: 2025-02-04
Attending: INTERNAL MEDICINE
Payer: MEDICARE

## 2025-02-04 VITALS
OXYGEN SATURATION: 100 % | RESPIRATION RATE: 16 BRPM | HEART RATE: 74 BPM | SYSTOLIC BLOOD PRESSURE: 116 MMHG | WEIGHT: 140.69 LBS | DIASTOLIC BLOOD PRESSURE: 74 MMHG | TEMPERATURE: 98 F | BODY MASS INDEX: 20.84 KG/M2 | HEIGHT: 69 IN

## 2025-02-04 VITALS
HEIGHT: 69 IN | SYSTOLIC BLOOD PRESSURE: 116 MMHG | BODY MASS INDEX: 20.84 KG/M2 | WEIGHT: 140.69 LBS | HEART RATE: 74 BPM | OXYGEN SATURATION: 100 % | TEMPERATURE: 98 F | DIASTOLIC BLOOD PRESSURE: 74 MMHG | RESPIRATION RATE: 16 BRPM

## 2025-02-04 DIAGNOSIS — I81 PORTAL VEIN THROMBOSIS: ICD-10-CM

## 2025-02-04 DIAGNOSIS — T45.1X5A CHEMOTHERAPY-INDUCED PERIPHERAL NEUROPATHY: ICD-10-CM

## 2025-02-04 DIAGNOSIS — C25.9 MALIGNANT NEOPLASM OF PANCREAS, UNSPECIFIED LOCATION OF MALIGNANCY: Primary | ICD-10-CM

## 2025-02-04 DIAGNOSIS — G89.3 CANCER ASSOCIATED PAIN: ICD-10-CM

## 2025-02-04 DIAGNOSIS — G62.0 CHEMOTHERAPY-INDUCED PERIPHERAL NEUROPATHY: ICD-10-CM

## 2025-02-04 DIAGNOSIS — R74.8 ELEVATED ALKALINE PHOSPHATASE LEVEL: ICD-10-CM

## 2025-02-04 DIAGNOSIS — C25.0 MALIGNANT NEOPLASM OF HEAD OF PANCREAS: Primary | ICD-10-CM

## 2025-02-04 DIAGNOSIS — R18.8 OTHER ASCITES: ICD-10-CM

## 2025-02-04 PROCEDURE — 3074F SYST BP LT 130 MM HG: CPT | Mod: CPTII,S$GLB,, | Performed by: INTERNAL MEDICINE

## 2025-02-04 PROCEDURE — G2211 COMPLEX E/M VISIT ADD ON: HCPCS | Mod: S$GLB,,, | Performed by: INTERNAL MEDICINE

## 2025-02-04 PROCEDURE — 3051F HG A1C>EQUAL 7.0%<8.0%: CPT | Mod: CPTII,S$GLB,, | Performed by: INTERNAL MEDICINE

## 2025-02-04 PROCEDURE — 25000003 PHARM REV CODE 250: Performed by: INTERNAL MEDICINE

## 2025-02-04 PROCEDURE — 99215 OFFICE O/P EST HI 40 MIN: CPT | Mod: S$GLB,,, | Performed by: INTERNAL MEDICINE

## 2025-02-04 PROCEDURE — 96523 IRRIG DRUG DELIVERY DEVICE: CPT

## 2025-02-04 PROCEDURE — 3078F DIAST BP <80 MM HG: CPT | Mod: CPTII,S$GLB,, | Performed by: INTERNAL MEDICINE

## 2025-02-04 PROCEDURE — A4216 STERILE WATER/SALINE, 10 ML: HCPCS | Performed by: INTERNAL MEDICINE

## 2025-02-04 PROCEDURE — 1111F DSCHRG MED/CURRENT MED MERGE: CPT | Mod: CPTII,S$GLB,, | Performed by: INTERNAL MEDICINE

## 2025-02-04 PROCEDURE — 3008F BODY MASS INDEX DOCD: CPT | Mod: CPTII,S$GLB,, | Performed by: INTERNAL MEDICINE

## 2025-02-04 PROCEDURE — 63600175 PHARM REV CODE 636 W HCPCS: Performed by: INTERNAL MEDICINE

## 2025-02-04 PROCEDURE — 99999 PR PBB SHADOW E&M-EST. PATIENT-LVL V: CPT | Mod: PBBFAC,,, | Performed by: INTERNAL MEDICINE

## 2025-02-04 RX ORDER — HEPARIN 100 UNIT/ML
500 SYRINGE INTRAVENOUS
Status: ACTIVE | OUTPATIENT
Start: 2025-02-04

## 2025-02-04 RX ORDER — SODIUM CHLORIDE 0.9 % (FLUSH) 0.9 %
10 SYRINGE (ML) INJECTION
Status: CANCELLED | OUTPATIENT
Start: 2025-02-04

## 2025-02-04 RX ORDER — SODIUM CHLORIDE 0.9 % (FLUSH) 0.9 %
10 SYRINGE (ML) INJECTION
Status: ACTIVE | OUTPATIENT
Start: 2025-02-04

## 2025-02-04 RX ORDER — CAPECITABINE 500 MG/1
850 TABLET, FILM COATED ORAL 2 TIMES DAILY
Qty: 84 TABLET | Refills: 3 | Status: SHIPPED | OUTPATIENT
Start: 2025-02-04 | End: 2025-02-05

## 2025-02-04 RX ORDER — HEPARIN 100 UNIT/ML
500 SYRINGE INTRAVENOUS
Status: CANCELLED | OUTPATIENT
Start: 2025-02-04

## 2025-02-04 RX ADMIN — Medication 10 ML: at 04:02

## 2025-02-04 RX ADMIN — HEPARIN SODIUM (PORCINE) LOCK FLUSH IV SOLN 100 UNIT/ML 500 UNITS: 100 SOLUTION at 04:02

## 2025-02-05 RX ORDER — CAPECITABINE 500 MG/1
1500 TABLET, FILM COATED ORAL 2 TIMES DAILY
Qty: 84 TABLET | Refills: 3 | Status: ACTIVE | OUTPATIENT
Start: 2025-02-05

## 2025-02-10 DIAGNOSIS — C25.0 MALIGNANT NEOPLASM OF HEAD OF PANCREAS: Primary | ICD-10-CM

## 2025-02-10 DIAGNOSIS — R19.00 ABDOMINAL SWELLING: ICD-10-CM

## 2025-02-10 DIAGNOSIS — K83.8 PNEUMOBILIA: ICD-10-CM

## 2025-02-12 ENCOUNTER — HOSPITAL ENCOUNTER (OUTPATIENT)
Dept: INTERVENTIONAL RADIOLOGY/VASCULAR | Facility: HOSPITAL | Age: 60
Discharge: HOME | End: 2025-02-12
Attending: INTERNAL MEDICINE
Payer: MEDICARE

## 2025-02-12 DIAGNOSIS — C25.0 MALIGNANT NEOPLASM OF HEAD OF PANCREAS: ICD-10-CM

## 2025-02-12 DIAGNOSIS — R19.00 ABDOMINAL SWELLING: ICD-10-CM

## 2025-02-12 DIAGNOSIS — K83.8 PNEUMOBILIA: ICD-10-CM

## 2025-02-12 PROCEDURE — 63600175 PHARM REV CODE 636 W HCPCS: Performed by: RADIOLOGY

## 2025-02-12 PROCEDURE — P9047 ALBUMIN (HUMAN), 25%, 50ML: HCPCS | Performed by: RADIOLOGY

## 2025-02-12 PROCEDURE — 49083 ABD PARACENTESIS W/IMAGING: CPT

## 2025-02-12 RX ORDER — ALBUMIN HUMAN 250 G/1000ML
25 SOLUTION INTRAVENOUS ONCE
Status: COMPLETED | OUTPATIENT
Start: 2025-02-12 | End: 2025-02-12

## 2025-02-12 RX ADMIN — ALBUMIN (HUMAN) 25 G: 12.5 SOLUTION INTRAVENOUS at 08:02

## 2025-02-12 NOTE — DISCHARGE SUMMARY
Radiology Post-Procedure Note    Pre Op Diagnosis: Ascites    Post Op Diagnosis: Same    Secondary Diagnoses:   Problem List Items Addressed This Visit       Pancreatic cancer    Relevant Orders    IR Paracentesis with Imaging     Other Visit Diagnoses       Pneumobilia        Relevant Orders    IR Paracentesis with Imaging    Abdominal swelling        Relevant Orders    IR Paracentesis with Imaging             Procedure: US Guided Paracentesis    Procedure performed by: Jerome Avalos MD    Assistant: None    Written Informed Consent Obtained: Yes    Specimen Removed: None    Estimated Blood Loss: <10 cc    Condition: Stable    Outcome: The patient tolerated the procedure well and was without complications.    For further details please see the imaging report associated.    Disposition: Home or Self Care    Follow Up: With primary care provider    Discharge Instructions:  No discharge procedures on file.       Time Spent On Discharge: 2 minutes

## 2025-02-12 NOTE — INTERVAL H&P NOTE
The patient has been examined and the H&P has been reviewed:    I concur with the findings and no changes have occurred since H&P was written. Warren P Lejeune is a 60 y.o. male with Pancreatic Cancer & Ascites who presents for Paracentesis.           There are no hospital problems to display for this patient.

## 2025-02-13 LAB — PSYCHE PATHOLOGY RESULT: NORMAL

## 2025-02-14 ENCOUNTER — TELEPHONE (OUTPATIENT)
Dept: HEMATOLOGY/ONCOLOGY | Facility: CLINIC | Age: 60
End: 2025-02-14
Payer: MEDICARE

## 2025-02-14 ENCOUNTER — HOSPITAL ENCOUNTER (INPATIENT)
Facility: HOSPITAL | Age: 60
LOS: 3 days | Discharge: HOME | DRG: 432 | End: 2025-02-17
Attending: STUDENT IN AN ORGANIZED HEALTH CARE EDUCATION/TRAINING PROGRAM | Admitting: STUDENT IN AN ORGANIZED HEALTH CARE EDUCATION/TRAINING PROGRAM
Payer: MEDICARE

## 2025-02-14 DIAGNOSIS — R07.9 CHEST PAIN: ICD-10-CM

## 2025-02-14 DIAGNOSIS — C25.9 ADENOCARCINOMA OF PANCREAS: ICD-10-CM

## 2025-02-14 DIAGNOSIS — R11.0 NAUSEA: ICD-10-CM

## 2025-02-14 DIAGNOSIS — K65.2 SBP (SPONTANEOUS BACTERIAL PERITONITIS): Primary | ICD-10-CM

## 2025-02-14 DIAGNOSIS — R10.9 ABDOMINAL PAIN, UNSPECIFIED ABDOMINAL LOCATION: ICD-10-CM

## 2025-02-14 DIAGNOSIS — C25.9 MALIGNANT NEOPLASM OF PANCREAS, UNSPECIFIED LOCATION OF MALIGNANCY: ICD-10-CM

## 2025-02-14 LAB
ALBUMIN SERPL-MCNC: 3 G/DL (ref 3.5–5)
ALBUMIN/GLOB SERPL: 0.8 RATIO (ref 1.1–2)
ALP SERPL-CCNC: 523 UNIT/L (ref 40–150)
ALT SERPL-CCNC: 43 UNIT/L (ref 0–55)
ANION GAP SERPL CALC-SCNC: 5 MEQ/L
APTT PPP: 29.5 SECONDS (ref 23.2–33.7)
AST SERPL-CCNC: 45 UNIT/L (ref 5–34)
BACTERIA #/AREA URNS AUTO: ABNORMAL /HPF
BASOPHILS # BLD AUTO: 0.02 X10(3)/MCL
BASOPHILS NFR BLD AUTO: 0.4 %
BILIRUB SERPL-MCNC: 0.6 MG/DL
BILIRUB UR QL STRIP.AUTO: NEGATIVE
BUN SERPL-MCNC: 11 MG/DL (ref 8.4–25.7)
CALCIUM SERPL-MCNC: 9.2 MG/DL (ref 8.4–10.2)
CAOX CRY UR QL COMP ASSIST: ABNORMAL
CHLORIDE SERPL-SCNC: 101 MMOL/L (ref 98–107)
CLARITY BODY FLUID (OLG): CLEAR
CLARITY UR: ABNORMAL
CO2 SERPL-SCNC: 28 MMOL/L (ref 22–29)
COLOR BODY FLUID (OLG): NORMAL
COLOR UR AUTO: YELLOW
CREAT SERPL-MCNC: 0.9 MG/DL (ref 0.72–1.25)
CREAT/UREA NIT SERPL: 12
EOSINOPHIL # BLD AUTO: 0.01 X10(3)/MCL (ref 0–0.9)
EOSINOPHIL NFR BLD AUTO: 0.2 %
ERYTHROCYTE [DISTWIDTH] IN BLOOD BY AUTOMATED COUNT: 13.7 % (ref 11.5–17)
GFR SERPLBLD CREATININE-BSD FMLA CKD-EPI: >60 ML/MIN/1.73/M2
GLOBULIN SER-MCNC: 3.7 GM/DL (ref 2.4–3.5)
GLUCOSE SERPL-MCNC: 378 MG/DL (ref 74–100)
GLUCOSE UR QL STRIP: ABNORMAL
GRAM STN SPEC: NORMAL
GRAM STN SPEC: NORMAL
HCT VFR BLD AUTO: 35.4 % (ref 42–52)
HGB BLD-MCNC: 11.4 G/DL (ref 14–18)
HGB UR QL STRIP: NEGATIVE
HYALINE CASTS #/AREA URNS LPF: ABNORMAL /LPF
IMM GRANULOCYTES # BLD AUTO: 0.01 X10(3)/MCL (ref 0–0.04)
IMM GRANULOCYTES NFR BLD AUTO: 0.2 %
INR PPP: 1.3
KETONES UR QL STRIP: ABNORMAL
LACTATE SERPL-SCNC: 2.9 MMOL/L (ref 0.5–2.2)
LACTATE SERPL-SCNC: 3.4 MMOL/L (ref 0.5–2.2)
LDH FLD-CCNC: 83 U/L
LEUKOCYTE ESTERASE UR QL STRIP: NEGATIVE
LYMPHOCYTES # BLD AUTO: 0.72 X10(3)/MCL (ref 0.6–4.6)
LYMPHOCYTES NFR BLD AUTO: 15.1 %
LYMPHOCYTES NFR FLD MANUAL: 44 %
MCH RBC QN AUTO: 28.6 PG (ref 27–31)
MCHC RBC AUTO-ENTMCNC: 32.2 G/DL (ref 33–36)
MCV RBC AUTO: 88.9 FL (ref 80–94)
MONOCYTE MAN % BF (OLG): 5 %
MONOCYTES # BLD AUTO: 0.47 X10(3)/MCL (ref 0.1–1.3)
MONOCYTES NFR BLD AUTO: 9.8 %
MUCOUS THREADS URNS QL MICRO: ABNORMAL /LPF
NEUTROPHILS # BLD AUTO: 3.55 X10(3)/MCL (ref 2.1–9.2)
NEUTROPHILS MAN % BF (OLG): 51 %
NEUTROPHILS NFR BLD AUTO: 74.3 %
NITRITE UR QL STRIP: NEGATIVE
NRBC BLD AUTO-RTO: 0 %
PH UR STRIP: 6 [PH]
PLATELET # BLD AUTO: 198 X10(3)/MCL (ref 130–400)
PMV BLD AUTO: 10.8 FL (ref 7.4–10.4)
POCT GLUCOSE: 285 MG/DL (ref 70–110)
POTASSIUM SERPL-SCNC: 4.7 MMOL/L (ref 3.5–5.1)
PROT SERPL-MCNC: 6.7 GM/DL (ref 6.4–8.3)
PROT UR QL STRIP: ABNORMAL
PROTHROMBIN TIME: 15.6 SECONDS (ref 12.5–14.5)
RBC # BLD AUTO: 3.98 X10(6)/MCL (ref 4.7–6.1)
RBC #/AREA URNS AUTO: ABNORMAL /HPF
SODIUM SERPL-SCNC: 134 MMOL/L (ref 136–145)
SP GR UR STRIP.AUTO: 1.05 (ref 1–1.03)
SQUAMOUS #/AREA URNS LPF: ABNORMAL /HPF
UROBILINOGEN UR STRIP-ACNC: 2
WBC # BLD AUTO: 4.78 X10(3)/MCL (ref 4.5–11.5)
WBC # FLD AUTO: 314 /UL
WBC #/AREA URNS AUTO: ABNORMAL /HPF

## 2025-02-14 PROCEDURE — 82150 ASSAY OF AMYLASE: CPT | Performed by: INTERNAL MEDICINE

## 2025-02-14 PROCEDURE — 85025 COMPLETE CBC W/AUTO DIFF WBC: CPT | Performed by: NURSE PRACTITIONER

## 2025-02-14 PROCEDURE — 83605 ASSAY OF LACTIC ACID: CPT | Performed by: NURSE PRACTITIONER

## 2025-02-14 PROCEDURE — 81001 URINALYSIS AUTO W/SCOPE: CPT | Performed by: NURSE PRACTITIONER

## 2025-02-14 PROCEDURE — 87205 SMEAR GRAM STAIN: CPT | Performed by: STUDENT IN AN ORGANIZED HEALTH CARE EDUCATION/TRAINING PROGRAM

## 2025-02-14 PROCEDURE — 96374 THER/PROPH/DIAG INJ IV PUSH: CPT

## 2025-02-14 PROCEDURE — 63600175 PHARM REV CODE 636 W HCPCS: Performed by: STUDENT IN AN ORGANIZED HEALTH CARE EDUCATION/TRAINING PROGRAM

## 2025-02-14 PROCEDURE — 80053 COMPREHEN METABOLIC PANEL: CPT | Performed by: NURSE PRACTITIONER

## 2025-02-14 PROCEDURE — 25000003 PHARM REV CODE 250: Performed by: STUDENT IN AN ORGANIZED HEALTH CARE EDUCATION/TRAINING PROGRAM

## 2025-02-14 PROCEDURE — 99285 EMERGENCY DEPT VISIT HI MDM: CPT | Mod: 25

## 2025-02-14 PROCEDURE — 96361 HYDRATE IV INFUSION ADD-ON: CPT

## 2025-02-14 PROCEDURE — 84157 ASSAY OF PROTEIN OTHER: CPT | Performed by: INTERNAL MEDICINE

## 2025-02-14 PROCEDURE — P9047 ALBUMIN (HUMAN), 25%, 50ML: HCPCS | Performed by: STUDENT IN AN ORGANIZED HEALTH CARE EDUCATION/TRAINING PROGRAM

## 2025-02-14 PROCEDURE — 82042 OTHER SOURCE ALBUMIN QUAN EA: CPT | Performed by: INTERNAL MEDICINE

## 2025-02-14 PROCEDURE — 21400001 HC TELEMETRY ROOM

## 2025-02-14 PROCEDURE — 0W9G3ZZ DRAINAGE OF PERITONEAL CAVITY, PERCUTANEOUS APPROACH: ICD-10-PCS | Performed by: STUDENT IN AN ORGANIZED HEALTH CARE EDUCATION/TRAINING PROGRAM

## 2025-02-14 PROCEDURE — 11000001 HC ACUTE MED/SURG PRIVATE ROOM

## 2025-02-14 PROCEDURE — 96375 TX/PRO/DX INJ NEW DRUG ADDON: CPT

## 2025-02-14 PROCEDURE — 83615 LACTATE (LD) (LDH) ENZYME: CPT | Performed by: STUDENT IN AN ORGANIZED HEALTH CARE EDUCATION/TRAINING PROGRAM

## 2025-02-14 PROCEDURE — 87070 CULTURE OTHR SPECIMN AEROBIC: CPT | Performed by: STUDENT IN AN ORGANIZED HEALTH CARE EDUCATION/TRAINING PROGRAM

## 2025-02-14 PROCEDURE — 25500020 PHARM REV CODE 255: Performed by: STUDENT IN AN ORGANIZED HEALTH CARE EDUCATION/TRAINING PROGRAM

## 2025-02-14 PROCEDURE — 85610 PROTHROMBIN TIME: CPT | Performed by: NURSE PRACTITIONER

## 2025-02-14 PROCEDURE — 87086 URINE CULTURE/COLONY COUNT: CPT | Performed by: NURSE PRACTITIONER

## 2025-02-14 PROCEDURE — 49082 ABD PARACENTESIS: CPT

## 2025-02-14 PROCEDURE — 89051 BODY FLUID CELL COUNT: CPT | Performed by: STUDENT IN AN ORGANIZED HEALTH CARE EDUCATION/TRAINING PROGRAM

## 2025-02-14 PROCEDURE — 87040 BLOOD CULTURE FOR BACTERIA: CPT | Performed by: NURSE PRACTITIONER

## 2025-02-14 PROCEDURE — 85730 THROMBOPLASTIN TIME PARTIAL: CPT | Performed by: NURSE PRACTITIONER

## 2025-02-14 RX ORDER — POLYETHYLENE GLYCOL 3350 17 G/17G
17 POWDER, FOR SOLUTION ORAL 2 TIMES DAILY PRN
Status: DISCONTINUED | OUTPATIENT
Start: 2025-02-15 | End: 2025-02-17 | Stop reason: HOSPADM

## 2025-02-14 RX ORDER — TALC
6 POWDER (GRAM) TOPICAL NIGHTLY PRN
Status: DISCONTINUED | OUTPATIENT
Start: 2025-02-15 | End: 2025-02-17 | Stop reason: HOSPADM

## 2025-02-14 RX ORDER — BISACODYL 10 MG/1
10 SUPPOSITORY RECTAL DAILY PRN
Status: DISCONTINUED | OUTPATIENT
Start: 2025-02-15 | End: 2025-02-17 | Stop reason: HOSPADM

## 2025-02-14 RX ORDER — ENOXAPARIN SODIUM 100 MG/ML
40 INJECTION SUBCUTANEOUS EVERY 24 HOURS
Status: DISCONTINUED | OUTPATIENT
Start: 2025-02-15 | End: 2025-02-15

## 2025-02-14 RX ORDER — GLUCAGON 1 MG
1 KIT INJECTION
Status: DISCONTINUED | OUTPATIENT
Start: 2025-02-15 | End: 2025-02-17 | Stop reason: HOSPADM

## 2025-02-14 RX ORDER — SODIUM CHLORIDE 0.9 % (FLUSH) 0.9 %
10 SYRINGE (ML) INJECTION
Status: DISCONTINUED | OUTPATIENT
Start: 2025-02-15 | End: 2025-02-17 | Stop reason: HOSPADM

## 2025-02-14 RX ORDER — MUPIROCIN 20 MG/G
OINTMENT TOPICAL 2 TIMES DAILY
Status: DISCONTINUED | OUTPATIENT
Start: 2025-02-15 | End: 2025-02-17 | Stop reason: HOSPADM

## 2025-02-14 RX ORDER — INSULIN ASPART 100 [IU]/ML
0-10 INJECTION, SOLUTION INTRAVENOUS; SUBCUTANEOUS
Status: DISCONTINUED | OUTPATIENT
Start: 2025-02-15 | End: 2025-02-17 | Stop reason: HOSPADM

## 2025-02-14 RX ORDER — ALBUMIN HUMAN 250 G/1000ML
50 SOLUTION INTRAVENOUS ONCE
Status: COMPLETED | OUTPATIENT
Start: 2025-02-14 | End: 2025-02-14

## 2025-02-14 RX ORDER — CEFTRIAXONE 2 G/1
2 INJECTION, POWDER, FOR SOLUTION INTRAMUSCULAR; INTRAVENOUS
Status: DISCONTINUED | OUTPATIENT
Start: 2025-02-14 | End: 2025-02-14

## 2025-02-14 RX ORDER — IBUPROFEN 200 MG
24 TABLET ORAL
Status: DISCONTINUED | OUTPATIENT
Start: 2025-02-15 | End: 2025-02-17 | Stop reason: HOSPADM

## 2025-02-14 RX ORDER — ACETAMINOPHEN 325 MG/1
650 TABLET ORAL EVERY 4 HOURS PRN
Status: DISCONTINUED | OUTPATIENT
Start: 2025-02-15 | End: 2025-02-17 | Stop reason: HOSPADM

## 2025-02-14 RX ORDER — ONDANSETRON HYDROCHLORIDE 2 MG/ML
4 INJECTION, SOLUTION INTRAVENOUS
Status: COMPLETED | OUTPATIENT
Start: 2025-02-14 | End: 2025-02-14

## 2025-02-14 RX ORDER — MORPHINE SULFATE 4 MG/ML
2 INJECTION, SOLUTION INTRAMUSCULAR; INTRAVENOUS EVERY 6 HOURS PRN
Status: DISCONTINUED | OUTPATIENT
Start: 2025-02-15 | End: 2025-02-17 | Stop reason: HOSPADM

## 2025-02-14 RX ORDER — LIDOCAINE HYDROCHLORIDE 10 MG/ML
10 INJECTION, SOLUTION INFILTRATION; PERINEURAL
Status: COMPLETED | OUTPATIENT
Start: 2025-02-14 | End: 2025-02-14

## 2025-02-14 RX ORDER — IBUPROFEN 200 MG
16 TABLET ORAL
Status: DISCONTINUED | OUTPATIENT
Start: 2025-02-15 | End: 2025-02-17 | Stop reason: HOSPADM

## 2025-02-14 RX ORDER — ALUMINUM HYDROXIDE, MAGNESIUM HYDROXIDE, AND SIMETHICONE 1200; 120; 1200 MG/30ML; MG/30ML; MG/30ML
30 SUSPENSION ORAL 4 TIMES DAILY PRN
Status: DISCONTINUED | OUTPATIENT
Start: 2025-02-15 | End: 2025-02-17 | Stop reason: HOSPADM

## 2025-02-14 RX ORDER — MORPHINE SULFATE 4 MG/ML
4 INJECTION, SOLUTION INTRAMUSCULAR; INTRAVENOUS
Status: COMPLETED | OUTPATIENT
Start: 2025-02-14 | End: 2025-02-14

## 2025-02-14 RX ADMIN — ONDANSETRON 4 MG: 2 INJECTION INTRAMUSCULAR; INTRAVENOUS at 06:02

## 2025-02-14 RX ADMIN — IOHEXOL 100 ML: 350 INJECTION, SOLUTION INTRAVENOUS at 06:02

## 2025-02-14 RX ADMIN — MORPHINE SULFATE 4 MG: 4 INJECTION, SOLUTION INTRAMUSCULAR; INTRAVENOUS at 06:02

## 2025-02-14 RX ADMIN — VANCOMYCIN HYDROCHLORIDE 1000 MG: 1 INJECTION, POWDER, LYOPHILIZED, FOR SOLUTION INTRAVENOUS at 11:02

## 2025-02-14 RX ADMIN — SODIUM CHLORIDE 1000 ML: 9 INJECTION, SOLUTION INTRAVENOUS at 05:02

## 2025-02-14 RX ADMIN — ALBUMIN (HUMAN) 50 G: 12.5 SOLUTION INTRAVENOUS at 07:02

## 2025-02-14 RX ADMIN — PIPERACILLIN SODIUM AND TAZOBACTAM SODIUM 4.5 G: 4; .5 INJECTION, POWDER, LYOPHILIZED, FOR SOLUTION INTRAVENOUS at 08:02

## 2025-02-14 RX ADMIN — LIDOCAINE HYDROCHLORIDE 10 ML: 10 INJECTION, SOLUTION INFILTRATION; PERINEURAL at 06:02

## 2025-02-14 NOTE — FIRST PROVIDER EVALUATION
Medical screening examination initiated.  I have conducted a focused provider triage encounter, findings are as follows:    Brief history of present illness:  Patient and his wife state that patient has pancreatic CA and has been getting scheduled weekly paracentesis. States that he was told that his cytology report from his last paracentesis x 2 days ago came back turbid yellow green purulent appearing fluid so he was sent to the ED for a septic work-up.     There were no vitals filed for this visit.    Pertinent physical exam:  Awake, alert, ambulatory     Brief workup plan:  labs    Preliminary workup initiated; this workup will be continued and followed by the physician or advanced practice provider that is assigned to the patient when roomed.

## 2025-02-14 NOTE — ED PROVIDER NOTES
"Encounter Date: 2/14/2025    SCRIBE #1 NOTE: I, Doreen Henry, am scribing for, and in the presence of,  Erich Aguilar MD. I have scribed the following portions of the note - Other sections scribed: HPI, ROS, PE.       History     Chief Complaint   Patient presents with    Abdominal Pain     C/o generalized abd pain x a few weeks. States sent from [place] after paracentesis results showed infection. Denies n/v. Hx pancreatic cancer. No active chemo/radiation. GCS 15.      Patient is a 61 y/o male with a PMHx of anxiety, GERD, HTN, pancreatic cancer, and DM presents to the ED for abdominal pain. Patient's wife at bedside to give history. Wife reports patient gets weekly paracentesis procedures. She reports patient's last paracentesis was on 2/12. She reports a caregiver at patient's cancer center told her to present patient to this ED after the studies done on the peritoneal fluid came back as "almost 100% pus".  Reviewed pathology report which showed result of pus.    Patient reports distended abdomen with abdominal pain. He reports he is currently taking Eliquis. He denies having fever, nausea, or vomiting.     The history is provided by the patient, medical records and the spouse. No  was used.     Review of patient's allergies indicates:  No Known Allergies  Past Medical History:   Diagnosis Date    Abdominal pain     Admission for chemotherapy     last chemo 9/12/24    Anxiety disorder, unspecified     Back pain     Bradycardia     Enlarged prostate     GERD (gastroesophageal reflux disease)     Hypertension     no cardiologist; no longer on meds since weight loss due to pancreatic cancer    Memory loss     Pancreatic cancer     Peripheral neuropathy     Type 2 diabetes mellitus with unspecified diabetic retinopathy without macular edema      Past Surgical History:   Procedure Laterality Date    bile duct stents times 2      EGD, WITH STENT INSERTION      INSERTION OF TUNNELED CENTRAL VENOUS " CATHETER (CVC) WITH SUBCUTANEOUS PORT Right 9/20/2024    Procedure: [id number];  Surgeon: Moe Champagne MD;  Location: [facility];  Service: General;  Laterality: Right;    MEDIPORT REMOVAL      times 2     Family History   Problem Relation Name Age of Onset    Hypertension Mother      Stroke Father      Stroke Sister      Prostate cancer Brother       Social History     Tobacco Use    Smoking status: Never     Passive exposure: Never    Smokeless tobacco: Former     Types: Chew   Substance Use Topics    Alcohol use: Not Currently    Drug use: Never     Review of Systems   Constitutional:  Negative for fever.   HENT:  Negative for sore throat.    Eyes:  Negative for visual disturbance.   Respiratory:  Negative for shortness of breath.    Cardiovascular:  Negative for chest pain.   Gastrointestinal:  Positive for abdominal distention and abdominal pain. Negative for nausea and vomiting.   Genitourinary:  Negative for dysuria.   Musculoskeletal:  Negative for joint swelling.   Skin:  Negative for rash.   Psychiatric/Behavioral:  Negative for confusion.    All other systems reviewed and are negative.      Physical Exam     Initial Vitals [02/14/25 1600]   BP Pulse Resp Temp SpO2   120/85 83 18 97.6 °F (36.4 °C) (!) 94 %      MAP       --         Physical Exam    Nursing note and vitals reviewed.  Constitutional: He appears well-developed and well-nourished. He is not diaphoretic. No distress.   HENT:   Head: Normocephalic and atraumatic.   Eyes: Conjunctivae and EOM are normal. Pupils are equal, round, and reactive to light.   Neck:   Normal range of motion.  Cardiovascular:  Normal rate, regular rhythm, normal heart sounds and intact distal pulses.           No murmur heard.  Pulmonary/Chest: Breath sounds normal. No respiratory distress. He has no wheezes. He has no rales.   Abdominal: Abdomen is soft. He exhibits distension. There is no abdominal tenderness.   Musculoskeletal:         General: No  tenderness or edema. Normal range of motion.      Cervical back: Normal range of motion.     Neurological: He is alert and oriented to person, place, and time. No cranial nerve deficit.   Skin: Skin is warm and dry. Capillary refill takes less than 2 seconds. No rash noted. No erythema.   Psychiatric: He has a normal mood and affect.         ED Course   Critical Care    Date/Time: 2/14/2025 6:07 PM    Performed by: Erich Aguilar MD  Authorized by: Erich Aguilar MD  Direct patient critical care time: 14 minutes  Additional history critical care time: 4 minutes  Ordering / reviewing critical care time: 6 minutes  Documentation critical care time: 6 minutes  Consulting other physicians critical care time: 4 minutes  Consult with family critical care time: 3 minutes  Other critical care time: 2 minutes  Total critical care time (exclusive of procedural time) : 39 minutes  Critical care time was exclusive of separately billable procedures and treating other patients and teaching time.  Critical care was necessary to treat or prevent imminent or life-threatening deterioration of the following conditions: hepatic failure and sepsis.  Critical care was time spent personally by me on the following activities: development of treatment plan with patient or surrogate, discussions with consultants, evaluation of patient's response to treatment, interpretation of cardiac output measurements, examination of patient, obtaining history from patient or surrogate, ordering and performing treatments and interventions, ordering and review of laboratory studies, ordering and review of radiographic studies, pulse oximetry, re-evaluation of patient's condition and review of old charts.      Paracentesis    Date/Time: 2/14/2025 6:08 PM  Location procedure was performed: [facility] EMERGENCY DEPARTMENT    Performed by: Erich Aguilar MD  Authorized by: Erich Aguilar MD  Consent Done: Yes  Consent: Verbal consent obtained. Written  consent obtained  Consent given by: patient  Patient understanding: patient states understanding of the procedure being performed  Patient consent: the patient's understanding of the procedure matches consent given  Procedure consent: procedure consent matches procedure scheduled  Relevant documents: relevant documents present and verified  Test results: test results available and properly labeled  Site marked: the operative site was marked  Imaging studies: imaging studies available  Required items: required blood products, implants, devices, and special equipment available  Patient identity confirmed: , MRN and name  Initial or subsequent exam: subsequent  Procedure purpose: diagnostic and therapeutic  Indications: secondary bacterial peritonitis and suspected peritonitis    Anesthesia:  Local Anesthetic: lidocaine 1% without epinephrine  Preparation: Patient was prepped and draped in the usual sterile fashion.  Fluid removed: 4500(ml)  Fluid appearance: clear and cloudy  Complications: No  Specimens: Yes  Patient tolerance: Patient tolerated the procedure well with no immediate complications      ED US FAST    Date/Time: 2025 7:00 PM    Performed by: Erich Aguilar MD  Authorized by: Rik Lester MD    Indication:  Abdominal pain  Identified Structures:  The pericardium, hepatorenal space, splenorenal space, and pelvic cul-de-sac were examined  The following findings in the peritoneal, pericardial, and pleural spaces were obtained:     Hepatorenal free fluid:  Present    Splenorenal free fluid:  Present    Impression: ascites.    Charge?:  Yes    Labs Reviewed   COMPREHENSIVE METABOLIC PANEL - Abnormal       Result Value    Sodium 134 (*)     Potassium 4.7      Chloride 101      CO2 28      Glucose 378 (*)     Blood Urea Nitrogen 11.0      Creatinine 0.90      Calcium 9.2      Protein Total 6.7      Albumin 3.0 (*)     Globulin 3.7 (*)     Albumin/Globulin Ratio 0.8 (*)     Bilirubin Total 0.6        (*)     ALT 43      AST 45 (*)     eGFR >60      Anion Gap 5.0      BUN/Creatinine Ratio 12     URINALYSIS, REFLEX TO URINE CULTURE - Abnormal    Color, UA Yellow      Appearance, UA Turbid (*)     Specific Gravity, UA 1.048 (*)     pH, UA 6.0      Protein, UA 1+ (*)     Glucose, UA 3+ (*)     Ketones, UA Trace (*)     Blood, UA Negative      Bilirubin, UA Negative      Urobilinogen, UA 2.0 (*)     Nitrites, UA Negative      Leukocyte Esterase, UA Negative      RBC, UA 6-10 (*)     WBC, UA 11-20 (*)     Bacteria, UA Trace      Squamous Epithelial Cells, UA Trace      Mucous, UA Moderate (*)     Hyaline Casts, UA 3-5 (*)     Calcium Oxalate Crystals, UA Moderate (*)    LACTIC ACID, PLASMA - Abnormal    Lactic Acid Level 2.9 (*)    CBC WITH DIFFERENTIAL - Abnormal    WBC 4.78      RBC 3.98 (*)     Hgb 11.4 (*)     Hct 35.4 (*)     MCV 88.9      MCH 28.6      MCHC 32.2 (*)     RDW 13.7      Platelet 198      MPV 10.8 (*)     Neut % 74.3      Lymph % 15.1      Mono % 9.8      Eos % 0.2      Basophil % 0.4      Imm Grans % 0.2      Neut # 3.55      Lymph # 0.72      Mono # 0.47      Eos # 0.01      Baso # 0.02      Imm Gran # 0.01      NRBC% 0.0     PROTIME-INR - Abnormal    PT 15.6 (*)     INR 1.3     LACTIC ACID, PLASMA - Abnormal    Lactic Acid Level 3.4 (*)    APTT - Normal    PTT 29.5     GRAM STAIN [facility]    GRAM STAIN Many WBC observed      GRAM STAIN No bacteria seen     BLOOD CULTURE [facility]   BLOOD CULTURE [facility]   BODY FLUID CULTURE [facility]   CULTURE, URINE   CBC W/ AUTO DIFFERENTIAL    Narrative:     The following orders were created for panel order CBC Auto Differential.  Procedure                               Abnormality         Status                     ---------                               -----------         ------                     CBC with Differential[[id number]]       Abnormal            Final result                 Please view results for these tests on the individual orders.   CELL COUNT W/ DIFF,  BODY FLUID    Narrative:     The following orders were created for panel order Cell Count w/ Diff, Fluid.  Procedure                               Abnormality         Status                     ---------                               -----------         ------                     Cell Count, Body Fluid[[id number]]                          Final result               Differential, Body Fluid[[id number]]                        Final result                 Please view results for these tests on the individual orders.   LD, BODY FLUID    Lactate Dehydrogenase Body Fluid 83      Narrative:     This test was developed, and its performance characteristics determined by [facility]. It has not been cleared or approved by the [place] Food and Drug Administration.   CELL COUNT, BODY FLUID    Color BF Straw      Clarity BF Clear      WBC      DIFFERENTIAL, BODY FLUID    Neutrophils % BF 51      Lymphocyte % BF 44      Monocyte % BF 5            Imaging Results              CT Abdomen Pelvis With IV Contrast NO Oral Contrast (Final result)  Result time 02/14/25 18:17:41      Final result by Cristina Saldana MD (02/14/25 18:17:41)                   Impression:      Large volume ascites appears worse than the prior examination    Pancreatic head mass similar but there appears to be a mass in the pancreatic neck on this examination.  This was not clearly seen on the prior examination.    Previous embolization procedure in the liver    Pneumobilia      Electronically signed by: Vicente Saldana  Date:    02/14/2025  Time:    18:17               Narrative:    EXAMINATION:  CT ABDOMEN PELVIS WITH IV CONTRAST    CLINICAL HISTORY:  Abdominal abscess/infection suspected;    TECHNIQUE:  Low dose axial images, sagittal and coronal reformations were obtained from the lung bases to the pubic symphysis following the IV administration of contrast. Automatic exposure control (AEC) is utilized to reduce patient  radiation exposure.    COMPARISON:  01/07/2025 the    FINDINGS:  There are changes consistent with COPD and emphysema in the lung bases.  There is right lower lobe atelectasis.    There is large volume ascites seen..    There are embolization coil seen in the liver along the inferior margin.  The liver enhances heterogeneously.  This may be due to the early arterial phase of the examination.  There is evidence of some pneumobilia.  Some intrahepatic biliary dilatation is seen.  The pancreatic head mass is poorly visualized on this examination due to the significant amount of mesenteric edema.  It measures roughly 2 3.3 x 3.1 cm.  It is similar to the prior examination but now there appears to be a area of hypoattenuation and mass in the neck of the pancreas.  This was not clearly seen on the prior examination.    There is a connection between the lesser sac and the stomach likely due to previous marsupialization of pancreatic pseudocyst.    Spleen appears unremarkable.  Adrenal glands appear normal.  Kidneys show no acute abnormality.    Visualized portion of the bowel shows no gross abnormality but the examination is limited by the large volume ascites and anasarca edema    Urinary bladder appears unremarkable.                                       Medications   sodium chloride 0.9% flush 10 mL (has no administration in time range)   melatonin tablet 6 mg (has no administration in time range)   polyethylene glycol packet 17 g (has no administration in time range)   bisacodyL suppository 10 mg (has no administration in time range)   acetaminophen tablet 650 mg (has no administration in time range)   aluminum-magnesium hydroxide-simethicone 200-200-20 mg/5 mL suspension 30 mL (has no administration in time range)   glucose chewable tablet 16 g (has no administration in time range)   dextrose 50% injection 12.5 g (has no administration in time range)   glucose chewable tablet 24 g (has no administration in time range)    dextrose 50% injection 25 g (has no administration in time range)   glucagon (human recombinant) injection 1 mg (has no administration in time range)   morphine injection 2 mg (2 mg Intravenous Given 2/15/25 0004)   insulin aspart U-100 injection 0-10 Units (6 Units Subcutaneous Given 2/15/25 0004)   mupirocin 2 % ointment ( Nasal Given 2/15/25 0100)   piperacillin-tazobactam (ZOSYN) 4.5 g in D5W 100 mL IVPB (MB+) (4.5 g Intravenous New Bag 2/15/25 0428)   vancomycin - pharmacy to dose (has no administration in time range)   vancomycin (VANCOCIN) 1,000 mg in 0.9% NaCl 250 mL IVPB (admixture device) (has no administration in time range)   apixaban tablet 5 mg (5 mg Oral Given 2/15/25 0115)   furosemide tablet 40 mg (has no administration in time range)   dicyclomine capsule 10 mg (has no administration in time range)   OLANZapine tablet 5 mg (5 mg Oral Not Given 2/15/25 0045)   tamsulosin 24 hr capsule 0.4 mg (has no administration in time range)   pregabalin capsule 100 mg (100 mg Oral Given 2/15/25 0115)   polyethylene glycol packet 17 g (has no administration in time range)   pantoprazole EC tablet 40 mg (has no administration in time range)   morphine 12 hr tablet 15 mg (15 mg Oral Given 2/15/25 0428)   multivitamin tablet (has no administration in time range)   sodium chloride 0.9% bolus 1,000 mL 1,000 mL (0 mLs Intravenous Stopped 2/14/25 1853)   albumin human 25% bottle 50 g (0 g Intravenous Stopped 2/14/25 2044)   LIDOcaine HCL 10 mg/ml (1%) injection 10 mL (10 mLs Infiltration Given by Provider 2/14/25 1815)   iohexoL (OMNIPAQUE 350) injection 100 mL (100 mLs Intravenous Given 2/14/25 1806)   morphine injection 4 mg (4 mg Intravenous Given 2/14/25 1814)   ondansetron injection 4 mg (4 mg Intravenous Given 2/14/25 1814)   piperacillin-tazobactam (ZOSYN) 4.5 g in D5W 100 mL IVPB (MB+) (0 g Intravenous Stopped 2/14/25 2120)   vancomycin (VANCOCIN) 1,000 mg in 0.9% NaCl 250 mL IVPB (0 mg Intravenous Stopped  2/15/25 0041)   vancomycin 750 mg in 0.9% NaCl 250 mL IVPB (0 mg Intravenous Stopped 2/15/25 0145)     Medical Decision Making  Judging by the patient's chief complaint and pertinent history, the patient has the following possible differential diagnoses, including but not limited to the following.  Some of these are deemed to be lower likelihood and some more likely based on my physical exam and history combined with possible lab work and/or imaging studies.   Please see the pertinent studies, and refer to the HPI.  Some of these diagnoses will take further evaluation to fully rule out, perhaps as an outpatient and the patient was encouraged to follow up when discharged for more comprehensive evaluation.    SBP, progression of cancer, biliary disease, diverticulitis,mesenteric ischemia, intraabdominal abscess, retroperitoneal abscess, gastritis, gastroenteritis, hepatitis, hernia, pancreatitis, inflammatory bowel disease, PUD, gastroparesis, nephrolithiasis, constipation, GERD, IBS      Patient is a 60-year-old male sent to the emergency department after fluid results from 2 days ago with IR guided procedure showed concern for pus within the abdomen.  Patient was called today by his oncology office and recommended that he come to the emergency department.  Patient afebrile.  Review previous fluid studies.  There is no cell count culture differential.  The only pathology report findings state pus.  Patient afebrile, no leukocytosis.  Would suspect if drain large amount of purulent material he would be displaying some systemic signs of sepsis.  However given sent here for rule out of SBP discussed with lab to see if any fluids studies could be added on.  Unfortunately lab states unable to add on fluid studies.  Repeat paracentesis performed.  Both diagnostic and therapeutic.  4-1/2 L of fluid removed.  Patient reports improvement in abdominal pain.  Covered with antibiotics.  Discussed with medicine who will admit the  "patient.  All results discussed with the patient and family.  Answered all questions at this time.  Verbalized understanding and agreed to plan.    Problems Addressed:  Abdominal pain, unspecified abdominal location: acute illness or injury that poses a threat to life or bodily functions  Malignant neoplasm of pancreas, unspecified location of malignancy: acute illness or injury that poses a threat to life or bodily functions  Nausea: acute illness or injury that poses a threat to life or bodily functions  SBP (spontaneous bacterial peritonitis): acute illness or injury that poses a threat to life or bodily functions    Amount and/or Complexity of Data Reviewed  Independent Historian: spouse     Details: Patient's wife at bedside to give history. Wife reports patient gets weekly paracentesis procedures. She reports patient's last paracentesis was on 2/12. She reports a caregiver at patient's cancer center told her to present patient to this ED after the studies done on the peritoneal fluid came back as "almost 100% pus".  Labs: ordered.  Radiology: ordered and independent interpretation performed.  Discussion of management or test interpretation with external provider(s): Discussed with hospital medicine who will admit the patient    Risk  Prescription drug management.  Parenteral controlled substances.  Decision regarding hospitalization.  Diagnosis or treatment significantly limited by social determinants of health.            Scribe Attestation:   Scribe #1: I performed the above scribed service and the documentation accurately describes the services I performed. I attest to the accuracy of the note.    Attending Attestation:           Physician Attestation for Scribe:  Physician Attestation Statement for Scribe #1: I, Erich Aguilar MD, reviewed documentation, as scribed by Doreen Henry in my presence, and it is both accurate and complete.             ED Course as of 02/15/25 0502   Fri Feb 14, 2025   9312 --- " Message from Nurse Yessica sent at 2/14/2025  9:46 AM CST -----  Regarding: cytology  Patient had cytology on 2/12/25. Pathology results reveal pus only. Gross Description   `peritoneal fluid` is approximately 1000 cc of turbid yellow-green fluid. Dr. Messina made aware of results. Dr. Messina advised for patient to be further evaluated at [facility]. Patient's wife, Terri, made aware of cytology results and Dr. Messina's recommendations. She verbalized understanding and stated she would bring him to [facility] ED for further evaluation today. She reports that patient has not had a fever but has felt a decrease in energy the last couple of days. Report called into [facility] ED triage.   [RP]   1796 Paged Lab [MB]   9332 Discussed with Lab [MB]   8667 4.75L of fluids pulled over [RP]      ED Course User Index  [MB] Doreen Henry  [RP] Erich Aguilar MD                           Clinical Impression:  Final diagnoses:  [K65.2] SBP (spontaneous bacterial peritonitis) (Primary)  [R10.9] Abdominal pain, unspecified abdominal location  [R11.0] Nausea  [C25.9] Malignant neoplasm of pancreas, unspecified location of malignancy          ED Disposition Condition    Admit Stable                Erich Aguilar MD  02/15/25 3181

## 2025-02-14 NOTE — TELEPHONE ENCOUNTER
----- Message from Nurse Yessica sent at 2/14/2025  9:46 AM CST -----  Regarding: cytology  Patient had cytology on 2/12/25. Pathology results reveal pus only. Gross Description   `peritoneal fluid` is approximately 1000 cc of turbid yellow-green fluid. Dr. Messina made aware of results. Dr. Messina advised for patient to be further evaluated at [facility]. Patient's wife, Terri, made aware of cytology results and Dr. Messina's recommendations. She verbalized understanding and stated she would bring him to [facility] ED for further evaluation today. She reports that patient has not had a fever but has felt a decrease in energy the last couple of days. Report called into [facility] ED triage.

## 2025-02-15 PROBLEM — K65.2 SBP (SPONTANEOUS BACTERIAL PERITONITIS): Status: ACTIVE | Noted: 2025-02-15

## 2025-02-15 LAB
ALBUMIN FLD-MCNC: <0.4 GM/DL
ALBUMIN SERPL-MCNC: 3 G/DL (ref 3.5–5)
ALBUMIN/GLOB SERPL: 1.2 RATIO (ref 1.1–2)
ALP SERPL-CCNC: 369 UNIT/L (ref 40–150)
ALT SERPL-CCNC: 30 UNIT/L (ref 0–55)
ANION GAP SERPL CALC-SCNC: 6 MEQ/L
AST SERPL-CCNC: 28 UNIT/L (ref 5–34)
BASOPHILS # BLD AUTO: 0.02 X10(3)/MCL
BASOPHILS NFR BLD AUTO: 0.7 %
BILIRUB SERPL-MCNC: 0.6 MG/DL
BUN SERPL-MCNC: 9.4 MG/DL (ref 8.4–25.7)
CALCIUM SERPL-MCNC: 8.5 MG/DL (ref 8.4–10.2)
CHLORIDE SERPL-SCNC: 104 MMOL/L (ref 98–107)
CO2 SERPL-SCNC: 29 MMOL/L (ref 22–29)
CREAT SERPL-MCNC: 0.75 MG/DL (ref 0.72–1.25)
CREAT/UREA NIT SERPL: 13
EOSINOPHIL # BLD AUTO: 0.02 X10(3)/MCL (ref 0–0.9)
EOSINOPHIL NFR BLD AUTO: 0.7 %
ERYTHROCYTE [DISTWIDTH] IN BLOOD BY AUTOMATED COUNT: 13.8 % (ref 11.5–17)
GFR SERPLBLD CREATININE-BSD FMLA CKD-EPI: >60 ML/MIN/1.73/M2
GLOBULIN SER-MCNC: 2.6 GM/DL (ref 2.4–3.5)
GLUCOSE SERPL-MCNC: 250 MG/DL (ref 74–100)
HCT VFR BLD AUTO: 27 % (ref 42–52)
HGB BLD-MCNC: 8.8 G/DL (ref 14–18)
IMM GRANULOCYTES # BLD AUTO: 0.01 X10(3)/MCL (ref 0–0.04)
IMM GRANULOCYTES NFR BLD AUTO: 0.4 %
LACTATE SERPL-SCNC: 2.8 MMOL/L (ref 0.5–2.2)
LYMPHOCYTES # BLD AUTO: 0.54 X10(3)/MCL (ref 0.6–4.6)
LYMPHOCYTES NFR BLD AUTO: 18.9 %
MAGNESIUM SERPL-MCNC: 1.7 MG/DL (ref 1.6–2.6)
MCH RBC QN AUTO: 28.8 PG (ref 27–31)
MCHC RBC AUTO-ENTMCNC: 32.6 G/DL (ref 33–36)
MCV RBC AUTO: 88.2 FL (ref 80–94)
MONOCYTES # BLD AUTO: 0.37 X10(3)/MCL (ref 0.1–1.3)
MONOCYTES NFR BLD AUTO: 13 %
NEUTROPHILS # BLD AUTO: 1.89 X10(3)/MCL (ref 2.1–9.2)
NEUTROPHILS NFR BLD AUTO: 66.3 %
NRBC BLD AUTO-RTO: 0 %
PLATELET # BLD AUTO: 115 X10(3)/MCL (ref 130–400)
PLATELETS.RETICULATED NFR BLD AUTO: 2.6 % (ref 0.9–11.2)
PMV BLD AUTO: 10.9 FL (ref 7.4–10.4)
POCT GLUCOSE: 122 MG/DL (ref 70–110)
POCT GLUCOSE: 145 MG/DL (ref 70–110)
POCT GLUCOSE: 196 MG/DL (ref 70–110)
POCT GLUCOSE: 93 MG/DL (ref 70–110)
POTASSIUM SERPL-SCNC: 4.2 MMOL/L (ref 3.5–5.1)
PROT FLD-MCNC: <0.8 GM/DL
PROT SERPL-MCNC: 5.6 GM/DL (ref 6.4–8.3)
RBC # BLD AUTO: 3.06 X10(6)/MCL (ref 4.7–6.1)
SODIUM SERPL-SCNC: 139 MMOL/L (ref 136–145)
WBC # BLD AUTO: 2.85 X10(3)/MCL (ref 4.5–11.5)

## 2025-02-15 PROCEDURE — 25000003 PHARM REV CODE 250: Performed by: INTERNAL MEDICINE

## 2025-02-15 PROCEDURE — 11000001 HC ACUTE MED/SURG PRIVATE ROOM

## 2025-02-15 PROCEDURE — 25000003 PHARM REV CODE 250

## 2025-02-15 PROCEDURE — 83605 ASSAY OF LACTIC ACID: CPT

## 2025-02-15 PROCEDURE — 63600175 PHARM REV CODE 636 W HCPCS

## 2025-02-15 PROCEDURE — 82150 ASSAY OF AMYLASE: CPT | Performed by: INTERNAL MEDICINE

## 2025-02-15 PROCEDURE — 80053 COMPREHEN METABOLIC PANEL: CPT

## 2025-02-15 PROCEDURE — 21400001 HC TELEMETRY ROOM

## 2025-02-15 PROCEDURE — 36415 COLL VENOUS BLD VENIPUNCTURE: CPT

## 2025-02-15 PROCEDURE — 83735 ASSAY OF MAGNESIUM: CPT

## 2025-02-15 PROCEDURE — 84550 ASSAY OF BLOOD/URIC ACID: CPT | Performed by: INTERNAL MEDICINE

## 2025-02-15 PROCEDURE — 63600175 PHARM REV CODE 636 W HCPCS: Performed by: STUDENT IN AN ORGANIZED HEALTH CARE EDUCATION/TRAINING PROGRAM

## 2025-02-15 PROCEDURE — 85025 COMPLETE CBC W/AUTO DIFF WBC: CPT

## 2025-02-15 PROCEDURE — 25000003 PHARM REV CODE 250: Performed by: STUDENT IN AN ORGANIZED HEALTH CARE EDUCATION/TRAINING PROGRAM

## 2025-02-15 PROCEDURE — 63600175 PHARM REV CODE 636 W HCPCS: Performed by: INTERNAL MEDICINE

## 2025-02-15 RX ORDER — POLYETHYLENE GLYCOL 3350 17 G/17G
17 POWDER, FOR SOLUTION ORAL DAILY
Status: DISCONTINUED | OUTPATIENT
Start: 2025-02-15 | End: 2025-02-17

## 2025-02-15 RX ORDER — DRONABINOL 2.5 MG/1
2.5 CAPSULE ORAL
Status: DISCONTINUED | OUTPATIENT
Start: 2025-02-15 | End: 2025-02-17

## 2025-02-15 RX ORDER — TAMSULOSIN HYDROCHLORIDE 0.4 MG/1
0.4 CAPSULE ORAL DAILY
Status: DISCONTINUED | OUTPATIENT
Start: 2025-02-15 | End: 2025-02-17 | Stop reason: HOSPADM

## 2025-02-15 RX ORDER — DICYCLOMINE HYDROCHLORIDE 10 MG/1
10 CAPSULE ORAL
Status: DISCONTINUED | OUTPATIENT
Start: 2025-02-15 | End: 2025-02-16

## 2025-02-15 RX ORDER — MORPHINE SULFATE 15 MG/1
15 TABLET, FILM COATED, EXTENDED RELEASE ORAL 2 TIMES DAILY
Status: DISCONTINUED | OUTPATIENT
Start: 2025-02-15 | End: 2025-02-17 | Stop reason: HOSPADM

## 2025-02-15 RX ORDER — MIDODRINE HYDROCHLORIDE 5 MG/1
5 TABLET ORAL
Status: DISCONTINUED | OUTPATIENT
Start: 2025-02-15 | End: 2025-02-15

## 2025-02-15 RX ORDER — PREGABALIN 50 MG/1
100 CAPSULE ORAL 2 TIMES DAILY
Status: DISCONTINUED | OUTPATIENT
Start: 2025-02-15 | End: 2025-02-17 | Stop reason: HOSPADM

## 2025-02-15 RX ORDER — OLANZAPINE 5 MG/1
5 TABLET ORAL NIGHTLY
Status: DISCONTINUED | OUTPATIENT
Start: 2025-02-15 | End: 2025-02-17 | Stop reason: HOSPADM

## 2025-02-15 RX ORDER — FUROSEMIDE 40 MG/1
40 TABLET ORAL DAILY
Status: DISCONTINUED | OUTPATIENT
Start: 2025-02-15 | End: 2025-02-16

## 2025-02-15 RX ORDER — PANTOPRAZOLE SODIUM 40 MG/1
40 TABLET, DELAYED RELEASE ORAL
Status: DISCONTINUED | OUTPATIENT
Start: 2025-02-15 | End: 2025-02-17 | Stop reason: HOSPADM

## 2025-02-15 RX ADMIN — PIPERACILLIN SODIUM AND TAZOBACTAM SODIUM 4.5 G: 4; .5 INJECTION, POWDER, LYOPHILIZED, FOR SOLUTION INTRAVENOUS at 09:02

## 2025-02-15 RX ADMIN — DICYCLOMINE HYDROCHLORIDE 10 MG: 10 CAPSULE ORAL at 09:02

## 2025-02-15 RX ADMIN — POLYETHYLENE GLYCOL 3350 17 G: 17 POWDER, FOR SOLUTION ORAL at 08:02

## 2025-02-15 RX ADMIN — MUPIROCIN: 20 OINTMENT TOPICAL at 08:02

## 2025-02-15 RX ADMIN — DICYCLOMINE HYDROCHLORIDE 10 MG: 10 CAPSULE ORAL at 06:02

## 2025-02-15 RX ADMIN — TAMSULOSIN HYDROCHLORIDE 0.4 MG: 0.4 CAPSULE ORAL at 08:02

## 2025-02-15 RX ADMIN — MUPIROCIN: 20 OINTMENT TOPICAL at 01:02

## 2025-02-15 RX ADMIN — DICYCLOMINE HYDROCHLORIDE 10 MG: 10 CAPSULE ORAL at 10:02

## 2025-02-15 RX ADMIN — DRONABINOL 2.5 MG: 2.5 CAPSULE ORAL at 04:02

## 2025-02-15 RX ADMIN — MORPHINE SULFATE 2 MG: 4 INJECTION, SOLUTION INTRAMUSCULAR; INTRAVENOUS at 10:02

## 2025-02-15 RX ADMIN — DICYCLOMINE HYDROCHLORIDE 10 MG: 10 CAPSULE ORAL at 04:02

## 2025-02-15 RX ADMIN — PREGABALIN 100 MG: 50 CAPSULE ORAL at 08:02

## 2025-02-15 RX ADMIN — APIXABAN 5 MG: 5 TABLET, FILM COATED ORAL at 09:02

## 2025-02-15 RX ADMIN — FUROSEMIDE 40 MG: 40 TABLET ORAL at 08:02

## 2025-02-15 RX ADMIN — PREGABALIN 100 MG: 50 CAPSULE ORAL at 09:02

## 2025-02-15 RX ADMIN — MORPHINE SULFATE 2 MG: 4 INJECTION, SOLUTION INTRAMUSCULAR; INTRAVENOUS at 12:02

## 2025-02-15 RX ADMIN — DRONABINOL 2.5 MG: 2.5 CAPSULE ORAL at 08:02

## 2025-02-15 RX ADMIN — MUPIROCIN: 20 OINTMENT TOPICAL at 09:02

## 2025-02-15 RX ADMIN — APIXABAN 5 MG: 5 TABLET, FILM COATED ORAL at 08:02

## 2025-02-15 RX ADMIN — THERA TABS 1 TABLET: TAB at 08:02

## 2025-02-15 RX ADMIN — VANCOMYCIN HYDROCHLORIDE 750 MG: 750 INJECTION, POWDER, LYOPHILIZED, FOR SOLUTION INTRAVENOUS at 12:02

## 2025-02-15 RX ADMIN — APIXABAN 5 MG: 5 TABLET, FILM COATED ORAL at 01:02

## 2025-02-15 RX ADMIN — PANTOPRAZOLE SODIUM 40 MG: 40 TABLET, DELAYED RELEASE ORAL at 06:02

## 2025-02-15 RX ADMIN — MORPHINE SULFATE 15 MG: 15 TABLET, FILM COATED, EXTENDED RELEASE ORAL at 04:02

## 2025-02-15 RX ADMIN — MORPHINE SULFATE 15 MG: 15 TABLET, FILM COATED, EXTENDED RELEASE ORAL at 05:02

## 2025-02-15 RX ADMIN — PIPERACILLIN SODIUM AND TAZOBACTAM SODIUM 4.5 G: 4; .5 INJECTION, POWDER, LYOPHILIZED, FOR SOLUTION INTRAVENOUS at 01:02

## 2025-02-15 RX ADMIN — PIPERACILLIN SODIUM AND TAZOBACTAM SODIUM 4.5 G: 4; .5 INJECTION, POWDER, LYOPHILIZED, FOR SOLUTION INTRAVENOUS at 04:02

## 2025-02-15 RX ADMIN — PREGABALIN 100 MG: 50 CAPSULE ORAL at 01:02

## 2025-02-15 RX ADMIN — INSULIN ASPART 2 UNITS: 100 INJECTION, SOLUTION INTRAVENOUS; SUBCUTANEOUS at 04:02

## 2025-02-15 RX ADMIN — INSULIN ASPART 6 UNITS: 100 INJECTION, SOLUTION INTRAVENOUS; SUBCUTANEOUS at 12:02

## 2025-02-15 RX ADMIN — Medication: at 09:02

## 2025-02-15 NOTE — PROGRESS NOTES
"Pharmacokinetic Initial Assessment: IV Vancomycin    Assessment/Plan:    Initiate intravenous vancomycin with loading dose of 1750 mg once followed by a maintenance dose of vancomycin 1000 mg IV every 12 hours  Desired empiric serum trough concentration is 15 to 20 mcg/mL  Draw vancomycin trough level 60 min prior to fourth dose on 02/16 at approximately 1000  Pharmacy will continue to follow and monitor vancomycin.      Please contact pharmacy at extension [phone] with any questions regarding this assessment.     Thank you for the consult,   Arash Mann       Patient brief summary:  Warren P Lejeune is a 60 y.o. male initiated on antimicrobial therapy with IV Vancomycin for treatment of suspected intra-abdominal infection    Drug Allergies:   Review of patient's allergies indicates:  No Known Allergies    Actual Body Weight:   Wt Readings from Last 1 Encounters:   02/14/25 65.8 kg (145 lb)       Renal Function:   Estimated Creatinine Clearance: 82.3 mL/min (based on SCr of 0.9 mg/dL).,     Dialysis Method (if applicable):  N/A    CBC (last 72 hours):  Recent Labs   Lab Result Units 02/14/25  1653   WBC x10(3)/mcL 4.78   Hgb g/dL 11.4*   Hct % 35.4*   Platelet x10(3)/mcL 198   Mono % % 9.8   Eos % % 0.2   Basophil % % 0.4       Metabolic Panel (last 72 hours):  Recent Labs   Lab Result Units 02/14/25  1653 02/14/25  1712   Sodium mmol/L 134*  --    Potassium mmol/L 4.7  --    Chloride mmol/L 101  --    CO2 mmol/L 28  --    Glucose mg/dL 378*  --    Glucose, UA   --  3+*   Blood Urea Nitrogen mg/dL 11.0  --    Creatinine mg/dL 0.90  --    Albumin g/dL 3.0*  --    Bilirubin Total mg/dL 0.6  --    ALP unit/L 523*  --    AST unit/L 45*  --    ALT unit/L 43  --        Drug levels (last 3 results):  No results for input(s): "VANCOMYCINRA", "VANCORANDOM", "VANCOMYCINPE", "VANCOPEAK", "VANCOMYCINTR", "VANCOTROUGH" in the last 72 hours.    Microbiologic Results:  Microbiology Results (last 7 days)       Procedure Component Value " Units Date/Time    Gram Stain [[id number]] Collected: 02/14/25 1917    Order Status: Completed Specimen: Peritoneal Fluid from Abdominal Fluid Updated: 02/14/25 2027     GRAM STAIN Many WBC observed      No bacteria seen    Body Fluid Culture [[id number]] Collected: 02/14/25 1917    Order Status: Sent Specimen: Peritoneal Fluid from Abdomen Updated: 02/14/25 1921    Blood Culture [[id number]] Collected: 02/14/25 1653    Order Status: Sent Specimen: Blood Updated: 02/14/25 1905    Blood Culture [[id number]] Collected: 02/14/25 1653    Order Status: Sent Specimen: Blood Updated: 02/14/25 1905    Urine culture [[id number]] Collected: 02/14/25 1712    Order Status: Sent Specimen: Urine Updated: 02/14/25 1817

## 2025-02-15 NOTE — PROGRESS NOTES
[facility] [address] Surg  Wound Care    Patient Name:  Warren P Lejeune   MRN:  [id number]  Date: 2/15/2025  Diagnosis: SBP (spontaneous bacterial peritonitis)    History:     Past Medical History:   Diagnosis Date    Abdominal pain     Admission for chemotherapy     last chemo 9/12/24    Anxiety disorder, unspecified     Back pain     Bradycardia     Enlarged prostate     GERD (gastroesophageal reflux disease)     Hypertension     no cardiologist; no longer on meds since weight loss due to pancreatic cancer    Memory loss     Pancreatic cancer     Peripheral neuropathy     Type 2 diabetes mellitus with unspecified diabetic retinopathy without macular edema        Social History[1]    Precautions:     Allergies as of 02/14/2025    (No Known Allergies)       [facility] Assessment Details/Treatment     Initial visit regarding affected area over sacrum, patient is currently resting on a pressure redistribution mattress and demonstrated ability to mobilize self well in bed to off load his bony  prominences . Area over sacrum, coccyx and buttocks cleansed and assessed , noting blanchable erythema and vulnerable areas, reinforced silicone bordered foam dressing in use. Reviewed pressure injury prevention measures with patient who verbalized understanding .        02/15/25 1000        Wound 02/14/25 2200 Pressure Injury Sacral spine #1   Date First Assessed/Time First Assessed: 02/14/25 2200   Present on Original Admission: Yes  Primary Wound Type: Pressure Injury  Location: Sacral spine  Wound Number: #1   Wound Image    Pressure Injury Stage   (blanchable erythema currently.)   Dressing Appearance Dry;Intact   Drainage Amount None   Appearance Pink  (blanchable)   Tissue loss description Not applicable   Periwound Area Intact;Dry   Wound Edges Undefined   Care Soap and water   Dressing Reinforced   Off Loading   (patient mobilizes self well in bed to off load his bony prominences.)   Dressing Change Due  02/17/25         Recommendations made to primary team for local wound and skin care and pressure injury prevention measures. Orders placed.     02/15/2025         [1]   Social History  Socioeconomic History    Marital status:    Tobacco Use    Smoking status: Never     Passive exposure: Never    Smokeless tobacco: Former     Types: Chew   Substance and Sexual Activity    Alcohol use: Not Currently    Drug use: Never    Sexual activity: Yes     Partners: Female     Social Drivers of Health     Financial Resource Strain: High Risk (2/15/2025)    Overall Financial Resource Strain (CARDIA)     Difficulty of Paying Living Expenses: Very hard   Food Insecurity: Food Insecurity Present (2/15/2025)    Hunger Vital Sign     Worried About Running Out of Food in the Last Year: Often true     Ran Out of Food in the Last Year: Often true   Transportation Needs: No Transportation Needs (1/28/2025)    TRANSPORTATION NEEDS     Transportation : No   Recent Concern: Transportation Needs - Unmet Transportation Needs (1/28/2025)    TRANSPORTATION NEEDS     Transportation : Yes, it has kept me from medical appointments or from getting my medications.   Physical Activity: Sufficiently Active (1/15/2025)    Exercise Vital Sign     Days of Exercise per Week: 7 days     Minutes of Exercise per Session: 150+ min   Stress: Stress Concern Present (2/15/2025)    [employer] [place] [employer] - Occupational Stress Questionnaire     Feeling of Stress : Rather much   Housing Stability: High Risk (2/15/2025)    Housing Stability Vital Sign     Unable to Pay for Housing in the Last Year: Yes     Homeless in the Last Year: No

## 2025-02-15 NOTE — H&P
[facility] Hospital Medicine - H&P Note    Patient Name: Warren P Lejeune  : 1965  MRN: [id number]  PCP: No primary care provider on file.  Admitting Physician:   Admission Class: IP- Inpatient   Code status: FULL    Allergies   Patient has no known allergies.    Chief Complaint     Abdominal Pain    History of Present Illness     Warren Lejeune is a 60-year-old M with a PMH of pancreatic ductal adenocarcinoma (Dx 2022) was previously doing chemo (last session 2024) but has switch to oral regimen that he has not yet begun, portal vein thrombosis takes Eliquis, insulin-dependent DM II, chronic ascites and weekly paracentesis presents to the ED after being told by the [facility] that his last sample of paracentesis fluid was positive with pus. Patient denies fever, denies nausea, denies vomiting, complains of generalized abdominal pain that radiates to bilateral flanks.    ED course: Initial ED vitals temp 97.6°, HR 83 beats per minute, respirations 18, /85, SpO2 94% on room air.  ED workup revealed glucose 378, , AST 45, albumin 3.0, initial lactic 2.9 with repeat lactic 3.4, UA positive for protein, glucose, ketones, urobilinogen, RBC, WBC, hyaline casts, peritoneal fluid Gram stain positive for WBC, CT abdomen and pelvis with IV contrast read large volume ascites appears worse than prior examination, pancreatic head mass similar but there appears to be a mass in the pancreatic neck on this examination that was not clearly seen on prior examination, previous embolization procedure in the liver, pneumobilia.  Patient was given albumin 50 g IV, morphine 4 mg IV, Zofran 4 mg IV, Zosyn 4.5 g IV and NaCl 1 L IV in the ED and admitted to Hospital Medicine for further care.      ROS   Except as documented, all other systems reviewed and negative     Past Medical History     Past Medical History:   Diagnosis Date    Abdominal pain     Admission for  chemotherapy     last chemo 9/12/24    Anxiety disorder, unspecified     Back pain     Bradycardia     Enlarged prostate     GERD (gastroesophageal reflux disease)     Hypertension     no cardiologist; no longer on meds since weight loss due to pancreatic cancer    Memory loss     Pancreatic cancer     Peripheral neuropathy     Type 2 diabetes mellitus with unspecified diabetic retinopathy without macular edema        Past Surgical History     Past Surgical History:   Procedure Laterality Date    bile duct stents times 2      EGD, WITH STENT INSERTION      INSERTION OF TUNNELED CENTRAL VENOUS CATHETER (CVC) WITH SUBCUTANEOUS PORT Right 9/20/2024    Procedure: [id number];  Surgeon: Moe Champagne MD;  Location: [facility];  Service: General;  Laterality: Right;    MEDIPORT REMOVAL      times 2       Social History   Denies alcohol, tobacco or illicit drug use.     Screening for Social Drivers for health:  Patient screened for food insecurity, housing instability, transportation needs, utility difficulties, and interpersonal safety (select all that apply as identified as concern)  []Housing or Food  []Transportation Needs  []Utility Difficulties  []Interpersonal safety  [x]None        Family History   Reviewed and negative    Home Medications     Prior to Admission medications    Medication Sig Start Date End Date Taking? Authorizing Provider   albuterol (PROVENTIL/VENTOLIN HFA) 90 mcg/actuation inhaler Inhale 1-2 puffs into the lungs every 6 (six) hours as needed for Wheezing. Rescue 1/26/25 1/26/26  Mustapha Montero MD   aluminum & magnesium hydroxide-simethicone (MYLANTA MAX STRENGTH) 400-400-40 mg/5 mL suspension Take 5 mLs by mouth 4 (four) times daily as needed (mouth sores). 9/18/24 9/18/25  Terri Marcelino, ERNA   apixaban (ELIQUIS) 5 mg Tab Take 1 tablet (5 mg total) by mouth 2 (two) times daily. Take 2 tablets by mouth (10 mg) twice a day for 7 days, then 1 tablet (5 mg total) by mouth twice  a day thereafter 10/29/24   Jena Msesina MD   benzonatate (TESSALON) 100 MG capsule Take 1 capsule (100 mg total) by mouth 3 (three) times daily as needed for Cough. 1/26/25   Mustapha Montero MD   capecitabine (XELODA) 500 MG Tab Take 3 tablets (1,500 mg total) by mouth 2 (two) times daily on days 1-14 of each 21 day cycle (2 weeks on, then 1 week off). 2/5/25   Jena Messina MD   cholecalciferol, vitamin D3, (VITAMIN D3) 50 mcg (2,000 unit) Tab Take 2,000 Units by mouth once daily.    Provider, Historical   dexAMETHasone (DECADRON) 4 MG Tab TAKE 2 TABLETS BY MOUTH ONCE DAILY AS DIRECTED ON DAYS 2 AND 3 OF CHEMOTHERAPY CYCLE 11/26/24   Provider, Historical   dicyclomine (BENTYL) 10 MG capsule TAKE 1 CAPSULE BY MOUTH 4 TIMES DAILY BEFORE MEAL(S) AND NIGHTLY 2/3/25   Abby Bah FNP   diphenhydrAMINE (BENADRYL) 12.5 mg/5 mL elixir Take 5 mLs (12.5 mg total) by mouth 4 (four) times daily as needed (mouth sores). 9/18/24   Terri Marcelino FNP   furosemide (LASIX) 40 MG tablet Take 1 tablet (40 mg total) by mouth once daily. 1/17/25 1/17/26  Todd Montes De Oca MD   insulin aspart U-100 (NOVOLOG) 100 unit/mL injection Inject into the skin 3 (three) times daily before meals. prn    Provider, Historical   LIDOcaine viscous HCl 2% (LIDOCAINE VISCOUS) 2 % Soln by Mucous Membrane route 4 (four) times daily as needed (mouth sores). Swish and spit 15 ml (5 ml benadryl, 5 ml mylanta, 5 ml lidocaine) by mouth 4 times daily as needed for mouth sores 9/18/24   Terri Marcelino FNP   magnesium oxide (MAGOX) 400 mg (241.3 mg magnesium) tablet Take 1 tablet (400 mg total) by mouth once daily. 12/18/24 12/18/25  Abby Bah FNP   midodrine (PROAMATINE) 5 MG Tab Take 1 tablet (5 mg total) by mouth 3 (three) times daily with meals. for 7 days 1/29/25 2/5/25  Dave Sellers MD   morphine (MS CONTIN) 15 MG 12 hr tablet Take 1 tablet (15 mg total) by mouth 2 (two) times daily. 1/6/25   Abby Bah, TRUONGP    multivitamin with folic acid 400 mcg Tab Take 1 tablet by mouth once daily.    Provider, Historical   OLANZapine (ZYPREXA) 5 MG tablet Take 1 tablet (5 mg total) by mouth nightly. 9/9/24 10/7/24  Danielle Bryant, NP   ondansetron (ZOFRAN) 8 MG tablet Take 8 mg by mouth. 6/12/24   Provider, Historical   oxyCODONE-acetaminophen (PERCOCET)  mg per tablet Take 1 tablet by mouth every 4 (four) hours as needed for Pain. 1/6/25   Abby Bah FNP   pantoprazole (PROTONIX) 40 MG tablet Take 1 tablet (40 mg total) by mouth once daily. 12/26/24 3/26/25  Abby Bah FNP   polyethylene glycol (GLYCOLAX) 17 gram PwPk Take 17 g by mouth. 8/9/24   Provider, Historical   pregabalin (LYRICA) 100 MG capsule Take 1 capsule (100 mg total) by mouth 2 (two) times daily. 12/18/24 7/16/25  Abby Bah FNP   prochlorperazine (COMPAZINE) 10 MG tablet Take 10 mg by mouth. 6/12/24   Provider, Historical   tamsulosin (FLOMAX) 0.4 mg Cap Take 1 capsule (0.4 mg total) by mouth once daily. 1/15/25 4/15/25  Abby Bah FNP   TRESIBA FLEXTOUCH U-200 200 unit/mL (3 mL) insulin pen Inject 14 Units into the skin. 3/12/24   Provider, Historical   vibegron (GEMTESA) 75 mg Tab Take 75 mg by mouth.    Provider, Historical        Physical Exam   Vital Signs  Temp:  [97.6 °F (36.4 °C)-97.9 °F (36.6 °C)]   Pulse:  [63-83]   Resp:  [12-20]   BP: (115-148)/(66-89)   SpO2:  [94 %-99 %]    General: Appears comfortable  HEENT: NC/AT  Neck:  No JVD  Chest: CTABL  CVS: Regular rhythm. Normal S1/S2.  Abdomen: nondistended, hyperactive BS, soft, taught, tender to palpation  MSK: No obvious deformity or joint swelling  Skin: Warm and dry, slightly jaundiced in color  Neuro: AAOx3, no focal neurological deficit  Psych: Cooperative    Labs     Recent Labs     02/14/25  1653   WBC 4.78   RBC 3.98*   HGB 11.4*   HCT 35.4*   MCV 88.9   MCH 28.6   MCHC 32.2*   RDW 13.7        Recent Labs     02/14/25  1653   INR 1.3      Recent Labs      "02/14/25 1653   *   K 4.7   CO2 28   BUN 11.0   CREATININE 0.90   EGFRNORACEVR >60   GLUCOSE 378*   CALCIUM 9.2   ALBUMIN 3.0*   GLOBULIN 3.7*   ALKPHOS 523*   ALT 43   AST 45*   BILITOT 0.6     No results for input(s): "LACTIC" in the last 72 hours.  No results for input(s): "CPK", "TROPONINI" in the last 72 hours.     Microbiology Results (last 7 days)       Procedure Component Value Units Date/Time    Gram Stain [[id number]] Collected: 02/14/25 1917    Order Status: Completed Specimen: Peritoneal Fluid from Abdominal Fluid Updated: 02/14/25 2027     GRAM STAIN Many WBC observed      No bacteria seen    Body Fluid Culture [[id number]] Collected: 02/14/25 1917    Order Status: Sent Specimen: Peritoneal Fluid from Abdomen Updated: 02/14/25 1921    Blood Culture [[id number]] Collected: 02/14/25 1653    Order Status: Sent Specimen: Blood Updated: 02/14/25 1905    Blood Culture [[id number]] Collected: 02/14/25 1653    Order Status: Sent Specimen: Blood Updated: 02/14/25 1905    Urine culture [[id number]] Collected: 02/14/25 1712    Order Status: Sent Specimen: Urine Updated: 02/14/25 1817           Imaging   CT Abdomen Pelvis With IV Contrast NO Oral Contrast  Narrative: EXAMINATION:  CT ABDOMEN PELVIS WITH IV CONTRAST    CLINICAL HISTORY:  Abdominal abscess/infection suspected;    TECHNIQUE:  Low dose axial images, sagittal and coronal reformations were obtained from the lung bases to the pubic symphysis following the IV administration of contrast. Automatic exposure control (AEC) is utilized to reduce patient radiation exposure.    COMPARISON:  01/07/2025 the    FINDINGS:  There are changes consistent with COPD and emphysema in the lung bases.  There is right lower lobe atelectasis.    There is large volume ascites seen..    There are embolization coil seen in the liver along the inferior margin.  The liver enhances heterogeneously.  This may be due to the early arterial phase of the examination.  There is " evidence of some pneumobilia.  Some intrahepatic biliary dilatation is seen.  The pancreatic head mass is poorly visualized on this examination due to the significant amount of mesenteric edema.  It measures roughly 2 3.3 x 3.1 cm.  It is similar to the prior examination but now there appears to be a area of hypoattenuation and mass in the neck of the pancreas.  This was not clearly seen on the prior examination.    There is a connection between the lesser sac and the stomach likely due to previous marsupialization of pancreatic pseudocyst.    Spleen appears unremarkable.  Adrenal glands appear normal.  Kidneys show no acute abnormality.    Visualized portion of the bowel shows no gross abnormality but the examination is limited by the large volume ascites and anasarca edema    Urinary bladder appears unremarkable.  Impression: Large volume ascites appears worse than the prior examination    Pancreatic head mass similar but there appears to be a mass in the pancreatic neck on this examination.  This was not clearly seen on the prior examination.    Previous embolization procedure in the liver    Pneumobilia    Electronically signed by: Vicente Saldana  Date:    02/14/2025  Time:    18:17    Assessment & Plan     SBP    History: pancreatic ductal adenocarcinoma, portal vein thrombosis, DM II, chronic ascites     - Pharmacy to dose vanc  - Zosyn 4.5 g IV q.8 hours  - monitor daily labs (CBC, CMP, Mag)  - Consult oncology    VTE Prophylaxis: SCDs, resume daily April Venegas, ERNA-C have discussed this patients case with Dr. Qureshi who agrees with the diagnosis and treatment plan.       ________________________________________________________________________  I, Dr. Angel Qureshi assumed care of this patient.  For the patient encounter, I performed the substantive portion of the visit, I reviewed the NPPA documentation, treatment plan, and medical decision making.  I had face to face time with this  patient.  I have personally reviewed the labs and test results that are presently available. I have reviewed or attempted to review medical records based upon their availability. If patient was admitted under observational status it is with my approval.      Pleasant 60-year-old male with pancreatic cancer, refractory ascites requiring paracentesis underwent an elective outpatient paracentesis 02/12/2025.  Results came back with 314 WBCs which were 5% sites.  On exam he has no significant tenderness to palpation.  Technically he meets the criteria for SBP having over 250 WBCs however clinically he is not quite presenting as such.  Continue empiric antibiotics for now pending cultures.  G stains were negative.  He was tapped again in the ER and will send that fluid for analysis.  4.5 L of fluid was removed.    Time seen:  11:00 p.m. 2/14  Angel Qureshi MD

## 2025-02-15 NOTE — PROGRESS NOTES
[facility] MEDICINE ~ PROGRESS NOTE        CHIEF COMPLAINT   Hospital follow up    HOSPITAL COURSE   Warren Lejeune is a 60-year-old M with a PMH of pancreatic ductal adenocarcinoma (Dx 8/2022) was previously doing chemo (last session December 4th, 2024) but has switch to oral regimen that he has not yet begun, portal vein thrombosis takes Eliquis, insulin-dependent DM II, chronic ascites and weekly paracentesis presents to the ED after [facility] that his last sample of paracentesis fluid was positive with pus. Patient denies fever, denies nausea, denies vomiting, complains of generalized abdominal pain that radiates to bilateral flanks.     ED course: Initial ED vitals temp 97.6°, HR 83 beats per minute, respirations 18, /85, SpO2 94% on room air.  ED workup revealed glucose 378, , AST 45, albumin 3.0, initial lactic 2.9 with repeat lactic 3.4, UA positive for protein, glucose, ketones, urobilinogen, RBC, WBC, hyaline casts, peritoneal fluid Gram stain positive for WBC, CT abdomen and pelvis with IV contrast read large volume ascites appears worse than prior examination, pancreatic head mass similar but there appears to be a mass in the pancreatic neck on this examination that was not clearly seen on prior examination, previous embolization procedure in the liver, pneumobilia.  Patient was given albumin 50 g IV, morphine 4 mg IV, Zofran 4 mg IV, Zosyn 4.5 g IV and NaCl 1 L IV in the ED and admitted to Hospital Medicine for further care.    Today  Seen examined this morning.  Patient has been admitted for SBP however per the criteria does not meet SBP.  He has 314 WBC with 51% neutrophils which is less than 250.  He does have abdominal tenderness        OBJECTIVE/PHYSICAL EXAM     VITAL SIGNS (MOST RECENT):  Temp: 98.5 °F (36.9 °C) (02/15/25 0725)  Pulse: 65 (02/15/25 0725)  Resp: 17 (02/15/25 0725)  BP: 103/68 (02/15/25 0725)  SpO2: 96 %  (02/15/25 0725) VITAL SIGNS (24 HOUR RANGE):  Temp:  [97.6 °F (36.4 °C)-98.5 °F (36.9 °C)] 98.5 °F (36.9 °C)  Pulse:  [56-88] 65  Resp:  [12-20] 17  SpO2:  [94 %-99 %] 96 %  BP: ()/(53-89) 103/68   GENERAL: In no acute distress, afebrile  HEENT:  CHEST: Clear to auscultation bilaterally  HEART: S1, S2, no appreciable murmur  ABDOMEN:  Distended and tender diffusely  MSK: Warm, 2+ lower extremity edema, no clubbing or cyanosis  NEUROLOGIC: Alert and oriented x4, moving all extremities with good strength   INTEGUMENTARY:  PSYCHIATRY:        ASSESSMENT/PLAN   Generalized abdominal tenderness   Ascites   Lactic acidosis   Known pneumobilia    History of: As listed above      Referred to ED for pathologist writing pus on report.  Current peritoneal fluid studies do not suggest SBP.  We will follow cultures.  Continue with Zosyn, discontinue vancomycin.  Add albumin and protein on the peritoneal fluid.    DVT prophylaxis:  Eliquis    Anticipated discharge and disposition:   __________________________________________________________________________    NUTRITIONAL STATUS     Patient meets ASPEN criteria for   malnutrition of   per RD assessment as evidenced by:                       A minimum of two characteristics is recommended for diagnosis of either severe or non-severe malnutrition.     LABS/MICRO/MEDS/DIAGNOSTICS       LABS  Recent Labs     02/15/25  0037      K 4.2   CO2 29   BUN 9.4   CREATININE 0.75   GLUCOSE 250*   CALCIUM 8.5   ALKPHOS 369*   AST 28   ALT 30   ALBUMIN 3.0*     Recent Labs     02/15/25  0037   WBC 2.85*   RBC 3.06*   HCT 27.0*   MCV 88.2   *       MICROBIOLOGY  Microbiology Results (last 7 days)       Procedure Component Value Units Date/Time    Body Fluid Culture [[id number]]     Order Status: Sent Specimen: Peritoneal Fluid from Ascites Fluid     Gram Stain [[id number]] Collected: 02/14/25 1917    Order Status: Completed Specimen: Peritoneal Fluid from Abdominal Fluid Updated:  02/14/25 2027     GRAM STAIN Many WBC observed      No bacteria seen    Body Fluid Culture [[id number]] Collected: 02/14/25 1917    Order Status: Sent Specimen: Peritoneal Fluid from Abdomen Updated: 02/14/25 1921    Blood Culture [[id number]] Collected: 02/14/25 1653    Order Status: Sent Specimen: Blood Updated: 02/14/25 1905    Blood Culture [[id number]] Collected: 02/14/25 1653    Order Status: Sent Specimen: Blood Updated: 02/14/25 1905    Urine culture [[id number]] Collected: 02/14/25 1712    Order Status: Sent Specimen: Urine Updated: 02/14/25 1817               MEDICATIONS   apixaban  5 mg Oral BID    dicyclomine  10 mg Oral QID (AC & HS)    furosemide  40 mg Oral Daily    morphine  15 mg Oral BID    multivitamin  1 tablet Oral Daily    mupirocin   Nasal BID    OLANZapine  5 mg Oral Nightly    pantoprazole  40 mg Oral Before breakfast    piperacillin-tazobactam (Zosyn) IV (PEDS and ADULTS) (extended infusion is not appropriate)  4.5 g Intravenous Q8H    polyethylene glycol  17 g Oral Daily    pregabalin  100 mg Oral BID    tamsulosin  0.4 mg Oral Daily    vancomycin (VANCOCIN) 1,000 mg in 0.9% NaCl 250 mL IVPB (admixture device)  1,000 mg Intravenous Q12H         INFUSIONS         DIAGNOSTIC TESTS  ED US FAST   Final Result      CT Abdomen Pelvis With IV Contrast NO Oral Contrast   Final Result      Large volume ascites appears worse than the prior examination      Pancreatic head mass similar but there appears to be a mass in the pancreatic neck on this examination.  This was not clearly seen on the prior examination.      Previous embolization procedure in the liver      Pneumobilia         Electronically signed by: Vicente Saldana   Date:    02/14/2025   Time:    18:17           No echocardiogram results found for the past 14 days.         Case related differential diagnoses have been reviewed; assessment and plan has been documented. I have personally reviewed the labs and test results that are currently  available; I have reviewed the patients medication list. I have reviewed the consulting providers recommendations. I have reviewed or attempted to review medical records based upon their availability.  All of the patient's and/or family's questions have been addressed and answered to the best of my ability.  I will continue to monitor closely and make adjustments to medical management as needed.  This document was created using M*Modal Fluency Direct.  Transcription errors may have been made.  Please contact me if any questions may rise regarding documentation to clarify transcription.        Tristian Aguilar MD   Internal Medicine  Department of Hospital Medicine [facility] - [address]

## 2025-02-16 PROBLEM — E44.0 MODERATE MALNUTRITION: Status: ACTIVE | Noted: 2025-02-16

## 2025-02-16 LAB
ALBUMIN SERPL-MCNC: 3.2 G/DL (ref 3.5–5)
ALBUMIN/GLOB SERPL: 1.2 RATIO (ref 1.1–2)
ALP SERPL-CCNC: 366 UNIT/L (ref 40–150)
ALT SERPL-CCNC: 24 UNIT/L (ref 0–55)
AMYLASE FLD-CCNC: <4 U/L
AMYLASE SERPL-CCNC: 23 UNIT/L (ref 25–125)
ANION GAP SERPL CALC-SCNC: 10 MEQ/L
AST SERPL-CCNC: 28 UNIT/L (ref 5–34)
BACTERIA UR CULT: ABNORMAL
BASOPHILS # BLD AUTO: 0.02 X10(3)/MCL
BASOPHILS NFR BLD AUTO: 0.5 %
BILIRUB SERPL-MCNC: 0.7 MG/DL
BUN SERPL-MCNC: 13 MG/DL (ref 8.4–25.7)
CALCIUM SERPL-MCNC: 8.7 MG/DL (ref 8.4–10.2)
CHLORIDE SERPL-SCNC: 96 MMOL/L (ref 98–107)
CO2 SERPL-SCNC: 32 MMOL/L (ref 22–29)
CREAT SERPL-MCNC: 0.84 MG/DL (ref 0.72–1.25)
CREAT/UREA NIT SERPL: 15
EOSINOPHIL # BLD AUTO: 0.04 X10(3)/MCL (ref 0–0.9)
EOSINOPHIL NFR BLD AUTO: 0.9 %
ERYTHROCYTE [DISTWIDTH] IN BLOOD BY AUTOMATED COUNT: 14 % (ref 11.5–17)
GFR SERPLBLD CREATININE-BSD FMLA CKD-EPI: >60 ML/MIN/1.73/M2
GLOBULIN SER-MCNC: 2.6 GM/DL (ref 2.4–3.5)
GLUCOSE SERPL-MCNC: 272 MG/DL (ref 74–100)
HCT VFR BLD AUTO: 28 % (ref 42–52)
HGB BLD-MCNC: 9.1 G/DL (ref 14–18)
IMM GRANULOCYTES # BLD AUTO: 0.01 X10(3)/MCL (ref 0–0.04)
IMM GRANULOCYTES NFR BLD AUTO: 0.2 %
INR PPP: 1.7
LIPASE SERPL-CCNC: <4 U/L
LYMPHOCYTES # BLD AUTO: 0.71 X10(3)/MCL (ref 0.6–4.6)
LYMPHOCYTES NFR BLD AUTO: 16.5 %
MAGNESIUM SERPL-MCNC: 1.7 MG/DL (ref 1.6–2.6)
MCH RBC QN AUTO: 29.3 PG (ref 27–31)
MCHC RBC AUTO-ENTMCNC: 32.5 G/DL (ref 33–36)
MCV RBC AUTO: 90 FL (ref 80–94)
MONOCYTES # BLD AUTO: 0.45 X10(3)/MCL (ref 0.1–1.3)
MONOCYTES NFR BLD AUTO: 10.5 %
NEUTROPHILS # BLD AUTO: 3.07 X10(3)/MCL (ref 2.1–9.2)
NEUTROPHILS NFR BLD AUTO: 71.4 %
NRBC BLD AUTO-RTO: 0 %
PHOSPHATE SERPL-MCNC: 3 MG/DL (ref 2.3–4.7)
PLATELET # BLD AUTO: 141 X10(3)/MCL (ref 130–400)
PLATELETS.RETICULATED NFR BLD AUTO: 4.5 % (ref 0.9–11.2)
PMV BLD AUTO: 10.6 FL (ref 7.4–10.4)
POCT GLUCOSE: 162 MG/DL (ref 70–110)
POCT GLUCOSE: 247 MG/DL (ref 70–110)
POCT GLUCOSE: 293 MG/DL (ref 70–110)
POTASSIUM SERPL-SCNC: 4.1 MMOL/L (ref 3.5–5.1)
PROT SERPL-MCNC: 5.8 GM/DL (ref 6.4–8.3)
PROTHROMBIN TIME: 19.4 SECONDS (ref 12.5–14.5)
RBC # BLD AUTO: 3.11 X10(6)/MCL (ref 4.7–6.1)
SODIUM SERPL-SCNC: 138 MMOL/L (ref 136–145)
URATE SERPL-MCNC: 2.6 MG/DL (ref 3.5–7.2)
WBC # BLD AUTO: 4.3 X10(3)/MCL (ref 4.5–11.5)

## 2025-02-16 PROCEDURE — 99900031 HC PATIENT EDUCATION (STAT)

## 2025-02-16 PROCEDURE — 25000003 PHARM REV CODE 250: Performed by: INTERNAL MEDICINE

## 2025-02-16 PROCEDURE — S5010 5% DEXTROSE AND 0.45% SALINE: HCPCS | Performed by: INTERNAL MEDICINE

## 2025-02-16 PROCEDURE — 80053 COMPREHEN METABOLIC PANEL: CPT | Performed by: INTERNAL MEDICINE

## 2025-02-16 PROCEDURE — P9047 ALBUMIN (HUMAN), 25%, 50ML: HCPCS

## 2025-02-16 PROCEDURE — 85025 COMPLETE CBC W/AUTO DIFF WBC: CPT | Performed by: INTERNAL MEDICINE

## 2025-02-16 PROCEDURE — 83735 ASSAY OF MAGNESIUM: CPT | Performed by: INTERNAL MEDICINE

## 2025-02-16 PROCEDURE — 25000003 PHARM REV CODE 250

## 2025-02-16 PROCEDURE — 94760 N-INVAS EAR/PLS OXIMETRY 1: CPT

## 2025-02-16 PROCEDURE — 84100 ASSAY OF PHOSPHORUS: CPT | Performed by: INTERNAL MEDICINE

## 2025-02-16 PROCEDURE — 36415 COLL VENOUS BLD VENIPUNCTURE: CPT | Performed by: INTERNAL MEDICINE

## 2025-02-16 PROCEDURE — 21400001 HC TELEMETRY ROOM

## 2025-02-16 PROCEDURE — 99900035 HC TECH TIME PER 15 MIN (STAT)

## 2025-02-16 PROCEDURE — 94799 UNLISTED PULMONARY SVC/PX: CPT

## 2025-02-16 PROCEDURE — 85610 PROTHROMBIN TIME: CPT | Performed by: INTERNAL MEDICINE

## 2025-02-16 PROCEDURE — 27000221 HC OXYGEN, UP TO 24 HOURS

## 2025-02-16 PROCEDURE — 63600175 PHARM REV CODE 636 W HCPCS: Performed by: INTERNAL MEDICINE

## 2025-02-16 PROCEDURE — 63600175 PHARM REV CODE 636 W HCPCS

## 2025-02-16 PROCEDURE — 83690 ASSAY OF LIPASE: CPT | Performed by: INTERNAL MEDICINE

## 2025-02-16 RX ORDER — ONDANSETRON HYDROCHLORIDE 2 MG/ML
4 INJECTION, SOLUTION INTRAVENOUS EVERY 6 HOURS PRN
Status: DISCONTINUED | OUTPATIENT
Start: 2025-02-16 | End: 2025-02-17 | Stop reason: HOSPADM

## 2025-02-16 RX ORDER — METOCLOPRAMIDE HYDROCHLORIDE 5 MG/ML
10 INJECTION INTRAMUSCULAR; INTRAVENOUS ONCE
Status: COMPLETED | OUTPATIENT
Start: 2025-02-16 | End: 2025-02-16

## 2025-02-16 RX ORDER — METOCLOPRAMIDE HYDROCHLORIDE 5 MG/ML
10 INJECTION INTRAMUSCULAR; INTRAVENOUS
Status: DISCONTINUED | OUTPATIENT
Start: 2025-02-16 | End: 2025-02-17 | Stop reason: HOSPADM

## 2025-02-16 RX ORDER — DEXTROSE MONOHYDRATE AND SODIUM CHLORIDE 5; .45 G/100ML; G/100ML
INJECTION, SOLUTION INTRAVENOUS CONTINUOUS
Status: DISCONTINUED | OUTPATIENT
Start: 2025-02-16 | End: 2025-02-17 | Stop reason: HOSPADM

## 2025-02-16 RX ORDER — ALBUMIN HUMAN 250 G/1000ML
50 SOLUTION INTRAVENOUS ONCE
Status: COMPLETED | OUTPATIENT
Start: 2025-02-16 | End: 2025-02-16

## 2025-02-16 RX ADMIN — MUPIROCIN: 20 OINTMENT TOPICAL at 09:02

## 2025-02-16 RX ADMIN — PANTOPRAZOLE SODIUM 40 MG: 40 TABLET, DELAYED RELEASE ORAL at 06:02

## 2025-02-16 RX ADMIN — ALUMINUM HYDROXIDE, MAGNESIUM HYDROXIDE, AND SIMETHICONE 30 ML: 200; 200; 20 SUSPENSION ORAL at 05:02

## 2025-02-16 RX ADMIN — ONDANSETRON 4 MG: 2 INJECTION INTRAMUSCULAR; INTRAVENOUS at 02:02

## 2025-02-16 RX ADMIN — APIXABAN 5 MG: 5 TABLET, FILM COATED ORAL at 09:02

## 2025-02-16 RX ADMIN — METOCLOPRAMIDE 10 MG: 5 INJECTION, SOLUTION INTRAMUSCULAR; INTRAVENOUS at 04:02

## 2025-02-16 RX ADMIN — INSULIN ASPART 6 UNITS: 100 INJECTION, SOLUTION INTRAVENOUS; SUBCUTANEOUS at 04:02

## 2025-02-16 RX ADMIN — THERA TABS 1 TABLET: TAB at 09:02

## 2025-02-16 RX ADMIN — MUPIROCIN: 20 OINTMENT TOPICAL at 10:02

## 2025-02-16 RX ADMIN — ONDANSETRON 4 MG: 2 INJECTION INTRAMUSCULAR; INTRAVENOUS at 10:02

## 2025-02-16 RX ADMIN — TAMSULOSIN HYDROCHLORIDE 0.4 MG: 0.4 CAPSULE ORAL at 09:02

## 2025-02-16 RX ADMIN — INSULIN ASPART 4 UNITS: 100 INJECTION, SOLUTION INTRAVENOUS; SUBCUTANEOUS at 11:02

## 2025-02-16 RX ADMIN — METOCLOPRAMIDE 10 MG: 5 INJECTION, SOLUTION INTRAMUSCULAR; INTRAVENOUS at 10:02

## 2025-02-16 RX ADMIN — Medication: at 09:02

## 2025-02-16 RX ADMIN — DICYCLOMINE HYDROCHLORIDE 10 MG: 10 CAPSULE ORAL at 06:02

## 2025-02-16 RX ADMIN — Medication 6 MG: at 10:02

## 2025-02-16 RX ADMIN — MORPHINE SULFATE 15 MG: 15 TABLET, FILM COATED, EXTENDED RELEASE ORAL at 07:02

## 2025-02-16 RX ADMIN — METOCLOPRAMIDE 10 MG: 5 INJECTION, SOLUTION INTRAMUSCULAR; INTRAVENOUS at 11:02

## 2025-02-16 RX ADMIN — DRONABINOL 2.5 MG: 2.5 CAPSULE ORAL at 06:02

## 2025-02-16 RX ADMIN — PIPERACILLIN SODIUM AND TAZOBACTAM SODIUM 4.5 G: 4; .5 INJECTION, POWDER, LYOPHILIZED, FOR SOLUTION INTRAVENOUS at 10:02

## 2025-02-16 RX ADMIN — APIXABAN 5 MG: 5 TABLET, FILM COATED ORAL at 10:02

## 2025-02-16 RX ADMIN — PREGABALIN 100 MG: 50 CAPSULE ORAL at 09:02

## 2025-02-16 RX ADMIN — PREGABALIN 100 MG: 50 CAPSULE ORAL at 10:02

## 2025-02-16 RX ADMIN — DEXTROSE MONOHYDRATE AND SODIUM CHLORIDE: 5; .45 INJECTION, SOLUTION INTRAVENOUS at 11:02

## 2025-02-16 RX ADMIN — LACTULOSE 30 G: 10 SOLUTION ORAL at 11:02

## 2025-02-16 RX ADMIN — ALBUMIN (HUMAN) 50 G: 12.5 SOLUTION INTRAVENOUS at 05:02

## 2025-02-16 RX ADMIN — POLYETHYLENE GLYCOL 3350 17 G: 17 POWDER, FOR SOLUTION ORAL at 09:02

## 2025-02-16 RX ADMIN — Medication: at 02:02

## 2025-02-16 RX ADMIN — PIPERACILLIN SODIUM AND TAZOBACTAM SODIUM 4.5 G: 4; .5 INJECTION, POWDER, LYOPHILIZED, FOR SOLUTION INTRAVENOUS at 06:02

## 2025-02-16 RX ADMIN — PIPERACILLIN SODIUM AND TAZOBACTAM SODIUM 4.5 G: 4; .5 INJECTION, POWDER, LYOPHILIZED, FOR SOLUTION INTRAVENOUS at 02:02

## 2025-02-16 NOTE — PROGRESS NOTES
Inpatient Nutrition Assessment    Admit Date: 2/14/2025   Total duration of encounter: 2 days   Patient Age: 60 y.o.    Nutrition Recommendation/Prescription     Continue oral diet as tolerated; Diet Full Liquid ; advance as medically appropriate. Goal diet 2000 calorie Diabetic, Low Fiber  Boost Max provides 160 kcal, 30 gm protein per container  Medical management of nausea    Communication of Recommendations: reviewed with nurse, reviewed with patient, and reviewed with family    Nutrition Assessment     Malnutrition Assessment/Nutrition-Focused Physical Exam    Malnutrition Context: chronic illness (02/16/25 1559)  Malnutrition Level: moderate (02/16/25 1559)  Energy Intake (Malnutrition): less than or equal to 75% for greater than or equal to 1 month (02/16/25 1559)  Weight Loss (Malnutrition): other (see comments) (Unable to assess) (02/16/25 1559)  Subcutaneous Fat (Malnutrition): moderate depletion (02/16/25 1559)  Orbital Region (Subcutaneous Fat Loss): moderate depletion        Muscle Mass (Malnutrition): moderate depletion (02/16/25 1559)  Mandaeism Region (Muscle Loss): moderate depletion  Clavicle Bone Region (Muscle Loss): moderate depletion  Clavicle and Acromion Bone Region (Muscle Loss): moderate depletion                 Fluid Accumulation (Malnutrition): other (see comments) (Unable to assess) (02/16/25 1559)        A minimum of two characteristics is recommended for diagnosis of either severe or non-severe malnutrition.    Chart Review    Reason Seen: malnutrition screening tool (MST)    Malnutrition Screening Tool Results   Have you recently lost weight without trying?: Yes: 2-13 lbs  Have you been eating poorly because of a decreased appetite?: Yes   MST Score: 2   Diagnosis:  Generalized abdominal tenderness   Ascites   Lactic acidosis   Known pneumobilia    Relevant Medical History: pancreatic ductal adenocarcinoma (Dx 8/2022)   insulin-dependent DM II, chronic ascites and weekly paracentesis      Scheduled Medications:  apixaban, 5 mg, BID  droNABinol, 2.5 mg, BID AC  lipase-protease-amylase 12,000-38,000-60,000 units, 2 capsule, TID WM  metoclopramide, 10 mg, QID (AC & HS)  morphine, 15 mg, BID  multivitamin, 1 tablet, Daily  mupirocin, , BID  OLANZapine, 5 mg, Nightly  pantoprazole, 40 mg, Before breakfast  piperacillin-tazobactam (Zosyn) IV (PEDS and ADULTS) (extended infusion is not appropriate), 4.5 g, Q8H  polyethylene glycol, 17 g, Daily  pregabalin, 100 mg, BID  tamsulosin, 0.4 mg, Daily  zinc oxide-cod liver oil, , TID    Continuous Infusions:  D5 and 0.45% NaCl, Last Rate: 80 mL/hr at 02/16/25 1144    PRN Medications:  acetaminophen, 650 mg, Q4H PRN  aluminum-magnesium hydroxide-simethicone, 30 mL, QID PRN  bisacodyL, 10 mg, Daily PRN  dextrose 50%, 12.5 g, PRN  dextrose 50%, 25 g, PRN  glucagon (human recombinant), 1 mg, PRN  glucose, 16 g, PRN  glucose, 24 g, PRN  insulin aspart U-100, 0-10 Units, QID (AC + HS) PRN  melatonin, 6 mg, Nightly PRN  morphine, 2 mg, Q6H PRN  ondansetron, 4 mg, Q6H PRN  polyethylene glycol, 17 g, BID PRN  sodium chloride 0.9%, 10 mL, PRN    Calorie Containing IV Medications: no significant kcals from medications at this time    Recent Labs   Lab 02/14/25  1653 02/15/25  0037 02/16/25  1101   * 139 138   K 4.7 4.2 4.1   CALCIUM 9.2 8.5 8.7   PHOS  --   --  3.0   MG  --  1.70 1.70    104 96*   CO2 28 29 32*   BUN 11.0 9.4 13.0   CREATININE 0.90 0.75 0.84   EGFRNORACEVR >60 >60 >60   GLUCOSE 378* 250* 272*   BILITOT 0.6 0.6 0.7   ALKPHOS 523* 369* 366*   ALT 43 30 24   AST 45* 28 28   ALBUMIN 3.0* 3.0* 3.2*   LIPASE  --   --  <4   AMYLASE  --  23*  --    WBC 4.78 2.85* 4.30*   HGB 11.4* 8.8* 9.1*   HCT 35.4* 27.0* 28.0*     Nutrition Orders:  Diet Full Liquid      Appetite/Oral Intake: poor/0-25% of meals  Factors Affecting Nutritional Intake: abdominal distension, abdominal pain, decreased appetite, and nausea  Social Needs Impacting Access to Food: none  "identified  Food/Mu-ism/Cultural Preferences: none reported  Food Allergies: no known food allergies  Last Bowel Movement: 02/16/25  Wound(s):     Wound 02/14/25 2200 Pressure Injury Sacral spine #1-Tissue loss description: Not applicable     Comments    2/16/25 Pt and family report decreased appetite and intake, ate some cereal for breakfast and did not eat lunch due to abdominal distention, nausea, and abdominal fullness. Pt with fair appetite and intake at home depending on abdominal symptoms. Chronic weight loss reported, but with fluid accumulation/ascites unable to accurately assess. Physical signs of fat and muscle loss present. Agreeable to adding oral supplements for additional nutrition.     Anthropometrics    Height: 5' 9.02" (175.3 cm),    Last Weight: 62.9 kg (138 lb 10.7 oz) (02/14/25 2200), Weight Method: Bed Scale  BMI (Calculated): 20.5  BMI Classification: normal (BMI 18.5-24.9)        Ideal Body Weight (IBW), Male: 160.12 lb     % Ideal Body Weight, Male (lb): 90.63 %                          Usual Weight Provided By: unable to obtain usual weight    Wt Readings from Last 5 Encounters:   02/14/25 62.9 kg (138 lb 10.7 oz)   02/04/25 63.8 kg (140 lb 11.2 oz)   02/04/25 63.8 kg (140 lb 11.2 oz)   01/27/25 72.9 kg (160 lb 11.5 oz)   01/26/25 73.9 kg (163 lb)     Weight Change(s) Since Admission:   Wt Readings from Last 1 Encounters:   02/14/25 2200 62.9 kg (138 lb 10.7 oz)   02/14/25 1600 65.8 kg (145 lb)   Admit Weight: 65.8 kg (145 lb) (02/14/25 1600), Weight Method: Bed Scale    Estimated Needs    Weight Used For Calorie Calculations: 62.9 kg (138 lb 10.7 oz)  Energy Calorie Requirements (kcal): 1887 kcal (30kcal/kg)  Energy Need Method: Kcal/kg  Weight Used For Protein Calculations: 62.9 kg (138 lb 10.7 oz)  Protein Requirements: 94gm (1.5 gm/kg)  Fluid Requirements (mL): 1887 ml (1 ml/kcal)  CHO Requirement: 250 gm/day (45% est kcal needs)     Enteral Nutrition     Patient not receiving " enteral nutrition at this time.    Parenteral Nutrition     Patient not receiving parenteral nutrition support at this time.    Evaluation of Received Nutrient Intake    Calories: not meeting estimated needs  Protein: not meeting estimated needs    Patient Education     Not applicable.    Nutrition Diagnosis     PES: Inadequate oral intake related to acute illness as evidenced by <25% intake of meals . (new)     PES: Moderate chronic disease or condition related malnutrition Related to chronic condition As Evidenced by:  - weight loss: Unable to assess - energy intake: <= 75% for 1 months (meets criteria for <= 75% >= 1 month (severe - chronic)) - muscle mass depletion: 6 areas of moderate muscle loss (Pectoralis, Acromion, Temporalis, Deltoid, Trapezius, Clavicle) - loss of subcutaneous fat: 2 areas of moderate fat loss (Infraorbital, Buccal) new    Nutrition Interventions     Intervention(s): commercial beverage and collaboration with other providers  Intervention(s):      Goal: Meet greater than 80% of nutritional needs by follow-up. (new)  Goal: Consume % of meals/snacks by follow-up. (new)    Nutrition Goals & Monitoring     Dietitian will monitor: food and beverage intake, weight change, glucose/endocrine profile, and gastrointestinal profile  Discharge planning: resume home regimen  Nutrition Risk/Follow-Up: high (follow-up in 1-4 days)   Please consult if re-assessment needed sooner.

## 2025-02-17 VITALS
OXYGEN SATURATION: 91 % | HEART RATE: 93 BPM | WEIGHT: 138.69 LBS | DIASTOLIC BLOOD PRESSURE: 73 MMHG | HEIGHT: 69 IN | RESPIRATION RATE: 17 BRPM | TEMPERATURE: 99 F | SYSTOLIC BLOOD PRESSURE: 99 MMHG | BODY MASS INDEX: 20.54 KG/M2

## 2025-02-17 LAB
POCT GLUCOSE: 211 MG/DL (ref 70–110)
POCT GLUCOSE: 240 MG/DL (ref 70–110)

## 2025-02-17 PROCEDURE — S5010 5% DEXTROSE AND 0.45% SALINE: HCPCS | Performed by: INTERNAL MEDICINE

## 2025-02-17 PROCEDURE — 94799 UNLISTED PULMONARY SVC/PX: CPT

## 2025-02-17 PROCEDURE — 25000003 PHARM REV CODE 250: Performed by: INTERNAL MEDICINE

## 2025-02-17 PROCEDURE — 63600175 PHARM REV CODE 636 W HCPCS

## 2025-02-17 PROCEDURE — 25500020 PHARM REV CODE 255: Performed by: STUDENT IN AN ORGANIZED HEALTH CARE EDUCATION/TRAINING PROGRAM

## 2025-02-17 PROCEDURE — 63600175 PHARM REV CODE 636 W HCPCS: Performed by: INTERNAL MEDICINE

## 2025-02-17 PROCEDURE — 25000003 PHARM REV CODE 250

## 2025-02-17 RX ORDER — METOCLOPRAMIDE 10 MG/1
10 TABLET ORAL
Qty: 21 TABLET | Refills: 0 | Status: SHIPPED | OUTPATIENT
Start: 2025-02-17 | End: 2025-02-24

## 2025-02-17 RX ADMIN — PIPERACILLIN SODIUM AND TAZOBACTAM SODIUM 4.5 G: 4; .5 INJECTION, POWDER, LYOPHILIZED, FOR SOLUTION INTRAVENOUS at 06:02

## 2025-02-17 RX ADMIN — MUPIROCIN: 20 OINTMENT TOPICAL at 08:02

## 2025-02-17 RX ADMIN — THERA TABS 1 TABLET: TAB at 08:02

## 2025-02-17 RX ADMIN — IOHEXOL 57 ML: 350 INJECTION, SOLUTION INTRAVENOUS at 05:02

## 2025-02-17 RX ADMIN — INSULIN ASPART 4 UNITS: 100 INJECTION, SOLUTION INTRAVENOUS; SUBCUTANEOUS at 11:02

## 2025-02-17 RX ADMIN — PREGABALIN 100 MG: 50 CAPSULE ORAL at 08:02

## 2025-02-17 RX ADMIN — PIPERACILLIN SODIUM AND TAZOBACTAM SODIUM 4.5 G: 4; .5 INJECTION, POWDER, LYOPHILIZED, FOR SOLUTION INTRAVENOUS at 03:02

## 2025-02-17 RX ADMIN — PANCRELIPASE 2 CAPSULE: 60000; 12000; 38000 CAPSULE, DELAYED RELEASE PELLETS ORAL at 08:02

## 2025-02-17 RX ADMIN — DEXTROSE MONOHYDRATE AND SODIUM CHLORIDE: 5; .45 INJECTION, SOLUTION INTRAVENOUS at 01:02

## 2025-02-17 RX ADMIN — ONDANSETRON 4 MG: 2 INJECTION INTRAMUSCULAR; INTRAVENOUS at 04:02

## 2025-02-17 RX ADMIN — LACTULOSE 30 G: 10 SOLUTION ORAL at 08:02

## 2025-02-17 RX ADMIN — Medication: at 09:02

## 2025-02-17 RX ADMIN — DRONABINOL 2.5 MG: 2.5 CAPSULE ORAL at 06:02

## 2025-02-17 RX ADMIN — METOCLOPRAMIDE 10 MG: 5 INJECTION, SOLUTION INTRAMUSCULAR; INTRAVENOUS at 06:02

## 2025-02-17 RX ADMIN — PANCRELIPASE 2 CAPSULE: 60000; 12000; 38000 CAPSULE, DELAYED RELEASE PELLETS ORAL at 11:02

## 2025-02-17 RX ADMIN — PANTOPRAZOLE SODIUM 40 MG: 40 TABLET, DELAYED RELEASE ORAL at 06:02

## 2025-02-17 RX ADMIN — Medication: at 03:02

## 2025-02-17 RX ADMIN — INSULIN ASPART 4 UNITS: 100 INJECTION, SOLUTION INTRAVENOUS; SUBCUTANEOUS at 06:02

## 2025-02-17 RX ADMIN — TAMSULOSIN HYDROCHLORIDE 0.4 MG: 0.4 CAPSULE ORAL at 08:02

## 2025-02-17 RX ADMIN — METOCLOPRAMIDE 10 MG: 5 INJECTION, SOLUTION INTRAMUSCULAR; INTRAVENOUS at 11:02

## 2025-02-17 NOTE — PROGRESS NOTES
[facility] [address] MEDICINE ~ PROGRESS NOTE        CHIEF COMPLAINT   Hospital follow up    HOSPITAL COURSE   Warren Lejeune is a 60-year-old M with a PMH of pancreatic ductal adenocarcinoma (Dx 8/2022) was previously doing chemo (last session December 4th, 2024) but has switch to oral regimen that he has not yet begun, portal vein thrombosis takes Eliquis, insulin-dependent DM II, chronic ascites and weekly paracentesis presents to the ED after being told by the [facility] that his last sample of paracentesis fluid was positive with pus. Patient denies fever, denies nausea, denies vomiting, complains of generalized abdominal pain that radiates to bilateral flanks.     ED course: Initial ED vitals temp 97.6°, HR 83 beats per minute, respirations 18, /85, SpO2 94% on room air.  ED workup revealed glucose 378, , AST 45, albumin 3.0, initial lactic 2.9 with repeat lactic 3.4, UA positive for protein, glucose, ketones, urobilinogen, RBC, WBC, hyaline casts, peritoneal fluid Gram stain positive for WBC, CT abdomen and pelvis with IV contrast read large volume ascites appears worse than prior examination, pancreatic head mass similar but there appears to be a mass in the pancreatic neck on this examination that was not clearly seen on prior examination, previous embolization procedure in the liver, pneumobilia.  Patient was given albumin 50 g IV, morphine 4 mg IV, Zofran 4 mg IV, Zosyn 4.5 g IV and NaCl 1 L IV in the ED and admitted to Hospital Medicine for further care.    Today  Seen examined this morning.  Started having some vomiting today, initially plan was for discharge but we will monitor him overnight.        OBJECTIVE/PHYSICAL EXAM     VITAL SIGNS (MOST RECENT):  Temp: 99.3 °F (37.4 °C) (02/17/25 0740)  Pulse: 97 (02/17/25 0816)  Resp: 17 (02/17/25 0047)  BP: 91/63 (02/17/25 0816)  SpO2: (!) 91 % (02/17/25 0740) VITAL SIGNS (24 HOUR RANGE):  Temp:  [99 °F (37.2  °C)-99.8 °F (37.7 °C)] 99.3 °F (37.4 °C)  Pulse:  [] 97  Resp:  [16-17] 17  SpO2:  [91 %-96 %] 91 %  BP: ()/(54-74) 91/63   GENERAL: In no acute distress, afebrile  HEENT:  CHEST: Clear to auscultation bilaterally  HEART: S1, S2, no appreciable murmur  ABDOMEN:  Distended and tender diffusely  MSK: Warm, 2+ lower extremity edema, no clubbing or cyanosis  NEUROLOGIC: Alert and oriented x4, moving all extremities with good strength   INTEGUMENTARY:  PSYCHIATRY:        ASSESSMENT/PLAN   Generalized abdominal tenderness   Ascites   Lactic acidosis   Known pneumobilia    History of: As listed above      Referred to ED for pathologist writing pus on report.  Fluid cultures negative so far and not consistent with SBP  Continue with Zosyn, discontinue vancomycin.  Reglan, lactulose after reviewing CT scan, history of gastric outlet obstruction from cancer and status post duodenal stent    DVT prophylaxis:  Eliquis    Anticipated discharge and disposition:   __________________________________________________________________________    NUTRITIONAL STATUS     Patient meets ASPEN criteria for moderate malnutrition of chronic illness per RD assessment as evidenced by:  Energy Intake (Malnutrition): less than or equal to 75% for greater than or equal to 1 month  Weight Loss (Malnutrition): other (see comments) (Unable to assess)  Subcutaneous Fat (Malnutrition): moderate depletion  Muscle Mass (Malnutrition): moderate depletion  Fluid Accumulation (Malnutrition): other (see comments) (Unable to assess)        A minimum of two characteristics is recommended for diagnosis of either severe or non-severe malnutrition.     LABS/MICRO/MEDS/DIAGNOSTICS       LABS  Recent Labs     02/16/25  1101      K 4.1   CO2 32*   BUN 13.0   CREATININE 0.84   GLUCOSE 272*   CALCIUM 8.7   ALKPHOS 366*   AST 28   ALT 24   ALBUMIN 3.2*     Recent Labs     02/16/25  1101   WBC 4.30*   RBC 3.11*   HCT 28.0*   MCV 90.0           MICROBIOLOGY  Microbiology Results (last 7 days)       Procedure Component Value Units Date/Time    Body Fluid Culture [[id number]] Collected: 02/14/25 1917    Order Status: Completed Specimen: Peritoneal Fluid from Abdomen Updated: 02/17/25 0959     Body Fluid Culture No Growth At 72 Hours    Urine culture [[id number]]  (Abnormal) Collected: 02/14/25 1712    Order Status: Completed Specimen: Urine Updated: 02/16/25 0820     Urine Culture Less than 10,000 colonies/ml Enterobacter cloacae complex     Comment: [place] counts of <10,000colonies/ml are of questionable significance. Therefore organisms are identified only.       Blood Culture [[id number]]  (Normal) Collected: 02/14/25 1653    Order Status: Completed Specimen: Blood Updated: 02/16/25 0624     Blood Culture No Growth At 24 Hours    Blood Culture [[id number]]  (Normal) Collected: 02/14/25 1653    Order Status: Completed Specimen: Blood Updated: 02/16/25 0624     Blood Culture No Growth At 24 Hours    Body Fluid Culture [[id number]]     Order Status: Sent Specimen: Peritoneal Fluid from Ascites Fluid     Gram Stain [[id number]] Collected: 02/14/25 1917    Order Status: Completed Specimen: Peritoneal Fluid from Abdominal Fluid Updated: 02/14/25 2027     GRAM STAIN Many WBC observed      No bacteria seen               MEDICATIONS   droNABinol  2.5 mg Oral BID AC    lipase-protease-amylase 12,000-38,000-60,000 units  2 capsule Oral TID WM    metoclopramide  10 mg Intravenous QID (AC & HS)    morphine  15 mg Oral BID    multivitamin  1 tablet Oral Daily    mupirocin   Nasal BID    OLANZapine  5 mg Oral Nightly    pantoprazole  40 mg Oral Before breakfast    piperacillin-tazobactam (Zosyn) IV (PEDS and ADULTS) (extended infusion is not appropriate)  4.5 g Intravenous Q8H    pregabalin  100 mg Oral BID    tamsulosin  0.4 mg Oral Daily    zinc oxide-cod liver oil   Topical (Top) TID         INFUSIONS   D5 and 0.45% NaCl   Intravenous Continuous 80 mL/hr at  02/17/25 0141 New Bag at 02/17/25 0141          DIAGNOSTIC TESTS  CTA Chest Non-Coronary (PE Studies)   Final Result   Impression:      1. Pronounced streaky linear opacity is seen predominantly in the dependent portions at the lung bases consistent with nonspecific dependent changes scarring and subsegmental atelectasis.      2. There is extensive stranding of the thoracoabdominal subcutaneous fat associated with ascites consistent with anasarca edema likely form hepatic pathology.      3. The liver appears small with heterogeneous attenuation and nodular contours consistent with cirrhosis. Post surgical change is seen in the right lobe of the liver. Pneumobilia is noted in the left and right hepatic lobes. A large amount of free peritoneal fluid is noted in the visualized upper abdominal cavity likely related to liver cirrhosis. Correlate with clinical and laboratory parameters as regards additional evaluation and follow up.      4. No CT evidence of pulmonary embolism. Details and other findings as discussed above.      No significant discrepancy with overnight report.         Electronically signed by: Niall Jamison   Date:    02/17/2025   Time:    10:18      X-Ray Abdomen AP 1 View   Final Result      No acute abdominal radiographic abnormality.         Electronically signed by: Lisa Camilo   Date:    02/16/2025   Time:    11:14      X-Ray Chest 1 View   Final Result      Mild bibasilar atelectasis.      [employer] concordance         Electronically signed by: Magen Arechiga MD   Date:    02/16/2025   Time:    09:56      ED US FAST   Final Result      CT Abdomen Pelvis With IV Contrast NO Oral Contrast   Final Result      Large volume ascites appears worse than the prior examination      Pancreatic head mass similar but there appears to be a mass in the pancreatic neck on this examination.  This was not clearly seen on the prior examination.      Previous embolization procedure in the liver      Pneumobilia          Electronically signed by: Vicente Saldana   Date:    02/14/2025   Time:    18:17           No echocardiogram results found for the past 14 days.         Case related differential diagnoses have been reviewed; assessment and plan has been documented. I have personally reviewed the labs and test results that are currently available; I have reviewed the patients medication list. I have reviewed the consulting providers recommendations. I have reviewed or attempted to review medical records based upon their availability.  All of the patient's and/or family's questions have been addressed and answered to the best of my ability.  I will continue to monitor closely and make adjustments to medical management as needed.  This document was created using M*Modal Fluency Direct.  Transcription errors may have been made.  Please contact me if any questions may rise regarding documentation to clarify transcription.        Tristian Aguilar MD   Internal Medicine  Department of Hospital Medicine [facility] - [address]

## 2025-02-17 NOTE — CONSULTS
[facility]   Consult Note  Inpatient consult to Gastroenterology  Consult performed by: Lesia Bragg FNP  Consult ordered by: Tristian Aguilar MD             GI consulted for new cirrhosis.    HPI:  He is a 60-year-old male recently referred to our service although followed by [facility] with an extensive PMH including DM2, pancreatic ductal adenocarcinoma originally diagnosed in 08/2022 and previously on chemotherapy (with Gemcitabine and Cisplatin since 8/28/24), portal vein thrombus on Eliquis, and recurrent ascites requiring weekly paracentesis since the beginning of January this year.  Previously required ERCP with placement of fully covered metal stent 8/2022 and new fully covered stent within prior stent (s/t tissue overgrowth) placed 4/4/2024.  Last paracentesis on 2/12/25 -- only cytology sent w/concern for SBP.  Labs on admit with an elevated ALP, otherwise overall unremarkable.  CT A/P with IV contrast showed evidence of cirrhosis (not previously noted) large volume ascites, worse than prior exam on 01/07, as well as similar appearance of pancreatic head mass with possible new mass in the pancreatic neck as well as previous coil embolization within the liver and pneumobilia.  Additionally noted on CT with a connection between the lesser sac in the stomach likely due to previous marsupialization of pancreatic pseudocyst.  Paracentesis was again performed on 02/14 - cell count with 314 WBCs, culture negative, amylase negative.  No cytology obtained.  Given new imaging findings suggestive of cirrhosis as well as need for recurrent paracentesis since January, GI consulted for assistance.              ROS: Negative unless stated in HPI    Medical History:   Past Medical History:   Diagnosis Date    Abdominal pain     Admission for chemotherapy     last chemo 9/12/24    Anxiety disorder, unspecified     Back pain     Bradycardia     Enlarged prostate     GERD (gastroesophageal reflux  "disease)     Hypertension     no cardiologist; no longer on meds since weight loss due to pancreatic cancer    Memory loss     Pancreatic cancer     Peripheral neuropathy     Type 2 diabetes mellitus with unspecified diabetic retinopathy without macular edema        Surgical History:   Past Surgical History:   Procedure Laterality Date    bile duct stents times 2      EGD, WITH STENT INSERTION      INSERTION OF TUNNELED CENTRAL VENOUS CATHETER (CVC) WITH SUBCUTANEOUS PORT Right 9/20/2024    Procedure: [id number];  Surgeon: Moe Champagne MD;  Location: [facility];  Service: General;  Laterality: Right;    MEDIPORT REMOVAL      times 2       Social History:  Social History     Tobacco Use    Smoking status: Never     Passive exposure: Never    Smokeless tobacco: Former     Types: Chew   Substance Use Topics    Alcohol use: Not Currently       Family History:  Reviewed - noncontributory     Allergies:  Review of patient's allergies indicates:  No Known Allergies    MEDS: Reviewed in EMR    PHYSICAL EXAM:  T 97.6 °F (36.4 °C)   /72   P 101   RR 17   O2 (!) 90 %  GENERAL: Chronically ill appearing; NAD; does not appear toxic  SKIN: no rash, no jaundice  HEENT: sclera non-icteric; PERRL  NECK: supple; no LAD  CHEST: CTA; nonlabored, equal expansion; no adventitious BS  CARDIOVASCULAR: RRR, S1S2; no murmur; strong, equal peripheral pulses; no edema  ABDOMEN:  active bowel sounds; abdomen soft, rounded, some mild generalized TTP  EXTREMITIES: no cyanosis or clubbing  NEURO: AAO, baseline    Labs:   Recent Labs     02/15/25  0037 02/16/25  1101   WBC 2.85* 4.30*   RBC 3.06* 3.11*   HGB 8.8* 9.1*   HCT 27.0* 28.0*   MCV 88.2 90.0   MCH 28.8 29.3   MCHC 32.6* 32.5*   RDW 13.8 14.0   * 141     No results for input(s): "LACTIC" in the last 72 hours.  Recent Labs     02/14/25  1653 02/16/25  1101   INR 1.3 1.7*   APTT 29.5  --      No results for input(s): "HGBA1C", "CHOL", "TRIG", "LDL", "VLDL", "HDL" in " "the last 72 hours.   Recent Labs     02/15/25  0037 02/16/25  1101    138   K 4.2 4.1   CO2 29 32*   BUN 9.4 13.0   CREATININE 0.75 0.84   GLUCOSE 250* 272*   CALCIUM 8.5 8.7   MG 1.70 1.70   PHOS  --  3.0   ALBUMIN 3.0* 3.2*   GLOBULIN 2.6 2.6   ALKPHOS 369* 366*   ALT 30 24   AST 28 28   BILITOT 0.6 0.7   LIPASE  --  <4     No results for input(s): "BNP", "CPK", "TROPONINI" in the last 72 hours.        Imaging: Reviewed pertinent imaging    ASSESSMENT / PLAN:  He is a 60-year-old male recently referred to our service although followed by [facility] with an extensive PMH including DM2, pancreatic ductal adenocarcinoma originally diagnosed in 08/2022 and previously on chemotherapy (with Gemcitabine and Cisplatin since 8/28/24), portal vein thrombus on Eliquis (started 10/2024), and recurrent ascites requiring weekly paracentesis since the beginning of January this year.  Previously required ERCP with placement of fully covered metal stent 8/2022 and new fully covered stent within prior stent (s/t tissue overgrowth) placed 4/4/2024.  Last paracentesis on 2/12/25 -- only cytology sent w/concern for SBP.  Labs on admit with an elevated ALP, otherwise overall unremarkable.  CT A/P with IV contrast showed evidence of cirrhosis (not previously noted) large volume ascites, worse than prior exam on 01/07, as well as similar appearance of pancreatic head mass with possible new mass in the pancreatic neck as well as previous coil embolization within the liver and pneumobilia.  Additionally noted on CT with a connection between the lesser sac in the stomach likely due to previous marsupialization of pancreatic pseudocyst.  Paracentesis was again performed on 02/14 - cell count with 314 WBCs, culture negative, amylase negative.  No cytology obtained.  Given new imaging findings suggestive of cirrhosis as well as need for recurrent paracentesis since January, GI consulted for assistance.      New cirrhosis per imaging  Recurrent " ascites since 1/2025 requiring weekly paracentesis   H/o pancreatic adenocarcinoma on chemo w/concern for new pancreatic head mass  Portal vein thrombus on Eliquis      Recurrent large volume ascites and now with imaging showing evidence of cirrhosis.    Ascites fluid amylase negative.  SAAG 2.8 c/w portal HTN.  Appears is on Lasix 40mg at home daily.  Add aldactone 100mg daily to optimize diuretics.   No EGD on file or per report - would benefit from EGD for esophageal varices screening.  Consider palliative care input given advanced CA progressing on treatment.   Would consider PleurX placement if patient would elect to go hospice.  No concern for biliary stent obstruction.  Continue management per MDA.     Further recs if any to follow with MD on rounds.   Discussed with patient and family.           Thank you for allowing us to participate in the care of Warren P Lejeune.    Lesia Bragg, ERNA  [facility]

## 2025-02-17 NOTE — H&P (VIEW-ONLY)
[facility] [address] MEDICINE ~ PROGRESS NOTE        CHIEF COMPLAINT   Hospital follow up    HOSPITAL COURSE   Warren Lejeune is a 60-year-old M with a PMH of pancreatic ductal adenocarcinoma (Dx 8/2022) was previously doing chemo (last session December 4th, 2024) but has switch to oral regimen that he has not yet begun, portal vein thrombosis takes Eliquis, insulin-dependent DM II, chronic ascites and weekly paracentesis presents to the ED after [facility] that his last sample of paracentesis fluid was positive with pus. Patient denies fever, denies nausea, denies vomiting, complains of generalized abdominal pain that radiates to bilateral flanks.     ED course: Initial ED vitals temp 97.6°, HR 83 beats per minute, respirations 18, /85, SpO2 94% on room air.  ED workup revealed glucose 378, , AST 45, albumin 3.0, initial lactic 2.9 with repeat lactic 3.4, UA positive for protein, glucose, ketones, urobilinogen, RBC, WBC, hyaline casts, peritoneal fluid Gram stain positive for WBC, CT abdomen and pelvis with IV contrast read large volume ascites appears worse than prior examination, pancreatic head mass similar but there appears to be a mass in the pancreatic neck on this examination that was not clearly seen on prior examination, previous embolization procedure in the liver, pneumobilia.  Patient was given albumin 50 g IV, morphine 4 mg IV, Zofran 4 mg IV, Zosyn 4.5 g IV and NaCl 1 L IV in the ED and admitted to Hospital Medicine for further care.    Today  Seen examined this morning.  Started having some vomiting today, initially plan was for discharge but we will monitor him overnight.        OBJECTIVE/PHYSICAL EXAM     VITAL SIGNS (MOST RECENT):  Temp: 99.3 °F (37.4 °C) (02/17/25 0740)  Pulse: 97 (02/17/25 0816)  Resp: 17 (02/17/25 0047)  BP: 91/63 (02/17/25 0816)  SpO2: (!) 91 % (02/17/25 0740) VITAL SIGNS (24 HOUR RANGE):  Temp:  [99 °F (37.2  °C)-99.8 °F (37.7 °C)] 99.3 °F (37.4 °C)  Pulse:  [] 97  Resp:  [16-17] 17  SpO2:  [91 %-96 %] 91 %  BP: ()/(54-74) 91/63   GENERAL: In no acute distress, afebrile  HEENT:  CHEST: Clear to auscultation bilaterally  HEART: S1, S2, no appreciable murmur  ABDOMEN:  Distended and tender diffusely  MSK: Warm, 2+ lower extremity edema, no clubbing or cyanosis  NEUROLOGIC: Alert and oriented x4, moving all extremities with good strength   INTEGUMENTARY:  PSYCHIATRY:        ASSESSMENT/PLAN   Generalized abdominal tenderness   Ascites   Lactic acidosis   Known pneumobilia    History of: As listed above      Referred to ED for pathologist writing pus on report.  Fluid cultures negative so far and not consistent with SBP  Continue with Zosyn, discontinue vancomycin.  Reglan, lactulose after reviewing CT scan, history of gastric outlet obstruction from cancer and status post duodenal stent    DVT prophylaxis:  Eliquis    Anticipated discharge and disposition:   __________________________________________________________________________    NUTRITIONAL STATUS     Patient meets ASPEN criteria for moderate malnutrition of chronic illness per RD assessment as evidenced by:  Energy Intake (Malnutrition): less than or equal to 75% for greater than or equal to 1 month  Weight Loss (Malnutrition): other (see comments) (Unable to assess)  Subcutaneous Fat (Malnutrition): moderate depletion  Muscle Mass (Malnutrition): moderate depletion  Fluid Accumulation (Malnutrition): other (see comments) (Unable to assess)        A minimum of two characteristics is recommended for diagnosis of either severe or non-severe malnutrition.     LABS/MICRO/MEDS/DIAGNOSTICS       LABS  Recent Labs     02/16/25  1101      K 4.1   CO2 32*   BUN 13.0   CREATININE 0.84   GLUCOSE 272*   CALCIUM 8.7   ALKPHOS 366*   AST 28   ALT 24   ALBUMIN 3.2*     Recent Labs     02/16/25  1101   WBC 4.30*   RBC 3.11*   HCT 28.0*   MCV 90.0           MICROBIOLOGY  Microbiology Results (last 7 days)       Procedure Component Value Units Date/Time    Body Fluid Culture [[id number]] Collected: 02/14/25 1917    Order Status: Completed Specimen: Peritoneal Fluid from Abdomen Updated: 02/17/25 0959     Body Fluid Culture No Growth At 72 Hours    Urine culture [[id number]]  (Abnormal) Collected: 02/14/25 1712    Order Status: Completed Specimen: Urine Updated: 02/16/25 0820     Urine Culture Less than 10,000 colonies/ml Enterobacter cloacae complex     Comment: [place] counts of <10,000colonies/ml are of questionable significance. Therefore organisms are identified only.       Blood Culture [[id number]]  (Normal) Collected: 02/14/25 1653    Order Status: Completed Specimen: Blood Updated: 02/16/25 0624     Blood Culture No Growth At 24 Hours    Blood Culture [[id number]]  (Normal) Collected: 02/14/25 1653    Order Status: Completed Specimen: Blood Updated: 02/16/25 0624     Blood Culture No Growth At 24 Hours    Body Fluid Culture [[id number]]     Order Status: Sent Specimen: Peritoneal Fluid from Ascites Fluid     Gram Stain [[id number]] Collected: 02/14/25 1917    Order Status: Completed Specimen: Peritoneal Fluid from Abdominal Fluid Updated: 02/14/25 2027     GRAM STAIN Many WBC observed      No bacteria seen               MEDICATIONS   droNABinol  2.5 mg Oral BID AC    lipase-protease-amylase 12,000-38,000-60,000 units  2 capsule Oral TID WM    metoclopramide  10 mg Intravenous QID (AC & HS)    morphine  15 mg Oral BID    multivitamin  1 tablet Oral Daily    mupirocin   Nasal BID    OLANZapine  5 mg Oral Nightly    pantoprazole  40 mg Oral Before breakfast    piperacillin-tazobactam (Zosyn) IV (PEDS and ADULTS) (extended infusion is not appropriate)  4.5 g Intravenous Q8H    pregabalin  100 mg Oral BID    tamsulosin  0.4 mg Oral Daily    zinc oxide-cod liver oil   Topical (Top) TID         INFUSIONS   D5 and 0.45% NaCl   Intravenous Continuous 80 mL/hr at  02/17/25 0141 New Bag at 02/17/25 0141          DIAGNOSTIC TESTS  CTA Chest Non-Coronary (PE Studies)   Final Result   Impression:      1. Pronounced streaky linear opacity is seen predominantly in the dependent portions at the lung bases consistent with nonspecific dependent changes scarring and subsegmental atelectasis.      2. There is extensive stranding of the thoracoabdominal subcutaneous fat associated with ascites consistent with anasarca edema likely form hepatic pathology.      3. The liver appears small with heterogeneous attenuation and nodular contours consistent with cirrhosis. Post surgical change is seen in the right lobe of the liver. Pneumobilia is noted in the left and right hepatic lobes. A large amount of free peritoneal fluid is noted in the visualized upper abdominal cavity likely related to liver cirrhosis. Correlate with clinical and laboratory parameters as regards additional evaluation and follow up.      4. No CT evidence of pulmonary embolism. Details and other findings as discussed above.      No significant discrepancy with overnight report.         Electronically signed by: Niall Jamison   Date:    02/17/2025   Time:    10:18      X-Ray Abdomen AP 1 View   Final Result      No acute abdominal radiographic abnormality.         Electronically signed by: Lisa Camilo   Date:    02/16/2025   Time:    11:14      X-Ray Chest 1 View   Final Result      Mild bibasilar atelectasis.      [employer] concordance         Electronically signed by: Magen Arechiga MD   Date:    02/16/2025   Time:    09:56      ED US FAST   Final Result      CT Abdomen Pelvis With IV Contrast NO Oral Contrast   Final Result      Large volume ascites appears worse than the prior examination      Pancreatic head mass similar but there appears to be a mass in the pancreatic neck on this examination.  This was not clearly seen on the prior examination.      Previous embolization procedure in the liver      Pneumobilia          Electronically signed by: Vicente Saldana   Date:    02/14/2025   Time:    18:17           No echocardiogram results found for the past 14 days.         Case related differential diagnoses have been reviewed; assessment and plan has been documented. I have personally reviewed the labs and test results that are currently available; I have reviewed the patients medication list. I have reviewed the consulting providers recommendations. I have reviewed or attempted to review medical records based upon their availability.  All of the patient's and/or family's questions have been addressed and answered to the best of my ability.  I will continue to monitor closely and make adjustments to medical management as needed.  This document was created using M*Modal Fluency Direct.  Transcription errors may have been made.  Please contact me if any questions may rise regarding documentation to clarify transcription.        Tristian Aguilar MD   Internal Medicine  Department of Hospital Medicine [facility] - [address]

## 2025-02-17 NOTE — PLAN OF CARE
Problem: Adult Inpatient Plan of Care  Goal: Plan of Care Review  Outcome: Progressing  Flowsheets (Taken 2/17/2025 0800)  Plan of Care Reviewed With:   patient   spouse  Goal: Patient-Specific Goal (Individualized)  Outcome: Progressing  Goal: Absence of Hospital-Acquired Illness or Injury  Outcome: Progressing  Intervention: Identify and Manage Fall Risk  Flowsheets (Taken 2/17/2025 0800)  Safety Promotion/Fall Prevention:   assistive device/personal item within reach   side rails raised x 2  Intervention: Prevent Skin Injury  Flowsheets (Taken 2/17/2025 0800)  Body Position:   weight shifting   position changed independently  Intervention: Prevent and Manage VTE (Venous Thromboembolism) Risk  Flowsheets (Taken 2/17/2025 0800)  VTE Prevention/Management:   remove, assess skin, and reapply sequential compression device   ROM (active) performed  Intervention: Prevent Infection  Flowsheets (Taken 2/17/2025 0800)  Infection Prevention:   hand hygiene promoted   environmental surveillance performed  Goal: Optimal Comfort and Wellbeing  Outcome: Progressing  Intervention: Monitor Pain and Promote Comfort  Flowsheets (Taken 2/17/2025 0800)  Pain Management Interventions:   care clustered   pillow support provided  Intervention: Provide Person-Centered Care  Flowsheets (Taken 2/17/2025 0800)  Trust Relationship/Rapport:   care explained   choices provided   emotional support provided   empathic listening provided   questions answered   questions encouraged   reassurance provided   thoughts/feelings acknowledged  Goal: Readiness for Transition of Care  Outcome: Progressing     Problem: Infection  Goal: Absence of Infection Signs and Symptoms  Outcome: Progressing  Intervention: Prevent or Manage Infection  Flowsheets (Taken 2/17/2025 0800)  Fever Reduction/Comfort Measures:   lightweight bedding   lightweight clothing     Problem: Wound  Goal: Optimal Coping  Outcome: Progressing  Goal: Optimal Functional  Ability  Outcome: Progressing  Intervention: Optimize Functional Ability  Flowsheets (Taken 2/17/2025 0800)  Activity Management:   Arm raise - L1   Ankle pumps - L1  Activity Assistance Provided: independent  Goal: Absence of Infection Signs and Symptoms  Outcome: Progressing  Intervention: Prevent or Manage Infection  Flowsheets (Taken 2/17/2025 0800)  Fever Reduction/Comfort Measures:   lightweight bedding   lightweight clothing  Goal: Improved Oral Intake  Outcome: Progressing  Goal: Optimal Pain Control and Function  Outcome: Progressing  Intervention: Prevent or Manage Pain  Flowsheets (Taken 2/17/2025 0800)  Sleep/Rest Enhancement: regular sleep/rest pattern promoted  Pain Management Interventions:   care clustered   pillow support provided  Goal: Skin Health and Integrity  Outcome: Progressing  Intervention: Optimize Skin Protection  Flowsheets (Taken 2/17/2025 0800)  Pressure Reduction Techniques: frequent weight shift encouraged  Activity Management:   Arm raise - L1   Ankle pumps - L1  Head of Bed (HOB) Positioning: HOB at 20-30 degrees  Goal: Optimal Wound Healing  Outcome: Progressing  Intervention: Promote Wound Healing  Flowsheets (Taken 2/17/2025 0800)  Sleep/Rest Enhancement: regular sleep/rest pattern promoted

## 2025-02-17 NOTE — PLAN OF CARE
Problem: Adult Inpatient Plan of Care  Goal: Plan of Care Review  2/17/2025 1727 by Shi Colvin RN  Outcome: Met  2/17/2025 0942 by Shi Colvin RN  Outcome: Progressing  Flowsheets (Taken 2/17/2025 0800)  Plan of Care Reviewed With:   patient   spouse  Goal: Patient-Specific Goal (Individualized)  2/17/2025 1727 by Shi Colvin RN  Outcome: Met  2/17/2025 0942 by Shi Colvin RN  Outcome: Progressing  Goal: Absence of Hospital-Acquired Illness or Injury  2/17/2025 1727 by Shi Colvin RN  Outcome: Met  2/17/2025 0942 by Shi Colvin RN  Outcome: Progressing  Intervention: Identify and Manage Fall Risk  Flowsheets (Taken 2/17/2025 0800)  Safety Promotion/Fall Prevention:   assistive device/personal item within reach   side rails raised x 2  Intervention: Prevent Skin Injury  Flowsheets (Taken 2/17/2025 0800)  Body Position:   weight shifting   position changed independently  Intervention: Prevent and Manage VTE (Venous Thromboembolism) Risk  Flowsheets (Taken 2/17/2025 0800)  VTE Prevention/Management:   remove, assess skin, and reapply sequential compression device   ROM (active) performed  Intervention: Prevent Infection  Flowsheets (Taken 2/17/2025 0800)  Infection Prevention:   hand hygiene promoted   environmental surveillance performed  Goal: Optimal Comfort and Wellbeing  2/17/2025 1727 by Shi Colvin RN  Outcome: Met  2/17/2025 0942 by Shi Colvin RN  Outcome: Progressing  Intervention: Monitor Pain and Promote Comfort  Flowsheets (Taken 2/17/2025 0800)  Pain Management Interventions:   care clustered   pillow support provided  Intervention: Provide Person-Centered Care  Flowsheets (Taken 2/17/2025 0800)  Trust Relationship/Rapport:   care explained   choices provided   emotional support provided   empathic listening provided   questions answered   questions encouraged   reassurance provided   thoughts/feelings acknowledged  Goal: Readiness for Transition of Care  2/17/2025 1727 by Shi Colvin  RN  Outcome: Met  2/17/2025 0942 by Shi Colvin RN  Outcome: Progressing     Problem: Infection  Goal: Absence of Infection Signs and Symptoms  2/17/2025 1727 by Shi Colvin RN  Outcome: Met  2/17/2025 0942 by Shi Colvin RN  Outcome: Progressing  Intervention: Prevent or Manage Infection  Flowsheets (Taken 2/17/2025 0800)  Fever Reduction/Comfort Measures:   lightweight bedding   lightweight clothing     Problem: Wound  Goal: Optimal Coping  2/17/2025 1727 by Shi Colvin RN  Outcome: Met  2/17/2025 0942 by Shi Colvin RN  Outcome: Progressing  Goal: Optimal Functional Ability  2/17/2025 1727 by Shi Colvin RN  Outcome: Met  2/17/2025 0942 by Shi Colvin RN  Outcome: Progressing  Intervention: Optimize Functional Ability  Flowsheets (Taken 2/17/2025 0800)  Activity Management:   Arm raise - L1   Ankle pumps - L1  Activity Assistance Provided: independent  Goal: Absence of Infection Signs and Symptoms  2/17/2025 1727 by Shi Colvin RN  Outcome: Met  2/17/2025 0942 by Shi Colvin RN  Outcome: Progressing  Intervention: Prevent or Manage Infection  Flowsheets (Taken 2/17/2025 0800)  Fever Reduction/Comfort Measures:   lightweight bedding   lightweight clothing  Goal: Improved Oral Intake  2/17/2025 1727 by Shi Colvin RN  Outcome: Met  2/17/2025 0942 by Shi Colvin RN  Outcome: Progressing  Goal: Optimal Pain Control and Function  2/17/2025 1727 by Shi Colvin RN  Outcome: Met  2/17/2025 0942 by Shi Colvin RN  Outcome: Progressing  Intervention: Prevent or Manage Pain  Flowsheets (Taken 2/17/2025 0800)  Sleep/Rest Enhancement: regular sleep/rest pattern promoted  Pain Management Interventions:   care clustered   pillow support provided  Goal: Skin Health and Integrity  2/17/2025 1727 by Shi Colvin RN  Outcome: Met  2/17/2025 0942 by Shi Colvin RN  Outcome: Progressing  Intervention: Optimize Skin Protection  Flowsheets (Taken 2/17/2025 0800)  Pressure Reduction Techniques: frequent  weight shift encouraged  Activity Management:   Arm raise - L1   Ankle pumps - L1  Head of Bed (HOB) Positioning: HOB at 20-30 degrees  Goal: Optimal Wound Healing  2/17/2025 1727 by Shi Colvin, RN  Outcome: Met  2/17/2025 0942 by Shi Colvin, RN  Outcome: Progressing  Intervention: Promote Wound Healing  Flowsheets (Taken 2/17/2025 0800)  Sleep/Rest Enhancement: regular sleep/rest pattern promoted

## 2025-02-17 NOTE — H&P (VIEW-ONLY)
[facility] [address] MEDICINE ~ PROGRESS NOTE        CHIEF COMPLAINT   Hospital follow up    HOSPITAL COURSE   Warren Lejeune is a 60-year-old M with a PMH of pancreatic ductal adenocarcinoma (Dx 8/2022) was previously doing chemo (last session December 4th, 2024) but has switch to oral regimen that he has not yet begun, portal vein thrombosis takes Eliquis, insulin-dependent DM II, chronic ascites and weekly paracentesis presents to the ED after being told by the [facility] that his last sample of paracentesis fluid was positive with pus. Patient denies fever, denies nausea, denies vomiting, complains of generalized abdominal pain that radiates to bilateral flanks.     ED course: Initial ED vitals temp 97.6°, HR 83 beats per minute, respirations 18, /85, SpO2 94% on room air.  ED workup revealed glucose 378, , AST 45, albumin 3.0, initial lactic 2.9 with repeat lactic 3.4, UA positive for protein, glucose, ketones, urobilinogen, RBC, WBC, hyaline casts, peritoneal fluid Gram stain positive for WBC, CT abdomen and pelvis with IV contrast read large volume ascites appears worse than prior examination, pancreatic head mass similar but there appears to be a mass in the pancreatic neck on this examination that was not clearly seen on prior examination, previous embolization procedure in the liver, pneumobilia.  Patient was given albumin 50 g IV, morphine 4 mg IV, Zofran 4 mg IV, Zosyn 4.5 g IV and NaCl 1 L IV in the ED and admitted to Hospital Medicine for further care.    Today  Seen examined this morning.  Started having some vomiting today, initially plan was for discharge but we will monitor him overnight.        OBJECTIVE/PHYSICAL EXAM     VITAL SIGNS (MOST RECENT):  Temp: 99.3 °F (37.4 °C) (02/17/25 0740)  Pulse: 97 (02/17/25 0816)  Resp: 17 (02/17/25 0047)  BP: 91/63 (02/17/25 0816)  SpO2: (!) 91 % (02/17/25 0740) VITAL SIGNS (24 HOUR RANGE):  Temp:  [99 °F (37.2  °C)-99.8 °F (37.7 °C)] 99.3 °F (37.4 °C)  Pulse:  [] 97  Resp:  [16-17] 17  SpO2:  [91 %-96 %] 91 %  BP: ()/(54-74) 91/63   GENERAL: In no acute distress, afebrile  HEENT:  CHEST: Clear to auscultation bilaterally  HEART: S1, S2, no appreciable murmur  ABDOMEN:  Distended and tender diffusely  MSK: Warm, 2+ lower extremity edema, no clubbing or cyanosis  NEUROLOGIC: Alert and oriented x4, moving all extremities with good strength   INTEGUMENTARY:  PSYCHIATRY:        ASSESSMENT/PLAN   Generalized abdominal tenderness   Ascites   Lactic acidosis   Known pneumobilia    History of: As listed above      Referred to ED for pathologist writing pus on report.  Fluid cultures negative so far and not consistent with SBP  Continue with Zosyn, discontinue vancomycin.  Reglan, lactulose after reviewing CT scan, history of gastric outlet obstruction from cancer and status post duodenal stent    DVT prophylaxis:  Eliquis    Anticipated discharge and disposition:   __________________________________________________________________________    NUTRITIONAL STATUS     Patient meets ASPEN criteria for moderate malnutrition of chronic illness per RD assessment as evidenced by:  Energy Intake (Malnutrition): less than or equal to 75% for greater than or equal to 1 month  Weight Loss (Malnutrition): other (see comments) (Unable to assess)  Subcutaneous Fat (Malnutrition): moderate depletion  Muscle Mass (Malnutrition): moderate depletion  Fluid Accumulation (Malnutrition): other (see comments) (Unable to assess)        A minimum of two characteristics is recommended for diagnosis of either severe or non-severe malnutrition.     LABS/MICRO/MEDS/DIAGNOSTICS       LABS  Recent Labs     02/16/25  1101      K 4.1   CO2 32*   BUN 13.0   CREATININE 0.84   GLUCOSE 272*   CALCIUM 8.7   ALKPHOS 366*   AST 28   ALT 24   ALBUMIN 3.2*     Recent Labs     02/16/25  1101   WBC 4.30*   RBC 3.11*   HCT 28.0*   MCV 90.0           MICROBIOLOGY  Microbiology Results (last 7 days)       Procedure Component Value Units Date/Time    Body Fluid Culture [[id number]] Collected: 02/14/25 1917    Order Status: Completed Specimen: Peritoneal Fluid from Abdomen Updated: 02/17/25 0959     Body Fluid Culture No Growth At 72 Hours    Urine culture [[id number]]  (Abnormal) Collected: 02/14/25 1712    Order Status: Completed Specimen: Urine Updated: 02/16/25 0820     Urine Culture Less than 10,000 colonies/ml Enterobacter cloacae complex     Comment: [place] counts of <10,000colonies/ml are of questionable significance. Therefore organisms are identified only.       Blood Culture [[id number]]  (Normal) Collected: 02/14/25 1653    Order Status: Completed Specimen: Blood Updated: 02/16/25 0624     Blood Culture No Growth At 24 Hours    Blood Culture [[id number]]  (Normal) Collected: 02/14/25 1653    Order Status: Completed Specimen: Blood Updated: 02/16/25 0624     Blood Culture No Growth At 24 Hours    Body Fluid Culture [[id number]]     Order Status: Sent Specimen: Peritoneal Fluid from Ascites Fluid     Gram Stain [[id number]] Collected: 02/14/25 1917    Order Status: Completed Specimen: Peritoneal Fluid from Abdominal Fluid Updated: 02/14/25 2027     GRAM STAIN Many WBC observed      No bacteria seen               MEDICATIONS   droNABinol  2.5 mg Oral BID AC    lipase-protease-amylase 12,000-38,000-60,000 units  2 capsule Oral TID WM    metoclopramide  10 mg Intravenous QID (AC & HS)    morphine  15 mg Oral BID    multivitamin  1 tablet Oral Daily    mupirocin   Nasal BID    OLANZapine  5 mg Oral Nightly    pantoprazole  40 mg Oral Before breakfast    piperacillin-tazobactam (Zosyn) IV (PEDS and ADULTS) (extended infusion is not appropriate)  4.5 g Intravenous Q8H    pregabalin  100 mg Oral BID    tamsulosin  0.4 mg Oral Daily    zinc oxide-cod liver oil   Topical (Top) TID         INFUSIONS   D5 and 0.45% NaCl   Intravenous Continuous 80 mL/hr at  02/17/25 0141 New Bag at 02/17/25 0141          DIAGNOSTIC TESTS  CTA Chest Non-Coronary (PE Studies)   Final Result   Impression:      1. Pronounced streaky linear opacity is seen predominantly in the dependent portions at the lung bases consistent with nonspecific dependent changes scarring and subsegmental atelectasis.      2. There is extensive stranding of the thoracoabdominal subcutaneous fat associated with ascites consistent with anasarca edema likely form hepatic pathology.      3. The liver appears small with heterogeneous attenuation and nodular contours consistent with cirrhosis. Post surgical change is seen in the right lobe of the liver. Pneumobilia is noted in the left and right hepatic lobes. A large amount of free peritoneal fluid is noted in the visualized upper abdominal cavity likely related to liver cirrhosis. Correlate with clinical and laboratory parameters as regards additional evaluation and follow up.      4. No CT evidence of pulmonary embolism. Details and other findings as discussed above.      No significant discrepancy with overnight report.         Electronically signed by: Niall Jamison   Date:    02/17/2025   Time:    10:18      X-Ray Abdomen AP 1 View   Final Result      No acute abdominal radiographic abnormality.         Electronically signed by: Lisa Camilo   Date:    02/16/2025   Time:    11:14      X-Ray Chest 1 View   Final Result      Mild bibasilar atelectasis.      [employer] concordance         Electronically signed by: Magen Arechiga MD   Date:    02/16/2025   Time:    09:56      ED US FAST   Final Result      CT Abdomen Pelvis With IV Contrast NO Oral Contrast   Final Result      Large volume ascites appears worse than the prior examination      Pancreatic head mass similar but there appears to be a mass in the pancreatic neck on this examination.  This was not clearly seen on the prior examination.      Previous embolization procedure in the liver      Pneumobilia          Electronically signed by: Vicente Saldana   Date:    02/14/2025   Time:    18:17           No echocardiogram results found for the past 14 days.         Case related differential diagnoses have been reviewed; assessment and plan has been documented. I have personally reviewed the labs and test results that are currently available; I have reviewed the patients medication list. I have reviewed the consulting providers recommendations. I have reviewed or attempted to review medical records based upon their availability.  All of the patient's and/or family's questions have been addressed and answered to the best of my ability.  I will continue to monitor closely and make adjustments to medical management as needed.  This document was created using M*Modal Fluency Direct.  Transcription errors may have been made.  Please contact me if any questions may rise regarding documentation to clarify transcription.        Tristian Aguilar MD   Internal Medicine  Department of Hospital Medicine [facility] [address]

## 2025-02-17 NOTE — PLAN OF CARE
02/17/25 0924   Discharge Assessment   Assessment Type Discharge Planning Assessment   Confirmed/corrected address, phone number and insurance Yes   Confirmed Demographics Correct on Facesheet   Source of Information patient;family   When was your last doctors appointment?   (unknown)   Communicated MATTHIEU with patient/caregiver Date not available/Unable to determine   Reason For Admission Bacterial Peritonitis   People in Home spouse   Facility Arrived From: Home   Do you expect to return to your current living situation? Yes   Do you have help at home or someone to help you manage your care at home? Yes   Who are your caregiver(s) and their phone number(s)? SpouseTerri Lejeune [phone]   Prior to hospitilization cognitive status: Unable to Assess   Current cognitive status: Alert/Oriented   Walking or Climbing Stairs Difficulty no   Dressing/Bathing Difficulty no   Home Accessibility wheelchair accessible   Home Layout Able to live on 1st floor   Equipment Currently Used at Home none   Patient currently being followed by outpatient case management? No   Do you currently have service(s) that help you manage your care at home? No   Do you take prescription medications? Yes   Do you have prescription coverage? Yes   Coverage [employer] M[employer]   Do you have any problems affording any of your prescribed medications? No   Is the patient taking medications as prescribed? yes   Who is going to help you get home at discharge? Spouse, Denielle Lejeune   How do you get to doctors appointments? car, drives self;family or friend will provide   Are you on dialysis? No   Do you take coumadin? No   Discharge Plan A Home   Discharge Plan B Home   DME Needed Upon Discharge  none   Discharge Plan discussed with: Patient;Spouse/sig other   Name(s) and Number(s) Terri Lejeune [phone]   Transition of Care Barriers None   OTHER   Name(s) of People in Home Spouse, Danielle Lejeune     Assessment completed in patient's room.   Spouse, Terri, at bedside.  She will assist with care and transportation at discharge.  PCP-[facility]  Pharmacy-[facility] in [facility]    No DME in the home  No HH in the past    No discharge needs identified at this time.  Will continue to follow

## 2025-02-18 ENCOUNTER — PATIENT OUTREACH (OUTPATIENT)
Dept: ADMINISTRATIVE | Facility: CLINIC | Age: 60
End: 2025-02-18
Payer: MEDICARE

## 2025-02-18 NOTE — DISCHARGE SUMMARY
[facility] [address] HOSPITAL MEDICINE - DISCHARGE SUMMARY    Patient Name: Warren P Lejeune  MRN: [id number]  Admission Date: 2/14/2025  Discharge Date:  February 17  Hospital Length of Stay: 3 days  Discharge Provider: Tristian Aguilar MD  Primary Care Provider: No primary care provider on file.      HOSPITAL COURSE   Warren Lejeune is a 60-year-old M with a PMH of pancreatic ductal adenocarcinoma (Dx 8/2022) was previously doing chemo (last session December 4th, 2024) but has switch to oral regimen that he has not yet begun, portal vein thrombosis takes Eliquis, insulin-dependent DM II, chronic ascites and weekly paracentesis presents to the ED after being told by the [facility] that his last sample of paracentesis fluid was positive with pus. Patient denies fever, denies nausea, denies vomiting, complains of generalized abdominal pain that radiates to bilateral flanks.     ED course: Initial ED vitals temp 97.6°, HR 83 beats per minute, respirations 18, /85, SpO2 94% on room air.  ED workup revealed glucose 378, , AST 45, albumin 3.0, initial lactic 2.9 with repeat lactic 3.4, UA positive for protein, glucose, ketones, urobilinogen, RBC, WBC, hyaline casts, peritoneal fluid Gram stain positive for WBC, CT abdomen and pelvis with IV contrast read large volume ascites appears worse than prior examination, pancreatic head mass similar but there appears to be a mass in the pancreatic neck on this examination that was not clearly seen on prior examination, previous embolization procedure in the liver, pneumobilia.  Patient was given albumin 50 g IV, morphine 4 mg IV, Zofran 4 mg IV, Zosyn 4.5 g IV and NaCl 1 L IV in the ED and admitted to Hospital Medicine for further care.      Patient underwent paracentesis and ascites fluid came back transudative with no sign of bacterial peritonitis.  Pathology report notes pus only which in our terms would be WBC and not necessarily  purulence.  Cultures remained negative from the peritoneal fluid.    He did have episode of vomiting while in the hospital and he has a history of gastric outlet obstruction so I have started him on Reglan and also given lactulose to be taken to have 2 bowel movements per day.  Of note CT chest was obtained which showed cirrhotic appearance of the liver.  GI was consulted and no etiology for the cirrhosis was elicited.  They recommended diuretic therapy however at this time patient does not tolerate diuretic therapy given blood pressure low normal.  Discussed it will be up to Oncology to continue treatments versus determining if chemotherapy-induced cirrhosis?        PHYSICAL EXAM     Most Recent Vital Signs:  Temp: 99.3 °F (37.4 °C) (02/17/25 1608)  Pulse: 93 (02/17/25 1608)  Resp: 17 (02/17/25 0047)  BP: 99/73 (02/17/25 1608)  SpO2: (!) 91 % (02/17/25 1608)   GENERAL: In no acute distress, afebrile  HEENT:  CHEST: Clear to auscultation bilaterally  HEART: S1, S2, no appreciable murmur  ABDOMEN: Soft, nontender, BS +, ascites  MSK: Warm, lower extremity edema, no clubbing or cyanosis  NEUROLOGIC: Alert and oriented x4, moving all extremities with good strength   INTEGUMENTARY:  PSYCHIATRY:          DISCHARGE DIAGNOSIS   Generalized abdominal tenderness   Ascites   Lactic acidosis   Known pneumobilia  CT imaging noting cirrhotic liver which is likely the cause of the recurrent ascites  Gastric outlet obstruction history  Constipation     History of: As listed above        _____________________________________________________________________________      DISCHARGE MED REC     Discharge Medication List as of 2/17/2025  4:57 PM        START taking these medications    Details   lactulose (CHRONULAC) 20 gram/30 mL Soln Take 30 mLs (20 g total) by mouth every 6 (six) hours as needed (constipation)., Starting Mon 2/17/2025, Normal      metoclopramide HCl (REGLAN) 10 MG tablet Take 1 tablet (10 mg total) by mouth 3 (three)  times daily before meals. for 7 days, Starting Mon 2/17/2025, Until Mon 2/24/2025, Normal           CONTINUE these medications which have CHANGED    Details   apixaban (ELIQUIS) 5 mg Tab Take 1 tablet (5 mg total) by mouth 2 (two) times daily., Starting Mon 2/17/2025, Historical Med           CONTINUE these medications which have NOT CHANGED    Details   capecitabine (XELODA) 500 MG Tab Take 3 tablets (1,500 mg total) by mouth 2 (two) times daily on days 1-14 of each 21 day cycle (2 weeks on, then 1 week off)., Starting Wed 2/5/2025, Normal      cholecalciferol, vitamin D3, (VITAMIN D3) 50 mcg (2,000 unit) Tab Take 2,000 Units by mouth once daily., Historical Med      morphine (MS CONTIN) 15 MG 12 hr tablet Take 1 tablet (15 mg total) by mouth 2 (two) times daily., Starting Mon 1/6/2025, Normal      oxyCODONE-acetaminophen (PERCOCET)  mg per tablet Take 1 tablet by mouth every 4 (four) hours as needed for Pain., Starting Mon 1/6/2025, Normal      pantoprazole (PROTONIX) 40 MG tablet Take 1 tablet (40 mg total) by mouth once daily., Starting Thu 12/26/2024, Until Wed 3/26/2025, Normal      pregabalin (LYRICA) 100 MG capsule Take 1 capsule (100 mg total) by mouth 2 (two) times daily., Starting Wed 12/18/2024, Until Wed 7/16/2025, Normal      tamsulosin (FLOMAX) 0.4 mg Cap Take 1 capsule (0.4 mg total) by mouth once daily., Starting Wed 1/15/2025, Until Tue 4/15/2025, Normal      vibegron (GEMTESA) 75 mg Tab Take 75 mg by mouth., Historical Med      albuterol (PROVENTIL/VENTOLIN HFA) 90 mcg/actuation inhaler Inhale 1-2 puffs into the lungs every 6 (six) hours as needed for Wheezing. Rescue, Starting Sun 1/26/2025, Until Mon 1/26/2026 at 2359, Normal      aluminum & magnesium hydroxide-simethicone (MYLANTA MAX STRENGTH) 400-400-40 mg/5 mL suspension Take 5 mLs by mouth 4 (four) times daily as needed (mouth sores)., Starting Wed 9/18/2024, Until Thu 9/18/2025 at 2359, Normal      dexAMETHasone (DECADRON) 4 MG Tab  TAKE 2 TABLETS BY MOUTH ONCE DAILY AS DIRECTED ON DAYS 2 AND 3 OF CHEMOTHERAPY CYCLE, Historical Med      dicyclomine (BENTYL) 10 MG capsule TAKE 1 CAPSULE BY MOUTH 4 TIMES DAILY BEFORE MEAL(S) AND NIGHTLY, Normal      diphenhydrAMINE (BENADRYL) 12.5 mg/5 mL elixir Take 5 mLs (12.5 mg total) by mouth 4 (four) times daily as needed (mouth sores)., Starting Wed 9/18/2024, Normal      insulin aspart U-100 (NOVOLOG) 100 unit/mL injection Inject into the skin 3 (three) times daily before meals. prn, Historical Med      LIDOcaine viscous HCl 2% (LIDOCAINE VISCOUS) 2 % Soln by Mucous Membrane route 4 (four) times daily as needed (mouth sores). Swish and spit 15 ml (5 ml benadryl, 5 ml mylanta, 5 ml lidocaine) by mouth 4 times daily as needed for mouth sores, Starting Wed 9/18/2024, Normal      magnesium oxide (MAGOX) 400 mg (241.3 mg magnesium) tablet Take 1 tablet (400 mg total) by mouth once daily., Starting Wed 12/18/2024, Until Thu 12/18/2025, Normal      midodrine (PROAMATINE) 5 MG Tab Take 1 tablet (5 mg total) by mouth 3 (three) times daily with meals. for 7 days, Starting Wed 1/29/2025, Until Wed 2/5/2025, Normal      multivitamin with folic acid 400 mcg Tab Take 1 tablet by mouth once daily., Historical Med      OLANZapine (ZYPREXA) 5 MG tablet Take 1 tablet (5 mg total) by mouth nightly., Starting Mon 9/9/2024, Until Mon 10/7/2024, Normal      ondansetron (ZOFRAN) 8 MG tablet Take 8 mg by mouth., Starting Wed 6/12/2024, Historical Med      polyethylene glycol (GLYCOLAX) 17 gram PwPk Take 17 g by mouth., Starting Fri 8/9/2024, Historical Med      TRESIBA FLEXTOUCH U-200 200 unit/mL (3 mL) insulin pen Inject 14 Units into the skin., Starting Tue 3/12/2024, Historical Med           STOP taking these medications       furosemide (LASIX) 40 MG tablet Comments:   Reason for Stopping:         benzonatate (TESSALON) 100 MG capsule Comments:   Reason for Stopping:         prochlorperazine (COMPAZINE) 10 MG tablet  Comments:   Reason for Stopping:                  CONSULTS     Consults (From admission, onward)          Status Ordering Provider     Inpatient consult to Gastroenterology  Once        Provider:  Angel Cuba MD    Completed NIKKY AGUILAR              FOLLOW UP      Follow-up Information       Dumont, Shilpa, NP. Go on 2/19/2025.    Specialties: Hematology and Oncology, Adult Health  Why: Wednesday, February 19th @ 9am :)  Contact information:  [address] [place] [address]  [phone]               Ponce Rodríguez MD Follow up.    Specialty: Gastroenterology  Why: Doctors office will call you with appointment date & time! :)  Contact information:  [address] [facility] [id number] [phone]                                 DISCHARGE INSTRUCTIONS     Explained in detail to the patient about the discharge plan, medications, and follow-up visits. Pt understands and agrees with the treatment plan.  Discharged Condition: stable  Diet as tolerated  Activities as tolerated  Discharge to: Home or Self Care    TIME SPENT ON DISCHARGE   35 minutes        Nikky Aguilar MD  Internal Medicine  Department of Hospital Medicine [facility] [address]      This document was created using electronic dictation services.  Please excuse any errors that may have been made.  Contact me if any questions regarding documentation to clarify verbiage.

## 2025-02-19 ENCOUNTER — HOSPITAL ENCOUNTER (OUTPATIENT)
Dept: INTERVENTIONAL RADIOLOGY/VASCULAR | Facility: HOSPITAL | Age: 60
Discharge: HOME | End: 2025-02-19
Attending: INTERNAL MEDICINE
Payer: MEDICARE

## 2025-02-19 ENCOUNTER — DOCUMENTATION ONLY (OUTPATIENT)
Dept: HEMATOLOGY/ONCOLOGY | Facility: CLINIC | Age: 60
End: 2025-02-19
Payer: MEDICARE

## 2025-02-19 ENCOUNTER — LAB VISIT (OUTPATIENT)
Dept: LAB | Facility: HOSPITAL | Age: 60
End: 2025-02-19
Attending: NURSE PRACTITIONER
Payer: MEDICARE

## 2025-02-19 ENCOUNTER — OFFICE VISIT (OUTPATIENT)
Dept: HEMATOLOGY/ONCOLOGY | Facility: CLINIC | Age: 60
End: 2025-02-19
Payer: MEDICARE

## 2025-02-19 VITALS — DIASTOLIC BLOOD PRESSURE: 68 MMHG | SYSTOLIC BLOOD PRESSURE: 111 MMHG

## 2025-02-19 VITALS
HEART RATE: 63 BPM | TEMPERATURE: 98 F | HEIGHT: 69 IN | BODY MASS INDEX: 21.21 KG/M2 | WEIGHT: 143.19 LBS | DIASTOLIC BLOOD PRESSURE: 60 MMHG | RESPIRATION RATE: 16 BRPM | OXYGEN SATURATION: 100 % | SYSTOLIC BLOOD PRESSURE: 90 MMHG

## 2025-02-19 DIAGNOSIS — D64.9 ANEMIA, UNSPECIFIED TYPE: ICD-10-CM

## 2025-02-19 DIAGNOSIS — R18.8 OTHER ASCITES: ICD-10-CM

## 2025-02-19 DIAGNOSIS — G89.3 CANCER ASSOCIATED PAIN: ICD-10-CM

## 2025-02-19 DIAGNOSIS — R19.00 ABDOMINAL SWELLING: ICD-10-CM

## 2025-02-19 DIAGNOSIS — R74.8 ELEVATED ALKALINE PHOSPHATASE LEVEL: ICD-10-CM

## 2025-02-19 DIAGNOSIS — K59.03 CONSTIPATION DUE TO OPIOID THERAPY: ICD-10-CM

## 2025-02-19 DIAGNOSIS — T40.2X5A CONSTIPATION DUE TO OPIOID THERAPY: ICD-10-CM

## 2025-02-19 DIAGNOSIS — I81 PORTAL VEIN THROMBOSIS: ICD-10-CM

## 2025-02-19 DIAGNOSIS — D64.9 ANEMIA, UNSPECIFIED TYPE: Primary | ICD-10-CM

## 2025-02-19 DIAGNOSIS — R41.3 OTHER AMNESIA: ICD-10-CM

## 2025-02-19 DIAGNOSIS — C25.0 MALIGNANT NEOPLASM OF HEAD OF PANCREAS: Primary | ICD-10-CM

## 2025-02-19 DIAGNOSIS — R97.8 ELEVATED CA 19-9 LEVEL: ICD-10-CM

## 2025-02-19 DIAGNOSIS — G62.0 CHEMOTHERAPY-INDUCED NEUROPATHY: ICD-10-CM

## 2025-02-19 DIAGNOSIS — K83.8 PNEUMOBILIA: ICD-10-CM

## 2025-02-19 DIAGNOSIS — T45.1X5A CHEMOTHERAPY-INDUCED NEUROPATHY: ICD-10-CM

## 2025-02-19 DIAGNOSIS — C25.0 MALIGNANT NEOPLASM OF HEAD OF PANCREAS: ICD-10-CM

## 2025-02-19 LAB
ABORH RETYPE: NORMAL
ALBUMIN SERPL-MCNC: 3.2 G/DL (ref 3.5–5)
ALBUMIN/GLOB SERPL: 1 RATIO (ref 1.1–2)
ALP SERPL-CCNC: 298 UNIT/L (ref 40–150)
ALT SERPL-CCNC: 26 UNIT/L (ref 0–55)
ANION GAP SERPL CALC-SCNC: 5 MEQ/L
AST SERPL-CCNC: 31 UNIT/L (ref 5–34)
BACTERIA FLD CULT: NORMAL
BASOPHILS # BLD AUTO: 0.01 X10(3)/MCL
BASOPHILS NFR BLD AUTO: 0.3 %
BILIRUB SERPL-MCNC: 0.4 MG/DL
BUN SERPL-MCNC: 21 MG/DL (ref 8.4–25.7)
CALCIUM SERPL-MCNC: 9 MG/DL (ref 8.4–10.2)
CANCER AG19-9 SERPL-ACNC: ABNORMAL UNIT/ML (ref 0–37)
CHLORIDE SERPL-SCNC: 100 MMOL/L (ref 98–107)
CO2 SERPL-SCNC: 30 MMOL/L (ref 22–29)
CREAT SERPL-MCNC: 0.78 MG/DL (ref 0.72–1.25)
CREAT/UREA NIT SERPL: 27
EOSINOPHIL # BLD AUTO: 0.03 X10(3)/MCL (ref 0–0.9)
EOSINOPHIL NFR BLD AUTO: 0.9 %
ERYTHROCYTE [DISTWIDTH] IN BLOOD BY AUTOMATED COUNT: 14.6 % (ref 11.5–17)
GFR SERPLBLD CREATININE-BSD FMLA CKD-EPI: >60 ML/MIN/1.73/M2
GLOBULIN SER-MCNC: 3.1 GM/DL (ref 2.4–3.5)
GLUCOSE SERPL-MCNC: 231 MG/DL (ref 74–100)
GROUP & RH: NORMAL
HCT VFR BLD AUTO: 22.4 % (ref 42–52)
HGB BLD-MCNC: 7.4 G/DL (ref 14–18)
IMM GRANULOCYTES # BLD AUTO: 0.01 X10(3)/MCL (ref 0–0.04)
IMM GRANULOCYTES NFR BLD AUTO: 0.3 %
INDIRECT COOMBS: NORMAL
LYMPHOCYTES # BLD AUTO: 0.52 X10(3)/MCL (ref 0.6–4.6)
LYMPHOCYTES NFR BLD AUTO: 15.1 %
MCH RBC QN AUTO: 29.6 PG (ref 27–31)
MCHC RBC AUTO-ENTMCNC: 33 G/DL (ref 33–36)
MCV RBC AUTO: 89.6 FL (ref 80–94)
MONOCYTES # BLD AUTO: 0.42 X10(3)/MCL (ref 0.1–1.3)
MONOCYTES NFR BLD AUTO: 12.2 %
NEUTROPHILS # BLD AUTO: 2.45 X10(3)/MCL (ref 2.1–9.2)
NEUTROPHILS NFR BLD AUTO: 71.2 %
NRBC BLD AUTO-RTO: 0 %
PLATELET # BLD AUTO: 140 X10(3)/MCL (ref 130–400)
PMV BLD AUTO: 10.1 FL (ref 7.4–10.4)
POTASSIUM SERPL-SCNC: 4.6 MMOL/L (ref 3.5–5.1)
PROT SERPL-MCNC: 6.3 GM/DL (ref 6.4–8.3)
RBC # BLD AUTO: 2.5 X10(6)/MCL (ref 4.7–6.1)
SODIUM SERPL-SCNC: 135 MMOL/L (ref 136–145)
SPECIMEN OUTDATE: NORMAL
WBC # BLD AUTO: 3.44 X10(3)/MCL (ref 4.5–11.5)

## 2025-02-19 PROCEDURE — 80053 COMPREHEN METABOLIC PANEL: CPT

## 2025-02-19 PROCEDURE — 63600175 PHARM REV CODE 636 W HCPCS: Performed by: INTERNAL MEDICINE

## 2025-02-19 PROCEDURE — 36415 COLL VENOUS BLD VENIPUNCTURE: CPT

## 2025-02-19 PROCEDURE — 88305 TISSUE EXAM BY PATHOLOGIST: CPT | Performed by: INTERNAL MEDICINE

## 2025-02-19 PROCEDURE — P9047 ALBUMIN (HUMAN), 25%, 50ML: HCPCS | Performed by: INTERNAL MEDICINE

## 2025-02-19 PROCEDURE — 85025 COMPLETE CBC W/AUTO DIFF WBC: CPT

## 2025-02-19 PROCEDURE — 88112 CYTOPATH CELL ENHANCE TECH: CPT

## 2025-02-19 PROCEDURE — 49083 ABD PARACENTESIS W/IMAGING: CPT

## 2025-02-19 PROCEDURE — 86301 IMMUNOASSAY TUMOR CA 19-9: CPT

## 2025-02-19 PROCEDURE — 86900 BLOOD TYPING SEROLOGIC ABO: CPT | Performed by: NURSE PRACTITIONER

## 2025-02-19 RX ORDER — ALBUMIN HUMAN 250 G/1000ML
25 SOLUTION INTRAVENOUS ONCE
Status: COMPLETED | OUTPATIENT
Start: 2025-02-19 | End: 2025-02-19

## 2025-02-19 RX ORDER — HYDROCODONE BITARTRATE AND ACETAMINOPHEN 500; 5 MG/1; MG/1
TABLET ORAL ONCE
Status: CANCELLED | OUTPATIENT
Start: 2025-02-19 | End: 2025-02-19

## 2025-02-19 RX ADMIN — ALBUMIN (HUMAN) 25 G: 12.5 SOLUTION INTRAVENOUS at 08:02

## 2025-02-19 NOTE — PROGRESS NOTES
HEMATOLOGY/ONCOLOGY OFFICE CLINIC VISIT    Visit Information:    Initial Evaluation: 9/3/2024  Referring Provider: Alba Chisholm MD PhD ([facility]) -Cell [phone]  Other providers:  Code status: Not addressed    Diagnosis:  Advanced pancreatic ductal adenocarcinoma (Dx 8/2022)     Present treatment:  Gemzar monotherapy 8/28/2024  -Gemcitabine and CISplatin Every 2 Weeks (8/28/2024-present)--planned  Chemotherapy summary: CISplatin (PLATINOL) 53 mg in sodium chloride 0.9% (NS) 250 mL IVPB, intravenous, 0 of 12 cycles  gemcitabine (GEMZAR) 836 mg in sodium chloride 0.9% (NS) 250 mL IVPB, intravenous, 0 of 12 cycles   - Eliquis 10 mg BID x 7 days then 5 mg BID- started 10/29/24--present  - weekly therapeutic paracentesis PRN    Treatment/Oncology history:  -8/2/2022 ERCP with metal biliary stent placement   -Folfirinox (10/2022- 2/2023)  -capecitabine RT (4/3/23 - 5/16/23)   -Phase I study 2021-1176 SD-101 at [facility]--SD-101 2 mg in sodium chloride 0.9% (NS) 30 mL IVPB, intravenous x 2 cycles (8/22/2023-11/22/2023)  -Gemcitabine and paclitaxel protein bound (12/13/2023-- 7/2024)-->clinical progression and GOO  -s/p biliary stent placement 4/4/2024  -chemotherapy with gemcitabine/paclitaxel (last dose 6/26/2024)  -duodenal stent (placed by GI)- 8/7/2024  -Gemcitabine and CISplatin Every 2 Weeks (8/28/2024-present)    Goal of care: life prolongation    Imaging:  CT CAP 8/21/2022: Since 7/25/2022, the primary pancreatic head mass encasing the common hepatic artery remains stable. The intrahepatic biliary obstruction improved with interval placement of a CBD stent. No definite distant metastasis in the abdomen or pelvis. Small indeterminate left lower lobe pulmonary nodule can be followed.  CT CAP 12/12/2022:  Primary pancreatic head tumor encasing the common hepatic artery is overall unchanged in size since 8/21/2022. Stable upstream pancreatic duct dilation and pancreatic atrophy. Interval placement of a cholecystostomy  tube, with decompression of the gallbladder. No definite evidence of distant metastatic disease in the chest, abdomen, and pelvis.   CT CAP 3/7/2023: Locally advanced pancreatic head tumor is slightly less conspicuous. Stable portacaval lymphadenopathy. Geographic hypodense regions have significantly increased throughout the liver compatible with fatty liver, to be correlated clinically regarding any potential concomitant hepatotoxicity; this appearance could obscure small low contrast liver lesions, to be better evaluated with MRI, if indicated. Unchanged mild jennifer appearance of the omentum is favored to be postinflammatory. No definite distant metastatic disease within the chest abdomen pelvis.   Ct CAP 6/29/2023:1.  Locally advanced pancreatic head mass not significantly changed. 2.  Increased periportal haziness and central liver heterogeneity which may relate to radiation.   3.  Indeterminate inferior right hepatic hypodensity as described above. Punctate left hepatic hypodensity also not previously evident, too small to characterize. Consider further evaluation with MRI as indicated.    MRI AP 7/15/2023:Primary neoplasm is noted within the pancreatic head and causes pancreatic ductal dilation and biliary ductal obstruction, which is decompressed via a stent. Nonspecific focal dilation of segment IV bile duct is noted.    CT CAP 10/2/2023: 1.  Compared to 07/28/2023, interval embolization of SMV tributaries in the epigastric region with new embolization coils in the right hepatic lobe following transhepatic catheterization. 2.  Previously noted small indeterminate low-attenuation lesion in the inferior right hepatic lobe segment 6 is no longer visualized. No new hepatic lesions.    3.  Stable 2.2 cm ill-defined residual pancreatic head tumor with no change in the vascular contact with the main portal vein, common hepatic and proper hepatic artery. 4.  No new metastatic disease in the chest or abdomen.   CT CAP  1/9/2024: 1.  Mild interval increase in size of the primary tumor in the head of the pancreas. 2.  No evidence of metastatic disease in the chest, abdomen or pelvis.   CT AP 4/3/2024: 1.  There is worsening intrahepatic biliary dilation despite the presence of a biliary stent, suggesting stent occlusion. 2.  No significant change in the locally advanced tumor of the head of the pancreas since 03/12/2024.   CT CAP 6/21/2024: 1. No convincing change in the large locally advanced infiltrating ill-defined mass in the head and neck of the pancreas compared with 5/14/2024. 2. No specific evidence of distant metastatic disease in the chest, abdomen or pelvis.   XR ABDOMEN 1 VW PORTABLE 8/6/2024:  There is a gastric drainage tube with the tip below the diaphragm projecting over the gastric body with the sidehole below the GE junction in appropriate position. Gastric drainage tube with tip and sidehole projecting below the diaphragm over the gastric body.  X-ray Abdomen AP  8/5/2024: Air is seen scattered throughout normal caliber colon in a nonobstructive pattern. No dilated loops of bowel. Moderate colonic stool burden. No intraperitoneal free air within the limitations of this study. A biliary stent and embolization coils overlie the right upper quadrant. No suspicious osseous lesions.   Nonobstructive bowel gas pattern. I personally reviewed these image(s) along with the resident's/fellow's interpretations, certify that if a procedure was performed I was physically present, and agree with the final report.  CT Abdomen Pelvis with Contrast 8/5/2024: No suspicious pulmonary nodules in the lung bases. Hepatobiliary: Evaluation of the liver is somewhat limited secondary to streak artifact from the embolization coils. Within these limits there is no suspicious hepatic lesion. The gallbladder is decompressed and poorly evaluated. Common bile duct stent with expected pneumobilia. Mild intrahepatic biliary ductal dilatation is  not significantly changed. Spleen: No splenomegaly. Pancreas: Ill-defined infiltrating mass of the pancreatic head and neck, in keeping with pancreatic adenocarcinoma. There is atrophy and ductal dilatation of the pancreatic body and tail. Overall this tumor appears larger Adrenal Glands: No mass. Kidneys, Ureters, Bladder: No hydronephrosis. No suspicious renal lesion. No bladder mass. Gastrointestinal Tract: The stomach is distended with fluid and debris, suggesting there may be a degree of gastric outlet obstruction secondary to the pancreatic tumor. This is not significantly changed. There is no downstream obstruction. Pelvic Organs: No pelvic mass. Prostatomegaly. Peritoneum/Retroperitoneum: No ascites. Lymph Nodes: No lymphadenopathy. Musculoskeletal: No suspicious skeletal lesion.     No acute abdominopelvic abnormality. Attending addendum: The pancreatic tumor appears larger compared to 06/21/2024. There is gastric distention with debris suggesting chronic outlet obstruction ACTIONABLE ITEMS/RECOMMENDATIONS*: None. *An Actionable Finding is a finding that may be unrelated to the original reason for imaging but potentially actionable, meaning further investigation may be necessary. The Actionable Findings Vigilance Unit (AFVU) assists medical providers with responding to additional radiologic findings that are unexpected and potentially actionable. I personally reviewed these image(s) along with the resident's/fellow's interpretations, certify that if a procedure was performed I was physically present, and agree with the final report.   CT CAP 10/24/24:  1. Ill-defined soft tissue fullness at the a hay hepatis and pancreatic head and uncinate process fabella bases history of pancreatic adenocarcinoma.  A few blebs of gas are identified which may represent gas within the common bile duct.  2. Intrahepatic biliary ductal dilatation identified with nonvisualization of the gallbladder  3. Beam hardening  artifact due to embolization coils at the liver  4. Small 10 x 6 filling defect at the portal vein suggesting a small thrombus  5. Diffuse hazy mucosal prominence at the descending and ascending colon.  This may be merely due to underdistention.  A underlying colitis cannot be excluded  6. Findings of constipation with contrast and fluid distended loops of small bowel diffusely throughout suggesting a mild ileus  7. Prostatomegaly with calcifications  8. Findings and other details as above  9. Comparison to previous exam would allow further evaluation.  MRI Brain 11/4/24:  1. Tiny (subcentimeter), occasional focal areas of increased signal intensity (on the FLAIR sequences) are noted within the deep white matter and gray-white matter junctions of both cerebral hemispheres (these are only visualized on the FLAIR sequence with no contrast enhancement noted on postcontrast images). I suspect the changes represent chronic ischemic changes, however, follow-up imaging in 3-6 months to ensure stability may be helpful in excluding the very remote possibility of tiny metastases if felt be clinically indicated.   CT abdomen and pelvis with IV contrast 02/14/2025:  Impression:   Large volume ascites appears worse than the prior examination.   Pancreatic head mass similar but there appears to be a mass in the pancreatic neck on this examination.  This was not clearly seen on the prior examination.   Previous embolization procedure in the liver.   Pneumobilia  CTA chest 02/17/2025:  Impression:   1. Pronounced streaky linear opacity is seen predominantly in the dependent portions at the lung bases consistent with nonspecific dependent changes scarring and subsegmental atelectasis.   2. There is extensive stranding of the thoracoabdominal subcutaneous fat associated with ascites consistent with anasarca edema likely form hepatic pathology.   3. The liver appears small with heterogeneous attenuation and nodular contours consistent  with cirrhosis. Post surgical change is seen in the right lobe of the liver. Pneumobilia is noted in the left and right hepatic lobes. A large amount of free peritoneal fluid is noted in the visualized upper abdominal cavity likely related to liver cirrhosis. Correlate with clinical and laboratory parameters as regards additional evaluation and follow up.   4. No CT evidence of pulmonary embolism. Details and other findings as discussed above.     Pathology:  8/2/2022:  FNA head pancreas: SCANT MALIGNANT CELLS, FAVOR ADENOCARCINOMA  NGS:  No detectable genomic aberrations  2/12/2025:  Cytology from paracentesis:  FINAL DIAGNOSIS    PERITONEAL FLUID, CYTOLOGY SPECIMEN:    PUS ONLY.    Electronically Signed by:   Art SEE , Pathologist     CLINICAL HISTORY:       Patient: Warren P Lejeune is a 60 y.o. male.with a past medical history of past medical history of pancreatic ductal adenocarcinoma (Dx 8/2022)   Patient initially presented with  unintentional weight loss starting in 1/2022. He also had worsening back/shoulder and abdominal pain during this time. In July he finally presented to his PCP with darkening of the urine and lightening of the stool - with Jaundice that his wife noticed. Labs demonstrating cholestasis (bilirubin of 19).    Patient noted to have an abnormal CT scan after developing obstructive jaundice. 2 cm hypoenhancing mass noted in the head of the pancreas with intra and extrahepatic biliary ductal dilatation. Mass measured 2.5 x 2.1 cm. There is also atrophy of the distal gland and upstream dilatation of the pancreas duct. There is no obvious vascular invasion. There were no focal liver lesions.    8/2/22- EGD/EUS/FNA. Final pathology showed ductal adenocarcinoma, moderately differentiated. ERCP on the same date performed, with placement of a fully covered metal biliary stent, 60 mm x 10 mm.    7/27/22- CA 19-9 was 681    8/21/22- CT CAP: Since 7/25/2022, the primary pancreatic  "head mass encasing the common hepatic artery remains stable. The intrahepatic biliary obstruction improved with interval placement of a CBD stent. No definite distant metastasis in the abdomen or pelvis. Small indeterminate left lower lobe pulmonary nodule can be followed.    09/03/2022 Germline genetic testing: Negative for germline pathogenic variants in any of the analyzed genes, Genes Analyzed: APC, DALE, BMPR1A, BRCA1, BRCA2, CDKN2A, EPCAM, MEN1, MLH1, MSH2, MSH6, NF1, PALB2, PMS2, SMAD4, STK11, TP53, TSC1, TSC2, and VHL. Genetic testing performed by [facility]; full report available in scanned documents.     s/p biliary stent placement, currently undergoing chemotherapy with gemcitabine/paclitaxel (last dose 6/26/2024), T2DM on insulin presenting for abdominal pain and constipation. CT A/P with distension in stomach suggesting gastric outlet obstruction secondary to pancreatic tumor. GI and Surg Onc consulted, pending evaluation. Poor PO tolerance, deferring NG tube now as vomiting resolves and having bowel movements.     Patient was recently admitted to the hospital at [facility]--Hospital Course:  "Findings on imaging were concerning for malignant gastric outlet obstruction from pancreatic cancer. After a discussion with surgical oncology, GI and GIMO, decision was made to proceed with duodenal stent (placed by GI). No indications for gastrojejunostomy bypass. Patient tolerated procedure well. We were able to advance to low fiber diet. Nutrition was consulted for low fiber diet education. Managed neoplasm related pain with PO morphine."     Patient is here today with his wife to continue chemotherapy close to home.  He is doing relatively well and voices no concerns.  MediPort was removed from his left chest wall to exposure of the MediPort.  Patient reports abdominal mid back pain, occasional muscle spasm and dizziness.  No fever, chills, sweats.  No chest pain or short of breath.    Chief Complaint: " Malignant neoplasm of head of pancreas (Pt is c/o dizziness, weakness, unbalanced, fatigue, and low energy. He rates his abd pain a 7 out of 10. He has not taken any pain medication today. )      Interval History:  Patient returns to clinic for a follow up, accompanied by his wife. He was discharged from the hospital 2/18/25. States he was told he now has cirrhosis. He was not given followup appt with GI. Wife will call to arrange. He is not feeling well today. S/P paracentesis this morning.  CBC today notes hemoglobin 7.4.  Will transfuse 1 unit in [place] tomorrow.  Reading from hospital notes hospice was discussed but he declined wishing to move forward with treatment.  He has not started Xeloda but plan to begin tomorrow evening after transfusion.  He continues with weakness and fatigue.  Will continue weekly diagnostic/therapeutic paracentesis. IR removed 6.5L today. Takes Percocet 10/325mg 1 q 4h prn and MS cont 15 mg BID, but didn't take anything this morning due to paracentesis. He is taking Miralax and laxative for opioid induced constipation, and Pregabalin 100 mg BID for bilateral hand and feet neuropathy. Now on lactulose by GI. Labs reviewed with patient and wife in detail.     I discussed referral to palliative care. They would like more information therefore social work referral was placed for this discussion. He is open to [facility]. Samples given to him today.     Past Medical History:   Diagnosis Date    Abdominal pain     Admission for chemotherapy     last chemo 9/12/24    Anxiety disorder, unspecified     Back pain     Bradycardia     Enlarged prostate     GERD (gastroesophageal reflux disease)     Hypertension     no cardiologist; no longer on meds since weight loss due to pancreatic cancer    Memory loss     Pancreatic cancer     Peripheral neuropathy     Type 2 diabetes mellitus with unspecified diabetic retinopathy without macular edema       Past Surgical History:   Procedure Laterality  Date    bile duct stents times 2      EGD, WITH STENT INSERTION      INSERTION OF TUNNELED CENTRAL VENOUS CATHETER (CVC) WITH SUBCUTANEOUS PORT Right 9/20/2024    Procedure: [id number]-CATH;  Surgeon: Moe Champagne MD;  Location: [facility];  Service: General;  Laterality: Right;    MEDIPORT REMOVAL      times 2     Family History   Problem Relation Name Age of Onset    Hypertension Mother      Stroke Father      Stroke Sister      Prostate cancer Brother            Review of patient's allergies indicates:  No Known Allergies   Current Outpatient Medications on File Prior to Visit   Medication Sig Dispense Refill    albuterol (PROVENTIL/VENTOLIN HFA) 90 mcg/actuation inhaler Inhale 1-2 puffs into the lungs every 6 (six) hours as needed for Wheezing. Rescue 18 g 0    aluminum & magnesium hydroxide-simethicone (MYLANTA MAX STRENGTH) 400-400-40 mg/5 mL suspension Take 5 mLs by mouth 4 (four) times daily as needed (mouth sores). 335 mL 3    apixaban (ELIQUIS) 5 mg Tab Take 1 tablet (5 mg total) by mouth 2 (two) times daily.      capecitabine (XELODA) 500 MG Tab Take 3 tablets (1,500 mg total) by mouth 2 (two) times daily on days 1-14 of each 21 day cycle (2 weeks on, then 1 week off). 84 tablet 3    cholecalciferol, vitamin D3, (VITAMIN D3) 50 mcg (2,000 unit) Tab Take 2,000 Units by mouth once daily.      dexAMETHasone (DECADRON) 4 MG Tab TAKE 2 TABLETS BY MOUTH ONCE DAILY AS DIRECTED ON DAYS 2 AND 3 OF CHEMOTHERAPY CYCLE      dicyclomine (BENTYL) 10 MG capsule TAKE 1 CAPSULE BY MOUTH 4 TIMES DAILY BEFORE MEAL(S) AND NIGHTLY 120 capsule 0    diphenhydrAMINE (BENADRYL) 12.5 mg/5 mL elixir Take 5 mLs (12.5 mg total) by mouth 4 (four) times daily as needed (mouth sores). 236 mL 3    insulin aspart U-100 (NOVOLOG) 100 unit/mL injection Inject into the skin 3 (three) times daily before meals. prn      lactulose (CHRONULAC) 20 gram/30 mL Soln Take 30 mLs (20 g total) by mouth every 6 (six) hours as needed  (constipation). 900 mL 0    LIDOcaine viscous HCl 2% (LIDOCAINE VISCOUS) 2 % Soln by Mucous Membrane route 4 (four) times daily as needed (mouth sores). Swish and spit 15 ml (5 ml benadryl, 5 ml mylanta, 5 ml lidocaine) by mouth 4 times daily as needed for mouth sores 100 mL 3    magnesium oxide (MAGOX) 400 mg (241.3 mg magnesium) tablet Take 1 tablet (400 mg total) by mouth once daily. 90 tablet 2    metoclopramide HCl (REGLAN) 10 MG tablet Take 1 tablet (10 mg total) by mouth 3 (three) times daily before meals. for 7 days 21 tablet 0    morphine (MS CONTIN) 15 MG 12 hr tablet Take 1 tablet (15 mg total) by mouth 2 (two) times daily. 30 tablet 0    multivitamin with folic acid 400 mcg Tab Take 1 tablet by mouth once daily.      ondansetron (ZOFRAN) 8 MG tablet Take 8 mg by mouth.      oxyCODONE-acetaminophen (PERCOCET)  mg per tablet Take 1 tablet by mouth every 4 (four) hours as needed for Pain. 120 tablet 0    pantoprazole (PROTONIX) 40 MG tablet Take 1 tablet (40 mg total) by mouth once daily. 30 tablet 2    polyethylene glycol (GLYCOLAX) 17 gram PwPk Take 17 g by mouth.      pregabalin (LYRICA) 100 MG capsule Take 1 capsule (100 mg total) by mouth 2 (two) times daily. 60 capsule 6    tamsulosin (FLOMAX) 0.4 mg Cap Take 1 capsule (0.4 mg total) by mouth once daily. 30 capsule 2    TRESIBA FLEXTOUCH U-200 200 unit/mL (3 mL) insulin pen Inject 14 Units into the skin.      vibegron (GEMTESA) 75 mg Tab Take 75 mg by mouth.      midodrine (PROAMATINE) 5 MG Tab Take 1 tablet (5 mg total) by mouth 3 (three) times daily with meals. for 7 days 21 tablet 0    OLANZapine (ZYPREXA) 5 MG tablet Take 1 tablet (5 mg total) by mouth nightly. 4 tablet 6     Current Facility-Administered Medications on File Prior to Visit   Medication Dose Route Frequency Provider Last Rate Last Admin    heparin, porcine (PF) 100 unit/mL injection flush 500 Units  500 Units Intravenous PRN Jena Messian MD   500 Units at 02/04/25  "1600    sodium chloride 0.9% flush 10 mL  10 mL Intravenous PRN Jena Messina MD   10 mL at 02/04/25 1600      Review of Systems   Constitutional:  Positive for appetite change and fatigue. Negative for activity change, chills, diaphoresis, fever and unexpected weight change.   HENT:  Positive for trouble swallowing. Negative for nasal congestion, mouth sores, nosebleeds, sinus pressure/congestion and sore throat.    Eyes: Negative.    Respiratory:  Positive for shortness of breath. Negative for cough.    Cardiovascular:  Positive for leg swelling. Negative for chest pain and palpitations.   Gastrointestinal:  Positive for abdominal distention and constipation. Negative for abdominal pain, blood in stool, change in bowel habit, diarrhea, nausea and vomiting.   Endocrine: Negative.    Genitourinary:  Negative for bladder incontinence, decreased urine volume, difficulty urinating, dysuria, frequency, hematuria and urgency.   Musculoskeletal:  Positive for back pain. Negative for arthralgias, gait problem, joint swelling, leg pain and myalgias.   Integumentary:  Negative for rash.   Allergic/Immunologic: Negative.  Negative for frequent infections.   Neurological:  Positive for weakness. Negative for dizziness, tremors, syncope, light-headedness, numbness, headaches and memory loss.   Hematological:  Negative for adenopathy. Does not bruise/bleed easily.   Psychiatric/Behavioral:  Negative for agitation, confusion, hallucinations, sleep disturbance and suicidal ideas. The patient is not nervous/anxious.           Vitals:    02/19/25 1031   BP: 90/60   BP Location: Right arm   Patient Position: Sitting   Pulse: 63   Resp: 16   Temp: 97.5 °F (36.4 °C)   TempSrc: Oral   SpO2: 100%   Weight: 65 kg (143 lb 3.2 oz)   Height: 5' 9.02" (1.753 m)       Wt Readings from Last 6 Encounters:   02/19/25 65 kg (143 lb 3.2 oz)   02/14/25 62.9 kg (138 lb 10.7 oz)   02/04/25 63.8 kg (140 lb 11.2 oz)   02/04/25 63.8 kg (140 lb " 11.2 oz)   01/27/25 72.9 kg (160 lb 11.5 oz)   01/26/25 73.9 kg (163 lb)     Body mass index is 21.14 kg/m².  Body surface area is 1.78 meters squared.    Physical Exam  Vitals and nursing note reviewed.   Constitutional:       General: He is not in acute distress.     Appearance: He is ill-appearing.      Comments: thin   HENT:      Head: Normocephalic and atraumatic.      Mouth/Throat:      Mouth: Mucous membranes are moist.   Eyes:      General: No scleral icterus.     Extraocular Movements: Extraocular movements intact.      Conjunctiva/sclera: Conjunctivae normal.   Neck:      Vascular: No JVD.   Cardiovascular:      Rate and Rhythm: Normal rate and regular rhythm.      Heart sounds: No murmur heard.  Pulmonary:      Effort: Pulmonary effort is normal.      Breath sounds: Normal breath sounds. No wheezing or rhonchi.   Chest:      Comments: Left chest wall scar from previous Mediport  Abdominal:      General: Bowel sounds are decreased. There is distension.      Tenderness: There is generalized abdominal tenderness.   Musculoskeletal:         General: No swelling or deformity.      Cervical back: Neck supple.      Right lower leg: Edema present.      Left lower leg: Edema present.   Lymphadenopathy:      Head:      Right side of head: No submandibular adenopathy.      Left side of head: No submandibular adenopathy.      Cervical: No cervical adenopathy.   Skin:     General: Skin is warm.      Coloration: Skin is not jaundiced.      Findings: No rash.   Neurological:      Mental Status: He is alert.   Psychiatric:         Attention and Perception: Attention normal.         Behavior: Behavior is cooperative.       ECOG SCORE    3 - Capable of only limited selfcare, confined to bed or chair more than 50% of waking hours     Laboratory:  CBC with Differential:  Lab Results   Component Value Date    WBC 3.44 (L) 02/19/2025    RBC 2.50 (L) 02/19/2025    HGB 7.4 (L) 02/19/2025    HCT 22.4 (L) 02/19/2025    MCV 89.6  02/19/2025    MCH 29.6 02/19/2025    MCHC 33.0 02/19/2025    RDW 14.6 02/19/2025     02/19/2025    MPV 10.1 02/19/2025      CMP:  Sodium   Date Value Ref Range Status   02/19/2025 135 (L) 136 - 145 mmol/L Final     Potassium   Date Value Ref Range Status   02/19/2025 4.6 3.5 - 5.1 mmol/L Final     Chloride   Date Value Ref Range Status   02/19/2025 100 98 - 107 mmol/L Final     CO2   Date Value Ref Range Status   02/19/2025 30 (H) 22 - 29 mmol/L Final     Blood Urea Nitrogen   Date Value Ref Range Status   02/19/2025 21.0 8.4 - 25.7 mg/dL Final   10/20/2023 12 6 - 23 mg/dL Final     Creatinine   Date Value Ref Range Status   02/19/2025 0.78 0.72 - 1.25 mg/dL Final   10/20/2023 0.95 0.67 - 1.17 mg/dL Final     Calcium   Date Value Ref Range Status   02/19/2025 9.0 8.4 - 10.2 mg/dL Final   10/20/2023 8.7 8.4 - 10.2 mg/dL Final     Albumin   Date Value Ref Range Status   02/19/2025 3.2 (L) 3.5 - 5.0 g/dL Final     Bilirubin Total   Date Value Ref Range Status   02/19/2025 0.4 <=1.5 mg/dL Final     ALP   Date Value Ref Range Status   02/19/2025 298 (H) 40 - 150 unit/L Final     AST   Date Value Ref Range Status   02/19/2025 31 5 - 34 unit/L Final     ALT   Date Value Ref Range Status   02/19/2025 26 0 - 55 unit/L Final      Component 08/28/24 07/17/24 06/26/24 06/21/24 06/12/24 05/29/24   CA 19-9 2,385.0 High  2,150.0 High  1,567.0 High  1,183.0 High  1,000.0 High  959.9 High       Latest Reference Range & Units 09/24/24 08:52 10/23/24 07:57   CA 19-9 0.00 - 37.00 unit/mL 6,202.93 (H) 21,137.60 (H)      Latest Reference Range & Units 11/05/24 13:54 11/19/24 07:47 12/04/24 07:58 12/18/24 07:11 12/26/24 07:57   CA 19-9 0.00 - 37.00 unit/mL 23,324.00 (H) 19,155.71 (H) 31,691.58 (H) 23,541.97 (H) 31,586.90 (H)          Assessment:       1. Malignant neoplasm of head of pancreas    2. Other amnesia    3. Constipation due to opioid therapy    4. Chemotherapy-induced neuropathy    5. Pneumobilia    6. Portal vein  thrombosis    7. Elevated alkaline phosphatase level    8. Cancer associated pain    9. Elevated CA 19-9 level    10. Other ascites       Locally advanced pancreatic cancer   ---8/2/2022 ERCP with metal biliary stent placement   ---Folfirinox (10/2022- 2/2023)  ---capecitabine RT (4/3/23 - 5/16/23)   ---Phase I study 2021-1176 SD-101 at [facility]--SD-101 2 mg in sodium chloride 0.9% (NS) 30 mL IVPB, intravenous x 2 cycles (8/22/2023-11/22/2023)  ---Gemcitabine and paclitaxel protein bound (12/13/2023-- 7/2024)-->clinical progression and GOO  ---s/p biliary stent placement 4/4/2024  ---chemotherapy with gemcitabine/paclitaxel (last dose 6/26/2024)  ---duodenal stent (placed by GI)- 8/7/2024  ---Gemcitabine and CISplatin Every 2 Weeks (8/28/2024-present)  --- Now requiring weekly therapeutic paracentesis, started 1/2/25   Liquid bx 12/31/24; insufficient sample  [place]/Cis stopped 2/4/25 d/t intolerance; last cycle C6D1 given 12/4/25  --start Xeloda 2/20/25 1000 mg/m2 BID D1-14/21 day cycle    H/o Gastric outlet obstruction  ---s/p duodenal stent, severe protein calori malnutrition      Pain, neoplasm related pain  ---continue Percocet, Lyrica, Morphine     elevated ALP  A. follow  Swelling   Bilateral lower extremity swelling and abdominal swelling    Ascites   -Likely malignant   -S/p therapeutic paracentesis-continue prn-consider PleuRx        Plan:       Patient with unresectable advanced pancreatic cancer to start 4th line treatment with cisplatin and Gemzar and clinically suggestive of progression, now with distended abdomen and recurrent ascites, likely malignant as they reaccumulate rather quick. Restaging scans without significant disease progression but significant amount of ascites. We order liquid biopsy but unfortunately sample was not satisfactory for processing.  He had liquid biopsy in the past and no mutations were identified.    As mentioned above we discussed hospice but he is not ready yet.  He knows that  he is not able to tolerate chemotherapy well but he is not opposed to try monotherapy capecitabine.  Prior discussion to try liposomal irinotecan but he will prefer to start with Xeloda only.    Stopped [place]/Cis due to intolerance   Labs stable to start Xeloda  Plan for Xeloda 1000 mg/m2 BID D1-14/21 day cycle; has had chemo education in past; he will start 2/20/25  Standing order for weekly paracentesis therapeutic and diagnostic for abdominal swelling as needed  Liquid bx 12/31/24--insufficient sample  Lasix 20mg BID was discontinued at hospital   Continue Eliquis 5 mg BID for thrombus  Repeat MRI Brain in 3 months, due 2/2025-needs prior to followup with Dr. Messina in 4 weeks  Continue morphine and percocet for pain  Acute on chronic anemia--transfuse 1 unit in [place] 2/20/25  CT C/A/P every 3 months--this will be due 5/2025  RTC 2 weeks with NP for FU/labs/tox check  Weekly cbc, cmp starting Xeloda 2/20/2025 @ [facility] or [place]    Pertinent lab and radiology studies were reviewed.   The patient was given ample opportunity to ask questions, and to the best of my abilities, all questions answered to satisfaction; patient demonstrated understanding of what we discussed and agreeable to the plan. Pt instructed to call should develop concerning signs/symptoms or have further questions.     Visit today included increased complexity associated with the care and treatment of  the episodic problem pancreatic cancer, addressing and managing the longitudinal care of the patient's Stage ICC Pancreatic Carcinoma.     Shilpa Dumont, DELVIS-C  Oncology/Hematology  [facility]

## 2025-02-19 NOTE — H&P (VIEW-ONLY)
HEMATOLOGY/ONCOLOGY OFFICE CLINIC VISIT    Visit Information:    Initial Evaluation: 9/3/2024  Referring Provider: Alba Chisholm MD PhD ([facility]) -Cell [phone]  Other providers:  Code status: Not addressed    Diagnosis:  Advanced pancreatic ductal adenocarcinoma (Dx 8/2022)     Present treatment:  Gemzar monotherapy 8/28/2024  -Gemcitabine and CISplatin Every 2 Weeks (8/28/2024-present)--planned  Chemotherapy summary: CISplatin (PLATINOL) 53 mg in sodium chloride 0.9% (NS) 250 mL IVPB, intravenous, 0 of 12 cycles  gemcitabine (GEMZAR) 836 mg in sodium chloride 0.9% (NS) 250 mL IVPB, intravenous, 0 of 12 cycles   - Eliquis 10 mg BID x 7 days then 5 mg BID- started 10/29/24--present  - weekly therapeutic paracentesis PRN    Treatment/Oncology history:  -8/2/2022 ERCP with metal biliary stent placement   -Folfirinox (10/2022- 2/2023)  -capecitabine RT (4/3/23 - 5/16/23)   -Phase I study 2021-1176 SD-101 at [facility]--SD-101 2 mg in sodium chloride 0.9% (NS) 30 mL IVPB, intravenous x 2 cycles (8/22/2023-11/22/2023)  -Gemcitabine and paclitaxel protein bound (12/13/2023-- 7/2024)-->clinical progression and GOO  -s/p biliary stent placement 4/4/2024  -chemotherapy with gemcitabine/paclitaxel (last dose 6/26/2024)  -duodenal stent (placed by GI)- 8/7/2024  -Gemcitabine and CISplatin Every 2 Weeks (8/28/2024-present)    Goal of care: life prolongation    Imaging:  CT CAP 8/21/2022: Since 7/25/2022, the primary pancreatic head mass encasing the common hepatic artery remains stable. The intrahepatic biliary obstruction improved with interval placement of a CBD stent. No definite distant metastasis in the abdomen or pelvis. Small indeterminate left lower lobe pulmonary nodule can be followed.  CT CAP 12/12/2022:  Primary pancreatic head tumor encasing the common hepatic artery is overall unchanged in size since 8/21/2022. Stable upstream pancreatic duct dilation and pancreatic atrophy. Interval placement of a cholecystostomy  tube, with decompression of the gallbladder. No definite evidence of distant metastatic disease in the chest, abdomen, and pelvis.   CT CAP 3/7/2023: Locally advanced pancreatic head tumor is slightly less conspicuous. Stable portacaval lymphadenopathy. Geographic hypodense regions have significantly increased throughout the liver compatible with fatty liver, to be correlated clinically regarding any potential concomitant hepatotoxicity; this appearance could obscure small low contrast liver lesions, to be better evaluated with MRI, if indicated. Unchanged mild jennifer appearance of the omentum is favored to be postinflammatory. No definite distant metastatic disease within the chest abdomen pelvis.   Ct CAP 6/29/2023:1.  Locally advanced pancreatic head mass not significantly changed. 2.  Increased periportal haziness and central liver heterogeneity which may relate to radiation.   3.  Indeterminate inferior right hepatic hypodensity as described above. Punctate left hepatic hypodensity also not previously evident, too small to characterize. Consider further evaluation with MRI as indicated.    MRI AP 7/15/2023:Primary neoplasm is noted within the pancreatic head and causes pancreatic ductal dilation and biliary ductal obstruction, which is decompressed via a stent. Nonspecific focal dilation of segment IV bile duct is noted.    CT CAP 10/2/2023: 1.  Compared to 07/28/2023, interval embolization of SMV tributaries in the epigastric region with new embolization coils in the right hepatic lobe following transhepatic catheterization. 2.  Previously noted small indeterminate low-attenuation lesion in the inferior right hepatic lobe segment 6 is no longer visualized. No new hepatic lesions.    3.  Stable 2.2 cm ill-defined residual pancreatic head tumor with no change in the vascular contact with the main portal vein, common hepatic and proper hepatic artery. 4.  No new metastatic disease in the chest or abdomen.   CT CAP  1/9/2024: 1.  Mild interval increase in size of the primary tumor in the head of the pancreas. 2.  No evidence of metastatic disease in the chest, abdomen or pelvis.   CT AP 4/3/2024: 1.  There is worsening intrahepatic biliary dilation despite the presence of a biliary stent, suggesting stent occlusion. 2.  No significant change in the locally advanced tumor of the head of the pancreas since 03/12/2024.   CT CAP 6/21/2024: 1. No convincing change in the large locally advanced infiltrating ill-defined mass in the head and neck of the pancreas compared with 5/14/2024. 2. No specific evidence of distant metastatic disease in the chest, abdomen or pelvis.   XR ABDOMEN 1 VW PORTABLE 8/6/2024:  There is a gastric drainage tube with the tip below the diaphragm projecting over the gastric body with the sidehole below the GE junction in appropriate position. Gastric drainage tube with tip and sidehole projecting below the diaphragm over the gastric body.  X-ray Abdomen AP  8/5/2024: Air is seen scattered throughout normal caliber colon in a nonobstructive pattern. No dilated loops of bowel. Moderate colonic stool burden. No intraperitoneal free air within the limitations of this study. A biliary stent and embolization coils overlie the right upper quadrant. No suspicious osseous lesions.   Nonobstructive bowel gas pattern. I personally reviewed these image(s) along with the resident's/fellow's interpretations, certify that if a procedure was performed I was physically present, and agree with the final report.  CT Abdomen Pelvis with Contrast 8/5/2024: No suspicious pulmonary nodules in the lung bases. Hepatobiliary: Evaluation of the liver is somewhat limited secondary to streak artifact from the embolization coils. Within these limits there is no suspicious hepatic lesion. The gallbladder is decompressed and poorly evaluated. Common bile duct stent with expected pneumobilia. Mild intrahepatic biliary ductal dilatation is  not significantly changed. Spleen: No splenomegaly. Pancreas: Ill-defined infiltrating mass of the pancreatic head and neck, in keeping with pancreatic adenocarcinoma. There is atrophy and ductal dilatation of the pancreatic body and tail. Overall this tumor appears larger Adrenal Glands: No mass. Kidneys, Ureters, Bladder: No hydronephrosis. No suspicious renal lesion. No bladder mass. Gastrointestinal Tract: The stomach is distended with fluid and debris, suggesting there may be a degree of gastric outlet obstruction secondary to the pancreatic tumor. This is not significantly changed. There is no downstream obstruction. Pelvic Organs: No pelvic mass. Prostatomegaly. Peritoneum/Retroperitoneum: No ascites. Lymph Nodes: No lymphadenopathy. Musculoskeletal: No suspicious skeletal lesion.     No acute abdominopelvic abnormality. Attending addendum: The pancreatic tumor appears larger compared to 06/21/2024. There is gastric distention with debris suggesting chronic outlet obstruction ACTIONABLE ITEMS/RECOMMENDATIONS*: None. *An Actionable Finding is a finding that may be unrelated to the original reason for imaging but potentially actionable, meaning further investigation may be necessary. The Actionable Findings Vigilance Unit (AFVU) assists medical providers with responding to additional radiologic findings that are unexpected and potentially actionable. I personally reviewed these image(s) along with the resident's/fellow's interpretations, certify that if a procedure was performed I was physically present, and agree with the final report.   CT CAP 10/24/24:  1. Ill-defined soft tissue fullness at the a hay hepatis and pancreatic head and uncinate process fabella bases history of pancreatic adenocarcinoma.  A few blebs of gas are identified which may represent gas within the common bile duct.  2. Intrahepatic biliary ductal dilatation identified with nonvisualization of the gallbladder  3. Beam hardening  artifact due to embolization coils at the liver  4. Small 10 x 6 filling defect at the portal vein suggesting a small thrombus  5. Diffuse hazy mucosal prominence at the descending and ascending colon.  This may be merely due to underdistention.  A underlying colitis cannot be excluded  6. Findings of constipation with contrast and fluid distended loops of small bowel diffusely throughout suggesting a mild ileus  7. Prostatomegaly with calcifications  8. Findings and other details as above  9. Comparison to previous exam would allow further evaluation.  MRI Brain 11/4/24:  1. Tiny (subcentimeter), occasional focal areas of increased signal intensity (on the FLAIR sequences) are noted within the deep white matter and gray-white matter junctions of both cerebral hemispheres (these are only visualized on the FLAIR sequence with no contrast enhancement noted on postcontrast images). I suspect the changes represent chronic ischemic changes, however, follow-up imaging in 3-6 months to ensure stability may be helpful in excluding the very remote possibility of tiny metastases if felt be clinically indicated.   CT abdomen and pelvis with IV contrast 02/14/2025:  Impression:   Large volume ascites appears worse than the prior examination.   Pancreatic head mass similar but there appears to be a mass in the pancreatic neck on this examination.  This was not clearly seen on the prior examination.   Previous embolization procedure in the liver.   Pneumobilia  CTA chest 02/17/2025:  Impression:   1. Pronounced streaky linear opacity is seen predominantly in the dependent portions at the lung bases consistent with nonspecific dependent changes scarring and subsegmental atelectasis.   2. There is extensive stranding of the thoracoabdominal subcutaneous fat associated with ascites consistent with anasarca edema likely form hepatic pathology.   3. The liver appears small with heterogeneous attenuation and nodular contours consistent  with cirrhosis. Post surgical change is seen in the right lobe of the liver. Pneumobilia is noted in the left and right hepatic lobes. A large amount of free peritoneal fluid is noted in the visualized upper abdominal cavity likely related to liver cirrhosis. Correlate with clinical and laboratory parameters as regards additional evaluation and follow up.   4. No CT evidence of pulmonary embolism. Details and other findings as discussed above.     Pathology:  8/2/2022:  FNA head pancreas: SCANT MALIGNANT CELLS, FAVOR ADENOCARCINOMA  NGS:  No detectable genomic aberrations  2/12/2025:  Cytology from paracentesis:  FINAL DIAGNOSIS    PERITONEAL FLUID, CYTOLOGY SPECIMEN:    PUS ONLY.    Electronically Signed by:   Art SEE , Pathologist     CLINICAL HISTORY:       Patient: Warren P Lejeune is a 60 y.o. male.with a past medical history of past medical history of pancreatic ductal adenocarcinoma (Dx 8/2022)   Patient initially presented with  unintentional weight loss starting in 1/2022. He also had worsening back/shoulder and abdominal pain during this time. In July he finally presented to his PCP with darkening of the urine and lightening of the stool - with Jaundice that his wife noticed. Labs demonstrating cholestasis (bilirubin of 19).    Patient noted to have an abnormal CT scan after developing obstructive jaundice. 2 cm hypoenhancing mass noted in the head of the pancreas with intra and extrahepatic biliary ductal dilatation. Mass measured 2.5 x 2.1 cm. There is also atrophy of the distal gland and upstream dilatation of the pancreas duct. There is no obvious vascular invasion. There were no focal liver lesions.    8/2/22- EGD/EUS/FNA. Final pathology showed ductal adenocarcinoma, moderately differentiated. ERCP on the same date performed, with placement of a fully covered metal biliary stent, 60 mm x 10 mm.    7/27/22- CA 19-9 was 681    8/21/22- CT CAP: Since 7/25/2022, the primary pancreatic  "head mass encasing the common hepatic artery remains stable. The intrahepatic biliary obstruction improved with interval placement of a CBD stent. No definite distant metastasis in the abdomen or pelvis. Small indeterminate left lower lobe pulmonary nodule can be followed.    09/03/2022 Germline genetic testing: Negative for germline pathogenic variants in any of the analyzed genes, Genes Analyzed: APC, DALE, BMPR1A, BRCA1, BRCA2, CDKN2A, EPCAM, MEN1, MLH1, MSH2, MSH6, NF1, PALB2, PMS2, SMAD4, STK11, TP53, TSC1, TSC2, and VHL. Genetic testing performed by [facility]; full report available in scanned documents.     s/p biliary stent placement, currently undergoing chemotherapy with gemcitabine/paclitaxel (last dose 6/26/2024), T2DM on insulin presenting for abdominal pain and constipation. CT A/P with distension in stomach suggesting gastric outlet obstruction secondary to pancreatic tumor. GI and Surg Onc consulted, pending evaluation. Poor PO tolerance, deferring NG tube now as vomiting resolves and having bowel movements.     Patient was recently admitted to the hospital at [facility]--Hospital Course:  "Findings on imaging were concerning for malignant gastric outlet obstruction from pancreatic cancer. After a discussion with surgical oncology, GI and GIMO, decision was made to proceed with duodenal stent (placed by GI). No indications for gastrojejunostomy bypass. Patient tolerated procedure well. We were able to advance to low fiber diet. Nutrition was consulted for low fiber diet education. Managed neoplasm related pain with PO morphine."     Patient is here today with his wife to continue chemotherapy close to home.  He is doing relatively well and voices no concerns.  MediPort was removed from his left chest wall to exposure of the MediPort.  Patient reports abdominal mid back pain, occasional muscle spasm and dizziness.  No fever, chills, sweats.  No chest pain or short of breath.    Chief Complaint: " Malignant neoplasm of head of pancreas (Pt is c/o dizziness, weakness, unbalanced, fatigue, and low energy. He rates his abd pain a 7 out of 10. He has not taken any pain medication today. )      Interval History:  Patient returns to clinic for a follow up, accompanied by his wife. He was discharged from the hospital 2/18/25. States he was told he now has cirrhosis. He was not given followup appt with GI. Wife will call to arrange. He is not feeling well today. S/P paracentesis this morning.  CBC today notes hemoglobin 7.4.  Will transfuse 1 unit in [place] tomorrow.  Reading from hospital notes hospice was discussed but he declined wishing to move forward with treatment.  He has not started Xeloda but plan to begin tomorrow evening after transfusion.  He continues with weakness and fatigue.  Will continue weekly diagnostic/therapeutic paracentesis. IR removed 6.5L today. Takes Percocet 10/325mg 1 q 4h prn and MS cont 15 mg BID, but didn't take anything this morning due to paracentesis. He is taking Miralax and laxative for opioid induced constipation, and Pregabalin 100 mg BID for bilateral hand and feet neuropathy. Now on lactulose by GI. Labs reviewed with patient and wife in detail.     I discussed referral to palliative care. They would like more information therefore social work referral was placed for this discussion. He is open to [place]. Samples given to him today.     Past Medical History:   Diagnosis Date    Abdominal pain     Admission for chemotherapy     last chemo 9/12/24    Anxiety disorder, unspecified     Back pain     Bradycardia     Enlarged prostate     GERD (gastroesophageal reflux disease)     Hypertension     no cardiologist; no longer on meds since weight loss due to pancreatic cancer    Memory loss     Pancreatic cancer     Peripheral neuropathy     Type 2 diabetes mellitus with unspecified diabetic retinopathy without macular edema       Past Surgical History:   Procedure Laterality  Date    bile duct stents times 2      EGD, WITH STENT INSERTION      INSERTION OF TUNNELED CENTRAL VENOUS CATHETER (CVC) WITH SUBCUTANEOUS PORT Right 9/20/2024    Procedure: [id number];  Surgeon: Moe Champagne MD;  Location: [facility];  Service: General;  Laterality: Right;    MEDIPORT REMOVAL      times 2     Family History   Problem Relation Name Age of Onset    Hypertension Mother      Stroke Father      Stroke Sister      Prostate cancer Brother            Review of patient's allergies indicates:  No Known Allergies   Current Outpatient Medications on File Prior to Visit   Medication Sig Dispense Refill    albuterol (PROVENTIL/VENTOLIN HFA) 90 mcg/actuation inhaler Inhale 1-2 puffs into the lungs every 6 (six) hours as needed for Wheezing. Rescue 18 g 0    aluminum & magnesium hydroxide-simethicone (MYLANTA MAX STRENGTH) 400-400-40 mg/5 mL suspension Take 5 mLs by mouth 4 (four) times daily as needed (mouth sores). 335 mL 3    apixaban (ELIQUIS) 5 mg Tab Take 1 tablet (5 mg total) by mouth 2 (two) times daily.      capecitabine (XELODA) 500 MG Tab Take 3 tablets (1,500 mg total) by mouth 2 (two) times daily on days 1-14 of each 21 day cycle (2 weeks on, then 1 week off). 84 tablet 3    cholecalciferol, vitamin D3, (VITAMIN D3) 50 mcg (2,000 unit) Tab Take 2,000 Units by mouth once daily.      dexAMETHasone (DECADRON) 4 MG Tab TAKE 2 TABLETS BY MOUTH ONCE DAILY AS DIRECTED ON DAYS 2 AND 3 OF CHEMOTHERAPY CYCLE      dicyclomine (BENTYL) 10 MG capsule TAKE 1 CAPSULE BY MOUTH 4 TIMES DAILY BEFORE MEAL(S) AND NIGHTLY 120 capsule 0    diphenhydrAMINE (BENADRYL) 12.5 mg/5 mL elixir Take 5 mLs (12.5 mg total) by mouth 4 (four) times daily as needed (mouth sores). 236 mL 3    insulin aspart U-100 (NOVOLOG) 100 unit/mL injection Inject into the skin 3 (three) times daily before meals. prn      lactulose (CHRONULAC) 20 gram/30 mL Soln Take 30 mLs (20 g total) by mouth every 6 (six) hours as needed  (constipation). 900 mL 0    LIDOcaine viscous HCl 2% (LIDOCAINE VISCOUS) 2 % Soln by Mucous Membrane route 4 (four) times daily as needed (mouth sores). Swish and spit 15 ml (5 ml benadryl, 5 ml mylanta, 5 ml lidocaine) by mouth 4 times daily as needed for mouth sores 100 mL 3    magnesium oxide (MAGOX) 400 mg (241.3 mg magnesium) tablet Take 1 tablet (400 mg total) by mouth once daily. 90 tablet 2    metoclopramide HCl (REGLAN) 10 MG tablet Take 1 tablet (10 mg total) by mouth 3 (three) times daily before meals. for 7 days 21 tablet 0    morphine (MS CONTIN) 15 MG 12 hr tablet Take 1 tablet (15 mg total) by mouth 2 (two) times daily. 30 tablet 0    multivitamin with folic acid 400 mcg Tab Take 1 tablet by mouth once daily.      ondansetron (ZOFRAN) 8 MG tablet Take 8 mg by mouth.      oxyCODONE-acetaminophen (PERCOCET)  mg per tablet Take 1 tablet by mouth every 4 (four) hours as needed for Pain. 120 tablet 0    pantoprazole (PROTONIX) 40 MG tablet Take 1 tablet (40 mg total) by mouth once daily. 30 tablet 2    polyethylene glycol (GLYCOLAX) 17 gram PwPk Take 17 g by mouth.      pregabalin (LYRICA) 100 MG capsule Take 1 capsule (100 mg total) by mouth 2 (two) times daily. 60 capsule 6    tamsulosin (FLOMAX) 0.4 mg Cap Take 1 capsule (0.4 mg total) by mouth once daily. 30 capsule 2    TRESIBA FLEXTOUCH U-200 200 unit/mL (3 mL) insulin pen Inject 14 Units into the skin.      vibegron (GEMTESA) 75 mg Tab Take 75 mg by mouth.      midodrine (PROAMATINE) 5 MG Tab Take 1 tablet (5 mg total) by mouth 3 (three) times daily with meals. for 7 days 21 tablet 0    OLANZapine (ZYPREXA) 5 MG tablet Take 1 tablet (5 mg total) by mouth nightly. 4 tablet 6     Current Facility-Administered Medications on File Prior to Visit   Medication Dose Route Frequency Provider Last Rate Last Admin    heparin, porcine (PF) 100 unit/mL injection flush 500 Units  500 Units Intravenous PRN Jena Messina MD   500 Units at 02/04/25  "1600    sodium chloride 0.9% flush 10 mL  10 mL Intravenous PRN Jena Messina MD   10 mL at 02/04/25 1600      Review of Systems   Constitutional:  Positive for appetite change and fatigue. Negative for activity change, chills, diaphoresis, fever and unexpected weight change.   HENT:  Positive for trouble swallowing. Negative for nasal congestion, mouth sores, nosebleeds, sinus pressure/congestion and sore throat.    Eyes: Negative.    Respiratory:  Positive for shortness of breath. Negative for cough.    Cardiovascular:  Positive for leg swelling. Negative for chest pain and palpitations.   Gastrointestinal:  Positive for abdominal distention and constipation. Negative for abdominal pain, blood in stool, change in bowel habit, diarrhea, nausea and vomiting.   Endocrine: Negative.    Genitourinary:  Negative for bladder incontinence, decreased urine volume, difficulty urinating, dysuria, frequency, hematuria and urgency.   Musculoskeletal:  Positive for back pain. Negative for arthralgias, gait problem, joint swelling, leg pain and myalgias.   Integumentary:  Negative for rash.   Allergic/Immunologic: Negative.  Negative for frequent infections.   Neurological:  Positive for weakness. Negative for dizziness, tremors, syncope, light-headedness, numbness, headaches and memory loss.   Hematological:  Negative for adenopathy. Does not bruise/bleed easily.   Psychiatric/Behavioral:  Negative for agitation, confusion, hallucinations, sleep disturbance and suicidal ideas. The patient is not nervous/anxious.           Vitals:    02/19/25 1031   BP: 90/60   BP Location: Right arm   Patient Position: Sitting   Pulse: 63   Resp: 16   Temp: 97.5 °F (36.4 °C)   TempSrc: Oral   SpO2: 100%   Weight: 65 kg (143 lb 3.2 oz)   Height: 5' 9.02" (1.753 m)       Wt Readings from Last 6 Encounters:   02/19/25 65 kg (143 lb 3.2 oz)   02/14/25 62.9 kg (138 lb 10.7 oz)   02/04/25 63.8 kg (140 lb 11.2 oz)   02/04/25 63.8 kg (140 lb " 11.2 oz)   01/27/25 72.9 kg (160 lb 11.5 oz)   01/26/25 73.9 kg (163 lb)     Body mass index is 21.14 kg/m².  Body surface area is 1.78 meters squared.    Physical Exam  Vitals and nursing note reviewed.   Constitutional:       General: He is not in acute distress.     Appearance: He is ill-appearing.      Comments: thin   HENT:      Head: Normocephalic and atraumatic.      Mouth/Throat:      Mouth: Mucous membranes are moist.   Eyes:      General: No scleral icterus.     Extraocular Movements: Extraocular movements intact.      Conjunctiva/sclera: Conjunctivae normal.   Neck:      Vascular: No JVD.   Cardiovascular:      Rate and Rhythm: Normal rate and regular rhythm.      Heart sounds: No murmur heard.  Pulmonary:      Effort: Pulmonary effort is normal.      Breath sounds: Normal breath sounds. No wheezing or rhonchi.   Chest:      Comments: Left chest wall scar from previous Mediport  Abdominal:      General: Bowel sounds are decreased. There is distension.      Tenderness: There is generalized abdominal tenderness.   Musculoskeletal:         General: No swelling or deformity.      Cervical back: Neck supple.      Right lower leg: Edema present.      Left lower leg: Edema present.   Lymphadenopathy:      Head:      Right side of head: No submandibular adenopathy.      Left side of head: No submandibular adenopathy.      Cervical: No cervical adenopathy.   Skin:     General: Skin is warm.      Coloration: Skin is not jaundiced.      Findings: No rash.   Neurological:      Mental Status: He is alert.   Psychiatric:         Attention and Perception: Attention normal.         Behavior: Behavior is cooperative.       ECOG SCORE    3 - Capable of only limited selfcare, confined to bed or chair more than 50% of waking hours     Laboratory:  CBC with Differential:  Lab Results   Component Value Date    WBC 3.44 (L) 02/19/2025    RBC 2.50 (L) 02/19/2025    HGB 7.4 (L) 02/19/2025    HCT 22.4 (L) 02/19/2025    MCV 89.6  02/19/2025    MCH 29.6 02/19/2025    MCHC 33.0 02/19/2025    RDW 14.6 02/19/2025     02/19/2025    MPV 10.1 02/19/2025      CMP:  Sodium   Date Value Ref Range Status   02/19/2025 135 (L) 136 - 145 mmol/L Final     Potassium   Date Value Ref Range Status   02/19/2025 4.6 3.5 - 5.1 mmol/L Final     Chloride   Date Value Ref Range Status   02/19/2025 100 98 - 107 mmol/L Final     CO2   Date Value Ref Range Status   02/19/2025 30 (H) 22 - 29 mmol/L Final     Blood Urea Nitrogen   Date Value Ref Range Status   02/19/2025 21.0 8.4 - 25.7 mg/dL Final   10/20/2023 12 6 - 23 mg/dL Final     Creatinine   Date Value Ref Range Status   02/19/2025 0.78 0.72 - 1.25 mg/dL Final   10/20/2023 0.95 0.67 - 1.17 mg/dL Final     Calcium   Date Value Ref Range Status   02/19/2025 9.0 8.4 - 10.2 mg/dL Final   10/20/2023 8.7 8.4 - 10.2 mg/dL Final     Albumin   Date Value Ref Range Status   02/19/2025 3.2 (L) 3.5 - 5.0 g/dL Final     Bilirubin Total   Date Value Ref Range Status   02/19/2025 0.4 <=1.5 mg/dL Final     ALP   Date Value Ref Range Status   02/19/2025 298 (H) 40 - 150 unit/L Final     AST   Date Value Ref Range Status   02/19/2025 31 5 - 34 unit/L Final     ALT   Date Value Ref Range Status   02/19/2025 26 0 - 55 unit/L Final      Component 08/28/24 07/17/24 06/26/24 06/21/24 06/12/24 05/29/24   CA 19-9 2,385.0 High  2,150.0 High  1,567.0 High  1,183.0 High  1,000.0 High  959.9 High       Latest Reference Range & Units 09/24/24 08:52 10/23/24 07:57   CA 19-9 0.00 - 37.00 unit/mL 6,202.93 (H) 21,137.60 (H)      Latest Reference Range & Units 11/05/24 13:54 11/19/24 07:47 12/04/24 07:58 12/18/24 07:11 12/26/24 07:57   CA 19-9 0.00 - 37.00 unit/mL 23,324.00 (H) 19,155.71 (H) 31,691.58 (H) 23,541.97 (H) 31,586.90 (H)          Assessment:       1. Malignant neoplasm of head of pancreas    2. Other amnesia    3. Constipation due to opioid therapy    4. Chemotherapy-induced neuropathy    5. Pneumobilia    6. Portal vein  thrombosis    7. Elevated alkaline phosphatase level    8. Cancer associated pain    9. Elevated CA 19-9 level    10. Other ascites       Locally advanced pancreatic cancer   ---8/2/2022 ERCP with metal biliary stent placement   ---Folfirinox (10/2022- 2/2023)  ---capecitabine RT (4/3/23 - 5/16/23)   ---Phase I study 2021-1176 SD-101 at [facility]--SD-101 2 mg in sodium chloride 0.9% (NS) 30 mL IVPB, intravenous x 2 cycles (8/22/2023-11/22/2023)  ---Gemcitabine and paclitaxel protein bound (12/13/2023-- 7/2024)-->clinical progression and GOO  ---s/p biliary stent placement 4/4/2024  ---chemotherapy with gemcitabine/paclitaxel (last dose 6/26/2024)  ---duodenal stent (placed by GI)- 8/7/2024  ---Gemcitabine and CISplatin Every 2 Weeks (8/28/2024-present)  --- Now requiring weekly therapeutic paracentesis, started 1/2/25   Liquid bx 12/31/24; insufficient sample  [place]/Cis stopped 2/4/25 d/t intolerance; last cycle C6D1 given 12/4/25  --start Xeloda 2/20/25 1000 mg/m2 BID D1-14/21 day cycle    H/o Gastric outlet obstruction  ---s/p duodenal stent, severe protein calori malnutrition      Pain, neoplasm related pain  ---continue Percocet, Lyrica, Morphine     elevated ALP  A. follow  Swelling   Bilateral lower extremity swelling and abdominal swelling    Ascites   -Likely malignant   -S/p therapeutic paracentesis-continue prn-consider PleuRx        Plan:       Patient with unresectable advanced pancreatic cancer to start 4th line treatment with cisplatin and Gemzar and clinically suggestive of progression, now with distended abdomen and recurrent ascites, likely malignant as they reaccumulate rather quick. Restaging scans without significant disease progression but significant amount of ascites. We order liquid biopsy but unfortunately sample was not satisfactory for processing.  He had liquid biopsy in the past and no mutations were identified.    As mentioned above we discussed hospice but he is not ready yet.  He knows that  he is not able to tolerate chemotherapy well but he is not opposed to try monotherapy capecitabine.  Prior discussion to try liposomal irinotecan but he will prefer to start with Xeloda only.    Stopped [place]/Cis due to intolerance   Labs stable to start Xeloda  Plan for Xeloda 1000 mg/m2 BID D1-14/21 day cycle; has had chemo education in past; he will start 2/20/25  Standing order for weekly paracentesis therapeutic and diagnostic for abdominal swelling as needed  Liquid bx 12/31/24--insufficient sample  Lasix 20mg BID was discontinued at hospital   Continue Eliquis 5 mg BID for thrombus  Repeat MRI Brain in 3 months, due 2/2025-needs prior to followup with Dr. Messina in 4 weeks  Continue morphine and percocet for pain  Acute on chronic anemia--transfuse 1 unit in [place] 2/20/25  CT C/A/P every 3 months--this will be due 5/2025  RTC 2 weeks with NP for FU/labs/tox check  Weekly cbc, cmp starting Xeloda 2/20/2025 @ [facility] or [place]    Pertinent lab and radiology studies were reviewed.   The patient was given ample opportunity to ask questions, and to the best of my abilities, all questions answered to satisfaction; patient demonstrated understanding of what we discussed and agreeable to the plan. Pt instructed to call should develop concerning signs/symptoms or have further questions.     Visit today included increased complexity associated with the care and treatment of  the episodic problem pancreatic cancer, addressing and managing the longitudinal care of the patient's Stage ICC Pancreatic Carcinoma.     Shilpa Dumont, DELVIS-C  Oncology/Hematology  [facility]

## 2025-02-20 ENCOUNTER — INFUSION (OUTPATIENT)
Facility: HOSPITAL | Age: 60
End: 2025-02-20
Attending: INTERNAL MEDICINE
Payer: MEDICARE

## 2025-02-20 VITALS
SYSTOLIC BLOOD PRESSURE: 98 MMHG | OXYGEN SATURATION: 96 % | HEART RATE: 82 BPM | DIASTOLIC BLOOD PRESSURE: 60 MMHG | TEMPERATURE: 98 F | RESPIRATION RATE: 18 BRPM

## 2025-02-20 DIAGNOSIS — D64.9 ANEMIA, UNSPECIFIED TYPE: Primary | ICD-10-CM

## 2025-02-20 DIAGNOSIS — C25.9 MALIGNANT NEOPLASM OF PANCREAS, UNSPECIFIED LOCATION OF MALIGNANCY: ICD-10-CM

## 2025-02-20 LAB
ABO + RH BLD: NORMAL
BACTERIA BLD CULT: NORMAL
BACTERIA BLD CULT: NORMAL
BLD PROD TYP BPU: NORMAL
BLOOD UNIT EXPIRATION DATE: NORMAL
BLOOD UNIT TYPE CODE: 7300
CROSSMATCH INTERPRETATION: NORMAL
DISPENSE STATUS: NORMAL
HCT VFR BLD AUTO: 22.4 % (ref 36–52)
HGB BLD-MCNC: 7.7 G/DL (ref 13–18)
UNIT NUMBER: NORMAL

## 2025-02-20 PROCEDURE — P9016 RBC LEUKOCYTES REDUCED: HCPCS | Performed by: NURSE PRACTITIONER

## 2025-02-20 PROCEDURE — 63600175 PHARM REV CODE 636 W HCPCS: Performed by: INTERNAL MEDICINE

## 2025-02-20 PROCEDURE — 25000003 PHARM REV CODE 250: Performed by: NURSE PRACTITIONER

## 2025-02-20 PROCEDURE — A4216 STERILE WATER/SALINE, 10 ML: HCPCS | Performed by: INTERNAL MEDICINE

## 2025-02-20 PROCEDURE — 85018 HEMOGLOBIN: CPT

## 2025-02-20 PROCEDURE — 25000003 PHARM REV CODE 250: Performed by: INTERNAL MEDICINE

## 2025-02-20 PROCEDURE — 36415 COLL VENOUS BLD VENIPUNCTURE: CPT

## 2025-02-20 PROCEDURE — 86923 COMPATIBILITY TEST ELECTRIC: CPT | Performed by: NURSE PRACTITIONER

## 2025-02-20 PROCEDURE — 36430 TRANSFUSION BLD/BLD COMPNT: CPT

## 2025-02-20 RX ORDER — HEPARIN 100 UNIT/ML
500 SYRINGE INTRAVENOUS
Status: DISCONTINUED | OUTPATIENT
Start: 2025-02-20 | End: 2025-02-20 | Stop reason: HOSPADM

## 2025-02-20 RX ORDER — SODIUM CHLORIDE 0.9 % (FLUSH) 0.9 %
10 SYRINGE (ML) INJECTION
OUTPATIENT
Start: 2025-02-20

## 2025-02-20 RX ORDER — HYDROCODONE BITARTRATE AND ACETAMINOPHEN 10; 325 MG/1; MG/1
1 TABLET ORAL
Status: COMPLETED | OUTPATIENT
Start: 2025-02-20 | End: 2025-02-20

## 2025-02-20 RX ORDER — HYDROCODONE BITARTRATE AND ACETAMINOPHEN 500; 5 MG/1; MG/1
TABLET ORAL ONCE
Status: COMPLETED | OUTPATIENT
Start: 2025-02-20 | End: 2025-02-20

## 2025-02-20 RX ORDER — HYDROCODONE BITARTRATE AND ACETAMINOPHEN 10; 325 MG/1; MG/1
1 TABLET ORAL
Status: CANCELLED
Start: 2025-02-20 | End: 2025-02-20

## 2025-02-20 RX ORDER — HYDROCODONE BITARTRATE AND ACETAMINOPHEN 10; 325 MG/1; MG/1
1 TABLET ORAL
Refills: 0 | Status: CANCELLED
Start: 2025-02-20 | End: 2025-02-20

## 2025-02-20 RX ORDER — HEPARIN 100 UNIT/ML
500 SYRINGE INTRAVENOUS
OUTPATIENT
Start: 2025-02-20

## 2025-02-20 RX ORDER — SODIUM CHLORIDE 0.9 % (FLUSH) 0.9 %
10 SYRINGE (ML) INJECTION
Status: DISCONTINUED | OUTPATIENT
Start: 2025-02-20 | End: 2025-02-20 | Stop reason: HOSPADM

## 2025-02-20 RX ADMIN — HEPARIN 500 UNITS: 100 SYRINGE at 11:02

## 2025-02-20 RX ADMIN — SODIUM CHLORIDE: 9 INJECTION, SOLUTION INTRAVENOUS at 09:02

## 2025-02-20 RX ADMIN — Medication 10 ML: at 11:02

## 2025-02-20 RX ADMIN — HYDROCODONE BITARTRATE AND ACETAMINOPHEN 1 TABLET: 10; 325 TABLET ORAL at 09:02

## 2025-02-21 LAB — PSYCHE PATHOLOGY RESULT: NORMAL

## 2025-02-24 ENCOUNTER — PATIENT MESSAGE (OUTPATIENT)
Dept: HEMATOLOGY/ONCOLOGY | Facility: CLINIC | Age: 60
End: 2025-02-24
Payer: MEDICARE

## 2025-02-25 ENCOUNTER — TELEPHONE (OUTPATIENT)
Facility: CLINIC | Age: 60
End: 2025-02-25
Payer: MEDICARE

## 2025-02-25 NOTE — TELEPHONE ENCOUNTER
----- Message from Nurse Juan sent at 2/25/2025 11:29 AM CST -----  Regarding: RE: Xeloda  Ok perfect, he sees rosy on 3/5 to go over tox check  ----- Message -----  From: Clara Smith MA  Sent: 2/25/2025  11:22 AM CST  To: Yessica Toro LPN  Subject: Xeloda                                           Patient's wife, Terri, contacted the clinic this morning. Due to nurses being in clinic, she had left a voicemail stating that the patient has started his Xeloda this past Sunday evening, 02/23/2025.

## 2025-02-26 ENCOUNTER — HOSPITAL ENCOUNTER (OUTPATIENT)
Dept: INTERVENTIONAL RADIOLOGY/VASCULAR | Facility: HOSPITAL | Age: 60
Discharge: HOME | End: 2025-02-26
Attending: INTERNAL MEDICINE
Payer: MEDICARE

## 2025-02-26 ENCOUNTER — DOCUMENTATION ONLY (OUTPATIENT)
Dept: HEMATOLOGY/ONCOLOGY | Facility: CLINIC | Age: 60
End: 2025-02-26
Payer: MEDICARE

## 2025-02-26 DIAGNOSIS — C25.0 MALIGNANT NEOPLASM OF HEAD OF PANCREAS: ICD-10-CM

## 2025-02-26 DIAGNOSIS — R19.00 ABDOMINAL SWELLING: ICD-10-CM

## 2025-02-26 DIAGNOSIS — K83.8 PNEUMOBILIA: ICD-10-CM

## 2025-02-26 PROCEDURE — 63600175 PHARM REV CODE 636 W HCPCS: Performed by: RADIOLOGY

## 2025-02-26 PROCEDURE — 49083 ABD PARACENTESIS W/IMAGING: CPT

## 2025-02-26 PROCEDURE — P9047 ALBUMIN (HUMAN), 25%, 50ML: HCPCS | Performed by: RADIOLOGY

## 2025-02-26 PROCEDURE — 88305 TISSUE EXAM BY PATHOLOGIST: CPT | Performed by: INTERNAL MEDICINE

## 2025-02-26 PROCEDURE — 88112 CYTOPATH CELL ENHANCE TECH: CPT

## 2025-02-26 RX ORDER — ALBUMIN HUMAN 250 G/1000ML
25 SOLUTION INTRAVENOUS ONCE
Status: COMPLETED | OUTPATIENT
Start: 2025-02-26 | End: 2025-02-26

## 2025-02-26 RX ADMIN — ALBUMIN (HUMAN) 25 G: 12.5 SOLUTION INTRAVENOUS at 10:02

## 2025-02-26 NOTE — DISCHARGE SUMMARY
Radiology Post-Procedure Note    Pre Op Diagnosis: Ascites     Post Op Diagnosis: Same    Secondary Diagnoses:   Problem List Items Addressed This Visit       Pancreatic cancer    Relevant Orders    IR Paracentesis with Imaging    Pneumobilia    Relevant Orders    IR Paracentesis with Imaging     Other Visit Diagnoses         Abdominal swelling        Relevant Orders    IR Paracentesis with Imaging             Procedure: Paracentesis    Procedure performed by: Jerome Avalos MD    Assistant: None    Written Informed Consent Obtained: Yes    Specimen Removed: None    Estimated Blood Loss: <10 cc    Condition: Stable    Outcome: The patient tolerated the procedure well and was without complications.    For further details please see the imaging report associated.    Disposition: Home or Self Care    Follow Up: With primary care provider    Discharge Instructions:  No discharge procedures on file.       Time Spent On Discharge: 2 minutes

## 2025-02-26 NOTE — INTERVAL H&P NOTE
The patient has been examined and the H&P has been reviewed:    I concur with the findings and no changes have occurred since H&P was written. Warren P Lejeune is a 60 y.o. male with Pancreatic Cancer with Ascites who presents for Paracentesis.           There are no hospital problems to display for this patient.

## 2025-02-26 NOTE — PROGRESS NOTES
Spoke with patient's wife Terri regarding Home health. She wasn't sure whether or not she wanted home health or not. I informed her when they come for office visit next week, they need to let the NP know whether or not she wants home health for him. Will continue to check on patient to see if anything else is needed.

## 2025-02-27 ENCOUNTER — DOCUMENTATION ONLY (OUTPATIENT)
Dept: HEMATOLOGY/ONCOLOGY | Facility: CLINIC | Age: 60
End: 2025-02-27
Payer: MEDICARE

## 2025-02-27 DIAGNOSIS — C25.0 MALIGNANT NEOPLASM OF HEAD OF PANCREAS: Primary | ICD-10-CM

## 2025-02-27 NOTE — PROGRESS NOTES
Patient wife called regarding patient not wanting anything else done,  Spoke with ERNA Quintero regarding patient and patient's wife request. I called Palliative care and spoke with Alexandra. She informed me that they went in great detail regarding Palliative vs Hospice.  ERNA Quintero informed me that patient would need a Pleur X  catheter placed prior to going on hospice. Patient's wife called regarding him needing a PleurX catheter place prior to going on hospice, and she verbalized understanding. She told me that he was in a lot  of pain. I informed her that he needed to go to hospital to have an abdominal PleurX place. She said that she would talk to him first and give me a call back. She called back and asked when would she need to go to hospital and I informed her that I needed to check. Patient 's wife called regarding order was put in and now it has to get schedule. She informed me that he did not want to go to the hospital until it is schedule. I informed her that if his pain get in worst and the current medication is not helping they needed to go to the ER to get check, she verbalized understanding of what was being said.

## 2025-02-27 NOTE — PROGRESS NOTES
Return call from patient's wife Terri regarding patient. She has decided to go with Palliative care, she told me he could barely get out out of bed this morning. I informed her that I would put in a referral for Palliative Care.

## 2025-02-28 ENCOUNTER — DOCUMENTATION ONLY (OUTPATIENT)
Dept: HEMATOLOGY/ONCOLOGY | Facility: CLINIC | Age: 60
End: 2025-02-28
Payer: MEDICARE

## 2025-02-28 LAB — PSYCHE PATHOLOGY RESULT: NORMAL

## 2025-02-28 NOTE — PROGRESS NOTES
Patient's wife called regarding Abdominal drain, she informed me that patient is schedule for it on Friday. She said that Mr. Lejeune is feeling better today, but still hurting some. I informed patient's wife that Palliative Care can give him more pain medication more than we can. She verbalized understanding. I informed her after he get the abdominal drain place on Friday, see how he do over the Weekend and then make a decision on whether or not you want Hospice or Palliative Care.

## 2025-03-02 DIAGNOSIS — K63.9 INTESTINAL DISORDER: ICD-10-CM

## 2025-03-02 DIAGNOSIS — K83.8 PNEUMOBILIA: ICD-10-CM

## 2025-03-03 ENCOUNTER — HOSPITAL ENCOUNTER (EMERGENCY)
Facility: HOSPITAL | Age: 60
Discharge: HOME | End: 2025-03-03
Attending: INTERNAL MEDICINE
Payer: MEDICARE

## 2025-03-03 VITALS
DIASTOLIC BLOOD PRESSURE: 61 MMHG | BODY MASS INDEX: 21.15 KG/M2 | HEART RATE: 67 BPM | WEIGHT: 143.31 LBS | RESPIRATION RATE: 10 BRPM | SYSTOLIC BLOOD PRESSURE: 101 MMHG | OXYGEN SATURATION: 97 % | TEMPERATURE: 99 F

## 2025-03-03 DIAGNOSIS — C25.9 PANCREATIC CANCER: Primary | ICD-10-CM

## 2025-03-03 DIAGNOSIS — K74.60 CIRRHOSIS OF LIVER WITH ASCITES, UNSPECIFIED HEPATIC CIRRHOSIS TYPE: ICD-10-CM

## 2025-03-03 DIAGNOSIS — R18.8 CIRRHOSIS OF LIVER WITH ASCITES, UNSPECIFIED HEPATIC CIRRHOSIS TYPE: ICD-10-CM

## 2025-03-03 DIAGNOSIS — T50.901A ACCIDENTAL OVERDOSE, INITIAL ENCOUNTER: ICD-10-CM

## 2025-03-03 LAB
ACCEPTIBLE SP GR UR QL: 1.03 (ref 1–1.03)
ALBUMIN SERPL-MCNC: 3.1 G/DL (ref 3.5–5)
ALBUMIN/GLOB SERPL: 0.9 RATIO (ref 1.1–2)
ALP SERPL-CCNC: 2263 UNIT/L (ref 40–150)
ALT SERPL-CCNC: 137 UNIT/L (ref 0–55)
AMPHET UR QL SCN: NEGATIVE
ANION GAP SERPL CALC-SCNC: 4 MEQ/L
APTT PPP: 28.9 SECONDS (ref 23.2–33.7)
AST SERPL-CCNC: 284 UNIT/L (ref 5–34)
BACTERIA #/AREA URNS AUTO: ABNORMAL /HPF
BARBITURATE SCN PRESENT UR: NEGATIVE
BASOPHILS # BLD AUTO: 0.01 X10(3)/MCL
BASOPHILS NFR BLD AUTO: 0.1 %
BENZODIAZ UR QL SCN: NEGATIVE
BILIRUB SERPL-MCNC: 1.9 MG/DL
BILIRUB UR QL STRIP.AUTO: NEGATIVE
BUN SERPL-MCNC: 20.7 MG/DL (ref 8.4–25.7)
CALCIUM SERPL-MCNC: 9 MG/DL (ref 8.4–10.2)
CANNABINOIDS UR QL SCN: NEGATIVE
CAOX CRY UR QL COMP ASSIST: ABNORMAL
CHLORIDE SERPL-SCNC: 101 MMOL/L (ref 98–107)
CLARITY UR: CLEAR
CO2 SERPL-SCNC: 27 MMOL/L (ref 22–29)
COCAINE UR QL SCN: NEGATIVE
COLOR UR AUTO: YELLOW
CREAT SERPL-MCNC: 0.87 MG/DL (ref 0.72–1.25)
CREAT/UREA NIT SERPL: 24
EOSINOPHIL # BLD AUTO: 0 X10(3)/MCL (ref 0–0.9)
EOSINOPHIL NFR BLD AUTO: 0 %
ERYTHROCYTE [DISTWIDTH] IN BLOOD BY AUTOMATED COUNT: 18.5 % (ref 11.5–17)
ETHANOL SERPL-MCNC: <10 MG/DL
FENTANYL UR QL SCN: NEGATIVE
GFR SERPLBLD CREATININE-BSD FMLA CKD-EPI: >60 ML/MIN/1.73/M2
GLOBULIN SER-MCNC: 3.3 GM/DL (ref 2.4–3.5)
GLUCOSE SERPL-MCNC: 97 MG/DL (ref 74–100)
GLUCOSE UR QL STRIP: NORMAL
HCT VFR BLD AUTO: 30.8 % (ref 42–52)
HGB BLD-MCNC: 10.2 G/DL (ref 14–18)
HGB UR QL STRIP: NEGATIVE
HYALINE CASTS #/AREA URNS LPF: ABNORMAL /LPF
IMM GRANULOCYTES # BLD AUTO: 0.02 X10(3)/MCL (ref 0–0.04)
IMM GRANULOCYTES NFR BLD AUTO: 0.3 %
INR PPP: 1.4
KETONES UR QL STRIP: NEGATIVE
LACTATE SERPL-SCNC: 1.4 MMOL/L (ref 0.5–2.2)
LEUKOCYTE ESTERASE UR QL STRIP: 75
LIPASE SERPL-CCNC: <4 U/L
LYMPHOCYTES # BLD AUTO: 0.35 X10(3)/MCL (ref 0.6–4.6)
LYMPHOCYTES NFR BLD AUTO: 5.2 %
MAGNESIUM SERPL-MCNC: 2.1 MG/DL (ref 1.6–2.6)
MCH RBC QN AUTO: 30.1 PG (ref 27–31)
MCHC RBC AUTO-ENTMCNC: 33.1 G/DL (ref 33–36)
MCV RBC AUTO: 90.9 FL (ref 80–94)
MDMA UR QL SCN: NEGATIVE
MONOCYTES # BLD AUTO: 0.43 X10(3)/MCL (ref 0.1–1.3)
MONOCYTES NFR BLD AUTO: 6.4 %
NEUTROPHILS # BLD AUTO: 5.91 X10(3)/MCL (ref 2.1–9.2)
NEUTROPHILS NFR BLD AUTO: 88 %
NITRITE UR QL STRIP: NEGATIVE
NRBC BLD AUTO-RTO: 0 %
OHS QRS DURATION: 88 MS
OHS QTC CALCULATION: 404 MS
OPIATES UR QL SCN: POSITIVE
PCP UR QL: NEGATIVE
PH UR STRIP: 6.5 [PH]
PH UR: 6.5 [PH] (ref 3–11)
PLATELET # BLD AUTO: 190 X10(3)/MCL (ref 130–400)
PMV BLD AUTO: 11.1 FL (ref 7.4–10.4)
POTASSIUM SERPL-SCNC: 5.5 MMOL/L (ref 3.5–5.1)
PROT SERPL-MCNC: 6.4 GM/DL (ref 6.4–8.3)
PROT UR QL STRIP: NEGATIVE
PROTHROMBIN TIME: 16.8 SECONDS (ref 12.5–14.5)
RBC # BLD AUTO: 3.39 X10(6)/MCL (ref 4.7–6.1)
RBC #/AREA URNS AUTO: ABNORMAL /HPF
SODIUM SERPL-SCNC: 132 MMOL/L (ref 136–145)
SP GR UR STRIP.AUTO: 1.03 (ref 1–1.03)
SQUAMOUS #/AREA URNS LPF: ABNORMAL /HPF
T4 FREE SERPL-MCNC: 0.91 NG/DL (ref 0.7–1.48)
TROPONIN I SERPL-MCNC: <0.01 NG/ML (ref 0–0.04)
TSH SERPL-ACNC: 6.15 UIU/ML (ref 0.35–4.94)
UROBILINOGEN UR STRIP-ACNC: NORMAL
WBC # BLD AUTO: 6.72 X10(3)/MCL (ref 4.5–11.5)
WBC #/AREA URNS AUTO: ABNORMAL /HPF

## 2025-03-03 PROCEDURE — 96374 THER/PROPH/DIAG INJ IV PUSH: CPT

## 2025-03-03 PROCEDURE — 83735 ASSAY OF MAGNESIUM: CPT | Performed by: INTERNAL MEDICINE

## 2025-03-03 PROCEDURE — 83690 ASSAY OF LIPASE: CPT | Performed by: INTERNAL MEDICINE

## 2025-03-03 PROCEDURE — 85610 PROTHROMBIN TIME: CPT | Performed by: INTERNAL MEDICINE

## 2025-03-03 PROCEDURE — 25000003 PHARM REV CODE 250: Performed by: INTERNAL MEDICINE

## 2025-03-03 PROCEDURE — 84484 ASSAY OF TROPONIN QUANT: CPT | Performed by: INTERNAL MEDICINE

## 2025-03-03 PROCEDURE — 63600175 PHARM REV CODE 636 W HCPCS: Performed by: INTERNAL MEDICINE

## 2025-03-03 PROCEDURE — 93005 ELECTROCARDIOGRAM TRACING: CPT

## 2025-03-03 PROCEDURE — 99285 EMERGENCY DEPT VISIT HI MDM: CPT | Mod: 25

## 2025-03-03 PROCEDURE — 81001 URINALYSIS AUTO W/SCOPE: CPT | Performed by: INTERNAL MEDICINE

## 2025-03-03 PROCEDURE — 85025 COMPLETE CBC W/AUTO DIFF WBC: CPT | Performed by: INTERNAL MEDICINE

## 2025-03-03 PROCEDURE — 84443 ASSAY THYROID STIM HORMONE: CPT | Performed by: INTERNAL MEDICINE

## 2025-03-03 PROCEDURE — 85730 THROMBOPLASTIN TIME PARTIAL: CPT | Performed by: INTERNAL MEDICINE

## 2025-03-03 PROCEDURE — 80307 DRUG TEST PRSMV CHEM ANLYZR: CPT | Performed by: INTERNAL MEDICINE

## 2025-03-03 PROCEDURE — 80053 COMPREHEN METABOLIC PANEL: CPT | Performed by: INTERNAL MEDICINE

## 2025-03-03 PROCEDURE — 84439 ASSAY OF FREE THYROXINE: CPT | Performed by: INTERNAL MEDICINE

## 2025-03-03 PROCEDURE — 93010 ELECTROCARDIOGRAM REPORT: CPT | Mod: ,,, | Performed by: STUDENT IN AN ORGANIZED HEALTH CARE EDUCATION/TRAINING PROGRAM

## 2025-03-03 PROCEDURE — 82077 ASSAY SPEC XCP UR&BREATH IA: CPT | Performed by: INTERNAL MEDICINE

## 2025-03-03 PROCEDURE — 96361 HYDRATE IV INFUSION ADD-ON: CPT

## 2025-03-03 PROCEDURE — 83605 ASSAY OF LACTIC ACID: CPT | Performed by: INTERNAL MEDICINE

## 2025-03-03 PROCEDURE — 96375 TX/PRO/DX INJ NEW DRUG ADDON: CPT

## 2025-03-03 RX ORDER — ONDANSETRON HYDROCHLORIDE 2 MG/ML
4 INJECTION, SOLUTION INTRAVENOUS
Status: COMPLETED | OUTPATIENT
Start: 2025-03-03 | End: 2025-03-03

## 2025-03-03 RX ORDER — DICYCLOMINE HYDROCHLORIDE 10 MG/1
CAPSULE ORAL
Qty: 120 CAPSULE | Refills: 0 | Status: SHIPPED | OUTPATIENT
Start: 2025-03-03

## 2025-03-03 RX ORDER — NALOXONE HCL 0.4 MG/ML
1 VIAL (ML) INJECTION
Status: COMPLETED | OUTPATIENT
Start: 2025-03-03 | End: 2025-03-03

## 2025-03-03 RX ORDER — MORPHINE SULFATE 4 MG/ML
4 INJECTION, SOLUTION INTRAMUSCULAR; INTRAVENOUS
Refills: 0 | Status: COMPLETED | OUTPATIENT
Start: 2025-03-03 | End: 2025-03-03

## 2025-03-03 RX ADMIN — MORPHINE SULFATE 4 MG: 4 INJECTION, SOLUTION INTRAMUSCULAR; INTRAVENOUS at 05:03

## 2025-03-03 RX ADMIN — NALOXONE HYDROCHLORIDE 1 MG: 0.4 INJECTION, SOLUTION INTRAMUSCULAR; INTRAVENOUS; SUBCUTANEOUS at 04:03

## 2025-03-03 RX ADMIN — ONDANSETRON 4 MG: 2 INJECTION INTRAMUSCULAR; INTRAVENOUS at 04:03

## 2025-03-03 RX ADMIN — SODIUM CHLORIDE 1000 ML: 9 INJECTION, SOLUTION INTRAVENOUS at 04:03

## 2025-03-03 NOTE — ED PROVIDER NOTES
Encounter Date: 3/3/2025    SCRIBE #1 NOTE: I, Lisa Racquel, am scribing for, and in the presence of,  Morgan Roldan DO. I have scribed the following portions of the note - the EKG reading. Other sections scribed: HPI, ROS, PE.       History     Chief Complaint   Patient presents with    Fatigue     Pt's daughter found pt on the bathroom floor with intermittent LOC. Last chemo Thursday, hx pancreatic cancer. Pt GCS 14 on arrival     Patient is a 60 year old male with a history of DM, HTN, and pancreatic cancer that presents to the ED following an unwitnessed fall at home today. The patient was found on his bathroom floor by his daughter with reported lethargy. Baseline GCS 15. He complains of intermittent left sided chest pain today and for about one week. He also reports nausea, shortness of breath, and generalized weakness. He does not remember today's fall. He denies vomiting, diarrhea, abdominal pain, and neck pain. Patient states he is not currently receiving chemotherapy or radiation for his pancreatic cancer.     The history is provided by the patient.     Review of patient's allergies indicates:  No Known Allergies  Past Medical History:   Diagnosis Date    Abdominal pain     Admission for chemotherapy     last chemo 9/12/24    Anxiety disorder, unspecified     Back pain     Bradycardia     Enlarged prostate     GERD (gastroesophageal reflux disease)     Hypertension     no cardiologist; no longer on meds since weight loss due to pancreatic cancer    Memory loss     Pancreatic cancer     Peripheral neuropathy     Type 2 diabetes mellitus with unspecified diabetic retinopathy without macular edema      Past Surgical History:   Procedure Laterality Date    bile duct stents times 2      EGD, WITH STENT INSERTION      INSERTION OF TUNNELED CENTRAL VENOUS CATHETER (CVC) WITH SUBCUTANEOUS PORT Right 9/20/2024    Procedure: [id number];  Surgeon: Moe Champagne MD;  Location: [facility];  Service:  General;  Laterality: Right;    MEDIPORT REMOVAL      times 2     Family History   Problem Relation Name Age of Onset    Hypertension Mother      Stroke Father      Stroke Sister      Prostate cancer Brother       Social History[1]  Review of Systems   Constitutional:         Reported lethargy. Generalized weakness.    Respiratory:  Positive for shortness of breath.    Cardiovascular:  Positive for chest pain.   Gastrointestinal:  Positive for nausea. Negative for abdominal pain, diarrhea and vomiting.   Musculoskeletal:  Negative for neck pain.       Physical Exam     Initial Vitals [03/03/25 1605]   BP Pulse Resp Temp SpO2   (!) 91/56 66 18 98.6 °F (37 °C) 100 %      MAP       --         Physical Exam    Constitutional: He appears cachectic. He is cooperative.  Non-toxic appearance.   Chronically ill appearing.    HENT:   Head: Normocephalic and atraumatic. Mouth/Throat: Uvula is midline, oropharynx is clear and moist and mucous membranes are normal.   Eyes: Conjunctivae, EOM and lids are normal. Pupils are equal, round, and reactive to light.   Neck: Trachea normal and phonation normal. Neck supple.    Full passive range of motion without pain.     Cardiovascular:  Normal rate, regular rhythm, normal heart sounds and normal pulses.           No murmur heard.  Pulmonary/Chest: Breath sounds normal. No accessory muscle usage. No tachypnea. No respiratory distress. He has no decreased breath sounds. He has no wheezes. He has no rhonchi. He has no rales.   Abdominal: Abdomen is soft. Bowel sounds are normal. He exhibits distension. There is no abdominal tenderness.   Small umbilical hernia, easily reducible.  There is no rebound and no guarding.   Musculoskeletal:      Cervical back: Full passive range of motion without pain and neck supple.     Neurological: He is alert. GCS eye subscore is 4. GCS verbal subscore is 4. GCS motor subscore is 6.   Oriented to name and location but not oriented to date.    Skin: Skin  is warm, dry and intact. Capillary refill takes less than 2 seconds. No rash noted.   Psychiatric: His speech is normal. Cognition and memory are normal.         ED Course   Critical Care    Date/Time: 3/3/2025 7:16 PM    Performed by: Morgan Roldan DO  Authorized by: Morgan Roldan DO  Direct patient critical care time: 15 minutes  Additional history critical care time: 5 minutes  Ordering / reviewing critical care time: 5 minutes  Documentation critical care time: 5 minutes  Consult with family critical care time: 5 minutes  Total critical care time (exclusive of procedural time) : 35 minutes  Critical care time was exclusive of separately billable procedures and treating other patients.  Critical care was necessary to treat or prevent imminent or life-threatening deterioration of the following conditions: circulatory failure, toxidrome and respiratory failure.  Critical care was time spent personally by me on the following activities: development of treatment plan with patient or surrogate, evaluation of patient's response to treatment, examination of patient, obtaining history from patient or surrogate, ordering and performing treatments and interventions, ordering and review of laboratory studies, ordering and review of radiographic studies, pulse oximetry, re-evaluation of patient's condition and review of old charts.        Labs Reviewed   COMPREHENSIVE METABOLIC PANEL - Abnormal       Result Value    Sodium 132 (*)     Potassium 5.5 (*)     Chloride 101      CO2 27      Glucose 97      Blood Urea Nitrogen 20.7      Creatinine 0.87      Calcium 9.0      Protein Total 6.4      Albumin 3.1 (*)     Globulin 3.3      Albumin/Globulin Ratio 0.9 (*)     Bilirubin Total 1.9 (*)     ALP 2,263 (*)      (*)      (*)     eGFR >60      Anion Gap 4.0      BUN/Creatinine Ratio 24     DRUG SCREEN, URINE (BEAKER) - Abnormal    Amphetamines, Urine Negative      Barbiturates, Urine Negative       Benzodiazepine, Urine Negative      Cannabinoids, Urine Negative      Cocaine, Urine Negative      Fentanyl, Urine Negative      MDMA, Urine Negative      Opiates, Urine Positive (*)     Phencyclidine, Urine Negative      pH, Urine 6.5      Specific Gravity, Urine Auto 1.026      Narrative:     Cut off concentrations:    Amphetamines - 1000 ng/ml  Barbiturates - 200 ng/ml  Benzodiazepine - 200 ng/ml  Cannabinoids (THC) - 50 ng/ml  Cocaine - 300 ng/ml  Fentanyl - 1.0 ng/ml  MDMA - 500 ng/ml  Opiates - 300 ng/ml   Phencyclidine (PCP) - 25 ng/ml    Specimen submitted for drug analysis and tested for pH and specific gravity in order to evaluate sample integrity. Suspect tampering if specific gravity is <1.003 and/or pH is not within the range of 4.5 - 8.0  False negatives may result form substances such as bleach added to urine.  False positives may result for the presence of a substance with similar chemical structure to the drug or its metabolite.    This test provides only a PRELIMINARY analytical test result. A more specific alternate chemical method must be used in order to obtain a confirmed analytical result. Gas chromatography/mass spectrometry (GC/MS) is the preferred confirmatory method. Other chemical confirmation methods are available. Clinical consideration and professional judgement should be applied to any drug of abuse test result, particularly when preliminary positive results are used.    Positive results will be confirmed only at the physicians request. Unconfirmed screening results are to be used only for medical purposes (treatment).        PROTIME-INR - Abnormal    PT 16.8 (*)     INR 1.4 (*)     Narrative:     Protimes are used to monitor anticoagulant agents such as warfarin. PT INR values are based on the current patient normal mean and the MEHDI value for the specific instrument reagent used.  **Routine theraputic target values for the INR are 2.0-3.0**   TSH - Abnormal    TSH 6.148 (*)     URINALYSIS, REFLEX TO URINE CULTURE - Abnormal    Color, UA Yellow      Appearance, UA Clear      Specific Gravity, UA 1.026      pH, UA 6.5      Protein, UA Negative      Glucose, UA Normal      Ketones, UA Negative      Blood, UA Negative      Bilirubin, UA Negative      Urobilinogen, UA Normal      Nitrites, UA Negative      Leukocyte Esterase, UA 75 (*)     RBC, UA 0-5      WBC, UA 6-10 (*)     Bacteria, UA None Seen      Squamous Epithelial Cells, UA None Seen      Hyaline Casts, UA 3-5 (*)     Calcium Oxalate Crystals, UA Occasional (*)    CBC WITH DIFFERENTIAL - Abnormal    WBC 6.72      RBC 3.39 (*)     Hgb 10.2 (*)     Hct 30.8 (*)     MCV 90.9      MCH 30.1      MCHC 33.1      RDW 18.5 (*)     Platelet 190      MPV 11.1 (*)     Neut % 88.0      Lymph % 5.2      Mono % 6.4      Eos % 0.0      Basophil % 0.1      Imm Grans % 0.3      Neut # 5.91      Lymph # 0.35 (*)     Mono # 0.43      Eos # 0.00      Baso # 0.01      Imm Gran # 0.02      NRBC% 0.0     ALCOHOL,MEDICAL (ETHANOL) - Normal    Ethanol Level <10.0     LACTIC ACID, PLASMA - Normal    Lactic Acid Level 1.4     LIPASE - Normal    Lipase Level <4     MAGNESIUM - Normal    Magnesium Level 2.10     APTT - Normal    PTT 28.9     TROPONIN I - Normal    Troponin-I <0.010     T4, FREE - Normal    Thyroxine Free 0.91     CBC W/ AUTO DIFFERENTIAL    Narrative:     The following orders were created for panel order CBC auto differential.  Procedure                               Abnormality         Status                     ---------                               -----------         ------                     CBC with Differential[[id number]]       Abnormal            Final result                 Please view results for these tests on the individual orders.     EKG Readings: (Independently Interpreted)   Initial Reading: No STEMI. Rhythm: Normal Sinus Rhythm. Heart Rate: 60. Ectopy: No Ectopy. Conduction: Normal. ST Segments: Normal ST Segments. Other  Impression: Non-specific T waves changes. No obvious ischemic changes.   1608     ECG Results              EKG 12-lead (Final result)        Collection Time Result Time QRS Duration OHS QTC Calculation    03/03/25 16:08:32 03/03/25 18:16:04 88 404                     Final result by Melva, Lab In [facility] (03/03/25 18:16:07)                   Narrative:    Test Reason : R07.9,    Vent. Rate :  60 BPM     Atrial Rate :  60 BPM     P-R Int : 170 ms          QRS Dur :  88 ms      QT Int : 404 ms       P-R-T Axes :  57  71  24 degrees    QTcB Int : 404 ms    Normal sinus rhythm  Nonspecific T wave abnormality  Abnormal ECG  Confirmed by Nathan Garcia ([id number]) on 3/3/2025 6:15:59 PM    Referred By:            Confirmed By: Nathan Garcia                                  Imaging Results              US Abdomen Limited (Final result)  Result time 03/03/25 17:57:15   Procedure changed from US Abdomen Limited (Gallbladder)     Final result by Cristina Saldana MD (03/03/25 17:57:15)                   Impression:      Nodular cirrhotic appearing liver with large volume ascites    The pancreas and gallbladder are not visualized on this examination      Electronically signed by: Vicente Saldana  Date:    03/03/2025  Time:    17:57               Narrative:    EXAMINATION:  US ABDOMEN LIMITED    CLINICAL HISTORY:  significantly elevated LFTs; Malignant neoplasm of pancreas, unspecified    TECHNIQUE:  Multiple sagittal and transverse images were obtained of the abdomen to include the right upper quadrant.    COMPARISON:  None    FINDINGS:  The examination is very limited due to patient cooperation.    The pancreas is obscured by overlying bowel gas    The liver has a nodular cirrhotic appearance.  There is evidence of ascites.  The liver measures 15 cm.  No liver mass is seen.  The gallbladder is unable to be visualized.    Common bile duct measures 4.5 mm.    Right kidney measures 9 cm.  No hydronephrosis is seen.  No  hydroureter is seen.    .                                       CT Head Without Contrast (Final result)  Result time 03/03/25 16:58:44      Final result by Cristina Saldana MD (03/03/25 16:58:44)                   Impression:      No acute abnormality seen      Electronically signed by: Vicente Saldana  Date:    03/03/2025  Time:    16:58               Narrative:    EXAMINATION:  CT HEAD WITHOUT CONTRAST    CLINICAL HISTORY:  Mental status change, unknown cause;    TECHNIQUE:  Multiple axial images were obtained from the base of the brain to the vertex without contrast administration.  Sagittal and coronal reconstructions were performed. .Automatic exposure control  (AEC) is utilized to reduce patient radiation exposure.    COMPARISON:  11/04/2024    FINDINGS:  There is no intracranial mass or lesion seen.  No hemorrhage is seen.  No infarct is seen.  The ventricles and basilar cisterns appear normal.  Brain parenchyma appears grossly unremarkable.    Posterior fossa appears normal.  The calvarium is intact.  The paranasal sinuses appear grossly unremarkable.                                       X-Ray Chest AP Portable (Final result)  Result time 03/03/25 16:42:26      Final result by Elliot Em MD (03/03/25 16:42:26)                   Impression:      No acute pulmonary process identified.      Electronically signed by: Elliot Em  Date:    03/03/2025  Time:    16:42               Narrative:    EXAMINATION:  XR CHEST AP PORTABLE    CLINICAL HISTORY:  chest pain, sob;    TECHNIQUE:  Frontal view(s) of the chest.    COMPARISON:  Radiography 02/16/2025    FINDINGS:  Right-sided MediPort catheter tip overlying the SVC.  Normal cardiac silhouette.  The lungs are well-inflated.  No consolidation identified.  No significant pleural effusion or discernible pneumothorax.                                       Medications   sodium chloride 0.9% bolus 1,000 mL 1,000 mL (0 mLs Intravenous Stopped 3/3/25 1814)    ondansetron injection 4 mg (4 mg Intravenous Given 3/3/25 1638)   naloxone 0.4 mg/mL injection 1 mg (1 mg Intravenous Given 3/3/25 1638)   morphine injection 4 mg (4 mg Intravenous Given 3/3/25 1700)     Medical Decision Making  60-year-old male presenting with fatigue and intermittent loss of consciousness.  Vital signs show blood pressure 91/56 but otherwise stable and afebrile.    Differential Diagnosis:   Judging by the patient's chief complaint and pertinent history, the patient has the following possible differential diagnoses, including but not limited to the following.  Some of these are deemed to be lower likelihood and some more likely based on my physical exam and history combined with possible lab work and/or imaging studies.   Please see the pertinent studies, and refer to the HPI.  Some of these diagnoses will take further evaluation to fully rule out, perhaps as an outpatient and the patient was encouraged to follow up when discharged for more comprehensive evaluation    Differential diagnosis includes, but is not limited to, infectious process, electrolyte abnormality, dehydration, ACS, pneumonia, and intracranial hemorrhage.     Problems Addressed:  Accidental overdose, initial encounter: acute illness or injury with systemic symptoms  Cirrhosis of liver with ascites, unspecified hepatic cirrhosis type: chronic illness or injury    Amount and/or Complexity of Data Reviewed  Independent Historian: spouse  External Data Reviewed: labs, radiology, ECG and notes.  Labs: ordered. Decision-making details documented in ED Course.  Radiology: ordered and independent interpretation performed. Decision-making details documented in ED Course.  ECG/medicine tests: ordered and independent interpretation performed.     Details: 1608. Initial Reading: No STEMI. Rhythm: Normal Sinus Rhythm. Heart Rate: 60. Ectopy: No Ectopy. Conduction: Normal. ST Segments: Normal ST Segments. Other Impression: Non-specific T  waves changes. No obvious ischemic changes.     Risk  Prescription drug management.            Scribe Attestation:   Scribe #1: I performed the above scribed service and the documentation accurately describes the services I performed. I attest to the accuracy of the note.    Attending Attestation:           Physician Attestation for Scribe:  Physician Attestation Statement for Scribe #1: I, Morgan Roldan, DO, reviewed documentation, as scribed by Lisa Clement in my presence, and it is both accurate and complete.             ED Course as of 03/03/25 1916   Mon Mar 03, 2025   1620 Pupils are pinpoint, patient is on chronic opiates for chronic pain.  I will do a cardiac workup as well as infectious workup with a CT of the head, but we will give 1 mg of Narcan.  I will give 1 L normal saline bolus as patient blood pressure is 91/56.  This may be opiate versus dehydration versus other [MM]   1620 BP(!): 91/56 [MM]   1621 Pulse: 66 [MM]   1621 Resp: 18 [MM]   1621 SpO2: 100 % [MM]   1621 Temp: 98.6 °F (37 °C) [MM]   1652 After given Narcan patient is a lot more awake and alert.  Complaining of 10/10 pain and wants to go home.  Vitals are improved.  I do believe patient accidentally took an extra dose of his opiates.  Due to patient's chronic cancer pain as he is significantly uncomfortable at this time, I will give 4 mg of morphine, I will avoid giving larger doses of opiates. [MM]   1701 Magnesium : 2.10 [MM]   1702 Alcohol, Serum: <10.0 [MM]   1702 INR(!): 1.4 [MM]   1702 Lipase: <4 [MM]   1702 Lactic Acid Level: 1.4 [MM]   1702 Sodium(!): 132 [MM]   1702 Potassium(!): 5.5 [MM]   1702 Chloride: 101 [MM]   1702 CO2: 27 [MM]   1702 Creatinine: 0.87 [MM]   1702 BUN: 20.7 [MM]   1702 ALP(!): 2,263 [MM]   1702 ALT(!): 137 [MM]   1702 AST(!): 284 [MM]   1702 BILIRUBIN TOTAL(!): 1.9 [MM]   1702 WBC: 6.72 [MM]   1702 Hemoglobin(!): 10.2 [MM]   1702 Platelet Count: 190 [MM]   1739 Chest x-ray is without obvious pneumonia,  pneumothorax, effusions, pneumomediastinum, wide mediastinum, pneumoperitoneum, cardiomegaly, edema.    CT of the head is without hemorrhage, mass, mass effect. [MM]   1803 Ultrasound demonstrates Nodular and cirrhotic appearing liver with ascites. [MM]   1810 RBC, UA: 0-5 [MM]   1810 WBC, UA(!): 6-10 [MM]   1810 Bacteria, UA: None Seen [MM]   1810 Opiates, Urine(!): Positive [MM]   1810 Patient is resting comfortably on re-evaluation.  Patient is a lot more awake and alert with stable vital signs after Narcan.  I do believe his symptoms that he was brought in with altered mental status and lethargy are due to an accidental overdose.  I did recommend using pill bottles to avoid accidental overdoses.  Patient is without suicidal or homicidal ideations.  They do have an appointment with his GI in 3 days to place a stent in the pancreas.  Stressed the importance to keep compliance with this appointment.  Stressed the importance of close follow-up, strict return precautions given.  I answered all questions to the best of my ability.  Patient and family verbalized an understanding of the plan and agreed. [MM]   1826 Free T4: 0.91 [MM]      ED Course User Index  [MM] Morgan Roldan DO                           Clinical Impression:  Final diagnoses:  [T50.901A] Accidental overdose, initial encounter  [K74.60, R18.8] Cirrhosis of liver with ascites, unspecified hepatic cirrhosis type          ED Disposition Condition    Discharge Stable          ED Prescriptions    None       Follow-up Information       Follow up With Specialties Details Why Contact [facility]  Call today If you do not have a primary care provider for follow up [phone], call to find a doctor               [1]   Social History  Tobacco Use    Smoking status: Never     Passive exposure: Never    Smokeless tobacco: Former     Types: Chew   Substance Use Topics    Alcohol use: Not Currently    Drug use: Never        Morgan Roldan  DO  03/03/25 1917

## 2025-03-05 ENCOUNTER — LAB VISIT (OUTPATIENT)
Dept: LAB | Facility: HOSPITAL | Age: 60
End: 2025-03-05
Payer: MEDICARE

## 2025-03-05 ENCOUNTER — HOSPITAL ENCOUNTER (OUTPATIENT)
Dept: INTERVENTIONAL RADIOLOGY/VASCULAR | Facility: HOSPITAL | Age: 60
Discharge: HOME | End: 2025-03-05
Attending: INTERNAL MEDICINE
Payer: MEDICARE

## 2025-03-05 ENCOUNTER — OFFICE VISIT (OUTPATIENT)
Facility: CLINIC | Age: 60
End: 2025-03-05
Payer: MEDICARE

## 2025-03-05 VITALS
RESPIRATION RATE: 16 BRPM | SYSTOLIC BLOOD PRESSURE: 109 MMHG | HEART RATE: 92 BPM | OXYGEN SATURATION: 95 % | DIASTOLIC BLOOD PRESSURE: 78 MMHG | BODY MASS INDEX: 21.15 KG/M2 | TEMPERATURE: 98 F | HEIGHT: 69 IN

## 2025-03-05 DIAGNOSIS — G89.3 CANCER ASSOCIATED PAIN: ICD-10-CM

## 2025-03-05 DIAGNOSIS — T40.2X5A CONSTIPATION DUE TO OPIOID THERAPY: Primary | ICD-10-CM

## 2025-03-05 DIAGNOSIS — K59.03 CONSTIPATION DUE TO OPIOID THERAPY: Primary | ICD-10-CM

## 2025-03-05 DIAGNOSIS — C25.0 MALIGNANT NEOPLASM OF HEAD OF PANCREAS: ICD-10-CM

## 2025-03-05 DIAGNOSIS — I81 PORTAL VEIN THROMBOSIS: ICD-10-CM

## 2025-03-05 DIAGNOSIS — K83.8 PNEUMOBILIA: ICD-10-CM

## 2025-03-05 DIAGNOSIS — G62.0 CHEMOTHERAPY-INDUCED NEUROPATHY: ICD-10-CM

## 2025-03-05 DIAGNOSIS — R97.8 ELEVATED CA 19-9 LEVEL: ICD-10-CM

## 2025-03-05 DIAGNOSIS — R18.8 OTHER ASCITES: ICD-10-CM

## 2025-03-05 DIAGNOSIS — R74.8 ELEVATED ALKALINE PHOSPHATASE LEVEL: ICD-10-CM

## 2025-03-05 DIAGNOSIS — R19.00 ABDOMINAL SWELLING: ICD-10-CM

## 2025-03-05 DIAGNOSIS — T45.1X5A CHEMOTHERAPY-INDUCED NEUROPATHY: ICD-10-CM

## 2025-03-05 LAB
ALBUMIN SERPL-MCNC: 2.8 G/DL (ref 3.5–5)
ALBUMIN/GLOB SERPL: 0.7 RATIO (ref 1.1–2)
ALP SERPL-CCNC: 2651 UNIT/L (ref 40–150)
ALT SERPL-CCNC: 141 UNIT/L (ref 0–55)
ANION GAP SERPL CALC-SCNC: 6 MEQ/L
AST SERPL-CCNC: 198 UNIT/L (ref 5–34)
BASOPHILS # BLD AUTO: 0.01 X10(3)/MCL
BASOPHILS NFR BLD AUTO: 0.2 %
BILIRUB SERPL-MCNC: 1.4 MG/DL
BUN SERPL-MCNC: 21 MG/DL (ref 8.4–25.7)
CALCIUM SERPL-MCNC: 8.4 MG/DL (ref 8.4–10.2)
CHLORIDE SERPL-SCNC: 101 MMOL/L (ref 98–107)
CLARITY BODY FLUID (OLG): CLEAR
CO2 SERPL-SCNC: 25 MMOL/L (ref 22–29)
COLOR BODY FLUID (OLG): YELLOW
CREAT SERPL-MCNC: 0.81 MG/DL (ref 0.72–1.25)
CREAT/UREA NIT SERPL: 26
EOSINOPHIL # BLD AUTO: 0 X10(3)/MCL (ref 0–0.9)
EOSINOPHIL NFR BLD AUTO: 0 %
ERYTHROCYTE [DISTWIDTH] IN BLOOD BY AUTOMATED COUNT: 19.4 % (ref 11.5–17)
GFR SERPLBLD CREATININE-BSD FMLA CKD-EPI: >60 ML/MIN/1.73/M2
GLOBULIN SER-MCNC: 3.8 GM/DL (ref 2.4–3.5)
GLUCOSE SERPL-MCNC: 212 MG/DL (ref 74–100)
HCT VFR BLD AUTO: 30.9 % (ref 42–52)
HGB BLD-MCNC: 10.5 G/DL (ref 14–18)
IMM GRANULOCYTES # BLD AUTO: 0.03 X10(3)/MCL (ref 0–0.04)
IMM GRANULOCYTES NFR BLD AUTO: 0.5 %
LYMPHOCYTES # BLD AUTO: 0.53 X10(3)/MCL (ref 0.6–4.6)
LYMPHOCYTES NFR BLD AUTO: 8.8 %
LYMPHOCYTES NFR FLD MANUAL: 23 %
MACROPHAGES MAN COUNT BF (OLG): 25
MCH RBC QN AUTO: 29.8 PG (ref 27–31)
MCHC RBC AUTO-ENTMCNC: 34 G/DL (ref 33–36)
MCV RBC AUTO: 87.8 FL (ref 80–94)
MESOTHELIAL CELLS MAN COUNT BF (OLG): 4
MONOCYTE MAN % BF (OLG): 26 %
MONOCYTES # BLD AUTO: 0.72 X10(3)/MCL (ref 0.1–1.3)
MONOCYTES NFR BLD AUTO: 12 %
NEUTROPHILS # BLD AUTO: 4.72 X10(3)/MCL (ref 2.1–9.2)
NEUTROPHILS MAN % BF (OLG): 51 %
NEUTROPHILS NFR BLD AUTO: 78.5 %
NRBC BLD AUTO-RTO: 0 %
PLATELET # BLD AUTO: 186 X10(3)/MCL (ref 130–400)
PMV BLD AUTO: 10.8 FL (ref 7.4–10.4)
POTASSIUM SERPL-SCNC: 4 MMOL/L (ref 3.5–5.1)
PROT SERPL-MCNC: 6.6 GM/DL (ref 6.4–8.3)
RBC # BLD AUTO: 3.52 X10(6)/MCL (ref 4.7–6.1)
SODIUM SERPL-SCNC: 132 MMOL/L (ref 136–145)
TNC BODY FLUID (OLG): 58 /UL
WBC # BLD AUTO: 6.01 X10(3)/MCL (ref 4.5–11.5)

## 2025-03-05 PROCEDURE — 3078F DIAST BP <80 MM HG: CPT | Mod: CPTII,S$GLB,, | Performed by: NURSE PRACTITIONER

## 2025-03-05 PROCEDURE — 89051 BODY FLUID CELL COUNT: CPT | Performed by: RADIOLOGY

## 2025-03-05 PROCEDURE — 3051F HG A1C>EQUAL 7.0%<8.0%: CPT | Mod: CPTII,S$GLB,, | Performed by: NURSE PRACTITIONER

## 2025-03-05 PROCEDURE — 87075 CULTR BACTERIA EXCEPT BLOOD: CPT | Performed by: RADIOLOGY

## 2025-03-05 PROCEDURE — 1111F DSCHRG MED/CURRENT MED MERGE: CPT | Mod: CPTII,S$GLB,, | Performed by: NURSE PRACTITIONER

## 2025-03-05 PROCEDURE — 80053 COMPREHEN METABOLIC PANEL: CPT

## 2025-03-05 PROCEDURE — 87102 FUNGUS ISOLATION CULTURE: CPT | Performed by: RADIOLOGY

## 2025-03-05 PROCEDURE — 3074F SYST BP LT 130 MM HG: CPT | Mod: CPTII,S$GLB,, | Performed by: NURSE PRACTITIONER

## 2025-03-05 PROCEDURE — 99215 OFFICE O/P EST HI 40 MIN: CPT | Mod: S$GLB,,, | Performed by: NURSE PRACTITIONER

## 2025-03-05 PROCEDURE — 85025 COMPLETE CBC W/AUTO DIFF WBC: CPT

## 2025-03-05 PROCEDURE — 99999 PR PBB SHADOW E&M-EST. PATIENT-LVL V: CPT | Mod: PBBFAC,,, | Performed by: NURSE PRACTITIONER

## 2025-03-05 PROCEDURE — 3008F BODY MASS INDEX DOCD: CPT | Mod: CPTII,S$GLB,, | Performed by: NURSE PRACTITIONER

## 2025-03-05 PROCEDURE — 49083 ABD PARACENTESIS W/IMAGING: CPT

## 2025-03-05 PROCEDURE — G2211 COMPLEX E/M VISIT ADD ON: HCPCS | Mod: S$GLB,,, | Performed by: NURSE PRACTITIONER

## 2025-03-05 PROCEDURE — 36415 COLL VENOUS BLD VENIPUNCTURE: CPT

## 2025-03-05 PROCEDURE — 87070 CULTURE OTHR SPECIMN AEROBIC: CPT | Performed by: RADIOLOGY

## 2025-03-05 NOTE — H&P (VIEW-ONLY)
HEMATOLOGY/ONCOLOGY OFFICE CLINIC VISIT    Visit Information:    Initial Evaluation: 9/3/2024  Referring Provider: Alba Chisholm MD PhD ([facility]) -Cell [phone]  Other providers:  Code status: Not addressed    Diagnosis:  Advanced pancreatic ductal adenocarcinoma (Dx 8/2022)     Present treatment:  Gemzar monotherapy 8/28/2024  -Gemcitabine and CISplatin Every 2 Weeks (8/28/2024-present)--planned  Chemotherapy summary: CISplatin (PLATINOL) 53 mg in sodium chloride 0.9% (NS) 250 mL IVPB, intravenous, 0 of 12 cycles  gemcitabine (GEMZAR) 836 mg in sodium chloride 0.9% (NS) 250 mL IVPB, intravenous, 0 of 12 cycles   - Eliquis 10 mg BID x 7 days then 5 mg BID- started 10/29/24--present  - weekly therapeutic paracentesis PRN    Treatment/Oncology history:  -8/2/2022 ERCP with metal biliary stent placement   -Folfirinox (10/2022- 2/2023)  -capecitabine RT (4/3/23 - 5/16/23)   -Phase I study 2021-1176 SD-101 at [facility]--SD-101 2 mg in sodium chloride 0.9% (NS) 30 mL IVPB, intravenous x 2 cycles (8/22/2023-11/22/2023)  -Gemcitabine and paclitaxel protein bound (12/13/2023-- 7/2024)-->clinical progression and GOO  -s/p biliary stent placement 4/4/2024  -chemotherapy with gemcitabine/paclitaxel (last dose 6/26/2024)  -duodenal stent (placed by GI)- 8/7/2024  -Gemcitabine and CISplatin Every 2 Weeks (8/28/2024-present)    Goal of care: life prolongation    Imaging:  CT CAP 8/21/2022: Since 7/25/2022, the primary pancreatic head mass encasing the common hepatic artery remains stable. The intrahepatic biliary obstruction improved with interval placement of a CBD stent. No definite distant metastasis in the abdomen or pelvis. Small indeterminate left lower lobe pulmonary nodule can be followed.  CT CAP 12/12/2022:  Primary pancreatic head tumor encasing the common hepatic artery is overall unchanged in size since 8/21/2022. Stable upstream pancreatic duct dilation and pancreatic atrophy. Interval placement of a cholecystostomy  tube, with decompression of the gallbladder. No definite evidence of distant metastatic disease in the chest, abdomen, and pelvis.   CT CAP 3/7/2023: Locally advanced pancreatic head tumor is slightly less conspicuous. Stable portacaval lymphadenopathy. Geographic hypodense regions have significantly increased throughout the liver compatible with fatty liver, to be correlated clinically regarding any potential concomitant hepatotoxicity; this appearance could obscure small low contrast liver lesions, to be better evaluated with MRI, if indicated. Unchanged mild jennifer appearance of the omentum is favored to be postinflammatory. No definite distant metastatic disease within the chest abdomen pelvis.   Ct CAP 6/29/2023:1.  Locally advanced pancreatic head mass not significantly changed. 2.  Increased periportal haziness and central liver heterogeneity which may relate to radiation.   3.  Indeterminate inferior right hepatic hypodensity as described above. Punctate left hepatic hypodensity also not previously evident, too small to characterize. Consider further evaluation with MRI as indicated.    MRI AP 7/15/2023:Primary neoplasm is noted within the pancreatic head and causes pancreatic ductal dilation and biliary ductal obstruction, which is decompressed via a stent. Nonspecific focal dilation of segment IV bile duct is noted.    CT CAP 10/2/2023: 1.  Compared to 07/28/2023, interval embolization of SMV tributaries in the epigastric region with new embolization coils in the right hepatic lobe following transhepatic catheterization. 2.  Previously noted small indeterminate low-attenuation lesion in the inferior right hepatic lobe segment 6 is no longer visualized. No new hepatic lesions.    3.  Stable 2.2 cm ill-defined residual pancreatic head tumor with no change in the vascular contact with the main portal vein, common hepatic and proper hepatic artery. 4.  No new metastatic disease in the chest or abdomen.   CT CAP  1/9/2024: 1.  Mild interval increase in size of the primary tumor in the head of the pancreas. 2.  No evidence of metastatic disease in the chest, abdomen or pelvis.   CT AP 4/3/2024: 1.  There is worsening intrahepatic biliary dilation despite the presence of a biliary stent, suggesting stent occlusion. 2.  No significant change in the locally advanced tumor of the head of the pancreas since 03/12/2024.   CT CAP 6/21/2024: 1. No convincing change in the large locally advanced infiltrating ill-defined mass in the head and neck of the pancreas compared with 5/14/2024. 2. No specific evidence of distant metastatic disease in the chest, abdomen or pelvis.   XR ABDOMEN 1 VW PORTABLE 8/6/2024:  There is a gastric drainage tube with the tip below the diaphragm projecting over the gastric body with the sidehole below the GE junction in appropriate position. Gastric drainage tube with tip and sidehole projecting below the diaphragm over the gastric body.  X-ray Abdomen AP  8/5/2024: Air is seen scattered throughout normal caliber colon in a nonobstructive pattern. No dilated loops of bowel. Moderate colonic stool burden. No intraperitoneal free air within the limitations of this study. A biliary stent and embolization coils overlie the right upper quadrant. No suspicious osseous lesions.   Nonobstructive bowel gas pattern. I personally reviewed these image(s) along with the resident's/fellow's interpretations, certify that if a procedure was performed I was physically present, and agree with the final report.  CT Abdomen Pelvis with Contrast 8/5/2024: No suspicious pulmonary nodules in the lung bases. Hepatobiliary: Evaluation of the liver is somewhat limited secondary to streak artifact from the embolization coils. Within these limits there is no suspicious hepatic lesion. The gallbladder is decompressed and poorly evaluated. Common bile duct stent with expected pneumobilia. Mild intrahepatic biliary ductal dilatation is  not significantly changed. Spleen: No splenomegaly. Pancreas: Ill-defined infiltrating mass of the pancreatic head and neck, in keeping with pancreatic adenocarcinoma. There is atrophy and ductal dilatation of the pancreatic body and tail. Overall this tumor appears larger Adrenal Glands: No mass. Kidneys, Ureters, Bladder: No hydronephrosis. No suspicious renal lesion. No bladder mass. Gastrointestinal Tract: The stomach is distended with fluid and debris, suggesting there may be a degree of gastric outlet obstruction secondary to the pancreatic tumor. This is not significantly changed. There is no downstream obstruction. Pelvic Organs: No pelvic mass. Prostatomegaly. Peritoneum/Retroperitoneum: No ascites. Lymph Nodes: No lymphadenopathy. Musculoskeletal: No suspicious skeletal lesion.     No acute abdominopelvic abnormality. Attending addendum: The pancreatic tumor appears larger compared to 06/21/2024. There is gastric distention with debris suggesting chronic outlet obstruction ACTIONABLE ITEMS/RECOMMENDATIONS*: None. *An Actionable Finding is a finding that may be unrelated to the original reason for imaging but potentially actionable, meaning further investigation may be necessary. The Actionable Findings Vigilance Unit (AFVU) assists medical providers with responding to additional radiologic findings that are unexpected and potentially actionable. I personally reviewed these image(s) along with the resident's/fellow's interpretations, certify that if a procedure was performed I was physically present, and agree with the final report.   CT CAP 10/24/24:  1. Ill-defined soft tissue fullness at the a hay hepatis and pancreatic head and uncinate process fabella bases history of pancreatic adenocarcinoma.  A few blebs of gas are identified which may represent gas within the common bile duct.  2. Intrahepatic biliary ductal dilatation identified with nonvisualization of the gallbladder  3. Beam hardening  artifact due to embolization coils at the liver  4. Small 10 x 6 filling defect at the portal vein suggesting a small thrombus  5. Diffuse hazy mucosal prominence at the descending and ascending colon.  This may be merely due to underdistention.  A underlying colitis cannot be excluded  6. Findings of constipation with contrast and fluid distended loops of small bowel diffusely throughout suggesting a mild ileus  7. Prostatomegaly with calcifications  8. Findings and other details as above  9. Comparison to previous exam would allow further evaluation.  MRI Brain 11/4/24:  1. Tiny (subcentimeter), occasional focal areas of increased signal intensity (on the FLAIR sequences) are noted within the deep white matter and gray-white matter junctions of both cerebral hemispheres (these are only visualized on the FLAIR sequence with no contrast enhancement noted on postcontrast images). I suspect the changes represent chronic ischemic changes, however, follow-up imaging in 3-6 months to ensure stability may be helpful in excluding the very remote possibility of tiny metastases if felt be clinically indicated.   CT abdomen and pelvis with IV contrast 02/14/2025:  Impression:   Large volume ascites appears worse than the prior examination.   Pancreatic head mass similar but there appears to be a mass in the pancreatic neck on this examination.  This was not clearly seen on the prior examination.   Previous embolization procedure in the liver.   Pneumobilia  CTA chest 02/17/2025:  Impression:   1. Pronounced streaky linear opacity is seen predominantly in the dependent portions at the lung bases consistent with nonspecific dependent changes scarring and subsegmental atelectasis.   2. There is extensive stranding of the thoracoabdominal subcutaneous fat associated with ascites consistent with anasarca edema likely form hepatic pathology.   3. The liver appears small with heterogeneous attenuation and nodular contours consistent  with cirrhosis. Post surgical change is seen in the right lobe of the liver. Pneumobilia is noted in the left and right hepatic lobes. A large amount of free peritoneal fluid is noted in the visualized upper abdominal cavity likely related to liver cirrhosis. Correlate with clinical and laboratory parameters as regards additional evaluation and follow up.   4. No CT evidence of pulmonary embolism. Details and other findings as discussed above.    US abdomen limited 3/3/2025:  Impression:  Nodular cirrhotic appearing liver with large volume ascites.   The pancreas and gallbladder are not visualized on this examination.    CT head without contrast 3/3/25:  Impression:   No acute abnormality seen     Pathology:  8/2/2022:  FNA head pancreas: SCANT MALIGNANT CELLS, FAVOR ADENOCARCINOMA  NGS:  No detectable genomic aberrations  2/12/2025:  Cytology from paracentesis:  FINAL DIAGNOSIS    PERITONEAL FLUID, CYTOLOGY SPECIMEN:    PUS ONLY.    Electronically Signed by:   Art SEE , Pathologist     CLINICAL HISTORY:       Patient: Warren P Lejeune is a 60 y.o. male.with a past medical history of past medical history of pancreatic ductal adenocarcinoma (Dx 8/2022)   Patient initially presented with  unintentional weight loss starting in 1/2022. He also had worsening back/shoulder and abdominal pain during this time. In July he finally presented to his PCP with darkening of the urine and lightening of the stool - with Jaundice that his wife noticed. Labs demonstrating cholestasis (bilirubin of 19).    Patient noted to have an abnormal CT scan after developing obstructive jaundice. 2 cm hypoenhancing mass noted in the head of the pancreas with intra and extrahepatic biliary ductal dilatation. Mass measured 2.5 x 2.1 cm. There is also atrophy of the distal gland and upstream dilatation of the pancreas duct. There is no obvious vascular invasion. There were no focal liver lesions.    8/2/22- EGD/EUS/FNA. Final  "pathology showed ductal adenocarcinoma, moderately differentiated. ERCP on the same date performed, with placement of a fully covered metal biliary stent, 60 mm x 10 mm.    7/27/22- CA 19-9 was 681    8/21/22- CT CAP: Since 7/25/2022, the primary pancreatic head mass encasing the common hepatic artery remains stable. The intrahepatic biliary obstruction improved with interval placement of a CBD stent. No definite distant metastasis in the abdomen or pelvis. Small indeterminate left lower lobe pulmonary nodule can be followed.    09/03/2022 Germline genetic testing: Negative for germline pathogenic variants in any of the analyzed genes, Genes Analyzed: APC, DALE, BMPR1A, BRCA1, BRCA2, CDKN2A, EPCAM, MEN1, MLH1, MSH2, MSH6, NF1, PALB2, PMS2, SMAD4, STK11, TP53, TSC1, TSC2, and VHL. Genetic testing performed by [employer]; full report available in scanned documents.     s/p biliary stent placement, currently undergoing chemotherapy with gemcitabine/paclitaxel (last dose 6/26/2024), T2DM on insulin presenting for abdominal pain and constipation. CT A/P with distension in stomach suggesting gastric outlet obstruction secondary to pancreatic tumor. GI and Surg Onc consulted, pending evaluation. Poor PO tolerance, deferring NG tube now as vomiting resolves and having bowel movements.     Patient was recently admitted to the hospital at [facility]--Hospital Course:  "Findings on imaging were concerning for malignant gastric outlet obstruction from pancreatic cancer. After a discussion with surgical oncology, GI and GIMO, decision was made to proceed with duodenal stent (placed by GI). No indications for gastrojejunostomy bypass. Patient tolerated procedure well. We were able to advance to low fiber diet. Nutrition was consulted for low fiber diet education. Managed neoplasm related pain with PO morphine."     Patient is here today with his wife to continue chemotherapy close to home.  He is doing relatively well and voices no " concerns.  MediPort was removed from his left chest wall to exposure of the MediPort.  Patient reports abdominal mid back pain, occasional muscle spasm and dizziness.  No fever, chills, sweats.  No chest pain or short of breath.    Chief Complaint: Malignant neoplasm of head of pancreas (Pt states he is ready for another paracentesis. )      Interval History:  3/5/2025: Patient returns to clinic for follow up/tox check after initiating Xeloda 2/23/25. He stopped taking Xeloda 2/27/25 due to abdominal pain/ascites. He declines any further chemo or XRT. His primary complaint is abdominal pain due to increased girth with ascites. Marked decline over the past few weeks with worsening ascites, pain, failure to thrive, anorexia and fatigue. Note plan for IR placement of PleuRx Friday 3/7/25. He and with are wanting to proceed with Palliative Care consult for symptom management and not ready for hospice. Phone number provided to them today. He was treated in ED 3/3/25 for accidental opiate OD and given narcan. He states he forgot he had taken Percocet and MS Contin and took it again. IR in [place] are working him in today for paracentesis. He wants to keep future appt with Dr. Messina for now but will cancel should he change his mind or proceed with hospice in future.  Noted and discussed today with worsening LFT's and Alk phos c/w disease process.     2/19/2025: Patient returns to clinic for a follow up, accompanied by his wife. He was discharged from the hospital 2/18/25. States he was told he now has cirrhosis. He was not given followup appt with GI. Wife will call to arrange. He is not feeling well today. S/P paracentesis this morning.  CBC today notes hemoglobin 7.4.  Will transfuse 1 unit in [place] tomorrow.  Reading from hospital notes hospice was discussed but he declined wishing to move forward with treatment.  He has not started Xeloda but plan to begin tomorrow evening after transfusion.  He continues with  weakness and fatigue.  Will continue weekly diagnostic/therapeutic paracentesis. IR removed 6.5L today. Takes Percocet 10/325mg 1 q 4h prn and MS cont 15 mg BID, but didn't take anything this morning due to paracentesis. He is taking Miralax and laxative for opioid induced constipation, and Pregabalin 100 mg BID for bilateral hand and feet neuropathy. Now on lactulose by GI. Labs reviewed with patient and wife in detail.     I discussed referral to palliative care. They would like more information therefore social work referral was placed for this discussion. He is open to [facility] [employer]. Samples given to him today.     Past Medical History:   Diagnosis Date    Abdominal pain     Admission for chemotherapy     last chemo 9/12/24    Anxiety disorder, unspecified     Back pain     Bradycardia     Enlarged prostate     GERD (gastroesophageal reflux disease)     Hypertension     no cardiologist; no longer on meds since weight loss due to pancreatic cancer    Memory loss     Pancreatic cancer     Peripheral neuropathy     Type 2 diabetes mellitus with unspecified diabetic retinopathy without macular edema       Past Surgical History:   Procedure Laterality Date    bile duct stents times 2      EGD, WITH STENT INSERTION      INSERTION OF TUNNELED CENTRAL VENOUS CATHETER (CVC) WITH SUBCUTANEOUS PORT Right 9/20/2024    Procedure: [id number];  Surgeon: Moe Champagne MD;  [facility]: [facility];  Service: General;  Laterality: Right;    MEDIPORT REMOVAL      times 2     Family History   Problem Relation Name Age of Onset    Hypertension Mother      Stroke Father      Stroke Sister      Prostate cancer Brother            Review of patient's allergies indicates:  No Known Allergies   Current Outpatient Medications on File Prior to Visit   Medication Sig Dispense Refill    albuterol (PROVENTIL/VENTOLIN HFA) 90 mcg/actuation inhaler Inhale 1-2 puffs into the lungs every 6 (six) hours as needed for Wheezing. Rescue 18 g 0     aluminum & magnesium hydroxide-simethicone (MYLANTA MAX STRENGTH) 400-400-40 mg/5 mL suspension Take 5 mLs by mouth 4 (four) times daily as needed (mouth sores). 335 mL 3    apixaban (ELIQUIS) 5 mg Tab Take 1 tablet (5 mg total) by mouth 2 (two) times daily.      capecitabine (XELODA) 500 MG Tab Take 3 tablets (1,500 mg total) by mouth 2 (two) times daily on days 1-14 of each 21 day cycle (2 weeks on, then 1 week off). 84 tablet 3    cholecalciferol, vitamin D3, (VITAMIN D3) 50 mcg (2,000 unit) Tab Take 2,000 Units by mouth once daily.      dexAMETHasone (DECADRON) 4 MG Tab TAKE 2 TABLETS BY MOUTH ONCE DAILY AS DIRECTED ON DAYS 2 AND 3 OF CHEMOTHERAPY CYCLE      dicyclomine (BENTYL) 10 MG capsule TAKE 1 CAPSULE BY MOUTH 4 TIMES DAILY BEFORE MEAL(S) AND NIGHTLY 120 capsule 0    diphenhydrAMINE (BENADRYL) 12.5 mg/5 mL elixir Take 5 mLs (12.5 mg total) by mouth 4 (four) times daily as needed (mouth sores). 236 mL 3    insulin aspart U-100 (NOVOLOG) 100 unit/mL injection Inject into the skin 3 (three) times daily before meals. prn      lactulose (CHRONULAC) 20 gram/30 mL Soln Take 30 mLs (20 g total) by mouth every 6 (six) hours as needed (constipation). 900 mL 0    LIDOcaine viscous HCl 2% (LIDOCAINE VISCOUS) 2 % Soln by Mucous Membrane route 4 (four) times daily as needed (mouth sores). Swish and spit 15 ml (5 ml benadryl, 5 ml mylanta, 5 ml lidocaine) by mouth 4 times daily as needed for mouth sores 100 mL 3    magnesium oxide (MAGOX) 400 mg (241.3 mg magnesium) tablet Take 1 tablet (400 mg total) by mouth once daily. 90 tablet 2    morphine (MS CONTIN) 15 MG 12 hr tablet Take 1 tablet (15 mg total) by mouth 2 (two) times daily. 30 tablet 0    multivitamin with folic acid 400 mcg Tab Take 1 tablet by mouth once daily.      ondansetron (ZOFRAN) 8 MG tablet Take 8 mg by mouth.      oxyCODONE-acetaminophen (PERCOCET)  mg per tablet Take 1 tablet by mouth every 4 (four) hours as needed for Pain. 120 tablet 0     pantoprazole (PROTONIX) 40 MG tablet Take 1 tablet (40 mg total) by mouth once daily. 30 tablet 2    polyethylene glycol (GLYCOLAX) 17 gram PwPk Take 17 g by mouth.      pregabalin (LYRICA) 100 MG capsule Take 1 capsule (100 mg total) by mouth 2 (two) times daily. 60 capsule 6    tamsulosin (FLOMAX) 0.4 mg Cap Take 1 capsule (0.4 mg total) by mouth once daily. 30 capsule 2    TRESIBA FLEXTOUCH U-200 200 unit/mL (3 mL) insulin pen Inject 14 Units into the skin.      vibegron (GEMTESA) 75 mg Tab Take 75 mg by mouth.      midodrine (PROAMATINE) 5 MG Tab Take 1 tablet (5 mg total) by mouth 3 (three) times daily with meals. for 7 days 21 tablet 0    OLANZapine (ZYPREXA) 5 MG tablet Take 1 tablet (5 mg total) by mouth nightly. 4 tablet 6     Current Facility-Administered Medications on File Prior to Visit   Medication Dose Route Frequency Provider Last Rate Last Admin    heparin, porcine (PF) 100 unit/mL injection flush 500 Units  500 Units Intravenous PRN Jena Messina MD   500 Units at 02/04/25 1600    sodium chloride 0.9% flush 10 mL  10 mL Intravenous PRN Jena Messina MD   10 mL at 02/04/25 1600      Review of Systems   Constitutional:  Positive for appetite change and fatigue. Negative for activity change, chills, diaphoresis, fever and unexpected weight change.   HENT:  Positive for trouble swallowing. Negative for nasal congestion, mouth sores, nosebleeds, sinus pressure/congestion and sore throat.    Eyes: Negative.    Respiratory:  Positive for shortness of breath. Negative for cough.    Cardiovascular:  Positive for leg swelling. Negative for chest pain and palpitations.   Gastrointestinal:  Positive for abdominal distention and constipation. Negative for abdominal pain, blood in stool, change in bowel habit, diarrhea, nausea and vomiting.   Endocrine: Negative.    Genitourinary:  Negative for bladder incontinence, decreased urine volume, difficulty urinating, dysuria, frequency, hematuria and  "urgency.   Musculoskeletal:  Positive for back pain. Negative for arthralgias, gait problem, joint swelling, leg pain and myalgias.   Integumentary:  Negative for rash.   Allergic/Immunologic: Negative.  Negative for frequent infections.   Neurological:  Positive for weakness. Negative for dizziness, tremors, syncope, light-headedness, numbness, headaches and memory loss.   Hematological:  Negative for adenopathy. Does not bruise/bleed easily.   Psychiatric/Behavioral:  Negative for agitation, confusion, hallucinations, sleep disturbance and suicidal ideas. The patient is not nervous/anxious.           Vitals:    03/05/25 0933   BP: 109/78   BP Location: Left arm   Patient Position: Sitting   Pulse: 92   Resp: 16   Temp: 98 °F (36.7 °C)   TempSrc: Oral   SpO2: 95%   Height: 5' 9.02" (1.753 m)       Wt Readings from Last 6 Encounters:   03/03/25 65 kg (143 lb 4.8 oz)   02/19/25 65 kg (143 lb 3.2 oz)   02/14/25 62.9 kg (138 lb 10.7 oz)   02/04/25 63.8 kg (140 lb 11.2 oz)   02/04/25 63.8 kg (140 lb 11.2 oz)   01/27/25 72.9 kg (160 lb 11.5 oz)     Body mass index is 21.15 kg/m².  Body surface area is 1.78 meters squared.    Physical Exam  Vitals and nursing note reviewed.   Constitutional:       General: He is not in acute distress.     Appearance: He is ill-appearing.      Comments: thin   HENT:      Head: Normocephalic and atraumatic.      Mouth/Throat:      Mouth: Mucous membranes are moist.   Eyes:      General: Scleral icterus present.   Neck:      Vascular: No JVD.   Cardiovascular:      Rate and Rhythm: Normal rate and regular rhythm.      Heart sounds: No murmur heard.  Pulmonary:      Effort: Pulmonary effort is normal.      Breath sounds: Normal breath sounds. No wheezing or rhonchi.   Abdominal:      General: Bowel sounds are decreased. There is distension.      Tenderness: There is generalized abdominal tenderness.   Musculoskeletal:         General: No swelling or deformity.      Cervical back: Neck supple. "      Right lower leg: Edema present.      Left lower leg: Edema present.   Lymphadenopathy:      Head:      Right side of head: No submandibular adenopathy.      Left side of head: No submandibular adenopathy.      Cervical: No cervical adenopathy.   Skin:     General: Skin is warm.      Coloration: Skin is not jaundiced.      Findings: No rash.   Neurological:      Mental Status: He is alert.   Psychiatric:         Attention and Perception: Attention normal.         Behavior: Behavior is cooperative.       ECOG SCORE         Laboratory:  CBC with Differential:  Lab Results   Component Value Date    WBC 6.01 03/05/2025    RBC 3.52 (L) 03/05/2025    HGB 10.5 (L) 03/05/2025    HCT 30.9 (L) 03/05/2025    MCV 87.8 03/05/2025    MCH 29.8 03/05/2025    MCHC 34.0 03/05/2025    RDW 19.4 (H) 03/05/2025     03/05/2025    MPV 10.8 (H) 03/05/2025      CMP:  Sodium   Date Value Ref Range Status   03/05/2025 132 (L) 136 - 145 mmol/L Final     Potassium   Date Value Ref Range Status   03/05/2025 4.0 3.5 - 5.1 mmol/L Final     Chloride   Date Value Ref Range Status   03/05/2025 101 98 - 107 mmol/L Final     CO2   Date Value Ref Range Status   03/05/2025 25 22 - 29 mmol/L Final     Blood Urea Nitrogen   Date Value Ref Range Status   03/05/2025 21.0 8.4 - 25.7 mg/dL Final   10/20/2023 12 6 - 23 mg/dL Final     Creatinine   Date Value Ref Range Status   03/05/2025 0.81 0.72 - 1.25 mg/dL Final   10/20/2023 0.95 0.67 - 1.17 mg/dL Final     Calcium   Date Value Ref Range Status   03/05/2025 8.4 8.4 - 10.2 mg/dL Final   10/20/2023 8.7 8.4 - 10.2 mg/dL Final     Albumin   Date Value Ref Range Status   03/05/2025 2.8 (L) 3.5 - 5.0 g/dL Final     Bilirubin Total   Date Value Ref Range Status   03/05/2025 1.4 <=1.5 mg/dL Final     ALP   Date Value Ref Range Status   03/05/2025 2,651 (H) 40 - 150 unit/L Final     AST   Date Value Ref Range Status   03/05/2025 198 (H) 5 - 34 unit/L Final     ALT   Date Value Ref Range Status   03/05/2025  141 (H) 0 - 55 unit/L Final      Component 08/28/24 07/17/24 06/26/24 06/21/24 06/12/24 05/29/24   CA 19-9 2,385.0 High  2,150.0 High  1,567.0 High  1,183.0 High  1,000.0 High  959.9 High       Latest Reference Range & Units 09/24/24 08:52 10/23/24 07:57   CA 19-9 0.00 - 37.00 unit/mL 6,202.93 (H) 21,137.60 (H)      Latest Reference Range & Units 11/05/24 13:54 11/19/24 07:47 12/04/24 07:58 12/18/24 07:11 12/26/24 07:57   CA 19-9 0.00 - 37.00 unit/mL 23,324.00 (H) 19,155.71 (H) 31,691.58 (H) 23,541.97 (H) 31,586.90 (H)          Assessment:       1. Malignant neoplasm of head of pancreas         Locally advanced pancreatic cancer   ---8/2/2022 ERCP with metal biliary stent placement   ---Folfirinox (10/2022- 2/2023)  ---capecitabine RT (4/3/23 - 5/16/23)   ---Phase I study 2021-1176 SD-101 at [facility]--SD-101 2 mg in sodium chloride 0.9% (NS) 30 mL IVPB, intravenous x 2 cycles (8/22/2023-11/22/2023)  ---Gemcitabine and paclitaxel protein bound (12/13/2023-- 7/2024)-->clinical progression and GOO  ---s/p biliary stent placement 4/4/2024  ---chemotherapy with gemcitabine/paclitaxel (last dose 6/26/2024)  ---duodenal stent (placed by GI)- 8/7/2024  ---Gemcitabine and CISplatin Every 2 Weeks (8/28/2024-present)  --- Now requiring weekly therapeutic paracentesis, started 1/2/25   Liquid bx 12/31/24; insufficient sample  [place]/Cis stopped 2/4/25 d/t intolerance; last cycle C6D1 given 12/4/25  --started Xeloda 2/23/25 1000 mg/m2 BID D1-14/21 but   D/C'd on his own accord 2/27/25-he declines further chemo/XRT    H/o Gastric outlet obstruction  ---s/p duodenal stent, severe protein calori malnutrition      Pain, neoplasm related pain  ---continue Percocet, Lyrica, Morphine     elevated ALP  A. follow  Swelling   Bilateral lower extremity swelling and abdominal swelling    Ascites   -Likely malignant   -S/p therapeutic paracentesis-continue prn-consider PleuRx        Plan:       Patient with unresectable advanced pancreatic cancer to  start 4th line treatment with cisplatin and Gemzar and clinically suggestive of progression, now with distended abdomen and recurrent ascites, likely malignant as they reaccumulate rather quick. Restaging scans without significant disease progression but significant amount of ascites. We order liquid biopsy but unfortunately sample was not satisfactory for processing.  He had liquid biopsy in the past and no mutations were identified.    Previously stopped [place]/Cis due to intolerance     He attempted monotherapy oral Xeloda but D/C'd it 2/27/25 due to ascites/pain symptoms as above. He declines any further chemo or XRT. As mentioned above we discussed hospice but he is not ready yet.  He wants to proceed with palliative care and will call today to set up consult.     Started Xeloda 1000 mg/m2 BID D1-14/21 on 2/23/25 and stopped 2/27/25 as above. He declines any further chemo or XRT.  Standing order for weekly paracentesis therapeutic and diagnostic for abdominal swelling as needed  Liquid bx 12/31/24--insufficient sample  Eliquis 5 mg BID for thrombus  Was due repeat MRI Brain in 3 months, had CT brain without contrast 3/3/25 after being found down at home reported no acute abnormality  Continue morphine and percocet for pain or as per palliative care an he desires consult with them now but holding off on hospice   Plan paracentesis today until PleuRx 3/7/25 for comfort.   CT C/A/P every 3 months--this will be due 5/2025  RTC as scheduled with Dr. Messina-they will call as cancel if he npt able to make or decides to go with hopsice    Pertinent lab and radiology studies were reviewed.     Visit today included increased complexity associated with the care and treatment of  the episodic problem pancreatic cancer, addressing and managing the longitudinal care of the patient's Stage ICC Pancreatic Carcinoma.     Shilpa Dumont, CRISTIANP-C  Oncology/Hematology  [facility]

## 2025-03-05 NOTE — DISCHARGE SUMMARY
Radiology Post-Procedure Note    Pre Op Diagnosis: Ascites    Post Op Diagnosis: Same    Secondary Diagnoses:   Problem List Items Addressed This Visit       Pancreatic cancer    Relevant Orders    IR Paracentesis with Imaging    Pneumobilia    Relevant Orders    IR Paracentesis with Imaging     Other Visit Diagnoses         Abdominal swelling        Relevant Orders    IR Paracentesis with Imaging             Procedure: US Guided Paracentesis    Procedure performed by: Jerome Avalos MD    Assistant: None    Written Informed Consent Obtained: Yes    Specimen Removed: Serous Fluid    Estimated Blood Loss: <10 cc    Condition: Stable    Outcome: The patient tolerated the procedure well and was without complications.    For further details please see the imaging report associated.    Disposition: Home or Self Care    Follow Up: With primary care provider    Discharge Instructions:  No discharge procedures on file.       Time Spent On Discharge: 2 minutes

## 2025-03-05 NOTE — PROGRESS NOTES
HEMATOLOGY/ONCOLOGY OFFICE CLINIC VISIT    Visit Information:    Initial Evaluation: 9/3/2024  Referring Provider: Alba Chisholm MD PhD ([facility]) -Cell [phone]  Other providers:  Code status: Not addressed    Diagnosis:  Advanced pancreatic ductal adenocarcinoma (Dx 8/2022)     Present treatment:  Gemzar monotherapy 8/28/2024  -Gemcitabine and CISplatin Every 2 Weeks (8/28/2024-present)--planned  Chemotherapy summary: CISplatin (PLATINOL) 53 mg in sodium chloride 0.9% (NS) 250 mL IVPB, intravenous, 0 of 12 cycles  gemcitabine (GEMZAR) 836 mg in sodium chloride 0.9% (NS) 250 mL IVPB, intravenous, 0 of 12 cycles   - Eliquis 10 mg BID x 7 days then 5 mg BID- started 10/29/24--present  - weekly therapeutic paracentesis PRN    Treatment/Oncology history:  -8/2/2022 ERCP with metal biliary stent placement   -Folfirinox (10/2022- 2/2023)  -capecitabine RT (4/3/23 - 5/16/23)   -Phase I study 2021-1176 SD-101 at [facility]--SD-101 2 mg in sodium chloride 0.9% (NS) 30 mL IVPB, intravenous x 2 cycles (8/22/2023-11/22/2023)  -Gemcitabine and paclitaxel protein bound (12/13/2023-- 7/2024)-->clinical progression and GOO  -s/p biliary stent placement 4/4/2024  -chemotherapy with gemcitabine/paclitaxel (last dose 6/26/2024)  -duodenal stent (placed by GI)- 8/7/2024  -Gemcitabine and CISplatin Every 2 Weeks (8/28/2024-present)    Goal of care: life prolongation    Imaging:  CT CAP 8/21/2022: Since 7/25/2022, the primary pancreatic head mass encasing the common hepatic artery remains stable. The intrahepatic biliary obstruction improved with interval placement of a CBD stent. No definite distant metastasis in the abdomen or pelvis. Small indeterminate left lower lobe pulmonary nodule can be followed.  CT CAP 12/12/2022:  Primary pancreatic head tumor encasing the common hepatic artery is overall unchanged in size since 8/21/2022. Stable upstream pancreatic duct dilation and pancreatic atrophy. Interval placement of a cholecystostomy  tube, with decompression of the gallbladder. No definite evidence of distant metastatic disease in the chest, abdomen, and pelvis.   CT CAP 3/7/2023: Locally advanced pancreatic head tumor is slightly less conspicuous. Stable portacaval lymphadenopathy. Geographic hypodense regions have significantly increased throughout the liver compatible with fatty liver, to be correlated clinically regarding any potential concomitant hepatotoxicity; this appearance could obscure small low contrast liver lesions, to be better evaluated with MRI, if indicated. Unchanged mild jennifer appearance of the omentum is favored to be postinflammatory. No definite distant metastatic disease within the chest abdomen pelvis.   Ct CAP 6/29/2023:1.  Locally advanced pancreatic head mass not significantly changed. 2.  Increased periportal haziness and central liver heterogeneity which may relate to radiation.   3.  Indeterminate inferior right hepatic hypodensity as described above. Punctate left hepatic hypodensity also not previously evident, too small to characterize. Consider further evaluation with MRI as indicated.    MRI AP 7/15/2023:Primary neoplasm is noted within the pancreatic head and causes pancreatic ductal dilation and biliary ductal obstruction, which is decompressed via a stent. Nonspecific focal dilation of segment IV bile duct is noted.    CT CAP 10/2/2023: 1.  Compared to 07/28/2023, interval embolization of SMV tributaries in the epigastric region with new embolization coils in the right hepatic lobe following transhepatic catheterization. 2.  Previously noted small indeterminate low-attenuation lesion in the inferior right hepatic lobe segment 6 is no longer visualized. No new hepatic lesions.    3.  Stable 2.2 cm ill-defined residual pancreatic head tumor with no change in the vascular contact with the main portal vein, common hepatic and proper hepatic artery. 4.  No new metastatic disease in the chest or abdomen.   CT CAP  1/9/2024: 1.  Mild interval increase in size of the primary tumor in the head of the pancreas. 2.  No evidence of metastatic disease in the chest, abdomen or pelvis.   CT AP 4/3/2024: 1.  There is worsening intrahepatic biliary dilation despite the presence of a biliary stent, suggesting stent occlusion. 2.  No significant change in the locally advanced tumor of the head of the pancreas since 03/12/2024.   CT CAP 6/21/2024: 1. No convincing change in the large locally advanced infiltrating ill-defined mass in the head and neck of the pancreas compared with 5/14/2024. 2. No specific evidence of distant metastatic disease in the chest, abdomen or pelvis.   XR ABDOMEN 1 VW PORTABLE 8/6/2024:  There is a gastric drainage tube with the tip below the diaphragm projecting over the gastric body with the sidehole below the GE junction in appropriate position. Gastric drainage tube with tip and sidehole projecting below the diaphragm over the gastric body.  X-ray Abdomen AP  8/5/2024: Air is seen scattered throughout normal caliber colon in a nonobstructive pattern. No dilated loops of bowel. Moderate colonic stool burden. No intraperitoneal free air within the limitations of this study. A biliary stent and embolization coils overlie the right upper quadrant. No suspicious osseous lesions.   Nonobstructive bowel gas pattern. I personally reviewed these image(s) along with the resident's/fellow's interpretations, certify that if a procedure was performed I was physically present, and agree with the final report.  CT Abdomen Pelvis with Contrast 8/5/2024: No suspicious pulmonary nodules in the lung bases. Hepatobiliary: Evaluation of the liver is somewhat limited secondary to streak artifact from the embolization coils. Within these limits there is no suspicious hepatic lesion. The gallbladder is decompressed and poorly evaluated. Common bile duct stent with expected pneumobilia. Mild intrahepatic biliary ductal dilatation is  not significantly changed. Spleen: No splenomegaly. Pancreas: Ill-defined infiltrating mass of the pancreatic head and neck, in keeping with pancreatic adenocarcinoma. There is atrophy and ductal dilatation of the pancreatic body and tail. Overall this tumor appears larger Adrenal Glands: No mass. Kidneys, Ureters, Bladder: No hydronephrosis. No suspicious renal lesion. No bladder mass. Gastrointestinal Tract: The stomach is distended with fluid and debris, suggesting there may be a degree of gastric outlet obstruction secondary to the pancreatic tumor. This is not significantly changed. There is no downstream obstruction. Pelvic Organs: No pelvic mass. Prostatomegaly. Peritoneum/Retroperitoneum: No ascites. Lymph Nodes: No lymphadenopathy. Musculoskeletal: No suspicious skeletal lesion.     No acute abdominopelvic abnormality. Attending addendum: The pancreatic tumor appears larger compared to 06/21/2024. There is gastric distention with debris suggesting chronic outlet obstruction ACTIONABLE ITEMS/RECOMMENDATIONS*: None. *An Actionable Finding is a finding that may be unrelated to the original reason for imaging but potentially actionable, meaning further investigation may be necessary. The Actionable Findings Vigilance Unit (AFVU) assists medical providers with responding to additional radiologic findings that are unexpected and potentially actionable. I personally reviewed these image(s) along with the resident's/fellow's interpretations, certify that if a procedure was performed I was physically present, and agree with the final report.   CT CAP 10/24/24:  1. Ill-defined soft tissue fullness at the a hay hepatis and pancreatic head and uncinate process fabella bases history of pancreatic adenocarcinoma.  A few blebs of gas are identified which may represent gas within the common bile duct.  2. Intrahepatic biliary ductal dilatation identified with nonvisualization of the gallbladder  3. Beam hardening  artifact due to embolization coils at the liver  4. Small 10 x 6 filling defect at the portal vein suggesting a small thrombus  5. Diffuse hazy mucosal prominence at the descending and ascending colon.  This may be merely due to underdistention.  A underlying colitis cannot be excluded  6. Findings of constipation with contrast and fluid distended loops of small bowel diffusely throughout suggesting a mild ileus  7. Prostatomegaly with calcifications  8. Findings and other details as above  9. Comparison to previous exam would allow further evaluation.  MRI Brain 11/4/24:  1. Tiny (subcentimeter), occasional focal areas of increased signal intensity (on the FLAIR sequences) are noted within the deep white matter and gray-white matter junctions of both cerebral hemispheres (these are only visualized on the FLAIR sequence with no contrast enhancement noted on postcontrast images). I suspect the changes represent chronic ischemic changes, however, follow-up imaging in 3-6 months to ensure stability may be helpful in excluding the very remote possibility of tiny metastases if felt be clinically indicated.   CT abdomen and pelvis with IV contrast 02/14/2025:  Impression:   Large volume ascites appears worse than the prior examination.   Pancreatic head mass similar but there appears to be a mass in the pancreatic neck on this examination.  This was not clearly seen on the prior examination.   Previous embolization procedure in the liver.   Pneumobilia  CTA chest 02/17/2025:  Impression:   1. Pronounced streaky linear opacity is seen predominantly in the dependent portions at the lung bases consistent with nonspecific dependent changes scarring and subsegmental atelectasis.   2. There is extensive stranding of the thoracoabdominal subcutaneous fat associated with ascites consistent with anasarca edema likely form hepatic pathology.   3. The liver appears small with heterogeneous attenuation and nodular contours consistent  with cirrhosis. Post surgical change is seen in the right lobe of the liver. Pneumobilia is noted in the left and right hepatic lobes. A large amount of free peritoneal fluid is noted in the visualized upper abdominal cavity likely related to liver cirrhosis. Correlate with clinical and laboratory parameters as regards additional evaluation and follow up.   4. No CT evidence of pulmonary embolism. Details and other findings as discussed above.    US abdomen limited 3/3/2025:  Impression:  Nodular cirrhotic appearing liver with large volume ascites.   The pancreas and gallbladder are not visualized on this examination.    CT head without contrast 3/3/25:  Impression:   No acute abnormality seen     Pathology:  8/2/2022:  FNA head pancreas: SCANT MALIGNANT CELLS, FAVOR ADENOCARCINOMA  NGS:  No detectable genomic aberrations  2/12/2025:  Cytology from paracentesis:  FINAL DIAGNOSIS    PERITONEAL FLUID, CYTOLOGY SPECIMEN:    PUS ONLY.    Electronically Signed by:   Art SEE , Pathologist     CLINICAL HISTORY:       Patient: Warren P Lejeune is a 60 y.o. male.with a past medical history of past medical history of pancreatic ductal adenocarcinoma (Dx 8/2022)   Patient initially presented with  unintentional weight loss starting in 1/2022. He also had worsening back/shoulder and abdominal pain during this time. In July he finally presented to his PCP with darkening of the urine and lightening of the stool - with Jaundice that his wife noticed. Labs demonstrating cholestasis (bilirubin of 19).    Patient noted to have an abnormal CT scan after developing obstructive jaundice. 2 cm hypoenhancing mass noted in the head of the pancreas with intra and extrahepatic biliary ductal dilatation. Mass measured 2.5 x 2.1 cm. There is also atrophy of the distal gland and upstream dilatation of the pancreas duct. There is no obvious vascular invasion. There were no focal liver lesions.    8/2/22- EGD/EUS/FNA. Final  "pathology showed ductal adenocarcinoma, moderately differentiated. ERCP on the same date performed, with placement of a fully covered metal biliary stent, 60 mm x 10 mm.    7/27/22- CA 19-9 was 681    8/21/22- CT CAP: Since 7/25/2022, the primary pancreatic head mass encasing the common hepatic artery remains stable. The intrahepatic biliary obstruction improved with interval placement of a CBD stent. No definite distant metastasis in the abdomen or pelvis. Small indeterminate left lower lobe pulmonary nodule can be followed.    09/03/2022 Germline genetic testing: Negative for germline pathogenic variants in any of the analyzed genes, Genes Analyzed: APC, DALE, BMPR1A, BRCA1, BRCA2, CDKN2A, EPCAM, MEN1, MLH1, MSH2, MSH6, NF1, PALB2, PMS2, SMAD4, STK11, TP53, TSC1, TSC2, and VHL. Genetic testing performed by [facility]; full report available in scanned documents.     s/p biliary stent placement, currently undergoing chemotherapy with gemcitabine/paclitaxel (last dose 6/26/2024), T2DM on insulin presenting for abdominal pain and constipation. CT A/P with distension in stomach suggesting gastric outlet obstruction secondary to pancreatic tumor. GI and Surg Onc consulted, pending evaluation. Poor PO tolerance, deferring NG tube now as vomiting resolves and having bowel movements.     Patient was recently admitted to the hospital at [facility]--Hospital Course:  "Findings on imaging were concerning for malignant gastric outlet obstruction from pancreatic cancer. After a discussion with surgical oncology, GI and GIMO, decision was made to proceed with duodenal stent (placed by GI). No indications for gastrojejunostomy bypass. Patient tolerated procedure well. We were able to advance to low fiber diet. Nutrition was consulted for low fiber diet education. Managed neoplasm related pain with PO morphine."     Patient is here today with his wife to continue chemotherapy close to home.  He is doing relatively well and voices no " concerns.  MediPort was removed from his left chest wall to exposure of the MediPort.  Patient reports abdominal mid back pain, occasional muscle spasm and dizziness.  No fever, chills, sweats.  No chest pain or short of breath.    Chief Complaint: Malignant neoplasm of head of pancreas (Pt states he is ready for another paracentesis. )      Interval History:  3/5/2025: Patient returns to clinic for follow up/tox check after initiating Xeloda 2/23/25. He stopped taking Xeloda 2/27/25 due to abdominal pain/ascites. He declines any further chemo or XRT. His primary complaint is abdominal pain due to increased girth with ascites. Marked decline over the past few weeks with worsening ascites, pain, failure to thrive, anorexia and fatigue. Note plan for IR placement of PleuRx Friday 3/7/25. He and with are wanting to proceed with Palliative Care consult for symptom management and not ready for hospice. Phone number provided to them today. He was treated in ED 3/3/25 for accidental opiate OD and given narcan. He states he forgot he had taken Percocet and MS Contin and took it again. IR in [place] are working him in today for paracentesis. He wants to keep future appt with Dr. Messina for now but will cancel should he change his mind or proceed with hospice in future.  Noted and discussed today with worsening LFT's and Alk phos c/w disease process.     2/19/2025: Patient returns to clinic for a follow up, accompanied by his wife. He was discharged from the hospital 2/18/25. States he was told he now has cirrhosis. He was not given followup appt with GI. Wife will call to arrange. He is not feeling well today. S/P paracentesis this morning.  CBC today notes hemoglobin 7.4.  Will transfuse 1 unit in [place] tomorrow.  Reading from hospital notes hospice was discussed but he declined wishing to move forward with treatment.  He has not started Xeloda but plan to begin tomorrow evening after transfusion.  He continues with  weakness and fatigue.  Will continue weekly diagnostic/therapeutic paracentesis. IR removed 6.5L today. Takes Percocet 10/325mg 1 q 4h prn and MS cont 15 mg BID, but didn't take anything this morning due to paracentesis. He is taking Miralax and laxative for opioid induced constipation, and Pregabalin 100 mg BID for bilateral hand and feet neuropathy. Now on lactulose by GI. Labs reviewed with patient and wife in detail.     I discussed referral to palliative care. They would like more information therefore social work referral was placed for this discussion. He is open to [facility]. Samples given to him today.     Past Medical History:   Diagnosis Date    Abdominal pain     Admission for chemotherapy     last chemo 9/12/24    Anxiety disorder, unspecified     Back pain     Bradycardia     Enlarged prostate     GERD (gastroesophageal reflux disease)     Hypertension     no cardiologist; no longer on meds since weight loss due to pancreatic cancer    Memory loss     Pancreatic cancer     Peripheral neuropathy     Type 2 diabetes mellitus with unspecified diabetic retinopathy without macular edema       Past Surgical History:   Procedure Laterality Date    bile duct stents times 2      EGD, WITH STENT INSERTION      INSERTION OF TUNNELED CENTRAL VENOUS CATHETER (CVC) WITH SUBCUTANEOUS PORT Right 9/20/2024    Procedure: [id number];  Surgeon: Moe Champagne MD;  Location: [facility];  Service: General;  Laterality: Right;    MEDIPORT REMOVAL      times 2     Family History   Problem Relation Name Age of Onset    Hypertension Mother      Stroke Father      Stroke Sister      Prostate cancer Brother            Review of patient's allergies indicates:  No Known Allergies   Current Outpatient Medications on File Prior to Visit   Medication Sig Dispense Refill    albuterol (PROVENTIL/VENTOLIN HFA) 90 mcg/actuation inhaler Inhale 1-2 puffs into the lungs every 6 (six) hours as needed for Wheezing. Rescue 18 g 0     aluminum & magnesium hydroxide-simethicone (MYLANTA MAX STRENGTH) 400-400-40 mg/5 mL suspension Take 5 mLs by mouth 4 (four) times daily as needed (mouth sores). 335 mL 3    apixaban (ELIQUIS) 5 mg Tab Take 1 tablet (5 mg total) by mouth 2 (two) times daily.      capecitabine (XELODA) 500 MG Tab Take 3 tablets (1,500 mg total) by mouth 2 (two) times daily on days 1-14 of each 21 day cycle (2 weeks on, then 1 week off). 84 tablet 3    cholecalciferol, vitamin D3, (VITAMIN D3) 50 mcg (2,000 unit) Tab Take 2,000 Units by mouth once daily.      dexAMETHasone (DECADRON) 4 MG Tab TAKE 2 TABLETS BY MOUTH ONCE DAILY AS DIRECTED ON DAYS 2 AND 3 OF CHEMOTHERAPY CYCLE      dicyclomine (BENTYL) 10 MG capsule TAKE 1 CAPSULE BY MOUTH 4 TIMES DAILY BEFORE MEAL(S) AND NIGHTLY 120 capsule 0    diphenhydrAMINE (BENADRYL) 12.5 mg/5 mL elixir Take 5 mLs (12.5 mg total) by mouth 4 (four) times daily as needed (mouth sores). 236 mL 3    insulin aspart U-100 (NOVOLOG) 100 unit/mL injection Inject into the skin 3 (three) times daily before meals. prn      lactulose (CHRONULAC) 20 gram/30 mL Soln Take 30 mLs (20 g total) by mouth every 6 (six) hours as needed (constipation). 900 mL 0    LIDOcaine viscous HCl 2% (LIDOCAINE VISCOUS) 2 % Soln by Mucous Membrane route 4 (four) times daily as needed (mouth sores). Swish and spit 15 ml (5 ml benadryl, 5 ml mylanta, 5 ml lidocaine) by mouth 4 times daily as needed for mouth sores 100 mL 3    magnesium oxide (MAGOX) 400 mg (241.3 mg magnesium) tablet Take 1 tablet (400 mg total) by mouth once daily. 90 tablet 2    morphine (MS CONTIN) 15 MG 12 hr tablet Take 1 tablet (15 mg total) by mouth 2 (two) times daily. 30 tablet 0    multivitamin with folic acid 400 mcg Tab Take 1 tablet by mouth once daily.      ondansetron (ZOFRAN) 8 MG tablet Take 8 mg by mouth.      oxyCODONE-acetaminophen (PERCOCET)  mg per tablet Take 1 tablet by mouth every 4 (four) hours as needed for Pain. 120 tablet 0     pantoprazole (PROTONIX) 40 MG tablet Take 1 tablet (40 mg total) by mouth once daily. 30 tablet 2    polyethylene glycol (GLYCOLAX) 17 gram PwPk Take 17 g by mouth.      pregabalin (LYRICA) 100 MG capsule Take 1 capsule (100 mg total) by mouth 2 (two) times daily. 60 capsule 6    tamsulosin (FLOMAX) 0.4 mg Cap Take 1 capsule (0.4 mg total) by mouth once daily. 30 capsule 2    TRESIBA FLEXTOUCH U-200 200 unit/mL (3 mL) insulin pen Inject 14 Units into the skin.      vibegron (GEMTESA) 75 mg Tab Take 75 mg by mouth.      midodrine (PROAMATINE) 5 MG Tab Take 1 tablet (5 mg total) by mouth 3 (three) times daily with meals. for 7 days 21 tablet 0    OLANZapine (ZYPREXA) 5 MG tablet Take 1 tablet (5 mg total) by mouth nightly. 4 tablet 6     Current Facility-Administered Medications on File Prior to Visit   Medication Dose Route Frequency Provider Last Rate Last Admin    heparin, porcine (PF) 100 unit/mL injection flush 500 Units  500 Units Intravenous PRN Jena Messina MD   500 Units at 02/04/25 1600    sodium chloride 0.9% flush 10 mL  10 mL Intravenous PRN Jena Messina MD   10 mL at 02/04/25 1600      Review of Systems   Constitutional:  Positive for appetite change and fatigue. Negative for activity change, chills, diaphoresis, fever and unexpected weight change.   HENT:  Positive for trouble swallowing. Negative for nasal congestion, mouth sores, nosebleeds, sinus pressure/congestion and sore throat.    Eyes: Negative.    Respiratory:  Positive for shortness of breath. Negative for cough.    Cardiovascular:  Positive for leg swelling. Negative for chest pain and palpitations.   Gastrointestinal:  Positive for abdominal distention and constipation. Negative for abdominal pain, blood in stool, change in bowel habit, diarrhea, nausea and vomiting.   Endocrine: Negative.    Genitourinary:  Negative for bladder incontinence, decreased urine volume, difficulty urinating, dysuria, frequency, hematuria and  "urgency.   Musculoskeletal:  Positive for back pain. Negative for arthralgias, gait problem, joint swelling, leg pain and myalgias.   Integumentary:  Negative for rash.   Allergic/Immunologic: Negative.  Negative for frequent infections.   Neurological:  Positive for weakness. Negative for dizziness, tremors, syncope, light-headedness, numbness, headaches and memory loss.   Hematological:  Negative for adenopathy. Does not bruise/bleed easily.   Psychiatric/Behavioral:  Negative for agitation, confusion, hallucinations, sleep disturbance and suicidal ideas. The patient is not nervous/anxious.           Vitals:    03/05/25 0933   BP: 109/78   BP Location: Left arm   Patient Position: Sitting   Pulse: 92   Resp: 16   Temp: 98 °F (36.7 °C)   TempSrc: Oral   SpO2: 95%   Height: 5' 9.02" (1.753 m)       Wt Readings from Last 6 Encounters:   03/03/25 65 kg (143 lb 4.8 oz)   02/19/25 65 kg (143 lb 3.2 oz)   02/14/25 62.9 kg (138 lb 10.7 oz)   02/04/25 63.8 kg (140 lb 11.2 oz)   02/04/25 63.8 kg (140 lb 11.2 oz)   01/27/25 72.9 kg (160 lb 11.5 oz)     Body mass index is 21.15 kg/m².  Body surface area is 1.78 meters squared.    Physical Exam  Vitals and nursing note reviewed.   Constitutional:       General: He is not in acute distress.     Appearance: He is ill-appearing.      Comments: thin   HENT:      Head: Normocephalic and atraumatic.      Mouth/Throat:      Mouth: Mucous membranes are moist.   Eyes:      General: Scleral icterus present.   Neck:      Vascular: No JVD.   Cardiovascular:      Rate and Rhythm: Normal rate and regular rhythm.      Heart sounds: No murmur heard.  Pulmonary:      Effort: Pulmonary effort is normal.      Breath sounds: Normal breath sounds. No wheezing or rhonchi.   Abdominal:      General: Bowel sounds are decreased. There is distension.      Tenderness: There is generalized abdominal tenderness.   Musculoskeletal:         General: No swelling or deformity.      Cervical back: Neck supple. "      Right lower leg: Edema present.      Left lower leg: Edema present.   Lymphadenopathy:      Head:      Right side of head: No submandibular adenopathy.      Left side of head: No submandibular adenopathy.      Cervical: No cervical adenopathy.   Skin:     General: Skin is warm.      Coloration: Skin is not jaundiced.      Findings: No rash.   Neurological:      Mental Status: He is alert.   Psychiatric:         Attention and Perception: Attention normal.         Behavior: Behavior is cooperative.       ECOG SCORE         Laboratory:  CBC with Differential:  Lab Results   Component Value Date    WBC 6.01 03/05/2025    RBC 3.52 (L) 03/05/2025    HGB 10.5 (L) 03/05/2025    HCT 30.9 (L) 03/05/2025    MCV 87.8 03/05/2025    MCH 29.8 03/05/2025    MCHC 34.0 03/05/2025    RDW 19.4 (H) 03/05/2025     03/05/2025    MPV 10.8 (H) 03/05/2025      CMP:  Sodium   Date Value Ref Range Status   03/05/2025 132 (L) 136 - 145 mmol/L Final     Potassium   Date Value Ref Range Status   03/05/2025 4.0 3.5 - 5.1 mmol/L Final     Chloride   Date Value Ref Range Status   03/05/2025 101 98 - 107 mmol/L Final     CO2   Date Value Ref Range Status   03/05/2025 25 22 - 29 mmol/L Final     Blood Urea Nitrogen   Date Value Ref Range Status   03/05/2025 21.0 8.4 - 25.7 mg/dL Final   10/20/2023 12 6 - 23 mg/dL Final     Creatinine   Date Value Ref Range Status   03/05/2025 0.81 0.72 - 1.25 mg/dL Final   10/20/2023 0.95 0.67 - 1.17 mg/dL Final     Calcium   Date Value Ref Range Status   03/05/2025 8.4 8.4 - 10.2 mg/dL Final   10/20/2023 8.7 8.4 - 10.2 mg/dL Final     Albumin   Date Value Ref Range Status   03/05/2025 2.8 (L) 3.5 - 5.0 g/dL Final     Bilirubin Total   Date Value Ref Range Status   03/05/2025 1.4 <=1.5 mg/dL Final     ALP   Date Value Ref Range Status   03/05/2025 2,651 (H) 40 - 150 unit/L Final     AST   Date Value Ref Range Status   03/05/2025 198 (H) 5 - 34 unit/L Final     ALT   Date Value Ref Range Status   03/05/2025  141 (H) 0 - 55 unit/L Final      Component 08/28/24 07/17/24 06/26/24 06/21/24 06/12/24 05/29/24   CA 19-9 2,385.0 High  2,150.0 High  1,567.0 High  1,183.0 High  1,000.0 High  959.9 High       Latest Reference Range & Units 09/24/24 08:52 10/23/24 07:57   CA 19-9 0.00 - 37.00 unit/mL 6,202.93 (H) 21,137.60 (H)      Latest Reference Range & Units 11/05/24 13:54 11/19/24 07:47 12/04/24 07:58 12/18/24 07:11 12/26/24 07:57   CA 19-9 0.00 - 37.00 unit/mL 23,324.00 (H) 19,155.71 (H) 31,691.58 (H) 23,541.97 (H) 31,586.90 (H)          Assessment:       1. Malignant neoplasm of head of pancreas         Locally advanced pancreatic cancer   ---8/2/2022 ERCP with metal biliary stent placement   ---Folfirinox (10/2022- 2/2023)  ---capecitabine RT (4/3/23 - 5/16/23)   ---Phase I study 2021-1176 SD-101 at [facility]--SD-101 2 mg in sodium chloride 0.9% (NS) 30 mL IVPB, intravenous x 2 cycles (8/22/2023-11/22/2023)  ---Gemcitabine and paclitaxel protein bound (12/13/2023-- 7/2024)-->clinical progression and GOO  ---s/p biliary stent placement 4/4/2024  ---chemotherapy with gemcitabine/paclitaxel (last dose 6/26/2024)  ---duodenal stent (placed by GI)- 8/7/2024  ---Gemcitabine and CISplatin Every 2 Weeks (8/28/2024-present)  --- Now requiring weekly therapeutic paracentesis, started 1/2/25   Liquid bx 12/31/24; insufficient sample  [facility]/Cis stopped 2/4/25 d/t intolerance; last cycle C6D1 given 12/4/25  --started Xeloda 2/23/25 1000 mg/m2 BID D1-14/21 but   D/C'd on his own accord 2/27/25-he declines further chemo/XRT    H/o Gastric outlet obstruction  ---s/p duodenal stent, severe protein calori malnutrition      Pain, neoplasm related pain  ---continue Percocet, Lyrica, Morphine     elevated ALP  A. follow  Swelling   Bilateral lower extremity swelling and abdominal swelling    Ascites   -Likely malignant   -S/p therapeutic paracentesis-continue prn-consider PleuRx        Plan:       Patient with unresectable advanced pancreatic cancer to  start 4th line treatment with cisplatin and Gemzar and clinically suggestive of progression, now with distended abdomen and recurrent ascites, likely malignant as they reaccumulate rather quick. Restaging scans without significant disease progression but significant amount of ascites. We order liquid biopsy but unfortunately sample was not satisfactory for processing.  He had liquid biopsy in the past and no mutations were identified.    Previously stopped [facility]/Cis due to intolerance     He attempted monotherapy oral Xeloda but D/C'd it 2/27/25 due to ascites/pain symptoms as above. He declines any further chemo or XRT. As mentioned above we discussed hospice but he is not ready yet.  He wants to proceed with palliative care and will call today to set up consult.     Started Xeloda 1000 mg/m2 BID D1-14/21 on 2/23/25 and stopped 2/27/25 as above. He declines any further chemo or XRT.  Standing order for weekly paracentesis therapeutic and diagnostic for abdominal swelling as needed  Liquid bx 12/31/24--insufficient sample  Eliquis 5 mg BID for thrombus  Was due repeat MRI Brain in 3 months, had CT brain without contrast 3/3/25 after being found down at home reported no acute abnormality  Continue morphine and percocet for pain or as per palliative care an he desires consult with them now but holding off on hospice   Plan paracentesis today until PleuRx 3/7/25 for comfort.   CT C/A/P every 3 months--this will be due 5/2025  RTC as scheduled with Dr. Messina-they will call as cancel if he npt able to make or decides to go with hopsice    Pertinent lab and radiology studies were reviewed.     Visit today included increased complexity associated with the care and treatment of  the episodic problem pancreatic cancer, addressing and managing the longitudinal care of the patient's Stage ICC Pancreatic Carcinoma.     Shilpa Dumont, CRISTIANP-C  Oncology/Hematology  [facility]

## 2025-03-07 ENCOUNTER — HOSPITAL ENCOUNTER (OUTPATIENT)
Dept: INTERVENTIONAL RADIOLOGY/VASCULAR | Facility: HOSPITAL | Age: 60
Discharge: HOME | End: 2025-03-07
Payer: MEDICARE

## 2025-03-07 VITALS
SYSTOLIC BLOOD PRESSURE: 91 MMHG | BODY MASS INDEX: 18.22 KG/M2 | HEIGHT: 69 IN | TEMPERATURE: 98 F | RESPIRATION RATE: 18 BRPM | HEART RATE: 67 BPM | DIASTOLIC BLOOD PRESSURE: 67 MMHG | WEIGHT: 123 LBS | OXYGEN SATURATION: 99 %

## 2025-03-07 DIAGNOSIS — R18.8 ASCITES: ICD-10-CM

## 2025-03-07 DIAGNOSIS — C25.0 MALIGNANT NEOPLASM OF HEAD OF PANCREAS: Primary | ICD-10-CM

## 2025-03-07 DIAGNOSIS — C25.0 MALIGNANT NEOPLASM OF HEAD OF PANCREAS: ICD-10-CM

## 2025-03-07 LAB
ALBUMIN SERPL-MCNC: 2.6 G/DL (ref 3.5–5)
ALBUMIN/GLOB SERPL: 0.7 RATIO (ref 1.1–2)
ALP SERPL-CCNC: 2733 UNIT/L (ref 40–150)
ALT SERPL-CCNC: 106 UNIT/L (ref 0–55)
ANION GAP SERPL CALC-SCNC: 4 MEQ/L
AST SERPL-CCNC: 114 UNIT/L (ref 5–34)
BASOPHILS # BLD AUTO: 0.01 X10(3)/MCL
BASOPHILS NFR BLD AUTO: 0.1 %
BILIRUB SERPL-MCNC: 1.4 MG/DL
BUN SERPL-MCNC: 22.3 MG/DL (ref 8.4–25.7)
CALCIUM SERPL-MCNC: 8.3 MG/DL (ref 8.4–10.2)
CHLORIDE SERPL-SCNC: 102 MMOL/L (ref 98–107)
CO2 SERPL-SCNC: 28 MMOL/L (ref 22–29)
CREAT SERPL-MCNC: 0.78 MG/DL (ref 0.72–1.25)
CREAT/UREA NIT SERPL: 29
EOSINOPHIL # BLD AUTO: 0.01 X10(3)/MCL (ref 0–0.9)
EOSINOPHIL NFR BLD AUTO: 0.1 %
ERYTHROCYTE [DISTWIDTH] IN BLOOD BY AUTOMATED COUNT: 19.9 % (ref 11.5–17)
GFR SERPLBLD CREATININE-BSD FMLA CKD-EPI: >60 ML/MIN/1.73/M2
GLOBULIN SER-MCNC: 3.7 GM/DL (ref 2.4–3.5)
GLUCOSE SERPL-MCNC: 161 MG/DL (ref 74–100)
HCT VFR BLD AUTO: 33.7 % (ref 42–52)
HGB BLD-MCNC: 11.4 G/DL (ref 14–18)
IMM GRANULOCYTES # BLD AUTO: 0.02 X10(3)/MCL (ref 0–0.04)
IMM GRANULOCYTES NFR BLD AUTO: 0.3 %
INR PPP: 1.1
LYMPHOCYTES # BLD AUTO: 0.72 X10(3)/MCL (ref 0.6–4.6)
LYMPHOCYTES NFR BLD AUTO: 10 %
MCH RBC QN AUTO: 30.2 PG (ref 27–31)
MCHC RBC AUTO-ENTMCNC: 33.8 G/DL (ref 33–36)
MCV RBC AUTO: 89.4 FL (ref 80–94)
MONOCYTES # BLD AUTO: 0.69 X10(3)/MCL (ref 0.1–1.3)
MONOCYTES NFR BLD AUTO: 9.6 %
NEUTROPHILS # BLD AUTO: 5.75 X10(3)/MCL (ref 2.1–9.2)
NEUTROPHILS NFR BLD AUTO: 79.9 %
NRBC BLD AUTO-RTO: 0 %
PLATELET # BLD AUTO: 198 X10(3)/MCL (ref 130–400)
PMV BLD AUTO: 11.1 FL (ref 7.4–10.4)
POTASSIUM SERPL-SCNC: 4.2 MMOL/L (ref 3.5–5.1)
PROT SERPL-MCNC: 6.3 GM/DL (ref 6.4–8.3)
PROTHROMBIN TIME: 14.4 SECONDS (ref 12.5–14.5)
RBC # BLD AUTO: 3.77 X10(6)/MCL (ref 4.7–6.1)
SODIUM SERPL-SCNC: 134 MMOL/L (ref 136–145)
WBC # BLD AUTO: 7.2 X10(3)/MCL (ref 4.5–11.5)

## 2025-03-07 PROCEDURE — 85025 COMPLETE CBC W/AUTO DIFF WBC: CPT | Performed by: RADIOLOGY

## 2025-03-07 PROCEDURE — 49418 INSERT TUN IP CATH PERC: CPT | Performed by: RADIOLOGY

## 2025-03-07 PROCEDURE — 99152 MOD SED SAME PHYS/QHP 5/>YRS: CPT

## 2025-03-07 PROCEDURE — 63600175 PHARM REV CODE 636 W HCPCS: Performed by: RADIOLOGY

## 2025-03-07 PROCEDURE — 99152 MOD SED SAME PHYS/QHP 5/>YRS: CPT | Mod: ,,, | Performed by: RADIOLOGY

## 2025-03-07 PROCEDURE — 76942 ECHO GUIDE FOR BIOPSY: CPT | Mod: TC

## 2025-03-07 PROCEDURE — 80053 COMPREHEN METABOLIC PANEL: CPT | Performed by: RADIOLOGY

## 2025-03-07 PROCEDURE — 49418 INSERT TUN IP CATH PERC: CPT | Mod: ,,, | Performed by: RADIOLOGY

## 2025-03-07 PROCEDURE — 49418 INSERT TUN IP CATH PERC: CPT

## 2025-03-07 PROCEDURE — 85610 PROTHROMBIN TIME: CPT | Performed by: RADIOLOGY

## 2025-03-07 PROCEDURE — 27201423 OPTIME MED/SURG SUP & DEVICES STERILE SUPPLY

## 2025-03-07 RX ORDER — FENTANYL CITRATE 50 UG/ML
INJECTION, SOLUTION INTRAMUSCULAR; INTRAVENOUS
Status: COMPLETED | OUTPATIENT
Start: 2025-03-07 | End: 2025-03-07

## 2025-03-07 RX ORDER — MIDAZOLAM HYDROCHLORIDE 2 MG/2ML
INJECTION, SOLUTION INTRAMUSCULAR; INTRAVENOUS
Status: COMPLETED | OUTPATIENT
Start: 2025-03-07 | End: 2025-03-07

## 2025-03-07 RX ORDER — CEFAZOLIN SODIUM 1 G/3ML
INJECTION, POWDER, FOR SOLUTION INTRAMUSCULAR; INTRAVENOUS
Status: COMPLETED | OUTPATIENT
Start: 2025-03-07 | End: 2025-03-07

## 2025-03-07 RX ORDER — LIDOCAINE HYDROCHLORIDE 20 MG/ML
INJECTION, SOLUTION INFILTRATION; PERINEURAL
Status: COMPLETED | OUTPATIENT
Start: 2025-03-07 | End: 2025-03-07

## 2025-03-07 RX ADMIN — LIDOCAINE HYDROCHLORIDE 10 ML: 20 INJECTION, SOLUTION INFILTRATION; PERINEURAL at 11:03

## 2025-03-07 RX ADMIN — MIDAZOLAM HYDROCHLORIDE 0.5 MG: 1 INJECTION, SOLUTION INTRAMUSCULAR; INTRAVENOUS at 11:03

## 2025-03-07 RX ADMIN — FENTANYL CITRATE 25 MCG: 50 INJECTION INTRAMUSCULAR; INTRAVENOUS at 11:03

## 2025-03-07 RX ADMIN — CEFAZOLIN 2 G: 330 INJECTION, POWDER, FOR SOLUTION INTRAMUSCULAR; INTRAVENOUS at 11:03

## 2025-03-07 NOTE — INTERVAL H&P NOTE
The patient has been examined and the H&P has been reviewed:    I concur with the findings and no changes have occurred since H&P was written. Warren P Lejeune is a 60 y.o. male with Pancreatic Cancer with Ascites who presents for Abdominal Pleurex Placement.           There are no hospital problems to display for this patient.

## 2025-03-07 NOTE — DISCHARGE SUMMARY
Radiology Post-Procedure Note    Pre Op Diagnosis: Ascites    Post Op Diagnosis: Same    Secondary Diagnoses:   Problem List Items Addressed This Visit       Pancreatic cancer    Relevant Orders    IR Peritoneal Tunneled Cath without port        Procedure: US & Fluoro Guided Abdominal Pleurex Placement    Procedure performed by: Jerome Avalos MD    Assistant: Skinny    Written Informed Consent Obtained: Yes    Specimen Removed: None    Estimated Blood Loss: <10 cc    Condition: Stable    Outcome: The patient tolerated the procedure well and was without complications.    For further details please see the imaging report associated.    Disposition: Home or Self Care    Follow Up: With primary care provider    Discharge Instructions:  No discharge procedures on file.       Time Spent On Discharge: 2 minutes

## 2025-03-08 LAB
BACTERIA FLD CULT: NORMAL
BACTERIA SPEC ANAEROBE CULT: NORMAL

## 2025-03-10 ENCOUNTER — DOCUMENTATION ONLY (OUTPATIENT)
Dept: HEMATOLOGY/ONCOLOGY | Facility: CLINIC | Age: 60
End: 2025-03-10
Payer: MEDICARE

## 2025-03-10 LAB — POCT GLUCOSE: 174 MG/DL (ref 70–110)

## 2025-03-10 NOTE — PROGRESS NOTES
Patient was admitted to [facility] on Friday (03/07/2025)

## (undated) DEVICE — SPONGE COTTON TRAY 4X4IN

## (undated) DEVICE — SOL NORMAL USPCA 0.9%

## (undated) DEVICE — COVER C-ARM STRAP BAND 44X80IN

## (undated) DEVICE — NDL PERC ENTRY BSDN 18-7.0

## (undated) DEVICE — GLOVE SENSICARE PI GRN 6.5

## (undated) DEVICE — SPONGE LAP 18X18 PREWASHED

## (undated) DEVICE — BOWL STERILE LG GRAD 32OZ

## (undated) DEVICE — DRESSING TRANS 4X4 TEGADERM

## (undated) DEVICE — ELECTRODE PATIENT RETURN DISP

## (undated) DEVICE — NDL HYPO REG 25G X 1 1/2

## (undated) DEVICE — COVER PROBE US 5.5X58L NON LTX

## (undated) DEVICE — BLADE SURG STAINLESS STEEL #11

## (undated) DEVICE — GLOVE SIGNATURE ESSNTL LTX 7

## (undated) DEVICE — ADHESIVE DERMABOND ADVANCED

## (undated) DEVICE — GLOVE SENSICARE PI SURG 6.5

## (undated) DEVICE — SUPPORT ULNA NERVE PROTECTOR

## (undated) DEVICE — SYR SLIP TIP 10ML SHIELD

## (undated) DEVICE — SUT MCRYL PLUS 4-0 PS2 27IN

## (undated) DEVICE — Device

## (undated) DEVICE — NDL SYR 10ML 18X1.5 LL BLUNT

## (undated) DEVICE — BAG MEDI-PLAST DECANTER C-FLOW

## (undated) DEVICE — SUT VICRYL 3-0 27 SH

## (undated) DEVICE — SUT CTD VICRYL 3-0 CR/SH